# Patient Record
Sex: MALE | Race: WHITE | NOT HISPANIC OR LATINO | Employment: OTHER | URBAN - METROPOLITAN AREA
[De-identification: names, ages, dates, MRNs, and addresses within clinical notes are randomized per-mention and may not be internally consistent; named-entity substitution may affect disease eponyms.]

---

## 2020-01-13 LAB
CREAT ?TM UR-SCNC: 45.9 UMOL/L
HBA1C MFR BLD HPLC: 7.6 %
MICROALBUMIN/CREAT UR: <6.5 MG/G{CREAT}

## 2020-06-11 ENCOUNTER — OFFICE VISIT (OUTPATIENT)
Dept: FAMILY MEDICINE CLINIC | Facility: CLINIC | Age: 70
End: 2020-06-11
Payer: MEDICARE

## 2020-06-11 VITALS
HEART RATE: 76 BPM | SYSTOLIC BLOOD PRESSURE: 112 MMHG | TEMPERATURE: 98.7 F | OXYGEN SATURATION: 95 % | WEIGHT: 207 LBS | BODY MASS INDEX: 25.74 KG/M2 | HEIGHT: 75 IN | RESPIRATION RATE: 16 BRPM | DIASTOLIC BLOOD PRESSURE: 70 MMHG

## 2020-06-11 DIAGNOSIS — I35.0 AORTIC VALVE STENOSIS, ETIOLOGY OF CARDIAC VALVE DISEASE UNSPECIFIED: ICD-10-CM

## 2020-06-11 DIAGNOSIS — Z12.5 SCREENING FOR PROSTATE CANCER: ICD-10-CM

## 2020-06-11 DIAGNOSIS — E78.1 HYPERTRIGLYCERIDEMIA: ICD-10-CM

## 2020-06-11 DIAGNOSIS — Z12.11 COLON CANCER SCREENING: ICD-10-CM

## 2020-06-11 DIAGNOSIS — I25.118 CORONARY ARTERY DISEASE OF NATIVE ARTERY OF NATIVE HEART WITH STABLE ANGINA PECTORIS (HCC): ICD-10-CM

## 2020-06-11 DIAGNOSIS — E11.9 TYPE 2 DIABETES MELLITUS WITHOUT COMPLICATION, WITHOUT LONG-TERM CURRENT USE OF INSULIN (HCC): Primary | ICD-10-CM

## 2020-06-11 DIAGNOSIS — Z95.1 S/P CABG (CORONARY ARTERY BYPASS GRAFT): ICD-10-CM

## 2020-06-11 PROBLEM — I10 HTN (HYPERTENSION): Status: RESOLVED | Noted: 2019-07-26 | Resolved: 2020-06-11

## 2020-06-11 PROBLEM — N43.2 OTHER HYDROCELE: Status: ACTIVE | Noted: 2020-06-11

## 2020-06-11 PROBLEM — I10 HTN (HYPERTENSION): Status: ACTIVE | Noted: 2019-07-26

## 2020-06-11 PROCEDURE — 3074F SYST BP LT 130 MM HG: CPT | Performed by: NURSE PRACTITIONER

## 2020-06-11 PROCEDURE — 3078F DIAST BP <80 MM HG: CPT | Performed by: NURSE PRACTITIONER

## 2020-06-11 PROCEDURE — 99204 OFFICE O/P NEW MOD 45 MIN: CPT | Performed by: NURSE PRACTITIONER

## 2020-06-11 PROCEDURE — 1160F RVW MEDS BY RX/DR IN RCRD: CPT | Performed by: NURSE PRACTITIONER

## 2020-06-11 PROCEDURE — 1036F TOBACCO NON-USER: CPT | Performed by: NURSE PRACTITIONER

## 2020-06-11 RX ORDER — DULAGLUTIDE 1.5 MG/.5ML
INJECTION, SOLUTION SUBCUTANEOUS
COMMUNITY
Start: 2020-06-08 | End: 2021-02-23 | Stop reason: SDUPTHER

## 2020-06-11 RX ORDER — EMPAGLIFLOZIN 25 MG/1
TABLET, FILM COATED ORAL
COMMUNITY
Start: 2020-06-01 | End: 2020-06-12 | Stop reason: SDUPTHER

## 2020-06-11 RX ORDER — ACETAMINOPHEN 500 MG
500 TABLET ORAL
COMMUNITY

## 2020-06-11 RX ORDER — ASPIRIN 81 MG/1
81 TABLET, CHEWABLE ORAL DAILY
COMMUNITY
End: 2022-04-08 | Stop reason: HOSPADM

## 2020-06-11 RX ORDER — MULTIVIT WITH MINERALS/LUTEIN
500 TABLET ORAL 2 TIMES DAILY
COMMUNITY

## 2020-06-11 RX ORDER — OMEGA-3-ACID ETHYL ESTERS 1 G/1
2000 CAPSULE, LIQUID FILLED ORAL 2 TIMES DAILY
COMMUNITY
Start: 2019-10-24 | End: 2020-11-02

## 2020-06-11 RX ORDER — ROSUVASTATIN CALCIUM 10 MG/1
10 TABLET, COATED ORAL
COMMUNITY
Start: 2019-08-06 | End: 2020-11-02 | Stop reason: SDUPTHER

## 2020-06-11 RX ORDER — FINASTERIDE 5 MG/1
5 TABLET, FILM COATED ORAL DAILY
COMMUNITY
End: 2021-05-18 | Stop reason: SDUPTHER

## 2020-06-11 RX ORDER — CHLORTHALIDONE 25 MG/1
25 TABLET ORAL
COMMUNITY
End: 2020-11-02 | Stop reason: SDUPTHER

## 2020-06-11 RX ORDER — METOPROLOL SUCCINATE 50 MG/1
TABLET, EXTENDED RELEASE ORAL
COMMUNITY
Start: 2020-06-08 | End: 2020-11-02 | Stop reason: SDUPTHER

## 2020-06-11 RX ORDER — MELATONIN
1000 DAILY
COMMUNITY
End: 2022-02-24

## 2020-06-11 RX ORDER — NIACIN 500 MG
500 TABLET ORAL 4 TIMES DAILY
COMMUNITY

## 2020-06-12 ENCOUNTER — TELEPHONE (OUTPATIENT)
Dept: FAMILY MEDICINE CLINIC | Facility: CLINIC | Age: 70
End: 2020-06-12

## 2020-06-12 DIAGNOSIS — E11.9 TYPE 2 DIABETES MELLITUS WITHOUT COMPLICATION, WITHOUT LONG-TERM CURRENT USE OF INSULIN (HCC): Primary | ICD-10-CM

## 2020-06-12 DIAGNOSIS — E11.9 TYPE 2 DIABETES MELLITUS WITHOUT COMPLICATION, WITHOUT LONG-TERM CURRENT USE OF INSULIN (HCC): ICD-10-CM

## 2020-06-12 RX ORDER — EMPAGLIFLOZIN 25 MG/1
25 TABLET, FILM COATED ORAL DAILY
Qty: 90 TABLET | Refills: 3 | Status: SHIPPED | OUTPATIENT
Start: 2020-06-12 | End: 2020-06-12 | Stop reason: SDUPTHER

## 2020-06-12 RX ORDER — EMPAGLIFLOZIN 25 MG/1
25 TABLET, FILM COATED ORAL DAILY
Qty: 90 TABLET | Refills: 3 | Status: SHIPPED | OUTPATIENT
Start: 2020-06-12 | End: 2020-09-08 | Stop reason: SDUPTHER

## 2020-07-06 ENCOUNTER — OFFICE VISIT (OUTPATIENT)
Dept: PODIATRY | Facility: CLINIC | Age: 70
End: 2020-07-06
Payer: MEDICARE

## 2020-07-06 VITALS
SYSTOLIC BLOOD PRESSURE: 125 MMHG | TEMPERATURE: 98.3 F | WEIGHT: 207 LBS | HEART RATE: 66 BPM | BODY MASS INDEX: 25.74 KG/M2 | DIASTOLIC BLOOD PRESSURE: 79 MMHG | HEIGHT: 75 IN

## 2020-07-06 DIAGNOSIS — M20.42 HAMMER TOES OF BOTH FEET: ICD-10-CM

## 2020-07-06 DIAGNOSIS — E11.42 DIABETIC POLYNEUROPATHY ASSOCIATED WITH TYPE 2 DIABETES MELLITUS (HCC): Primary | ICD-10-CM

## 2020-07-06 DIAGNOSIS — M20.41 HAMMER TOES OF BOTH FEET: ICD-10-CM

## 2020-07-06 PROCEDURE — 99203 OFFICE O/P NEW LOW 30 MIN: CPT | Performed by: PODIATRIST

## 2020-07-06 PROCEDURE — 1160F RVW MEDS BY RX/DR IN RCRD: CPT | Performed by: PODIATRIST

## 2020-07-06 PROCEDURE — 1036F TOBACCO NON-USER: CPT | Performed by: PODIATRIST

## 2020-07-06 PROCEDURE — 3008F BODY MASS INDEX DOCD: CPT | Performed by: PODIATRIST

## 2020-07-06 PROCEDURE — 3074F SYST BP LT 130 MM HG: CPT | Performed by: PODIATRIST

## 2020-07-06 PROCEDURE — 3078F DIAST BP <80 MM HG: CPT | Performed by: PODIATRIST

## 2020-07-06 RX ORDER — AMOXICILLIN 500 MG/1
TABLET, FILM COATED ORAL AS NEEDED
COMMUNITY
Start: 2020-06-22 | End: 2021-02-23

## 2020-07-06 NOTE — PROGRESS NOTES
Assessment/Plan:     Diagnoses and all orders for this visit:    Diabetic polyneuropathy associated with type 2 diabetes mellitus ([address] )    Hammer toes of both feet  Comments:  no acute concerns, soft shoes, monitor for wounds  Other orders  -     amoxicillin (AMOXIL) 500 MG tablet; as needed for other      -Educated on DM risk to lower extremities, proper shoe gear, and daily monitoring of feet    -Educated on A1C and the risks of poorly controlled Diabetes   -Discussed weight loss and suitable exercise regiment  - DM foot risk is moderate due to deformity, neuropathy  At risk foot care every 10-12 weeks  (Q9)    Subjective:      Patient ID: Maria Isabel Ch is a 65 y o  male  Patient presents for DM foot exam  He recently moved to the area  HE had been going to a podiatrist regularly for his feet  His A1C is 7 4  He has DM 2 for 15 years  He says he has "broderline" neuropathy  HE gets pins and needles in his feet, stronger at night  He takes folic acid which seems to help  His nails are thick and discolored, his old podiatrist usually derbides the nails  PMH significant for CABG, DM2, AVR bovine  The following portions of the patient's history were reviewed and updated as appropriate: allergies, current medications, past family history, past medical history, past social history, past surgical history and problem list     Review of Systems   Constitutional: Negative  HENT: Negative for sinus pressure and sinus pain  Respiratory: Negative for cough and shortness of breath  Cardiovascular: Negative for leg swelling  Gastrointestinal: Negative for diarrhea and nausea  Musculoskeletal: Negative for arthralgias and gait problem  Skin: Positive for color change  Negative for wound  Neurological: Positive for numbness           Objective:      /79   Pulse 66   Temp 98 3 °F (36 8 °C) (Tympanic)   Ht 6' 3" (1 905 m)   Wt 93 9 kg (207 lb)   BMI 25 87 kg/m²          Physical Exam Constitutional: He is oriented to person, place, and time  He appears well-developed  No distress  Cardiovascular: Intact distal pulses  Pulses are no weak pulses  Pulses:       Dorsalis pedis pulses are 2+ on the right side, and 2+ on the left side  Posterior tibial pulses are 2+ on the right side, and 2+ on the left side  Pulmonary/Chest: Effort normal  No respiratory distress  Musculoskeletal: He exhibits edema (1 LE edema B/L, no pitting) and deformity (hammertoes)  He exhibits no tenderness  Right foot: There is deformity  Left foot: There is deformity  Feet:    Feet:   Right Foot:   Protective Sensation: 10 sites tested  7 sites sensed  Skin Integrity: Positive for dry skin  Negative for ulcer, blister, skin breakdown, erythema or callus  Left Foot:   Protective Sensation: 10 sites tested  7 sites sensed  Skin Integrity: Positive for dry skin  Negative for ulcer, blister, skin breakdown, erythema or callus  Neurological: He is alert and oriented to person, place, and time  A sensory deficit is present  Coordination normal    Skin: Skin is warm  Capillary refill takes less than 2 seconds  No rash noted  He is not diaphoretic  Psychiatric: He has a normal mood and affect  Vitals reviewed  Diabetic Foot Exam    Patient's shoes and socks removed  Right Foot/Ankle   Right Foot Inspection  Skin Exam: skin intact, dry skin and abnormal color no blister, no callus, no erythema, no maceration, no ulcer and no callus                          Toe Exam: right toe deformity  Sensory   Vibration: diminished  Proprioception: intact   Monofilament testing: diminished  Vascular  Capillary refills: < 3 seconds  The right DP pulse is 2+  The right PT pulse is 2+     Right Toe  - Comprehensive Exam  Hammertoes: second toe and third toe    Left Foot/Ankle  Left Foot Inspection  Skin Exam: skin intact, dry skin and abnormal color (nails thick yellow, subungual debris, hypertorphy x9)no erythema, no maceration, no ulcer and no callus                         Toe Exam: left toe deformity                   Sensory   Vibration: diminished  Proprioception: intact  Monofilament: diminished  Vascular  Capillary refills: < 3 seconds  The left DP pulse is 2+  The left PT pulse is 2+  Left Toe  - Comprehensive Exam  Hammertoes: second toe and third toe  Assign Risk Category:  Deformity present; Loss of protective sensation;  No weak pulses       Risk: 2

## 2020-07-06 NOTE — PATIENT INSTRUCTIONS
Foot Care for People with Diabetes   WHAT YOU NEED TO KNOW:   · Foot care helps protect your feet and prevent foot ulcers or sores  Long-term high blood sugar levels can damage the blood vessels and nerves in your legs and feet  This damage makes it hard to feel pressure, pain, temperature, and touch  You may not be able to feel a cut or sore, or shoes that are too tight  Foot care is needed to prevent serious problems, such as an infection or amputation  · Diabetes may cause your toes to become crooked or curved under  These changes may affect the way you walk and can lead to increased pressure on your foot  The pressure can decrease blood flow to your feet  Lack of blood flow increases your risk for a foot ulcer  Do not ignore small problems, such as dry skin or small wounds  These can become life-threatening over time without proper care  DISCHARGE INSTRUCTIONS:   Contact your healthcare provider if:   · Your feet become numb, weak, or hard to move  · You have pus draining from a sore on your foot  · You have a wound on your foot that gets bigger, deeper, or does not heal      · You see blisters, cuts, scratches, calluses, or sores on your foot  · You have a fever, and your feet become red, warm, and swollen  · Your toenails become thick, curled, or yellow  · You find it hard to check your feet because your vision is poor  · You have questions or concerns about your condition or care  Foot care:   · Check your feet each day  Look at your whole foot, including the bottom, and between and under your toes  Check for wounds, corns, and calluses  Use a mirror to see the bottom of your feet  The skin on your feet may be shiny, tight, or darker than normal  Your feet may also be cold and pale  Feel your feet by running your hands along the tops, bottoms, sides, and between your toes  Redness, swelling, and warmth are signs of blood flow problems that can lead to a foot ulcer   Do not try to remove corns or calluses yourself  · Wash your feet each day with soap and warm water  Do not use hot water, because this can injure your foot  Dry your feet gently with a towel after you wash them  Dry between and under your toes  · Apply lotion or a moisturizer on your dry feet  Ask your healthcare provider what lotions are best to use  Do not put lotion or moisturizer between your toes  · Cut your toenails correctly  File or cut your toenails straight across  Use a soft brush to clean around your toenails  If your toenails are very thick, you may need to have a healthcare provider or specialist cut them  · Protect your feet  Do not walk barefoot or wear your shoes without socks  Check your shoes for rocks or other objects that can hurt your feet  Wear cotton socks to help keep your feet dry  Wear socks without toe seams, or wear them with the seams inside out  Change your socks each day  Do not wear socks that are dirty or damp  · Wear shoes that fit well  Wear shoes that do not rub against any area of your feet  Your shoes should be ½ to ¾ inch (1 to 2 centimeters) longer than your feet  Your shoes should also have extra space around the widest part of your feet  Walking or athletic shoes with laces or straps that adjust are best  Ask your healthcare provider for help to choose shoes that fit you best  Ask him if you need to wear an insert, orthotic, or bandage on your feet  Follow up with your healthcare provider or foot specialist as directed: You will need to have your feet checked at least once a year  You may need a foot exam more often if you have nerve damage, foot deformities, or ulcers  Write down your questions so you remember to ask them during your visits  © 2017 [address] [employer] Information is for End User's use only and may not be sold, redistributed or otherwise used for commercial purposes   All illustrations and images included in CareNotes® are the copyrighted property of [employer]  or [facility]  The above information is an  only  It is not intended as medical advice for individual conditions or treatments  Talk to your doctor, nurse or pharmacist before following any medical regimen to see if it is safe and effective for you

## 2020-07-20 LAB
CREAT ?TM UR-SCNC: 0.2 UMOL/L
EXT MICROALBUMIN URINE RANDOM: 49
HBA1C MFR BLD HPLC: 7.4 %
MICROALBUMIN/CREAT UR: 4 MG/G{CREAT}

## 2020-08-11 ENCOUNTER — TELEPHONE (OUTPATIENT)
Dept: FAMILY MEDICINE CLINIC | Facility: CLINIC | Age: 70
End: 2020-08-11

## 2020-08-11 NOTE — TELEPHONE ENCOUNTER
Patient called and wanted to know if Patty Couch can put in an order for a urologist        Patient wanted to know if we have more samples of the 1 5 mg trulicity   Please advise

## 2020-08-12 DIAGNOSIS — H25.9 SENILE CATARACT, UNSPECIFIED AGE-RELATED CATARACT TYPE, UNSPECIFIED LATERALITY: ICD-10-CM

## 2020-08-12 DIAGNOSIS — N43.2 OTHER HYDROCELE: Primary | ICD-10-CM

## 2020-08-12 NOTE — TELEPHONE ENCOUNTER
I sent referral for urology  You can give him name and number    Ask rony, we have tons of trulicity  Please give him some

## 2020-09-08 DIAGNOSIS — E11.9 TYPE 2 DIABETES MELLITUS WITHOUT COMPLICATION, WITHOUT LONG-TERM CURRENT USE OF INSULIN (HCC): ICD-10-CM

## 2020-09-08 RX ORDER — EMPAGLIFLOZIN 25 MG/1
25 TABLET, FILM COATED ORAL DAILY
Qty: 30 TABLET | Refills: 1 | Status: SHIPPED | OUTPATIENT
Start: 2020-09-08 | End: 2021-02-23 | Stop reason: SDUPTHER

## 2020-09-18 ENCOUNTER — TELEPHONE (OUTPATIENT)
Dept: FAMILY MEDICINE CLINIC | Facility: CLINIC | Age: 70
End: 2020-09-18

## 2020-09-18 DIAGNOSIS — R00.2 PALPITATIONS: ICD-10-CM

## 2020-09-18 DIAGNOSIS — Z95.1 S/P CABG (CORONARY ARTERY BYPASS GRAFT): Primary | ICD-10-CM

## 2020-09-18 NOTE — TELEPHONE ENCOUNTER
Jus can't see the Cardiologist until 11/02/20 & they would like him to wear a Holter Monitor since he has been having palpitations @ night  They asked if Lily Ram could order it & Atif Rojo said that she could  Please let the patient know once the order is in so they can schedule it  Thank you!

## 2020-09-21 ENCOUNTER — TELEPHONE (OUTPATIENT)
Dept: UROLOGY | Facility: MEDICAL CENTER | Age: 70
End: 2020-09-21

## 2020-09-22 ENCOUNTER — CONSULT (OUTPATIENT)
Dept: ENDOCRINOLOGY | Facility: CLINIC | Age: 70
End: 2020-09-22
Payer: MEDICARE

## 2020-09-22 VITALS
SYSTOLIC BLOOD PRESSURE: 118 MMHG | BODY MASS INDEX: 26.18 KG/M2 | HEIGHT: 74 IN | HEART RATE: 66 BPM | TEMPERATURE: 97 F | DIASTOLIC BLOOD PRESSURE: 60 MMHG | WEIGHT: 204 LBS

## 2020-09-22 DIAGNOSIS — E11.65 TYPE 2 DIABETES MELLITUS WITH HYPERGLYCEMIA, WITHOUT LONG-TERM CURRENT USE OF INSULIN (HCC): Primary | ICD-10-CM

## 2020-09-22 DIAGNOSIS — I10 ESSENTIAL HYPERTENSION: ICD-10-CM

## 2020-09-22 DIAGNOSIS — E78.2 MIXED HYPERLIPIDEMIA: ICD-10-CM

## 2020-09-22 PROCEDURE — 99204 OFFICE O/P NEW MOD 45 MIN: CPT | Performed by: INTERNAL MEDICINE

## 2020-09-22 NOTE — PROGRESS NOTES
Bernadette Pina 65 y o  male MRN: [id number]    Encounter: [id number]      Assessment/Plan     Assessment: This is a 65y o -year-old male with diabetes with hyperglycemia  Plan:    Diagnoses and all orders for this visit:    Type 2 diabetes mellitus with hyperglycemia, without long-term current use of insulin ([facility] )  Lab Results   Component Value Date    HGBA1C 7 4 07/20/2020    A1c 7 4%, goal is 7 0%  Fasting blood sugars are usually elevated in 150-160 range, most likely from eating snack at bedtime, such as Grapes and ice cream   Discussed with patient to avoid high carbohydrate snack at bedtime, in switched to more protein snacks  Also offered appointment with nutrition therapy however patient declined  Will continue current regimen  Discussed to take metformin 1000 mg with breakfast and 1000 mg with dinner, continue Jardiance and Trulicity     -     Ambulatory referral to Endocrinology  -     Basic metabolic panel; Future  -     Hemoglobin A1C; Future    Mixed hyperlipidemia  Continue statins as per Cardiology, patient is also taking fish oil and Niacin     Essential hypertension    Blood pressure well controlled  Continue current management      CC: Diabetes    History of Present Illness     HPI:    Bernadette Pina is 68-year-old gentleman with medical history of type 2 diabetes managed on oral medication, coronary artery disease, status post CABG in 2019, hyperlipidemia, hypertension history of aortic stenosis status post surgery is here for establishment of visit for type 2 diabetes management  Currently he is taking metformin 500 mg 4 times daily,,Jardiance 25 mg daily, trulicity 1 5 mg once a week   For hyperlipidemia he takes Crestor 10 mg daily, Lovaza 2000 mg twice daily  For hypertension his currently taking metoprolol XL 50 mg daily, aspirin 81 mg daily, chlorthalidone 25 mg daily  He also has vitamin-D deficiency and currently taking vitamin-D 3 1000 International Units daily      He checks blood sugars once or twice daily  His blood sugars are usually ranging in 100-200 range  He visits Ophthalmology on yearly basis, denies retinopathy  He denies diabetic neuropathy, follows with Podiatry  His last A1c is  Lab Results   Component Value Date    HGBA1C 7 4 07/20/2020          Review of Systems   Constitutional: Negative for activity change, diaphoresis, fatigue, fever and unexpected weight change  HENT: Negative  Eyes: Negative for visual disturbance  Respiratory: Negative for cough, chest tightness and shortness of breath  Cardiovascular: Negative for chest pain, palpitations and leg swelling  Gastrointestinal: Negative for abdominal pain, blood in stool, constipation, diarrhea, nausea and vomiting  Endocrine: Negative for cold intolerance, heat intolerance, polydipsia, polyphagia and polyuria  Genitourinary: Negative for dysuria, enuresis, frequency and urgency  Musculoskeletal: Negative for arthralgias and myalgias  Skin: Negative for pallor, rash and wound  Allergic/Immunologic: Negative  Neurological: Negative for dizziness, tremors, weakness and numbness  Hematological: Negative  Psychiatric/Behavioral: Negative          Historical Information   Past Medical History:   Diagnosis Date    Diabetes mellitus ([facility] )     TMJ (dislocation of temporomandibular joint)      Past Surgical History:   Procedure Laterality Date    CARDIAC SURGERY      EYE SURGERY  03/2020    Cataract right eye    TONSILLECTOMY       Social History   Social History     Substance and Sexual Activity   Alcohol Use Yes    Frequency: 2-4 times a month     Social History     Substance and Sexual Activity   Drug Use Never     Social History     Tobacco Use   Smoking Status Never Smoker   Smokeless Tobacco Never Used     Family History:   Family History   Problem Relation Age of Onset    Heart disease Mother     Cancer Mother     Heart disease Father     Hypertension Father Meds/Allergies   Current Outpatient Medications   Medication Sig Dispense Refill    acetaminophen (TYLENOL) 500 mg tablet Take 500 mg by mouth daily at bedtime      ascorbic acid (VITAMIN C) 250 mg tablet Take 500 mg by mouth 2 (two) times a day      aspirin 81 mg chewable tablet Chew 81 mg daily      chlorthalidone 25 mg tablet Take 25 mg by mouth      cholecalciferol (VITAMIN D3) 1,000 units tablet Take 1,000 Units by mouth daily      finasteride (PROSCAR) 5 mg tablet Take 5 mg by mouth daily      Jardiance 25 MG TABS Take 1 tablet (25 mg total) by mouth daily 30 tablet 1    metFORMIN (GLUCOPHAGE) 500 mg tablet Take 1,000 mg by mouth 2 (two) times a day      metoprolol succinate (TOPROL-XL) 50 mg 24 hr tablet       niacin 500 mg tablet Take 500 mg by mouth 4 (four) times a day      omega-3-acid ethyl esters (Lovaza) 1 g capsule Take 2,000 mg by mouth 2 (two) times a day      PROBIOTIC PRODUCT PO Take 111 mg by mouth daily      rosuvastatin (CRESTOR) 10 MG tablet Take 10 mg by mouth      TRULICITY 1 5 ZX/5 0ZZ SOPN       amoxicillin (AMOXIL) 500 MG tablet as needed for other      NON FORMULARY        No current facility-administered medications for this visit  Allergies   Allergen Reactions    Statins      Other reaction(s): Muscle Cramps        Objective   Vitals: Blood pressure 118/60, pulse 66, temperature (!) 97 °F (36 1 °C), height 6' 2" (1 88 m), weight 92 5 kg (204 lb)  Physical Exam  Vitals signs reviewed  Constitutional:       General: He is not in acute distress  Appearance: He is well-developed  HENT:      Head: Normocephalic and atraumatic  Eyes:      Pupils: Pupils are equal, round, and reactive to light  Neck:      Musculoskeletal: Normal range of motion and neck supple  Thyroid: No thyromegaly  Cardiovascular:      Rate and Rhythm: Normal rate and regular rhythm  Heart sounds: Normal heart sounds     Pulmonary:      Effort: Pulmonary effort is normal  No respiratory distress  Breath sounds: Normal breath sounds  Musculoskeletal: Normal range of motion  General: No deformity  Skin:     General: Skin is warm and dry  Findings: No erythema  Neurological:      General: No focal deficit present  Mental Status: He is alert and oriented to person, place, and time  Psychiatric:         Mood and Affect: Mood normal          Behavior: Behavior normal          The history was obtained from the review of the chart, patient  Lab Results:   Lab Results   Component Value Date/Time    Hemoglobin A1C 7 4 07/20/2020           Imaging Studies: I have personally reviewed pertinent reports  Portions of the record may have been created with voice recognition software  Occasional wrong word or "sound a like" substitutions may have occurred due to the inherent limitations of voice recognition software  Read the chart carefully and recognize, using context, where substitutions have occurred

## 2020-10-05 ENCOUNTER — OFFICE VISIT (OUTPATIENT)
Dept: PODIATRY | Facility: CLINIC | Age: 70
End: 2020-10-05
Payer: MEDICARE

## 2020-10-05 VITALS
DIASTOLIC BLOOD PRESSURE: 80 MMHG | HEART RATE: 72 BPM | HEIGHT: 75 IN | TEMPERATURE: 96.8 F | SYSTOLIC BLOOD PRESSURE: 123 MMHG | WEIGHT: 205 LBS | BODY MASS INDEX: 25.49 KG/M2

## 2020-10-05 DIAGNOSIS — E11.42 DIABETIC POLYNEUROPATHY ASSOCIATED WITH TYPE 2 DIABETES MELLITUS (HCC): Primary | ICD-10-CM

## 2020-10-05 DIAGNOSIS — B35.1 ONYCHOMYCOSIS: ICD-10-CM

## 2020-10-05 PROCEDURE — 11721 DEBRIDE NAIL 6 OR MORE: CPT | Performed by: PODIATRIST

## 2020-10-07 ENCOUNTER — TRANSCRIBE ORDERS (OUTPATIENT)
Dept: LAB | Facility: AMBULARY SURGERY CENTER | Age: 70
End: 2020-10-07

## 2020-10-07 ENCOUNTER — APPOINTMENT (OUTPATIENT)
Dept: LAB | Facility: AMBULARY SURGERY CENTER | Age: 70
End: 2020-10-07
Payer: MEDICARE

## 2020-10-07 DIAGNOSIS — E11.9 DIABETES MELLITUS WITHOUT COMPLICATION (HCC): ICD-10-CM

## 2020-10-07 DIAGNOSIS — E11.9 DIABETES MELLITUS WITHOUT COMPLICATION (HCC): Primary | ICD-10-CM

## 2020-10-07 LAB
ANION GAP SERPL CALCULATED.3IONS-SCNC: 5 MMOL/L (ref 4–13)
BUN SERPL-MCNC: 24 MG/DL (ref 5–25)
CALCIUM SERPL-MCNC: 10.1 MG/DL (ref 8.3–10.1)
CHLORIDE SERPL-SCNC: 103 MMOL/L (ref 100–108)
CO2 SERPL-SCNC: 31 MMOL/L (ref 21–32)
CREAT SERPL-MCNC: 1.12 MG/DL (ref 0.6–1.3)
GFR SERPL CREATININE-BSD FRML MDRD: 65 ML/MIN/1.73SQ M
GLUCOSE P FAST SERPL-MCNC: 163 MG/DL (ref 65–99)
POTASSIUM SERPL-SCNC: 4 MMOL/L (ref 3.5–5.3)
SODIUM SERPL-SCNC: 139 MMOL/L (ref 136–145)

## 2020-10-07 PROCEDURE — 36415 COLL VENOUS BLD VENIPUNCTURE: CPT

## 2020-10-07 PROCEDURE — 80048 BASIC METABOLIC PNL TOTAL CA: CPT

## 2020-10-12 ENCOUNTER — HOSPITAL ENCOUNTER (OUTPATIENT)
Dept: NON INVASIVE DIAGNOSTICS | Facility: CLINIC | Age: 70
Discharge: HOME | End: 2020-10-12
Payer: MEDICARE

## 2020-10-12 DIAGNOSIS — R00.2 PALPITATIONS: ICD-10-CM

## 2020-10-12 DIAGNOSIS — Z95.1 S/P CABG (CORONARY ARTERY BYPASS GRAFT): ICD-10-CM

## 2020-10-12 PROCEDURE — 93226 XTRNL ECG REC<48 HR SCAN A/R: CPT

## 2020-10-12 PROCEDURE — 93225 XTRNL ECG REC<48 HRS REC: CPT

## 2020-10-13 ENCOUNTER — OFFICE VISIT (OUTPATIENT)
Dept: FAMILY MEDICINE CLINIC | Facility: CLINIC | Age: 70
End: 2020-10-13
Payer: MEDICARE

## 2020-10-13 VITALS
WEIGHT: 206.4 LBS | HEIGHT: 75 IN | SYSTOLIC BLOOD PRESSURE: 129 MMHG | BODY MASS INDEX: 25.66 KG/M2 | OXYGEN SATURATION: 98 % | HEART RATE: 69 BPM | DIASTOLIC BLOOD PRESSURE: 68 MMHG | TEMPERATURE: 96 F

## 2020-10-13 DIAGNOSIS — I25.118 CORONARY ARTERY DISEASE OF NATIVE ARTERY OF NATIVE HEART WITH STABLE ANGINA PECTORIS (HCC): ICD-10-CM

## 2020-10-13 DIAGNOSIS — Z00.00 MEDICARE ANNUAL WELLNESS VISIT, SUBSEQUENT: Primary | ICD-10-CM

## 2020-10-13 DIAGNOSIS — Z95.1 S/P CABG (CORONARY ARTERY BYPASS GRAFT): ICD-10-CM

## 2020-10-13 DIAGNOSIS — E11.65 TYPE 2 DIABETES MELLITUS WITH HYPERGLYCEMIA, WITHOUT LONG-TERM CURRENT USE OF INSULIN (HCC): ICD-10-CM

## 2020-10-13 DIAGNOSIS — I35.0 AORTIC VALVE STENOSIS, ETIOLOGY OF CARDIAC VALVE DISEASE UNSPECIFIED: ICD-10-CM

## 2020-10-13 DIAGNOSIS — E78.1 HYPERTRIGLYCERIDEMIA: ICD-10-CM

## 2020-10-13 DIAGNOSIS — Z01.818 PREOP EXAMINATION: ICD-10-CM

## 2020-10-13 PROCEDURE — 99214 OFFICE O/P EST MOD 30 MIN: CPT | Performed by: NURSE PRACTITIONER

## 2020-10-13 PROCEDURE — 93000 ELECTROCARDIOGRAM COMPLETE: CPT | Performed by: NURSE PRACTITIONER

## 2020-10-13 PROCEDURE — G0438 PPPS, INITIAL VISIT: HCPCS | Performed by: NURSE PRACTITIONER

## 2020-10-13 RX ORDER — SEMAGLUTIDE 1.34 MG/ML
INJECTION, SOLUTION SUBCUTANEOUS
COMMUNITY
End: 2020-11-02

## 2020-10-13 RX ORDER — WARFARIN SODIUM 4 MG/1
TABLET ORAL
COMMUNITY
End: 2020-11-02

## 2020-10-13 RX ORDER — FUROSEMIDE 80 MG
TABLET ORAL
COMMUNITY
End: 2020-11-02

## 2020-10-13 RX ORDER — AMIODARONE HYDROCHLORIDE 200 MG/1
TABLET ORAL
COMMUNITY
End: 2020-11-02

## 2020-10-15 PROCEDURE — 93227 XTRNL ECG REC<48 HR R&I: CPT | Performed by: INTERNAL MEDICINE

## 2020-10-29 ENCOUNTER — TELEPHONE (OUTPATIENT)
Dept: CARDIOLOGY CLINIC | Facility: CLINIC | Age: 70
End: 2020-10-29

## 2020-11-02 ENCOUNTER — OFFICE VISIT (OUTPATIENT)
Dept: CARDIOLOGY CLINIC | Facility: CLINIC | Age: 70
End: 2020-11-02
Payer: MEDICARE

## 2020-11-02 VITALS
OXYGEN SATURATION: 96 % | SYSTOLIC BLOOD PRESSURE: 120 MMHG | HEART RATE: 73 BPM | BODY MASS INDEX: 25.49 KG/M2 | DIASTOLIC BLOOD PRESSURE: 70 MMHG | HEIGHT: 75 IN | WEIGHT: 205 LBS

## 2020-11-02 DIAGNOSIS — I35.0 NONRHEUMATIC AORTIC VALVE STENOSIS: ICD-10-CM

## 2020-11-02 DIAGNOSIS — I65.23 BILATERAL CAROTID ARTERY STENOSIS: ICD-10-CM

## 2020-11-02 DIAGNOSIS — Z95.2 S/P AVR: ICD-10-CM

## 2020-11-02 DIAGNOSIS — I25.118 CORONARY ARTERY DISEASE OF NATIVE ARTERY OF NATIVE HEART WITH STABLE ANGINA PECTORIS (HCC): Primary | ICD-10-CM

## 2020-11-02 DIAGNOSIS — I49.3 PVC (PREMATURE VENTRICULAR CONTRACTION): ICD-10-CM

## 2020-11-02 DIAGNOSIS — Z95.1 S/P CABG (CORONARY ARTERY BYPASS GRAFT): ICD-10-CM

## 2020-11-02 PROCEDURE — 93000 ELECTROCARDIOGRAM COMPLETE: CPT | Performed by: INTERNAL MEDICINE

## 2020-11-02 PROCEDURE — 99205 OFFICE O/P NEW HI 60 MIN: CPT | Performed by: INTERNAL MEDICINE

## 2020-11-02 RX ORDER — CHLORTHALIDONE 25 MG/1
25 TABLET ORAL DAILY
Qty: 90 TABLET | Refills: 3 | Status: SHIPPED | OUTPATIENT
Start: 2020-11-02 | End: 2021-04-30 | Stop reason: SDUPTHER

## 2020-11-02 RX ORDER — ROSUVASTATIN CALCIUM 10 MG/1
10 TABLET, COATED ORAL DAILY
Qty: 90 TABLET | Refills: 3 | Status: SHIPPED | OUTPATIENT
Start: 2020-11-02 | End: 2021-12-27 | Stop reason: SDUPTHER

## 2020-11-02 RX ORDER — METOPROLOL SUCCINATE 50 MG/1
50 TABLET, EXTENDED RELEASE ORAL DAILY
Qty: 90 TABLET | Refills: 3 | Status: SHIPPED | OUTPATIENT
Start: 2020-11-02 | End: 2021-11-08 | Stop reason: SDUPTHER

## 2020-11-17 ENCOUNTER — LAB (OUTPATIENT)
Dept: LAB | Facility: AMBULARY SURGERY CENTER | Age: 70
End: 2020-11-17
Payer: MEDICARE

## 2020-11-17 DIAGNOSIS — E11.9 DIABETES MELLITUS WITHOUT COMPLICATION (HCC): Primary | ICD-10-CM

## 2020-11-17 DIAGNOSIS — E11.65 TYPE 2 DIABETES MELLITUS WITH HYPERGLYCEMIA, WITHOUT LONG-TERM CURRENT USE OF INSULIN (HCC): ICD-10-CM

## 2020-11-17 LAB
ANION GAP SERPL CALCULATED.3IONS-SCNC: 3 MMOL/L (ref 4–13)
BUN SERPL-MCNC: 26 MG/DL (ref 5–25)
CALCIUM SERPL-MCNC: 9.9 MG/DL (ref 8.3–10.1)
CHLORIDE SERPL-SCNC: 106 MMOL/L (ref 100–108)
CO2 SERPL-SCNC: 31 MMOL/L (ref 21–32)
CREAT SERPL-MCNC: 1.13 MG/DL (ref 0.6–1.3)
EST. AVERAGE GLUCOSE BLD GHB EST-MCNC: 151 MG/DL
GFR SERPL CREATININE-BSD FRML MDRD: 65 ML/MIN/1.73SQ M
GLUCOSE P FAST SERPL-MCNC: 158 MG/DL (ref 65–99)
HBA1C MFR BLD: 6.9 %
POTASSIUM SERPL-SCNC: 3.9 MMOL/L (ref 3.5–5.3)
SODIUM SERPL-SCNC: 140 MMOL/L (ref 136–145)

## 2020-11-17 PROCEDURE — 83036 HEMOGLOBIN GLYCOSYLATED A1C: CPT

## 2020-11-17 PROCEDURE — 36415 COLL VENOUS BLD VENIPUNCTURE: CPT

## 2020-11-17 PROCEDURE — 80048 BASIC METABOLIC PNL TOTAL CA: CPT

## 2020-11-23 ENCOUNTER — OFFICE VISIT (OUTPATIENT)
Dept: ENDOCRINOLOGY | Facility: CLINIC | Age: 70
End: 2020-11-23
Payer: MEDICARE

## 2020-11-23 ENCOUNTER — OFFICE VISIT (OUTPATIENT)
Dept: FAMILY MEDICINE CLINIC | Facility: CLINIC | Age: 70
End: 2020-11-23
Payer: MEDICARE

## 2020-11-23 VITALS
TEMPERATURE: 96.6 F | WEIGHT: 205 LBS | HEIGHT: 75 IN | HEART RATE: 72 BPM | BODY MASS INDEX: 25.49 KG/M2 | DIASTOLIC BLOOD PRESSURE: 80 MMHG | SYSTOLIC BLOOD PRESSURE: 116 MMHG

## 2020-11-23 VITALS
OXYGEN SATURATION: 97 % | WEIGHT: 202 LBS | HEART RATE: 70 BPM | HEIGHT: 75 IN | BODY MASS INDEX: 25.12 KG/M2 | TEMPERATURE: 97 F | DIASTOLIC BLOOD PRESSURE: 62 MMHG | RESPIRATION RATE: 18 BRPM | SYSTOLIC BLOOD PRESSURE: 100 MMHG

## 2020-11-23 DIAGNOSIS — I25.118 CORONARY ARTERY DISEASE OF NATIVE ARTERY OF NATIVE HEART WITH STABLE ANGINA PECTORIS (HCC): ICD-10-CM

## 2020-11-23 DIAGNOSIS — I35.0 NONRHEUMATIC AORTIC VALVE STENOSIS: ICD-10-CM

## 2020-11-23 DIAGNOSIS — E11.65 TYPE 2 DIABETES MELLITUS WITH HYPERGLYCEMIA, WITHOUT LONG-TERM CURRENT USE OF INSULIN (HCC): Primary | ICD-10-CM

## 2020-11-23 DIAGNOSIS — I10 ESSENTIAL HYPERTENSION: ICD-10-CM

## 2020-11-23 DIAGNOSIS — E78.2 MIXED HYPERLIPIDEMIA: ICD-10-CM

## 2020-11-23 DIAGNOSIS — Z01.818 PREOP EXAMINATION: Primary | ICD-10-CM

## 2020-11-23 DIAGNOSIS — H25.9 SENILE CATARACT OF LEFT EYE, UNSPECIFIED AGE-RELATED CATARACT TYPE: ICD-10-CM

## 2020-11-23 DIAGNOSIS — E11.65 TYPE 2 DIABETES MELLITUS WITH HYPERGLYCEMIA, WITHOUT LONG-TERM CURRENT USE OF INSULIN (HCC): ICD-10-CM

## 2020-11-23 DIAGNOSIS — I65.23 BILATERAL CAROTID ARTERY STENOSIS: ICD-10-CM

## 2020-11-23 PROCEDURE — 99214 OFFICE O/P EST MOD 30 MIN: CPT | Performed by: PHYSICIAN ASSISTANT

## 2020-11-23 PROCEDURE — 99214 OFFICE O/P EST MOD 30 MIN: CPT | Performed by: NURSE PRACTITIONER

## 2020-11-23 RX ORDER — MULTIVITAMIN
1 TABLET ORAL DAILY
COMMUNITY
End: 2021-02-25

## 2020-12-16 ENCOUNTER — CONSULT (OUTPATIENT)
Dept: UROLOGY | Facility: CLINIC | Age: 70
End: 2020-12-16
Payer: MEDICARE

## 2020-12-16 VITALS
HEART RATE: 70 BPM | HEIGHT: 75 IN | DIASTOLIC BLOOD PRESSURE: 79 MMHG | BODY MASS INDEX: 24.99 KG/M2 | WEIGHT: 201 LBS | SYSTOLIC BLOOD PRESSURE: 120 MMHG

## 2020-12-16 DIAGNOSIS — N43.2 OTHER HYDROCELE: ICD-10-CM

## 2020-12-16 PROCEDURE — 99204 OFFICE O/P NEW MOD 45 MIN: CPT | Performed by: NURSE PRACTITIONER

## 2020-12-18 ENCOUNTER — TELEPHONE (OUTPATIENT)
Dept: FAMILY MEDICINE CLINIC | Facility: CLINIC | Age: 70
End: 2020-12-18

## 2021-01-04 ENCOUNTER — OFFICE VISIT (OUTPATIENT)
Dept: PODIATRY | Facility: CLINIC | Age: 71
End: 2021-01-04
Payer: MEDICARE

## 2021-01-04 VITALS
BODY MASS INDEX: 25.86 KG/M2 | HEART RATE: 75 BPM | HEIGHT: 75 IN | SYSTOLIC BLOOD PRESSURE: 115 MMHG | DIASTOLIC BLOOD PRESSURE: 71 MMHG | WEIGHT: 208 LBS

## 2021-01-04 DIAGNOSIS — B35.1 ONYCHOMYCOSIS: ICD-10-CM

## 2021-01-04 DIAGNOSIS — S90.219A SUBUNGUAL HEMATOMA OF GREAT TOE: ICD-10-CM

## 2021-01-04 DIAGNOSIS — E11.42 DIABETIC POLYNEUROPATHY ASSOCIATED WITH TYPE 2 DIABETES MELLITUS (HCC): Primary | ICD-10-CM

## 2021-01-04 PROCEDURE — 11721 DEBRIDE NAIL 6 OR MORE: CPT | Performed by: PODIATRIST

## 2021-01-04 NOTE — PROGRESS NOTES
Joey Carrasquillo  1950  AT RISK FOOT CARE    1  Diabetic polyneuropathy associated with type 2 [facility]     2  Onychomycosis         Patient presents for at-risk foot care  Patient has no acute concerns today  Patient has significant lower extremity risk due to neuropathy, parasthesia, edema, and trophic skin changes to the lower extremity  On exam patient has thickened, hypertrophic, discolored, brittle toenails with subungual debris and tenderness x10   Patient has callus on none  Patient has lower extremity edema  PAtients skin is atrophic, thickened nails, and decreased pedal hair  Patient has decreased pinprick and vibratory sensation to his feet and parasthesia    Today's treatment includes:  Debridement of toenails  Using nail nipper, an, and curette, nails were sharply debrided, reduced in thickness and length  Devitalized nail tissue and fungal debris excised and removed  Patient tolerated well  Discussed proper shoe gear, daily inspections of feet, and general foot health with patient  Patient has Q9  findings and is recommended for at risk foot care every 9-10 weeks      Patients most recent complete clinical foot exam was on: 7/6/2020

## 2021-01-05 ENCOUNTER — HOSPITAL ENCOUNTER (OUTPATIENT)
Dept: NON INVASIVE DIAGNOSTICS | Facility: CLINIC | Age: 71
Discharge: HOME | End: 2021-01-05
Payer: MEDICARE

## 2021-01-05 DIAGNOSIS — I65.23 BILATERAL CAROTID ARTERY STENOSIS: ICD-10-CM

## 2021-01-05 PROCEDURE — 93880 EXTRACRANIAL BILAT STUDY: CPT | Performed by: SURGERY

## 2021-01-05 PROCEDURE — 93880 EXTRACRANIAL BILAT STUDY: CPT

## 2021-01-06 ENCOUNTER — TELEPHONE (OUTPATIENT)
Dept: CARDIOLOGY CLINIC | Facility: CLINIC | Age: 71
End: 2021-01-06

## 2021-01-06 NOTE — TELEPHONE ENCOUNTER
----- Message from Monty Sena MD sent at 1/6/2021  8:09 AM EST -----  Please let patient know the carotid ultrasound showed < 50% blockages in his carotid arteries  There was some blockage in the artery that supplies his R arm, but this is not significant  He should be aware of this, and blood pressures should be taken on the left arm for most accurate reading  No changes to any medications

## 2021-01-20 ENCOUNTER — HOSPITAL ENCOUNTER (OUTPATIENT)
Dept: ULTRASOUND IMAGING | Facility: HOSPITAL | Age: 71
Discharge: HOME | End: 2021-01-20
Payer: MEDICARE

## 2021-01-20 DIAGNOSIS — N43.2 OTHER HYDROCELE: ICD-10-CM

## 2021-01-20 PROCEDURE — 76870 US EXAM SCROTUM: CPT

## 2021-02-13 DIAGNOSIS — Z23 ENCOUNTER FOR IMMUNIZATION: ICD-10-CM

## 2021-02-23 ENCOUNTER — OFFICE VISIT (OUTPATIENT)
Dept: FAMILY MEDICINE CLINIC | Facility: CLINIC | Age: 71
End: 2021-02-23
Payer: MEDICARE

## 2021-02-23 VITALS
HEIGHT: 75 IN | BODY MASS INDEX: 25.29 KG/M2 | DIASTOLIC BLOOD PRESSURE: 62 MMHG | HEART RATE: 68 BPM | SYSTOLIC BLOOD PRESSURE: 108 MMHG | OXYGEN SATURATION: 96 % | RESPIRATION RATE: 16 BRPM | WEIGHT: 203.4 LBS | TEMPERATURE: 97.7 F

## 2021-02-23 DIAGNOSIS — E11.9 TYPE 2 DIABETES MELLITUS WITHOUT COMPLICATION, WITHOUT LONG-TERM CURRENT USE OF INSULIN (HCC): ICD-10-CM

## 2021-02-23 DIAGNOSIS — I48.0 PAROXYSMAL ATRIAL FIBRILLATION (HCC): ICD-10-CM

## 2021-02-23 DIAGNOSIS — E78.1 HYPERTRIGLYCERIDEMIA: ICD-10-CM

## 2021-02-23 DIAGNOSIS — I50.21 ACUTE SYSTOLIC HEART FAILURE (HCC): ICD-10-CM

## 2021-02-23 DIAGNOSIS — E11.65 TYPE 2 DIABETES MELLITUS WITH HYPERGLYCEMIA, WITHOUT LONG-TERM CURRENT USE OF INSULIN (HCC): Primary | ICD-10-CM

## 2021-02-23 DIAGNOSIS — E78.2 MIXED HYPERLIPIDEMIA: ICD-10-CM

## 2021-02-23 DIAGNOSIS — I25.118 CORONARY ARTERY DISEASE OF NATIVE ARTERY OF NATIVE HEART WITH STABLE ANGINA PECTORIS (HCC): ICD-10-CM

## 2021-02-23 PROBLEM — I10 ESSENTIAL HYPERTENSION: Status: RESOLVED | Noted: 2019-07-26 | Resolved: 2021-02-23

## 2021-02-23 PROBLEM — K62.89 ANUS IRRITATION: Status: ACTIVE | Noted: 2021-01-11

## 2021-02-23 PROCEDURE — 99214 OFFICE O/P EST MOD 30 MIN: CPT | Performed by: NURSE PRACTITIONER

## 2021-02-23 RX ORDER — DULAGLUTIDE 1.5 MG/.5ML
1.5 INJECTION, SOLUTION SUBCUTANEOUS WEEKLY
Qty: 4 PEN | Refills: 5 | Status: SHIPPED | OUTPATIENT
Start: 2021-02-23 | End: 2021-08-19 | Stop reason: ALTCHOICE

## 2021-02-23 RX ORDER — EMPAGLIFLOZIN 25 MG/1
25 TABLET, FILM COATED ORAL DAILY
Qty: 90 TABLET | Refills: 3 | Status: SHIPPED | OUTPATIENT
Start: 2021-02-23 | End: 2021-02-23 | Stop reason: SDUPTHER

## 2021-02-23 RX ORDER — EMPAGLIFLOZIN 25 MG/1
25 TABLET, FILM COATED ORAL DAILY
Qty: 90 TABLET | Refills: 3 | Status: SHIPPED | OUTPATIENT
Start: 2021-02-23

## 2021-02-23 NOTE — PROGRESS NOTES
FAMILY PRACTICE OFFICE VISIT       NAME: Jus Chavez  AGE: 65 y o  SEX: male       : 1950        MRN: [id number]    DATE: 2021  TIME: 7:58 AM    Assessment and Plan   1  Type 2 diabetes mellitus with hyperglycemia, without long-term current use of insulin (HCC)  Assessment & Plan:    Lab Results   Component Value Date    HGBA1C 6 9 (H) 2020   stable  Cont current meds      Orders:  -     Trulicity 1 5 WF/0 3PT SOPN; Inject 0 5 mL (1 5 mg total) under the skin once a week  -     HEMOGLOBIN A1C W/ EAG ESTIMATION; Future  -     Microalbumin / creatinine urine ratio  -     CBC and differential; Future  -     Comprehensive metabolic panel; Future  -     Lipid Panel with Direct LDL reflex; Future  -     TSH, 3rd generation with Free T4 reflex; Future    2  Coronary artery disease of native artery of native heart with stable angina pectoris ([facility] [place] [address] )  Assessment & Plan:  Stable  Cont current meds  Cont f/u with cardiology      Orders:  -     HEMOGLOBIN A1C W/ EAG ESTIMATION; Future  -     Microalbumin / creatinine urine ratio  -     CBC and differential; Future  -     Comprehensive metabolic panel; Future  -     Lipid Panel with Direct LDL reflex; Future  -     TSH, 3rd generation with Free T4 reflex; Future    3  Hypertriglyceridemia  Assessment & Plan:  Stable labs      Orders:  -     HEMOGLOBIN A1C W/ EAG ESTIMATION; Future  -     Microalbumin / creatinine urine ratio  -     CBC and differential; Future  -     Comprehensive metabolic panel; Future  -     Lipid Panel with Direct LDL reflex; Future  -     TSH, 3rd generation with Free T4 reflex; Future    4  Mixed hyperlipidemia  -     HEMOGLOBIN A1C W/ EAG ESTIMATION; Future  -     Microalbumin / creatinine urine ratio  -     CBC and differential; Future  -     Comprehensive metabolic panel; Future  -     Lipid Panel with Direct LDL reflex; Future  -     TSH, 3rd generation with Free T4 reflex; Future    5   Acute systolic heart failure [facility])  Assessment & Plan:  Wt Readings from Last 3 Encounters:   02/24/21 90 7 kg (200 lb)   02/23/21 92 3 kg (203 lb 6 4 oz)   01/04/21 94 3 kg (208 lb)     Weigh daily        Orders:  -     HEMOGLOBIN A1C W/ EAG ESTIMATION; Future  -     Microalbumin / creatinine urine ratio  -     CBC and differential; Future  -     Comprehensive metabolic panel; Future  -     Lipid Panel with Direct LDL reflex; Future  -     TSH, 3rd generation with Free T4 reflex; Future    6  Paroxysmal atrial fibrillation (HCC)  Assessment & Plan:  Rate controlled      Orders:  -     HEMOGLOBIN A1C W/ EAG ESTIMATION; Future  -     Microalbumin / creatinine urine ratio  -     CBC and differential; Future  -     Comprehensive metabolic panel; Future  -     Lipid Panel with Direct LDL reflex; Future  -     TSH, 3rd generation with Free T4 reflex; Future    7  Type 2 diabetes mellitus without complication, without long-term current use of insulin (HCC)  Assessment & Plan:    Lab Results   Component Value Date    HGBA1C 6 9 (H) 11/17/2020   stable  Cont current meds      Orders:  -     Jardiance 25 MG TABS; Take 1 tablet (25 mg total) by mouth daily  -     HEMOGLOBIN A1C W/ EAG ESTIMATION; Future  -     Microalbumin / creatinine urine ratio  -     CBC and differential; Future  -     Comprehensive metabolic panel; Future  -     Lipid Panel with Direct LDL reflex; Future  -     TSH, 3rd generation with Free T4 reflex;  Future               Chief Complaint     Chief Complaint   Patient presents with    Follow-up       History of Present Illness   Isabel Jacob is a 65y o -year-old male who is here for f/u to chronic medical conditions  Saw dermatology and had precancerous growth from right knee removed  To see colorectal next week dr Holly Anderson to have rectal itch and discomfort  Trying monistat and desitin  Using vaseline as well  No hx of hemorrhoids  Not having bm daily  Straining more to go  To see dr Alisia Adam for hydrocele, had us      Review of Systems   Review of Systems   Constitutional: Negative for fatigue and fever  HENT: Negative for congestion, postnasal drip and rhinorrhea  Eyes: Negative for photophobia and visual disturbance  Respiratory: Negative for cough, shortness of breath and wheezing  Cardiovascular: Negative for chest pain and palpitations  Gastrointestinal: Negative for constipation, diarrhea and nausea  Genitourinary: Negative for dysuria and frequency  Musculoskeletal: Negative for arthralgias and myalgias  Skin: Negative for rash  Neurological: Negative for dizziness, light-headedness and headaches  Hematological: Negative for adenopathy  Psychiatric/Behavioral: Negative for dysphoric mood and sleep disturbance  The patient is not nervous/anxious          Active Problem List     Patient Active Problem List   Diagnosis    Aortic stenosis    [facility] native artery of native heart [facility]    DM (diabetes mellitus), type 2 (HCC)    Hypertriglyceridemia    S/P CABG (coronary artery bypass graft)    Other hydrocele    S/P AVR    PVC (premature ventricular contraction)    Bilateral carotid artery stenosis    Mixed hyperlipidemia    Anus irritation    Acute systolic heart failure (HCC)    Paroxysmal atrial fibrillation (HCC)    Benign prostatic hyperplasia with urinary frequency         Past Medical History:  Past Medical History:   Diagnosis Date    Diabetes mellitus ([facility] [address] )     TMJ (dislocation of temporomandibular joint)        Past Surgical History:  Past Surgical History:   Procedure Laterality Date    CARDIAC SURGERY      EYE SURGERY  03/2020    Cataract right eye    EYE SURGERY Left 12/03/2020    left eye cataract     TONSILLECTOMY         Family History:  Family History   Problem Relation Age of Onset    Heart disease Mother     Cancer Mother     Heart disease Father     Hypertension Father        Social History:  Social History Socioeconomic History    Marital status: /Civil Union     Spouse name: Not on file    Number of children: Not on file    Years of education: Not on file    Highest education level: Not on file   Occupational History    Not on file   Social Needs    Financial resource strain: Not on file    Food insecurity     Worry: Not on file     Inability: Not on file    Transportation needs     Medical: Not on file     Non-medical: Not on file   Tobacco Use    Smoking status: Never Smoker    Smokeless tobacco: Never Used   Substance and Sexual Activity    Alcohol use: Yes     Frequency: 2-4 times a month    Drug use: Never    Sexual activity: Not on file   Lifestyle    Physical activity     Days per week: Not on file     Minutes per session: Not on file    Stress: Not on file   Relationships    Social connections     Talks on phone: Not on file     Gets together: Not on file     Attends Mosque service: Not on file     Active member of club or organization: Not on file     Attends meetings of clubs or organizations: Not on file     Relationship status: Not on file    Intimate partner violence     Fear of current or ex partner: Not on file     Emotionally abused: Not on file     Physically abused: Not on file     Forced sexual activity: Not on file   Other Topics Concern    Not on file   Social History Narrative    Not on file       Objective     Vitals:    02/23/21 1400   BP: 108/62   Pulse: 68   Resp: 16   Temp: 97 7 °F (36 5 °C)   SpO2: 96%     Wt Readings from Last 3 Encounters:   02/24/21 90 7 kg (200 lb)   02/23/21 92 3 kg (203 lb 6 4 oz)   01/04/21 94 3 kg (208 lb)       Physical Exam  Vitals signs and nursing note reviewed  Constitutional:       Appearance: Normal appearance  HENT:      Head: Normocephalic and atraumatic        Right Ear: Tympanic membrane, ear canal and external ear normal       Left Ear: Tympanic membrane, ear canal and external ear normal       Nose: Nose normal  Mouth/Throat:      Mouth: Mucous membranes are moist    Eyes:      Extraocular Movements: Extraocular movements intact  Conjunctiva/sclera: Conjunctivae normal       Pupils: Pupils are equal, round, and reactive to light  Neck:      Musculoskeletal: Normal range of motion and neck supple  Cardiovascular:      Rate and Rhythm: Normal rate and regular rhythm  Pulses: Normal pulses  Heart sounds: Normal heart sounds  Pulmonary:      Effort: Pulmonary effort is normal       Breath sounds: Normal breath sounds  Abdominal:      General: Bowel sounds are normal       Palpations: Abdomen is soft  Musculoskeletal: Normal range of motion  Skin:     General: Skin is warm and dry  Capillary Refill: Capillary refill takes less than 2 seconds  Neurological:      General: No focal deficit present  Mental Status: He is alert and oriented to person, place, and time  Psychiatric:         Mood and Affect: Mood normal          Behavior: Behavior normal          Thought Content:  Thought content normal          Judgment: Judgment normal          Pertinent Laboratory/Diagnostic Studies:  Lab Results   Component Value Date    BUN 26 (H) 11/17/2020    CREATININE 1 13 11/17/2020    CALCIUM 9 9 11/17/2020    K 3 9 11/17/2020    CO2 31 11/17/2020     11/17/2020     No results found for: ALT, AST, GGT, ALKPHOS, BILITOT    No results found for: WBC, HGB, HCT, MCV, PLT    No results found for: TSH    No results found for: CHOL  No results found for: TRIG  No results found for: HDL  No results found for: [facility]  Lab Results   Component Value Date    HGBA1C 6 9 (H) 11/17/2020       Results for orders placed or performed in visit on 35/85/97   Basic metabolic panel   Result Value Ref Range    Sodium 140 136 - 145 mmol/L    Potassium 3 9 3 5 - 5 3 mmol/L    Chloride 106 100 - 108 mmol/L    CO2 31 21 - 32 mmol/L    ANION GAP 3 (L) 4 - 13 mmol/L    BUN 26 (H) 5 - 25 mg/dL    Creatinine 1 13 0 60 - 1 30 mg/dL    Glucose, Fasting 158 (H) 65 - 99 mg/dL    Calcium 9 9 8 3 - 10 1 mg/dL    eGFR 65 ml/min/1 73sq m   Hemoglobin A1C   Result Value Ref Range    Hemoglobin A1C 6 9 (H) Normal 3 8-5 6%; PreDiabetic 5 7-6 4%; Diabetic >=6 5%; Glycemic control for adults with diabetes <7 0% %     mg/dl       Orders Placed This Encounter   Procedures    HEMOGLOBIN A1C W/ EAG ESTIMATION    Microalbumin / creatinine urine ratio    CBC and differential    Comprehensive metabolic panel    Lipid Panel with Direct LDL reflex    TSH, 3rd generation with Free T4 reflex       ALLERGIES:  No Known Allergies    Current Medications     Current Outpatient Medications   Medication Sig Dispense Refill    acetaminophen (TYLENOL) 500 mg tablet Take 500 mg by mouth daily at bedtime      ascorbic acid (VITAMIN C) 250 mg tablet Take 500 mg by mouth 2 (two) times a day      aspirin 81 mg chewable tablet Chew 81 mg daily      chlorthalidone 25 mg tablet Take 1 tablet (25 mg total) by mouth daily 90 tablet 3    cholecalciferol (VITAMIN D3) 1,000 units tablet Take 1,000 Units by mouth daily      finasteride (PROSCAR) 5 mg tablet Take 5 mg by mouth daily      Jardiance 25 MG TABS Take 1 tablet (25 mg total) by mouth daily 90 tablet 3    metFORMIN (GLUCOPHAGE) 500 mg tablet Take 1,000 mg by mouth 2 (two) times a day      metoprolol succinate (TOPROL-XL) 50 mg 24 hr tablet Take 1 tablet (50 mg total) by mouth daily 90 tablet 3    niacin 500 mg tablet Take 500 mg by mouth 4 (four) times a day      NON FORMULARY       PROBIOTIC PRODUCT PO Take 111 mg by mouth daily      rosuvastatin (CRESTOR) 10 MG tablet Take 1 tablet (10 mg total) by mouth daily 90 tablet 3    Trulicity 1 5 EL/4 0RN SOPN Inject 0 5 mL (1 5 mg total) under the skin once a week 4 pen 5    tamsulosin (FLOMAX) 0 4 mg Take 1 capsule (0 4 mg total) by mouth daily with dinner 30 capsule 6     No current facility-administered medications for this visit            Health Maintenance     Health Maintenance   Topic Date Due    DM Eye Exam  04/28/1958    COVID-19 Vaccine (1 of 2) 04/28/1964    Colorectal Cancer Screening  04/28/1998    Influenza Vaccine (1) 09/01/2020    HEMOGLOBIN A1C  05/17/2021    Depression Screening PHQ  06/11/2021    BMI: Followup Plan  06/21/2021    DTaP,Tdap,and Td Vaccines (1 - Tdap) 06/21/2021 (Originally [birth year]28/1971)    Pneumococcal Vaccine: 65+ Years (1 of 1 - PPSV23) 10/13/2021 (Originally 4/28/2013)    Hepatitis C Screening  07/04/2022 (Originally 1950)    Diabetic Foot Exam  07/06/2021    URINE MICROALBUMIN  07/20/2021    Fall Risk  10/13/2021    Medicare Annual Wellness Visit (AWV)  10/13/2021    BMI: Adult  02/24/2022    HIB Vaccine  Aged Out    Hepatitis B Vaccine  Aged Out    IPV Vaccine  Aged Out    Hepatitis A Vaccine  Aged Out    Meningococcal ACWY Vaccine  Aged Out    HPV Vaccine  Aged Out       There is no immunization history on file for this patient  BMI Counseling: Body mass index is 25 42 kg/m²  The BMI is above normal  Nutrition recommendations include decreasing portion sizes, encouraging healthy choices of fruits and vegetables, decreasing fast food intake, consuming healthier snacks, limiting drinks that contain sugar, moderation in carbohydrate intake, increasing intake of lean protein, reducing intake of saturated and trans fat and reducing intake of cholesterol  Exercise recommendations include moderate physical activity 150 minutes/week, exercising 3-5 times per week and strength training exercises  No pharmacotherapy was ordered             HESHAM Gerber

## 2021-02-24 ENCOUNTER — OFFICE VISIT (OUTPATIENT)
Dept: UROLOGY | Facility: CLINIC | Age: 71
End: 2021-02-24
Payer: MEDICARE

## 2021-02-24 VITALS
DIASTOLIC BLOOD PRESSURE: 74 MMHG | BODY MASS INDEX: 24.87 KG/M2 | HEIGHT: 75 IN | SYSTOLIC BLOOD PRESSURE: 114 MMHG | HEART RATE: 75 BPM | WEIGHT: 200 LBS

## 2021-02-24 DIAGNOSIS — R35.0 BENIGN PROSTATIC HYPERPLASIA WITH URINARY FREQUENCY: ICD-10-CM

## 2021-02-24 DIAGNOSIS — N40.1 BENIGN PROSTATIC HYPERPLASIA WITH URINARY FREQUENCY: ICD-10-CM

## 2021-02-24 DIAGNOSIS — N43.2 OTHER HYDROCELE: Primary | ICD-10-CM

## 2021-02-24 PROCEDURE — 99214 OFFICE O/P EST MOD 30 MIN: CPT | Performed by: UROLOGY

## 2021-02-24 RX ORDER — TAMSULOSIN HYDROCHLORIDE 0.4 MG/1
0.4 CAPSULE ORAL
Qty: 30 CAPSULE | Refills: 6 | Status: SHIPPED | OUTPATIENT
Start: 2021-02-24 | End: 2021-09-20

## 2021-02-25 PROBLEM — N40.1 BENIGN PROSTATIC HYPERPLASIA WITH URINARY FREQUENCY: Status: ACTIVE | Noted: 2021-02-25

## 2021-02-25 PROBLEM — R35.0 BENIGN PROSTATIC HYPERPLASIA WITH URINARY FREQUENCY: Status: ACTIVE | Noted: 2021-02-25

## 2021-02-25 NOTE — PROGRESS NOTES
Referring Physician: HESHAM Quinones  A copy of this note was sent to the referring physician  Diagnoses and all orders for this visit:    Other hydrocele    Benign prostatic hyperplasia with urinary frequency  -     tamsulosin (FLOMAX) 0 4 mg; Take 1 capsule (0 4 mg total) by mouth daily with dinner            Assessment and plan: 1  Right hydrocele  - asymptomatic  - patient was counseled on management options and has elected for observation which is very reasonable    2  BPH with obstructive lower urinary tract symptoms  - tamsulosin prescribed dosing adverse effects reviewed      Today, we discussed a stepwise approach in treating lower urinary tract symptoms associated with BPH  Lower urinary tract symptoms (LUTS) can be sub-divided into those that result from failure of the bladder to store urine normally ("storage symptoms"), those that result from difficulties in emptying ("voiding symptoms"), or those that follow micturition ("post-micturition symptoms")  Obstructive symptoms such as hesitancy, weak stream, and pushing to urinate are classified as voiding symptoms  OAB is due to the inability of the bladder to store urine normally  The symptoms of frequency, urgency, nocturia, and urge incontinence are classified as storage symptoms  I discussed the mainstays of therapy for voiding symptoms including alpha blockers, 5-alpha reductase inhibitors, and surgical management including TURP (laser, monopolar, bipolar, button electrode), TUIP, and prostatectomy  I had a candid discussion about the risks of each of these medications and the mechanism of action  I also discussed the use of PD5 inhibitors for LUTS          tamsulosin was prescribed  Dosing adverse effects reviewed  He will follow up in 3 months time with 1 of our advanced practitioners for a AUA symptom score, uroflow, PVR    If his symptoms remain poorly controlled he   Can be offered  for cystoscopy and transrectal ultrasound  Jhoan Cheng MD      Chief Complaint      hydrocele follow-up, urinary complaints      History of Present Illness     Merced Stephenson is a 65 y o  Male returns in follow-up  He was seen recently for evaluation of a right-sided hydrocele  He was counseled on options and a scrotal ultrasound was obtained  Patient is completely asymptomatic from this  Specifically he has no pain or discomfort even when sitting in taking long car rides  He also wishes to discuss urinary symptoms  He has urinary urgency and frequency  He also notes a weak stream and some trouble directing his stream   He has never been offered any medical management for these urinary issues  He has had no hematuria, dysuria, or urinary tract infections in the past    Detailed Urologic History     - please refer to HPI    Review of Systems     Review of Systems   Constitutional: Negative for activity change and fatigue  HENT: Negative for congestion  Eyes: Negative for visual disturbance  Respiratory: Negative for shortness of breath and wheezing  Cardiovascular: Negative for chest pain and leg swelling  Gastrointestinal: Negative for abdominal pain  Genitourinary: Negative for dysuria, flank pain, hematuria and urgency  As per HPI   Musculoskeletal: Negative for back pain  Allergic/Immunologic: Negative for immunocompromised state  Neurological: Negative for dizziness and numbness  Psychiatric/Behavioral: Negative for dysphoric mood  All other systems reviewed and are negative  Allergies     No Known Allergies    Physical Exam     Physical Exam  Constitutional:       General: He is not in acute distress  Appearance: He is well-developed  HENT:      Head: Normocephalic and atraumatic  Neck:      Musculoskeletal: Normal range of motion     Cardiovascular:      Comments: Negative lower extremity edema  Pulmonary:      Effort: Pulmonary effort is normal       Breath sounds: Normal breath sounds  Abdominal:      Palpations: Abdomen is soft  Genitourinary:     Comments: Testes descended bilaterally  There is a large uncomplicated simple palpation of a right hydrocele  Contralateral left testes is normal   Musculoskeletal: Normal range of motion  Skin:     General: Skin is warm  Neurological:      Mental Status: He is alert and oriented to person, place, and time     Psychiatric:         Behavior: Behavior normal              Vital Signs  Vitals:    02/24/21 1320   BP: 114/74   Pulse: 75   Weight: 90 7 kg (200 lb)   Height: 6' 3" (1 905 m)         Current Medications       Current Outpatient Medications:     acetaminophen (TYLENOL) 500 mg tablet, Take 500 mg by mouth daily at bedtime, Disp: , Rfl:     ascorbic acid (VITAMIN C) 250 mg tablet, Take 500 mg by mouth 2 (two) times a day, Disp: , Rfl:     aspirin 81 mg chewable tablet, Chew 81 mg daily, Disp: , Rfl:     chlorthalidone 25 mg tablet, Take 1 tablet (25 mg total) by mouth daily, Disp: 90 tablet, Rfl: 3    cholecalciferol (VITAMIN D3) 1,000 units tablet, Take 1,000 Units by mouth daily, Disp: , Rfl:     finasteride (PROSCAR) 5 mg tablet, Take 5 mg by mouth daily, Disp: , Rfl:     Jardiance 25 MG TABS, Take 1 tablet (25 mg total) by mouth daily, Disp: 90 tablet, Rfl: 3    metFORMIN (GLUCOPHAGE) 500 mg tablet, Take 1,000 mg by mouth 2 (two) times a day, Disp: , Rfl:     metoprolol succinate (TOPROL-XL) 50 mg 24 hr tablet, Take 1 tablet (50 mg total) by mouth daily, Disp: 90 tablet, Rfl: 3    niacin 500 mg tablet, Take 500 mg by mouth 4 (four) times a day, Disp: , Rfl:     NON FORMULARY, , Disp: , Rfl:     PROBIOTIC PRODUCT PO, Take 111 mg by mouth daily, Disp: , Rfl:     rosuvastatin (CRESTOR) 10 MG tablet, Take 1 tablet (10 mg total) by mouth daily, Disp: 90 tablet, Rfl: 3    Trulicity 1 5 TE/2 3SL SOPN, Inject 0 5 mL (1 5 mg total) under the skin once a week, Disp: 4 pen, Rfl: 5    Multiple Vitamin (multivitamin) tablet, Take 1 tablet by mouth daily, Disp: , Rfl:     tamsulosin (FLOMAX) 0 4 mg, Take 1 capsule (0 4 mg total) by mouth daily with dinner, Disp: 30 capsule, Rfl: 6      Active Problems     Patient Active Problem List   Diagnosis    Aortic stenosis    Coronary artery disease of native artery of native heart with stable angina pectoris ([facility] )    DM (diabetes mellitus), type 2 ([place] [address] )    Hypertriglyceridemia    S/P CABG (coronary artery bypass graft)    Other hydrocele    S/P AVR    PVC (premature ventricular contraction)    Bilateral carotid artery stenosis    Mixed hyperlipidemia    Anus irritation    Acute systolic heart failure (HCC)    Paroxysmal atrial fibrillation ([place] [address] )    Benign prostatic hyperplasia with urinary frequency         Past Medical History     Past Medical History:   Diagnosis Date    Diabetes mellitus ([place] [address] )     TMJ (dislocation of temporomandibular joint)          Surgical History     Past Surgical History:   Procedure Laterality Date    CARDIAC SURGERY      EYE SURGERY  03/2020    Cataract right eye    EYE SURGERY Left 12/03/2020    left eye cataract     TONSILLECTOMY           Family History     Family History   Problem Relation Age of Onset    Heart disease Mother     Cancer Mother     Heart disease Father     Hypertension Father          Social History     Social History     Social History     Tobacco Use   Smoking Status Never Smoker   Smokeless Tobacco Never Used         Pertinent Lab Values     Lab Results   Component Value Date    CREATININE 1 13 11/17/2020       No results found for: PSA    @RESULTRCNT(1H])@      Pertinent Imaging      - n/a    Portions of the record may have been created with voice recognition software   Occasional wrong word or "sound a like" substitutions may have occurred due to the inherent limitations of voice recognition software   Read the chart carefully and recognize, using context, where substitutions have occurred

## 2021-02-25 NOTE — ASSESSMENT & PLAN NOTE
Wt Readings from Last 3 Encounters:   02/24/21 90 7 kg (200 lb)   02/23/21 92 3 kg (203 lb 6 4 oz)   01/04/21 94 3 kg (208 lb)     Weigh daily

## 2021-03-07 ENCOUNTER — IMMUNIZATIONS (OUTPATIENT)
Dept: FAMILY MEDICINE CLINIC | Facility: HOSPITAL | Age: 71
End: 2021-03-07

## 2021-03-07 DIAGNOSIS — Z23 ENCOUNTER FOR IMMUNIZATION: Primary | ICD-10-CM

## 2021-03-07 PROCEDURE — 91300 SARS-COV-2 / COVID-19 MRNA VACCINE (PFIZER-BIONTECH) 30 MCG: CPT

## 2021-03-07 PROCEDURE — 0001A SARS-COV-2 / COVID-19 MRNA VACCINE (PFIZER-BIONTECH) 30 MCG: CPT

## 2021-03-08 ENCOUNTER — TELEPHONE (OUTPATIENT)
Dept: FAMILY MEDICINE CLINIC | Facility: CLINIC | Age: 71
End: 2021-03-08

## 2021-03-08 NOTE — TELEPHONE ENCOUNTER
Patient has had sore ears for 3-4 days, they feel waxy and painful  Can he get an ambulatory referral to ENT?

## 2021-03-09 ENCOUNTER — OFFICE VISIT (OUTPATIENT)
Dept: FAMILY MEDICINE CLINIC | Facility: CLINIC | Age: 71
End: 2021-03-09
Payer: MEDICARE

## 2021-03-09 VITALS
OXYGEN SATURATION: 97 % | RESPIRATION RATE: 18 BRPM | HEART RATE: 72 BPM | DIASTOLIC BLOOD PRESSURE: 72 MMHG | WEIGHT: 204.4 LBS | BODY MASS INDEX: 25.41 KG/M2 | TEMPERATURE: 97.4 F | SYSTOLIC BLOOD PRESSURE: 118 MMHG | HEIGHT: 75 IN

## 2021-03-09 DIAGNOSIS — H61.23 BILATERAL IMPACTED CERUMEN: Primary | ICD-10-CM

## 2021-03-09 PROCEDURE — 69210 REMOVE IMPACTED EAR WAX UNI: CPT | Performed by: NURSE PRACTITIONER

## 2021-03-09 PROCEDURE — 99213 OFFICE O/P EST LOW 20 MIN: CPT | Performed by: NURSE PRACTITIONER

## 2021-03-09 NOTE — PROGRESS NOTES
FAMILY PRACTICE OFFICE VISIT       NAME: Jus Chavez  AGE: 65 y o  SEX: male       : 1950        MRN: [id number]    DATE: 3/9/2021  TIME: 3:44 PM    Assessment and Plan   1  Bilateral impacted cerumen  -     Ear cerumen removal                 Chief Complaint     Chief Complaint   Patient presents with    Earache       History of Present Illness   Morgan Ospina is a 65y o -year-old male who is here for c/o wax in both ears  Having trouble hearing from both ears      Review of Systems   Review of Systems   Constitutional: Negative for fatigue and fever  HENT: Negative for ear discharge and ear pain  Respiratory: Negative for cough and shortness of breath  Cardiovascular: Negative for chest pain and palpitations  Gastrointestinal: Negative for constipation and diarrhea  Skin: Negative for rash  Neurological: Negative for dizziness and headaches  Hematological: Negative for adenopathy  Psychiatric/Behavioral: Negative for dysphoric mood  The patient is not nervous/anxious          Active Problem List     Patient Active Problem List   Diagnosis    Aortic stenosis    Coronary artery disease of native artery of native heart with stable angina pectoris ([address] )    DM (diabetes mellitus), type 2 ([address] )    Hypertriglyceridemia    S/P CABG (coronary artery bypass graft)    Other hydrocele    S/P AVR    PVC (premature ventricular contraction)    Bilateral carotid artery stenosis    Mixed hyperlipidemia    Anus irritation    Acute systolic heart failure (HCC)    Paroxysmal atrial fibrillation (HCC)    Benign prostatic hyperplasia with urinary frequency    Bilateral impacted cerumen         Past Medical History:  Past Medical History:   Diagnosis Date    Diabetes mellitus ([address] )     TMJ (dislocation of temporomandibular joint)        Past Surgical History:  Past Surgical History:   Procedure Laterality Date    CARDIAC SURGERY      EYE SURGERY  2020    Cataract right eye    EYE SURGERY Left 12/03/2020    left eye cataract     TONSILLECTOMY         Family History:  Family History   Problem Relation Age of Onset    Heart disease Mother     Cancer Mother     Heart disease Father     Hypertension Father     Colon cancer Neg Hx        Social History:  Social History     Socioeconomic History    Marital status: /Civil Union     Spouse name: Not on file    Number of children: Not on file    Years of education: Not on file    Highest education level: Not on file   Occupational History    Not on file   Social Needs    Financial resource strain: Not on file    Food insecurity     Worry: Not on file     Inability: Not on file   [employer] needs     Medical: Not on file     Non-medical: Not on file   Tobacco Use    Smoking status: Never Smoker    Smokeless tobacco: Never Used   Substance and Sexual Activity    Alcohol use: Yes     Frequency: 2-4 times a month    Drug use: Never    Sexual activity: Not on file   Lifestyle    Physical activity     Days per week: Not on file     Minutes per session: Not on file    Stress: Not on file   Relationships    Social connections     Talks on phone: Not on file     Gets together: Not on file     Attends [employer] service: Not on file     Active member of club or organization: Not on file     Attends meetings of clubs or organizations: Not on file     Relationship status: Not on file    Intimate partner violence     Fear of current or ex partner: Not on file     Emotionally abused: Not on file     Physically abused: Not on file     Forced sexual activity: Not on file   Other Topics Concern    Not on file   Social History Narrative    Not on file       Objective     Vitals:    03/09/21 1500   BP: 118/72   Pulse: 72   Resp: 18   Temp: (!) 97 4 °F (36 3 °C)   SpO2: 97%     Wt Readings from Last 3 Encounters:   03/09/21 92 7 kg (204 lb 6 4 oz)   03/03/21 92 5 kg (204 lb)   02/24/21 90 7 kg (200 lb)       Physical Exam  Vitals signs and nursing note reviewed  Constitutional:       Appearance: Normal appearance  HENT:      Head: Normocephalic and atraumatic  Right Ear: There is impacted cerumen  Left Ear: There is impacted cerumen  Nose: Nose normal    Eyes:      Conjunctiva/sclera: Conjunctivae normal    Cardiovascular:      Rate and Rhythm: Normal rate and regular rhythm  Pulses: Normal pulses  Heart sounds: Normal heart sounds  Pulmonary:      Effort: Pulmonary effort is normal       Breath sounds: Normal breath sounds  Abdominal:      General: Bowel sounds are normal    Musculoskeletal: Normal range of motion  Skin:     General: Skin is warm and dry  Capillary Refill: Capillary refill takes less than 2 seconds  Neurological:      General: No focal deficit present  Mental Status: He is alert and oriented to person, place, and time     Psychiatric:         Mood and Affect: Mood normal          Behavior: Behavior normal          Pertinent Laboratory/Diagnostic Studies:  Lab Results   Component Value Date    BUN 26 (H) 11/17/2020    CREATININE 1 13 11/17/2020    CALCIUM 9 9 11/17/2020    K 3 9 11/17/2020    CO2 31 11/17/2020     11/17/2020     No results found for: ALT, AST, GGT, ALKPHOS, BILITOT    No results found for: WBC, HGB, HCT, MCV, PLT    No results found for: TSH    No results found for: CHOL  No results found for: TRIG  No results found for: HDL  No results found for: [facility]  Lab Results   Component Value Date    HGBA1C 6 9 (H) 11/17/2020       Results for orders placed or performed in visit on 26/60/96   Basic metabolic panel   Result Value Ref Range    Sodium 140 136 - 145 mmol/L    Potassium 3 9 3 5 - 5 3 mmol/L    Chloride 106 100 - 108 mmol/L    CO2 31 21 - 32 mmol/L    ANION GAP 3 (L) 4 - 13 mmol/L    BUN 26 (H) 5 - 25 mg/dL    Creatinine 1 13 0 60 - 1 30 mg/dL    Glucose, Fasting 158 (H) 65 - 99 mg/dL    Calcium 9 9 8 3 - 10 1 mg/dL    eGFR 65 ml/min/1 73sq m Hemoglobin A1C   Result Value Ref Range    Hemoglobin A1C 6 9 (H) Normal 3 8-5 6%; PreDiabetic 5 7-6 4%; Diabetic >=6 5%; Glycemic control for adults with diabetes <7 0% %     mg/dl       Orders Placed This Encounter   Procedures    Ear cerumen removal       ALLERGIES:  No Known Allergies    Current Medications     Current Outpatient Medications   Medication Sig Dispense Refill    acetaminophen (TYLENOL) 500 mg tablet Take 500 mg by mouth daily at bedtime      ascorbic acid (VITAMIN C) 250 mg tablet Take 500 mg by mouth 2 (two) times a day      aspirin 81 mg chewable tablet Chew 81 mg daily      chlorthalidone 25 mg tablet Take 1 tablet (25 mg total) by mouth daily 90 tablet 3    cholecalciferol (VITAMIN D3) 1,000 units tablet Take 1,000 Units by mouth daily      finasteride (PROSCAR) 5 mg tablet Take 5 mg by mouth daily      Jardiance 25 MG TABS Take 1 tablet (25 mg total) by mouth daily 90 tablet 3    metFORMIN (GLUCOPHAGE) 500 mg tablet Take 1,000 mg by mouth 2 (two) times a day      metoprolol succinate (TOPROL-XL) 50 mg 24 hr tablet Take 1 tablet (50 mg total) by mouth daily 90 tablet 3    niacin 500 mg tablet Take 500 mg by mouth 4 (four) times a day      NON FORMULARY       PROBIOTIC PRODUCT PO Take 111 mg by mouth daily      rosuvastatin (CRESTOR) 10 MG tablet Take 1 tablet (10 mg total) by mouth daily 90 tablet 3    tamsulosin (FLOMAX) 0 4 mg Take 1 capsule (0 4 mg total) by mouth daily with dinner 30 capsule 6    Trulicity 1 5 ZN/0 2PP SOPN Inject 0 5 mL (1 5 mg total) under the skin once a week 4 pen 5     No current facility-administered medications for this visit            Health Maintenance     Health Maintenance   Topic Date Due    DM Eye Exam  04/28/1958    Colorectal Cancer Screening  04/28/1998    HEMOGLOBIN A1C  05/17/2021    DTaP,Tdap,and Td Vaccines (1 - Tdap) 06/21/2021 (Originally 4/28/1969)    Influenza Vaccine (1) 06/30/2021 (Originally 9/1/2020)   [facility] Pneumococcal Vaccine: 65+ Years (1 of 1 - PPSV23) 10/13/2021 (Originally 4/28/2013)    Hepatitis C Screening  07/04/2022 (Originally 1950)    COVID-19 Vaccine (2 of 2 - Pfizer series) 03/28/2021    Diabetic Foot Exam  07/06/2021    URINE MICROALBUMIN  07/20/2021    Medicare Annual Wellness Visit (AWV)  10/13/2021    Depression Screening PHQ  02/23/2022    Fall Risk  02/25/2022    BMI: Followup Plan  02/25/2022    BMI: Adult  03/09/2022    HIB Vaccine  Aged Out    Hepatitis B Vaccine  Aged Out    IPV Vaccine  Aged Out    Hepatitis A Vaccine  Aged Out    Meningococcal ACWY Vaccine  Aged Out    HPV Vaccine  Aged [employer] History   Administered Date(s) Administered    SARS-CoV-2 / COVID-19 mRNA IM ([facility]`) 03/07/2021       Ear cerumen removal    Date/Time: 3/9/2021 3:42 PM  Performed by: HESHAM Wayne  Authorized by: HESHAM Wayne   Universal Protocol:  Consent: Verbal consent obtained  Written consent not obtained  Risks and benefits: risks, benefits and alternatives were discussed  Consent given by: patient  Patient understanding: patient states understanding of the procedure being performed  Patient consent: the patient's understanding of the procedure matches consent given  Patient identity confirmed: verbally with patient      Patient location:  Clinic  Procedure details:     Local anesthetic:  None    Location:  L ear and R ear    Procedure type: irrigation with instrumentation      Instrumentation: forceps      Approach:  Natural orifice    Equipment used:  Syringe, peroxide, water, water, forceps  Post-procedure details:     Complication:  None    Hearing quality:  Improved    Patient tolerance of procedure:   Tolerated well, no immediate complications        HESHAM Wayne

## 2021-03-15 ENCOUNTER — OFFICE VISIT (OUTPATIENT)
Dept: PODIATRY | Facility: CLINIC | Age: 71
End: 2021-03-15
Payer: MEDICARE

## 2021-03-15 VITALS
HEIGHT: 75 IN | SYSTOLIC BLOOD PRESSURE: 115 MMHG | WEIGHT: 207.4 LBS | BODY MASS INDEX: 25.79 KG/M2 | HEART RATE: 66 BPM | DIASTOLIC BLOOD PRESSURE: 70 MMHG

## 2021-03-15 DIAGNOSIS — E11.42 DIABETIC POLYNEUROPATHY ASSOCIATED WITH TYPE 2 DIABETES MELLITUS (HCC): Primary | ICD-10-CM

## 2021-03-15 DIAGNOSIS — M20.41 HAMMER TOES OF BOTH FEET: ICD-10-CM

## 2021-03-15 DIAGNOSIS — B35.1 ONYCHOMYCOSIS: ICD-10-CM

## 2021-03-15 DIAGNOSIS — M20.42 HAMMER TOES OF BOTH FEET: ICD-10-CM

## 2021-03-15 PROCEDURE — 11721 DEBRIDE NAIL 6 OR MORE: CPT | Performed by: PODIATRIST

## 2021-03-15 NOTE — PROGRESS NOTES
Jus Chavez  1950  AT RISK FOOT CARE    1  Diabetic polyneuropathy associated with type 2 diabetes mellitus ([facility] [address] )  Diabetic Shoe    Diabetic Shoe Inserts   2  Onychomycosis  Diabetic Shoe    Diabetic Shoe Inserts   3  Hammer toes of both feet  Diabetic Shoe    Diabetic Shoe Inserts       Patient presents for at-risk foot care  Patient has no acute concerns today  Patient has significant lower extremity risk due to neuropathy, parasthesia, edema, and trophic skin changes to the lower extremity  On exam patient has thickened, hypertrophic, discolored, brittle toenails with subungual debris and tenderness x10   Callus: none  Patient has lower extremity edema  PAtients skin is atrophic, thickened nails, and decreased pedal hair  Patient has decreased pinprick and vibratory sensation to his feet and parasthesia    Today's treatment includes:  Debridement of toenails  Using nail nipper, an, and curette, nails were sharply debrided, reduced in thickness and length  Devitalized nail tissue and fungal debris excised and removed  Patient tolerated well  Discussed proper shoe gear, daily inspections of feet, and general foot health with patient  Patient has Q9  findings and is recommended for at risk foot care every 9-10 weeks      Patients most recent complete clinical foot exam was on: 7/6/2020
patient

## 2021-03-22 ENCOUNTER — LAB (OUTPATIENT)
Dept: LAB | Facility: AMBULARY SURGERY CENTER | Age: 71
End: 2021-03-22
Payer: MEDICARE

## 2021-03-22 DIAGNOSIS — I48.0 PAROXYSMAL ATRIAL FIBRILLATION (HCC): ICD-10-CM

## 2021-03-22 DIAGNOSIS — E11.65 TYPE 2 DIABETES MELLITUS WITH HYPERGLYCEMIA, WITHOUT LONG-TERM CURRENT USE OF INSULIN (HCC): ICD-10-CM

## 2021-03-22 DIAGNOSIS — E78.1 HYPERTRIGLYCERIDEMIA: ICD-10-CM

## 2021-03-22 DIAGNOSIS — I50.21 ACUTE SYSTOLIC HEART FAILURE (HCC): ICD-10-CM

## 2021-03-22 DIAGNOSIS — E78.2 MIXED HYPERLIPIDEMIA: ICD-10-CM

## 2021-03-22 DIAGNOSIS — I10 ESSENTIAL HYPERTENSION: ICD-10-CM

## 2021-03-22 DIAGNOSIS — I25.118 CORONARY ARTERY DISEASE OF NATIVE ARTERY OF NATIVE HEART WITH STABLE ANGINA PECTORIS (HCC): ICD-10-CM

## 2021-03-22 DIAGNOSIS — E11.9 TYPE 2 DIABETES MELLITUS WITHOUT COMPLICATION, WITHOUT LONG-TERM CURRENT USE OF INSULIN (HCC): ICD-10-CM

## 2021-03-22 LAB
ALBUMIN SERPL BCP-MCNC: 4 G/DL (ref 3.5–5)
ALP SERPL-CCNC: 62 U/L (ref 46–116)
ALT SERPL W P-5'-P-CCNC: 26 U/L (ref 12–78)
ANION GAP SERPL CALCULATED.3IONS-SCNC: 7 MMOL/L (ref 4–13)
AST SERPL W P-5'-P-CCNC: 14 U/L (ref 5–45)
BASOPHILS # BLD AUTO: 0.09 THOUSANDS/ΜL (ref 0–0.1)
BASOPHILS NFR BLD AUTO: 1 % (ref 0–1)
BILIRUB SERPL-MCNC: 0.55 MG/DL (ref 0.2–1)
BUN SERPL-MCNC: 28 MG/DL (ref 5–25)
CALCIUM SERPL-MCNC: 9.7 MG/DL (ref 8.3–10.1)
CHLORIDE SERPL-SCNC: 105 MMOL/L (ref 100–108)
CHOLEST SERPL-MCNC: 121 MG/DL (ref 50–200)
CO2 SERPL-SCNC: 27 MMOL/L (ref 21–32)
CREAT SERPL-MCNC: 1.05 MG/DL (ref 0.6–1.3)
CREAT UR-MCNC: 33.6 MG/DL
EOSINOPHIL # BLD AUTO: 0.26 THOUSAND/ΜL (ref 0–0.61)
EOSINOPHIL NFR BLD AUTO: 3 % (ref 0–6)
ERYTHROCYTE [DISTWIDTH] IN BLOOD BY AUTOMATED COUNT: 11.6 % (ref 11.6–15.1)
EST. AVERAGE GLUCOSE BLD GHB EST-MCNC: 166 MG/DL
GFR SERPL CREATININE-BSD FRML MDRD: 71 ML/MIN/1.73SQ M
GLUCOSE P FAST SERPL-MCNC: 149 MG/DL (ref 65–99)
HBA1C MFR BLD: 7.4 %
HCT VFR BLD AUTO: 42.1 % (ref 36.5–49.3)
HDLC SERPL-MCNC: 46 MG/DL
HGB BLD-MCNC: 14.5 G/DL (ref 12–17)
IMM GRANULOCYTES # BLD AUTO: 0.01 THOUSAND/UL (ref 0–0.2)
IMM GRANULOCYTES NFR BLD AUTO: 0 % (ref 0–2)
LDLC SERPL CALC-MCNC: 39 MG/DL (ref 0–100)
LYMPHOCYTES # BLD AUTO: 3.06 THOUSANDS/ΜL (ref 0.6–4.47)
LYMPHOCYTES NFR BLD AUTO: 37 % (ref 14–44)
MCH RBC QN AUTO: 30.6 PG (ref 26.8–34.3)
MCHC RBC AUTO-ENTMCNC: 34.4 G/DL (ref 31.4–37.4)
MCV RBC AUTO: 89 FL (ref 82–98)
MICROALBUMIN UR-MCNC: <5 MG/L (ref 0–20)
MICROALBUMIN/CREAT 24H UR: <15 MG/G CREATININE (ref 0–30)
MONOCYTES # BLD AUTO: 0.76 THOUSAND/ΜL (ref 0.17–1.22)
MONOCYTES NFR BLD AUTO: 9 % (ref 4–12)
NEUTROPHILS # BLD AUTO: 4.06 THOUSANDS/ΜL (ref 1.85–7.62)
NEUTS SEG NFR BLD AUTO: 50 % (ref 43–75)
NRBC BLD AUTO-RTO: 0 /100 WBCS
PLATELET # BLD AUTO: 169 THOUSANDS/UL (ref 149–390)
PMV BLD AUTO: 10.5 FL (ref 8.9–12.7)
POTASSIUM SERPL-SCNC: 3.6 MMOL/L (ref 3.5–5.3)
PROT SERPL-MCNC: 7.5 G/DL (ref 6.4–8.2)
RBC # BLD AUTO: 4.74 MILLION/UL (ref 3.88–5.62)
SODIUM SERPL-SCNC: 139 MMOL/L (ref 136–145)
TRIGL SERPL-MCNC: 179 MG/DL
TSH SERPL DL<=0.05 MIU/L-ACNC: 3.52 UIU/ML (ref 0.36–3.74)
WBC # BLD AUTO: 8.24 THOUSAND/UL (ref 4.31–10.16)

## 2021-03-22 PROCEDURE — 82570 ASSAY OF URINE CREATININE: CPT | Performed by: NURSE PRACTITIONER

## 2021-03-22 PROCEDURE — 82043 UR ALBUMIN QUANTITATIVE: CPT | Performed by: NURSE PRACTITIONER

## 2021-03-22 PROCEDURE — 80053 COMPREHEN METABOLIC PANEL: CPT

## 2021-03-22 PROCEDURE — 80061 LIPID PANEL: CPT

## 2021-03-22 PROCEDURE — 85025 COMPLETE CBC W/AUTO DIFF WBC: CPT

## 2021-03-22 PROCEDURE — 83036 HEMOGLOBIN GLYCOSYLATED A1C: CPT

## 2021-03-22 PROCEDURE — 84443 ASSAY THYROID STIM HORMONE: CPT

## 2021-03-22 PROCEDURE — 36415 COLL VENOUS BLD VENIPUNCTURE: CPT

## 2021-03-26 ENCOUNTER — IMMUNIZATIONS (OUTPATIENT)
Dept: FAMILY MEDICINE CLINIC | Facility: HOSPITAL | Age: 71
End: 2021-03-26

## 2021-03-26 DIAGNOSIS — Z23 ENCOUNTER FOR IMMUNIZATION: Primary | ICD-10-CM

## 2021-03-26 PROCEDURE — 91300 SARS-COV-2 / COVID-19 MRNA VACCINE (PFIZER-BIONTECH) 30 MCG: CPT

## 2021-03-26 PROCEDURE — 0002A SARS-COV-2 / COVID-19 MRNA VACCINE (PFIZER-BIONTECH) 30 MCG: CPT

## 2021-03-29 ENCOUNTER — TELEPHONE (OUTPATIENT)
Dept: CARDIOLOGY CLINIC | Facility: CLINIC | Age: 71
End: 2021-03-29

## 2021-03-29 DIAGNOSIS — I25.118 CORONARY ARTERY DISEASE OF NATIVE ARTERY OF NATIVE HEART WITH STABLE ANGINA PECTORIS (HCC): ICD-10-CM

## 2021-03-29 NOTE — TELEPHONE ENCOUNTER
Patient has refill at [facility] , he will call them to [facility]

## 2021-03-29 NOTE — TELEPHONE ENCOUNTER
Isaac Franco called and is requesting a med refill for rosuvastatin, if approved please send to [facility] in [facility] is the last ordering provider

## 2021-04-15 ENCOUNTER — OFFICE VISIT (OUTPATIENT)
Dept: ENDOCRINOLOGY | Facility: CLINIC | Age: 71
End: 2021-04-15
Payer: MEDICARE

## 2021-04-15 VITALS
TEMPERATURE: 98.7 F | BODY MASS INDEX: 25.5 KG/M2 | HEART RATE: 70 BPM | WEIGHT: 204 LBS | DIASTOLIC BLOOD PRESSURE: 60 MMHG | SYSTOLIC BLOOD PRESSURE: 108 MMHG

## 2021-04-15 DIAGNOSIS — E78.2 MIXED HYPERLIPIDEMIA: ICD-10-CM

## 2021-04-15 DIAGNOSIS — I10 ESSENTIAL HYPERTENSION: ICD-10-CM

## 2021-04-15 DIAGNOSIS — E11.9 TYPE 2 DIABETES MELLITUS WITHOUT COMPLICATION, WITHOUT LONG-TERM CURRENT USE OF INSULIN (HCC): ICD-10-CM

## 2021-04-15 DIAGNOSIS — Z95.1 S/P CABG (CORONARY ARTERY BYPASS GRAFT): ICD-10-CM

## 2021-04-15 DIAGNOSIS — E11.65 TYPE 2 DIABETES MELLITUS WITH HYPERGLYCEMIA, WITHOUT LONG-TERM CURRENT USE OF INSULIN (HCC): ICD-10-CM

## 2021-04-15 DIAGNOSIS — E11.40 CONTROLLED TYPE 2 DIABETES WITH NEUROPATHY (HCC): Primary | ICD-10-CM

## 2021-04-15 DIAGNOSIS — I25.118 CORONARY ARTERY DISEASE OF NATIVE ARTERY OF NATIVE HEART WITH STABLE ANGINA PECTORIS (HCC): ICD-10-CM

## 2021-04-15 PROCEDURE — 99214 OFFICE O/P EST MOD 30 MIN: CPT | Performed by: INTERNAL MEDICINE

## 2021-04-15 RX ORDER — L-METHYLFOLATE-ALGAE-VIT B12-B6 CAP 3-90.314-2-35 MG 3-90.314-2-35 MG
CAP ORAL
Qty: 30 CAPSULE | Refills: 4 | Status: SHIPPED | OUTPATIENT
Start: 2021-04-15 | End: 2021-06-23

## 2021-04-15 NOTE — PROGRESS NOTES
Bandar Adkins 65 y o  male MRN: [id number]    Encounter: [id number]      Assessment/Plan     Assessment: This is a 65y o -year-old male with diabetes with hyperglycemia  Plan:    Diagnoses and all orders for this visit:    Controlled type 2 diabetes with neuropathy ([facility] )  Will start methyl folate  Vitamin B12 combination for neuropathy  -     L-Methylfolate-Algae-B12-B6 (Metanx) 3-90 314-2-35 MG CAPS; Take one capsule daily    Type 2 diabetes mellitus with hyperglycemia, without long-term current use of insulin ([facility])  Lab Results   Component Value Date    HGBA1C 7 4 (H) 03/22/2021   A1c 7 4%, improved  Continue current regimen  Discussed importance of follow-up with Ophthalmology and podiatry      -     L-Methylfolate-Algae-B12-B6 (Metanx) 8-11 002-4-59 MG CAPS; Take one capsule daily  -     metFORMIN (GLUCOPHAGE) 500 mg tablet; Take 2 tablets (1,000 mg total) by mouth 2 (two) times a day  -     Anti-islet cell antibody; Future  -     Hemoglobin A1C; Future    Mixed hyperlipidemia  Lab Results   Component Value Date    LDLCALC 39 03/22/2021     Essential hypertension  Blood pressure well controlled  Continue current management  Discussed with patient if he develops any dizziness he should call our office    S/P CABG (coronary artery bypass graft)  Continue to follow with Cardiology  Coronary artery disease of native artery of native heart with stable angina pectoris ([place] [address] )  Follow with Cardiology  Type 2 diabetes mellitus without complication, without long-term current use of insulin ([place] [address] )        CC: Diabetes    History of Present Illness     HPI:    Bandar Adkins  Is 64-year-old man with medical history of type 2 diabetes, managed on metformin 1000 mg twice a day and Jardiance 25 mg daily is here for follow-up    He is also taking Trulicity 1 5 mg weekly, denies any side effects      Diabetes course has been stable  Lab Results   Component Value Date    HGBA1C 7 4 (H) 03/22/2021   A1c improved to 7 4%    Patient checks blood sugars once or twice daily, blood sugars are usually in  mg/dL range    For hypertension he takes Toprol-XL 50 mg daily, chlorthalidone 25 mg daily  He also has history of congestive heart failure and coronary artery disease, followed by Cardiology  For hypertension he takes Crestor 10 mg daily    Results for Pipo Bartlett (MRN [id number]) as of 4/15/2021 14:49   Ref  Range 3/22/2021 07:36   Sodium Latest Ref Range: 136 - 145 mmol/L 139   Potassium Latest Ref Range: 3 5 - 5 3 mmol/L 3 6   Chloride Latest Ref Range: 100 - 108 mmol/L 105   CO2 Latest Ref Range: 21 - 32 mmol/L 27   Anion Gap Latest Ref Range: 4 - 13 mmol/L 7   BUN Latest Ref Range: 5 - 25 mg/dL 28 (H)   Creatinine Latest Ref Range: 0 60 - 1 30 mg/dL 1 05   GLUCOSE FASTING Latest Ref Range: 65 - 99 mg/dL 149 (H)   Calcium Latest Ref Range: 8 3 - 10 1 mg/dL 9 7   AST Latest Ref Range: 5 - 45 U/L 14   ALT Latest Ref Range: 12 - 78 U/L 26   Alkaline Phosphatase Latest Ref Range: 46 - 116 U/L 62   Total Protein Latest Ref Range: 6 4 - 8 2 g/dL 7 5   Albumin Latest Ref Range: 3 5 - 5 0 g/dL 4 0   TOTAL BILIRUBIN Latest Ref Range: 0 20 - 1 00 mg/dL 0 55   eGFR Latest Units: ml/min/1 73sq m 71   Cholesterol Latest Ref Range: 50 - 200 mg/dL 121   Triglycerides Latest Ref Range: <=150 mg/dL 179 (H)   HDL Latest Ref Range: >=40 mg/dL 46   LDL Calculated Latest Ref Range: 0 - 100 mg/dL 39       Lab Results   Component Value Date    HGBA1C 7 4 (H) 03/22/2021          Review of Systems   Constitutional: Negative for activity change, diaphoresis, fatigue, fever and unexpected weight change  HENT: Negative  Eyes: Negative for visual disturbance  Respiratory: Negative for cough, chest tightness and shortness of breath  Cardiovascular: Negative for chest pain, palpitations and leg swelling  Gastrointestinal: Negative for abdominal pain, blood in stool, constipation, diarrhea, nausea and vomiting     Endocrine: Negative for cold intolerance, heat intolerance, polydipsia, polyphagia and polyuria  Genitourinary: Negative for dysuria, enuresis, frequency and urgency  Musculoskeletal: Negative for arthralgias and myalgias  Skin: Negative for pallor, rash and wound  Allergic/Immunologic: Negative  Neurological: Positive for numbness  Negative for dizziness, tremors and weakness  Hematological: Negative  Psychiatric/Behavioral: Negative          Historical Information   Past Medical History:   Diagnosis Date    Diabetes mellitus ([address] )     TMJ (dislocation of temporomandibular joint)      Past Surgical History:   Procedure Laterality Date    CARDIAC SURGERY      EYE SURGERY  03/2020    Cataract right eye    EYE SURGERY Left 12/03/2020    left eye cataract     TONSILLECTOMY       Social History   Social History     Substance and Sexual Activity   Alcohol Use Yes    Frequency: 2-4 times a month     Social History     Substance and Sexual Activity   Drug Use Never     Social History     Tobacco Use   Smoking Status Never Smoker   Smokeless Tobacco Never Used     Family History:   Family History   Problem Relation Age of Onset    Heart disease Mother     Cancer Mother     Heart disease Father     Hypertension Father     Colon cancer Neg Hx        Meds/Allergies   Current Outpatient Medications   Medication Sig Dispense Refill    acetaminophen (TYLENOL) 500 mg tablet Take 500 mg by mouth daily at bedtime      ascorbic acid (VITAMIN C) 250 mg tablet Take 500 mg by mouth 2 (two) times a day      aspirin 81 mg chewable tablet Chew 81 mg daily      chlorthalidone 25 mg tablet Take 1 tablet (25 mg total) by mouth daily 90 tablet 3    cholecalciferol (VITAMIN D3) 1,000 units tablet Take 1,000 Units by mouth daily      finasteride (PROSCAR) 5 mg tablet Take 5 mg by mouth daily      Jardiance 25 MG TABS Take 1 tablet (25 mg total) by mouth daily 90 tablet 3    metFORMIN (GLUCOPHAGE) 500 mg tablet Take 2 tablets (1,000 mg total) by mouth 2 (two) times a day 360 tablet 1    metoprolol succinate (TOPROL-XL) 50 mg 24 hr tablet Take 1 tablet (50 mg total) by mouth daily 90 tablet 3    niacin 500 mg tablet Take 500 mg by mouth 4 (four) times a day      NON FORMULARY       PROBIOTIC PRODUCT PO Take 111 mg by mouth daily      rosuvastatin (CRESTOR) 10 MG tablet Take 1 tablet (10 mg total) by mouth daily 90 tablet 3    tamsulosin (FLOMAX) 0 4 mg Take 1 capsule (0 4 mg total) by mouth daily with dinner 30 capsule 6    Trulicity 1 5 WH/3 1KI SOPN Inject 0 5 mL (1 5 mg total) under the skin once a week 4 pen 5    L-Methylfolate-Algae-B12-B6 (Metanx) 3-90 314-2-35 MG CAPS Take one capsule daily 30 capsule 4     No current facility-administered medications for this visit  No Known Allergies    Objective   Vitals: Blood pressure 108/60, pulse 70, temperature 98 7 °F (37 1 °C), weight 92 5 kg (204 lb)  Physical Exam  Vitals signs reviewed  Constitutional:       General: He is not in acute distress  Appearance: He is well-developed  HENT:      Head: Normocephalic and atraumatic  Eyes:      Pupils: Pupils are equal, round, and reactive to light  Neck:      Musculoskeletal: Normal range of motion and neck supple  Thyroid: No thyromegaly  Cardiovascular:      Rate and Rhythm: Normal rate and regular rhythm  Heart sounds: Normal heart sounds  Pulmonary:      Effort: Pulmonary effort is normal  No respiratory distress  Breath sounds: Normal breath sounds  Musculoskeletal: Normal range of motion  General: No deformity  Skin:     General: Skin is warm and dry  Findings: No erythema  Neurological:      Mental Status: He is alert and oriented to person, place, and time  The history was obtained from the review of the chart, patient      Lab Results:   Lab Results   Component Value Date/Time    Hemoglobin A1C 7 4 (H) 03/22/2021 07:36 AM    Hemoglobin A1C 6 9 (H) 11/17/2020 08:23 AM    Hemoglobin A1C 7 4 07/20/2020    WBC 8 24 03/22/2021 07:36 AM    Hemoglobin 14 5 03/22/2021 07:36 AM    Hematocrit 42 1 03/22/2021 07:36 AM    MCV 89 03/22/2021 07:36 AM    Platelets 293 98/57/5378 07:36 AM    BUN 28 (H) 03/22/2021 07:36 AM    BUN 26 (H) 11/17/2020 08:23 AM    BUN 24 10/07/2020 08:05 AM    Potassium 3 6 03/22/2021 07:36 AM    Potassium 3 9 11/17/2020 08:23 AM    Potassium 4 0 10/07/2020 08:05 AM    Chloride 105 03/22/2021 07:36 AM    Chloride 106 11/17/2020 08:23 AM    Chloride 103 10/07/2020 08:05 AM    CO2 27 03/22/2021 07:36 AM    CO2 31 11/17/2020 08:23 AM    CO2 31 10/07/2020 08:05 AM    Creatinine 1 05 03/22/2021 07:36 AM    Creatinine 1 13 11/17/2020 08:23 AM    Creatinine 1 12 10/07/2020 08:05 AM    AST 14 03/22/2021 07:36 AM    ALT 26 03/22/2021 07:36 AM    Albumin 4 0 03/22/2021 07:36 AM    HDL, Direct 46 03/22/2021 07:36 AM    Triglycerides 179 (H) 03/22/2021 07:36 AM           Imaging Studies: I have personally reviewed pertinent reports  Portions of the record may have been created with voice recognition software  Occasional wrong word or "sound a like" substitutions may have occurred due to the inherent limitations of voice recognition software  Read the chart carefully and recognize, using context, where substitutions have occurred

## 2021-04-15 NOTE — PATIENT INSTRUCTIONS
Hypoglycemia instructions   Evy Gaxiola  4/15/2021  [id number]    Low Blood Sugar    Steps to treat low blood sugar  1  Test blood sugar if you have symptoms of low blood sugar:   Low Blood Sugar Symptoms:  o Sweaty  o Dizzy  o Rapid heartbeat  o Shaky    o Bad mood  o Hungry      2  Treat blood sugar less than 70 with 15 grams of fast-acting carbohydrate:   Examples of 15 grams Fast-Acting Carbohydrate:  o 4 oz juice  o 4 oz regular soda  o 3-4 glucose tablets (chew)  o 3-4 hard candies (chew)              3    Wait 15 minutes and test your blood sugar again           4   If blood sugar is less than 100, repeat steps 2-3       5  When your blood sugar is 100 or more, eat a snack if it will be longer than one hour until your next meal  The snack should be 15 grams of carbohydrate and a protein:   Examples of snacks:  o ½ sandwich  o 6 crackers with cheese  o Piece of fruit with cheese or peanut butter  o 6 crackers with peanut butter

## 2021-04-30 ENCOUNTER — OFFICE VISIT (OUTPATIENT)
Dept: CARDIOLOGY CLINIC | Facility: CLINIC | Age: 71
End: 2021-04-30
Payer: MEDICARE

## 2021-04-30 VITALS
HEART RATE: 68 BPM | SYSTOLIC BLOOD PRESSURE: 118 MMHG | BODY MASS INDEX: 25.74 KG/M2 | OXYGEN SATURATION: 98 % | DIASTOLIC BLOOD PRESSURE: 58 MMHG | HEIGHT: 75 IN | WEIGHT: 207 LBS

## 2021-04-30 DIAGNOSIS — Z95.2 S/P AVR: ICD-10-CM

## 2021-04-30 DIAGNOSIS — I77.1 STENOSIS OF RIGHT SUBCLAVIAN ARTERY (HCC): ICD-10-CM

## 2021-04-30 DIAGNOSIS — I25.118 CORONARY ARTERY DISEASE OF NATIVE ARTERY OF NATIVE HEART WITH STABLE ANGINA PECTORIS (HCC): Primary | ICD-10-CM

## 2021-04-30 DIAGNOSIS — E78.2 MIXED HYPERLIPIDEMIA: ICD-10-CM

## 2021-04-30 DIAGNOSIS — I35.0 NONRHEUMATIC AORTIC VALVE STENOSIS: ICD-10-CM

## 2021-04-30 DIAGNOSIS — Z95.1 S/P CABG (CORONARY ARTERY BYPASS GRAFT): ICD-10-CM

## 2021-04-30 PROCEDURE — 99214 OFFICE O/P EST MOD 30 MIN: CPT | Performed by: INTERNAL MEDICINE

## 2021-04-30 PROCEDURE — 93000 ELECTROCARDIOGRAM COMPLETE: CPT | Performed by: INTERNAL MEDICINE

## 2021-04-30 RX ORDER — CHLORTHALIDONE 25 MG/1
12.5 TABLET ORAL DAILY
Qty: 90 TABLET | Refills: 3
Start: 2021-04-30 | End: 2021-05-10 | Stop reason: SDUPTHER

## 2021-04-30 NOTE — PROGRESS NOTES
Cardiology Follow Up    Seretha Simmonds  [id number]  1950  [facility] [address]  [facility] [address]      1  Coronary artery disease of native artery of native heart with stable angina pectoris (HCC)  POCT ECG    chlorthalidone 25 mg tablet    Echo complete with contrast if indicated   2  Nonrheumatic aortic valve stenosis     3  S/P AVR  Echo complete with contrast if indicated   4  S/P CABG (coronary artery bypass graft)     5  Mixed hyperlipidemia     6  Stenosis of right subclavian artery (HCC)         Discussion/Summary:    - CAD: prior CABG single vessel with LIMA to LAD  Myalgias with other statins, but tolerating Crestor 10mg, LDL is doing well  Discussed the niacin previously, he wishes to continue     - AS: s/p AVR  Check echo to evaluate valve function  - Carotid stenosis: bilateral  < 50%  Medical management  R subclavian moderate disease also noted, advised to check BP measurements on the L     - HTN: BP on the lower side  With fatigue and dizziness, cut back the chlorthalidone to 12 5mg daily  - PAF: brief after surgery, no evidence of recurrence, not on anticoagulation     - Edema - trace, no major overload  Avoid loop diuretic for now, as I am actually decreasing the chlorthalidone  Previous History:  68-year-old man  He has a history of coronary artery disease diagnosed in July of 2019 when he was having chest discomfort  He had single-vessel bypass with LIMA to the LAD and also at that time had aortic stenosis and underwent a 25 mm Almeida bioprosthetic aortic valve replacement (Ray)    Preoperative testing showed that he had carotid artery stenosis bilaterally which has been monitored serially with ultrasound most recently with < 50% stenosis bilaterally but some R subclavian disease as well  Had atrial fibrillation which lasted < 24 hours after surgery   He was initially on amiodarone and warfarin for brief period of time after the surgery, but this was stopped  He has had some episodes of palpitations  He had a 1 week monitor with his prior cardiologist, no atrial fibrillation was found  Recently, he has been feeling some palpitations at night  He had a 48 hour Holter monitor which showed only PVCs  No other significant arrhythmias  Otherwise, he gets occasional lower extremity edema  He was on Lasix immediately after the surgery, but this was switched to chlorthalidone  He has had myalgias with several statins in the past   He has most recently been on 10 mg of Crestor  His LDL is in the 40s  He is also taking niacin  He has been on this for 15 years, has not had any side effects with it  Over-the-counter fish oil  Lovaza was too expensive  Interval History:    Returns again with similar concerns regarding lower extremity edema which is intermittent, and some occasional dizziness  He was walking recently at the [address] [place] [address], and felt like he was staggering  Drank some water and then was better  Review of his blood pressures from last few visits shows this is on the lower side  He is on 25 mg of chlorthalidone still  Blood work done in March showed good renal function  Lipid panel was okay for LDL  He has not had any chest pain  No major shortness of breath          Patient Active Problem List   Diagnosis    Aortic stenosis    Coronary artery disease of native artery of native heart with stable angina pectoris ([facility] )    DM (diabetes mellitus), type 2 ([facility] )    Hypertriglyceridemia    S/P CABG (coronary artery bypass graft)    Other hydrocele    S/P AVR    PVC (premature ventricular contraction)    Bilateral carotid artery stenosis    Mixed hyperlipidemia    Anus irritation    Acute systolic heart failure (HCC)    Paroxysmal atrial fibrillation (HCC)    Benign prostatic hyperplasia with urinary frequency    Bilateral impacted cerumen    Stenosis of right subclavian artery (HCC)     Past Medical History:   Diagnosis Date    Diabetes mellitus ([address] )     TMJ (dislocation of temporomandibular joint)      Social History     Tobacco Use    Smoking status: Never Smoker    Smokeless tobacco: Never Used   Substance Use Topics    Alcohol use: Yes     Frequency: 2-3 times a week     Comment: 1 glass of wine or 1 beer a week    Drug use: Never      Family History   Problem Relation Age of Onset    Heart disease Mother     Cancer Mother     Heart disease Father     Hypertension Father     Colon cancer Neg Hx      Past Surgical History:   Procedure Laterality Date    CARDIAC SURGERY      EYE SURGERY  03/2020    Cataract right eye    EYE SURGERY Left 12/03/2020    left eye cataract     TONSILLECTOMY         Current Outpatient Medications:     acetaminophen (TYLENOL) 500 mg tablet, Take 500 mg by mouth daily at bedtime, Disp: , Rfl:     ascorbic acid (VITAMIN C) 250 mg tablet, Take 500 mg by mouth 2 (two) times a day, Disp: , Rfl:     aspirin 81 mg chewable tablet, Chew 81 mg daily, Disp: , Rfl:     chlorthalidone 25 mg tablet, Take 0 5 tablets (12 5 mg total) by mouth daily, Disp: 90 tablet, Rfl: 3    cholecalciferol (VITAMIN D3) 1,000 units tablet, Take 1,000 Units by mouth daily, Disp: , Rfl:     finasteride (PROSCAR) 5 mg tablet, Take 5 mg by mouth daily, Disp: , Rfl:     Jardiance 25 MG TABS, Take 1 tablet (25 mg total) by mouth daily, Disp: 90 tablet, Rfl: 3    metFORMIN (GLUCOPHAGE) 500 mg tablet, Take 2 tablets (1,000 mg total) by mouth 2 (two) times a day, Disp: 360 tablet, Rfl: 1    metoprolol succinate (TOPROL-XL) 50 mg 24 hr tablet, Take 1 tablet (50 mg total) by mouth daily, Disp: 90 tablet, Rfl: 3    niacin 500 mg tablet, Take 500 mg by mouth 4 (four) times a day, Disp: , Rfl:     NON FORMULARY, , Disp: , Rfl:     PROBIOTIC PRODUCT PO, Take 111 mg by mouth daily, Disp: , Rfl:     rosuvastatin (CRESTOR) 10 MG tablet, Take 1 tablet (10 mg total) by mouth daily, Disp: 90 tablet, Rfl: 3    tamsulosin (FLOMAX) 0 4 mg, Take 1 capsule (0 4 mg total) by mouth daily with dinner, Disp: 30 capsule, Rfl: 6    Trulicity 1 5 IB/5 3UO SOPN, Inject 0 5 mL (1 5 mg total) under the skin once a week, Disp: 4 pen, Rfl: 5    L-Methylfolate-Algae-B12-B6 (Metanx) 3-90 314-2-35 MG CAPS, Take one capsule daily (Patient not taking: Reported on 4/30/2021), Disp: 30 capsule, Rfl: 4  No Known Allergies    Vitals:    04/30/21 1327   BP: 118/58   Pulse: 68   SpO2: 98%   Weight: 93 9 kg (207 lb)   Height: 6' 3" (1 905 m)     Vitals:    04/30/21 1327   Weight: 93 9 kg (207 lb)      Height: 6' 3" (190 5 cm)   Body mass index is 25 87 kg/m²  Physical Exam:  GEN: Fatmata Lindsay appears well, alert and oriented x 3, pleasant and cooperative   HEENT: pupils equal, round, and reactive to light; extraocular muscles intact  NECK: supple, no carotid bruits   HEART: bioprosthetic AVR  LUNGS: clear to auscultation bilaterally; no wheezes, rales, or rhonchi   ABDOMEN: normal bowel sounds, soft, no tenderness, no distention  EXTREMITIES: trace LE edema  NEURO: no focal findings   SKIN: normal without suspicious lesions on exposed skin      ROS:  Positive for fatigue, sleepiness  No snoring, gets refreshed sleep  No chest pain, shortness of breath  Except as noted in HPI, is otherwise reviewed in detail and a 12 point review of systems is negative      Labs:  Lab Results   Component Value Date    K 3 6 03/22/2021     03/22/2021    CREATININE 1 05 03/22/2021    BUN 28 (H) 03/22/2021    CO2 27 03/22/2021    ALT 26 03/22/2021    AST 14 03/22/2021    GLUF 149 (H) 03/22/2021    HGBA1C 7 4 (H) 03/22/2021    WBC 8 24 03/22/2021    HGB 14 5 03/22/2021    HCT 42 1 03/22/2021     03/22/2021       No results found for: CHOL  Lab Results   Component Value Date    HDL 46 03/22/2021     Lab Results   Component Value Date    LDLCALC 39 03/22/2021     Lab Results   Component Value Date    TRIG 179 (H) 03/22/2021       Testing:  Holter: The patient was in sinus rhythm throughout the recording      Minimum HR: 59  Average HR: 74  Maximum HR: 102     Premature ventricular contractions: 2954 (2%)  Ventricular runs: 0     Supraventricular contractions: 34  Supraventricular runs: 1 lasting 4 beats     Longest RR: 1 5 sec     Arrhythmias: none     Diary submitted: No        Impression     1  Abnormal Holter monitor  2  Frequent PVCs without ventricular runs  3   Rare PACs    EKG:  Sinus rhythm, 68 BPM  LBBB

## 2021-05-10 ENCOUNTER — TELEPHONE (OUTPATIENT)
Dept: CARDIOLOGY CLINIC | Facility: CLINIC | Age: 71
End: 2021-05-10

## 2021-05-10 DIAGNOSIS — I25.118 CORONARY ARTERY DISEASE OF NATIVE ARTERY OF NATIVE HEART WITH STABLE ANGINA PECTORIS (HCC): ICD-10-CM

## 2021-05-10 RX ORDER — CHLORTHALIDONE 25 MG/1
12.5 TABLET ORAL DAILY
Qty: 90 TABLET | Refills: 3
Start: 2021-05-10 | End: 2021-10-04

## 2021-05-10 NOTE — TELEPHONE ENCOUNTER
Dr Kely Fajardo lowered dose of Chlorthalidone to 12 5mg and Patient is requesting a new script for the 12 5mg

## 2021-05-13 NOTE — TELEPHONE ENCOUNTER
Please send 12 5 dose to the patient pharmacy  The pharmacy never received It      [facility] [address] - [address] [facility]    chlorthalidone 25 mg tablet

## 2021-05-18 DIAGNOSIS — N40.1 BENIGN PROSTATIC HYPERPLASIA WITH URINARY FREQUENCY: Primary | ICD-10-CM

## 2021-05-18 DIAGNOSIS — R35.0 BENIGN PROSTATIC HYPERPLASIA WITH URINARY FREQUENCY: Primary | ICD-10-CM

## 2021-05-18 RX ORDER — FINASTERIDE 5 MG/1
5 TABLET, FILM COATED ORAL DAILY
Qty: 90 TABLET | Refills: 3 | Status: SHIPPED | OUTPATIENT
Start: 2021-05-18 | End: 2022-05-02 | Stop reason: SDUPTHER

## 2021-05-18 NOTE — TELEPHONE ENCOUNTER
Patient left a message on the Medication Refill voice mail line requesting a new prescription for Finasteride 5mg, 90 day supply to [id number]

## 2021-05-18 NOTE — TELEPHONE ENCOUNTER
The patient has an upcoming office visit scheduled for 5/26/21 with Ifrah Medina PA-C in the [facility] location but will run out of medication until then    Request for same, 90 day supply with 2 refills was queued and forwarded to the Advanced Practitioner covering the [facility] location for approval

## 2021-05-24 ENCOUNTER — OFFICE VISIT (OUTPATIENT)
Dept: PODIATRY | Facility: CLINIC | Age: 71
End: 2021-05-24
Payer: MEDICARE

## 2021-05-24 VITALS
HEIGHT: 75 IN | DIASTOLIC BLOOD PRESSURE: 65 MMHG | BODY MASS INDEX: 25.54 KG/M2 | SYSTOLIC BLOOD PRESSURE: 108 MMHG | WEIGHT: 205.4 LBS | HEART RATE: 64 BPM

## 2021-05-24 DIAGNOSIS — B35.1 ONYCHOMYCOSIS: ICD-10-CM

## 2021-05-24 DIAGNOSIS — E11.42 DIABETIC POLYNEUROPATHY ASSOCIATED WITH TYPE 2 DIABETES MELLITUS (HCC): Primary | ICD-10-CM

## 2021-05-24 PROCEDURE — 11721 DEBRIDE NAIL 6 OR MORE: CPT | Performed by: PODIATRIST

## 2021-05-24 RX ORDER — PROPRANOLOL/HYDROCHLOROTHIAZID 40 MG-25MG
TABLET ORAL
COMMUNITY
Start: 2021-05-03

## 2021-05-24 RX ORDER — ASCORBIC ACID 1000 MG
TABLET ORAL
COMMUNITY

## 2021-05-24 NOTE — PROGRESS NOTES
Jus Chavez  1950  AT RISK FOOT CARE    1  Diabetic polyneuropathy associated with type 2 diabetes mellitus ([facility] )     2  Onychomycosis         Patient presents for at-risk foot care  Patient has no acute concerns today  Patient has significant lower extremity risk due to neuropathy, parasthesia, edema, and trophic skin changes to the lower extremity  On exam patient has thickened, hypertrophic, discolored, brittle toenails with subungual debris and tenderness x10   Callus: none  Patient has lower extremity edema  PAtients skin is atrophic, thickened nails, and decreased pedal hair  Patient has decreased pinprick and vibratory sensation to his feet and parasthesia    Today's treatment includes:  Debridement of toenails  Using nail nipper, an, and curette, nails were sharply debrided, reduced in thickness and length  Devitalized nail tissue and fungal debris excised and removed  Patient tolerated well  Discussed proper shoe gear, daily inspections of feet, and general foot health with patient  Patient has Q9  findings and is recommended for at risk foot care every 9-10 weeks      Patients most recent complete clinical foot exam was on: 7/6/2020

## 2021-05-26 ENCOUNTER — OFFICE VISIT (OUTPATIENT)
Dept: UROLOGY | Facility: CLINIC | Age: 71
End: 2021-05-26
Payer: MEDICARE

## 2021-05-26 VITALS
HEIGHT: 75 IN | SYSTOLIC BLOOD PRESSURE: 102 MMHG | WEIGHT: 205 LBS | BODY MASS INDEX: 25.49 KG/M2 | DIASTOLIC BLOOD PRESSURE: 60 MMHG | HEART RATE: 65 BPM

## 2021-05-26 DIAGNOSIS — R35.0 BENIGN PROSTATIC HYPERPLASIA WITH URINARY FREQUENCY: Primary | ICD-10-CM

## 2021-05-26 DIAGNOSIS — N40.1 BENIGN PROSTATIC HYPERPLASIA WITH URINARY FREQUENCY: Primary | ICD-10-CM

## 2021-05-26 LAB — POST-VOID RESIDUAL VOLUME, ML POC: 43 ML

## 2021-05-26 PROCEDURE — 51798 US URINE CAPACITY MEASURE: CPT | Performed by: PHYSICIAN ASSISTANT

## 2021-05-26 PROCEDURE — 99213 OFFICE O/P EST LOW 20 MIN: CPT | Performed by: PHYSICIAN ASSISTANT

## 2021-05-26 NOTE — PROGRESS NOTES
UROLOGY PROGRESS NOTE   Patient Identifiers: Renée Mejias (MRN [id number])  Date of Service: 5/26/2021    Subjective:     27-year-old male with history of BPH and right-sided hydrocele seen in February and is here for follow-up  He had been on finasteride for many years and had previously seen Urology in [address] [place] [address]  Tamsulosin was added as last visit which he reports made a good improvement in his lower urinary tract symptoms  He further has a right-sided hydrocele  This is confirmed by scrotal ultrasound  There is no intra testicular mass or hernia  Reason for visit:  BPH and hydrocele follow-up    Objective:     VITALS:    Vitals:    05/26/21 1133   BP: 102/60   Pulse: 65     AUA SYMPTOM SCORE      Most Recent Value   AUA SYMPTOM SCORE   How often have you had a sensation of not emptying your bladder completely after you finished urinating? 0   How often have you had to urinate again less than two hours after you finished urinating? 1   How often have you found you stopped and started again several times when you urinate?  0   How often have you found it difficult to postpone urination? 1   How often have you had a weak urinary stream?  1   How often have you had to push or strain to begin urination? 0   How many times did you most typically get up to urinate from the time you went to bed at night until the time you got up in the morning?   1   Quality of Life: If you were to spend the rest of your life with your urinary condition just the way it is now, how would you feel about that?  2   AUA SYMPTOM SCORE  4            LABS:  Lab Results   Component Value Date    HGB 14 5 03/22/2021    HCT 42 1 03/22/2021    WBC 8 24 03/22/2021     03/22/2021   ]    Lab Results   Component Value Date    K 3 6 03/22/2021     03/22/2021    CO2 27 03/22/2021    BUN 28 (H) 03/22/2021    CREATININE 1 05 03/22/2021    CALCIUM 9 7 03/22/2021   ]        INPATIENT MEDS:    Current Outpatient Medications:    acetaminophen (TYLENOL) 500 mg tablet, Take 500 mg by mouth daily at bedtime, Disp: , Rfl:     ascorbic acid (VITAMIN C) 250 mg tablet, Take 500 mg by mouth 2 (two) times a day, Disp: , Rfl:     aspirin 81 mg chewable tablet, Chew 81 mg daily, Disp: , Rfl:     chlorthalidone 25 mg tablet, Take 0 5 tablets (12 5 mg total) by mouth daily, Disp: 90 tablet, Rfl: 3    cholecalciferol (VITAMIN D3) 1,000 units tablet, Take 1,000 Units by mouth daily, Disp: , Rfl:     finasteride (PROSCAR) 5 mg tablet, Take 1 tablet (5 mg total) by mouth daily, Disp: 90 tablet, Rfl: 3    Norma, Zingiber officinalis, (Norma Root) 500 MG CAPS, , Disp: , Rfl:     Jardiance 25 MG TABS, Take 1 tablet (25 mg total) by mouth daily, Disp: 90 tablet, Rfl: 3    L-Methylfolate-Algae-B12-B6 (Metanx) 3-90 314-2-35 MG CAPS, Take one capsule daily, Disp: 30 capsule, Rfl: 4    metFORMIN (GLUCOPHAGE) 500 mg tablet, Take 2 tablets (1,000 mg total) by mouth 2 (two) times a day, Disp: 360 tablet, Rfl: 1    metoprolol succinate (TOPROL-XL) 50 mg 24 hr tablet, Take 1 tablet (50 mg total) by mouth daily, Disp: 90 tablet, Rfl: 3    niacin 500 mg tablet, Take 500 mg by mouth 4 (four) times a day, Disp: , Rfl:     NON FORMULARY, , Disp: , Rfl:     PROBIOTIC PRODUCT PO, Take 111 mg by mouth daily, Disp: , Rfl:     rosuvastatin (CRESTOR) 10 MG tablet, Take 1 tablet (10 mg total) by mouth daily, Disp: 90 tablet, Rfl: 3    tamsulosin (FLOMAX) 0 4 mg, Take 1 capsule (0 4 mg total) by mouth daily with dinner, Disp: 30 capsule, Rfl: 6    Trulicity 1 5 UJ/9 1SE SOPN, Inject 0 5 mL (1 5 mg total) under the skin once a week, Disp: 4 pen, Rfl: 5    Turmeric 500 MG CAPS, , Disp: , Rfl:       Physical Exam:   /60   Pulse 65   Ht 6' 3" (1 905 m)   Wt 93 kg (205 lb)   BMI 25 62 kg/m²   GEN: no acute distress    RESP: breathing comfortably with no accessory muscle use    ABD: soft, non-tender, non-distended   INCISION:    EXT: no significant peripheral edema   (Male): Penis circumcised, phallus normal, meatus patent  Testicles descended into scrotum bilaterally without masses nor tenderness  No inguinal hernias bilaterally  Moderate- right-sided hydrocele which somewhat tracts up the inguinal canal      RADIOLOGY:    SCROTAL ULTRASOUND   IMPRESSION:     1  No intratesticular mass or evidence of acute testicular torsion  2   Large right-sided hydrocele and small left-sided hydrocele      Assessment:    1  BPH with obstruction   2   Right-sided hydrocele     Plan:   - he will continue on with tamsulosin and finasteride  - we discussed hydrocele aspiration as well as hydrocelectomy  - he will follow-up with me in several weeks for hydrocele aspiration if it lasts only a short period of time he may consider a surgical option if he gets 3 or 4 months out of it he may follow up for repeat aspiration later in the year  -

## 2021-06-08 ENCOUNTER — TELEPHONE (OUTPATIENT)
Dept: FAMILY MEDICINE CLINIC | Facility: CLINIC | Age: 71
End: 2021-06-08

## 2021-06-08 NOTE — TELEPHONE ENCOUNTER
Patients wife called in said patient is having issues with his stomach, when he is laying down it gurgles  She said it may be acid tried otc tums but this is not helping  started Sunday  Not having fever, not having nausea, not having vomiting  Should he try something else?

## 2021-06-10 ENCOUNTER — PROCEDURE VISIT (OUTPATIENT)
Dept: UROLOGY | Facility: CLINIC | Age: 71
End: 2021-06-10
Payer: MEDICARE

## 2021-06-10 VITALS
WEIGHT: 205.4 LBS | BODY MASS INDEX: 25.54 KG/M2 | DIASTOLIC BLOOD PRESSURE: 60 MMHG | SYSTOLIC BLOOD PRESSURE: 132 MMHG | HEIGHT: 75 IN | HEART RATE: 71 BPM

## 2021-06-10 DIAGNOSIS — N40.0 BENIGN PROSTATIC HYPERPLASIA WITHOUT LOWER URINARY TRACT SYMPTOMS: ICD-10-CM

## 2021-06-10 DIAGNOSIS — N43.3 HYDROCELE, UNSPECIFIED HYDROCELE TYPE: Primary | ICD-10-CM

## 2021-06-10 PROCEDURE — 55000 DRAINAGE OF HYDROCELE: CPT | Performed by: PHYSICIAN ASSISTANT

## 2021-06-10 PROCEDURE — 99213 OFFICE O/P EST LOW 20 MIN: CPT | Performed by: PHYSICIAN ASSISTANT

## 2021-06-10 NOTE — PROGRESS NOTES
Puncture aspiration hydrocele     Date/Time 6/10/2021 2:01 PM     Performed by  William Castlilo PA-C     Authorized by William Castillo PA-C      Universal Protocol   Consent given by: patient  Time out: Immediately prior to procedure a "time out" was called to verify the correct patient, procedure, equipment, support staff and site/side marked as required  Timeout called at: 6/10/2021 1:15 PM   Patient identity confirmed: verbally with patient        Local anesthesia used: yes     Anesthesia   Local anesthesia used: yes  Local Anesthetic: lidocaine 1% without epinephrine     Procedure Details   Procedure Notes:  68-year-old man with history of moderate sized right hydrocele  He has not had any procedures previously  He is here for aspiration and drainage of hydrocele  Scrotum is prepped and draped in usual fashion with Betadine  1% lidocaine used for local anesthetic  An 25 gauge Angiocath is placed into the hydrocele collection and drainage is initiated  250 mL of slightly cloudy yellow fluid is removed without difficulty  There was no bleeding patient tolerated procedure well  The needle stick was covered with antibiotic ointment and a dry sterile dressing  Patient wishes follow-up in 1 year with a PSA prior to visit and will call in the interim if he needs a repeat hydrocele aspiration    Patient tolerance: patient tolerated the procedure well with no immediate complications

## 2021-06-23 ENCOUNTER — TELEPHONE (OUTPATIENT)
Dept: ENDOCRINOLOGY | Facility: CLINIC | Age: 71
End: 2021-06-23

## 2021-06-23 NOTE — TELEPHONE ENCOUNTER
Please inform pt it  hard to see any rashes in the picture, would recommend put trulicity on hold until we see you and then see if rash improves, thanks     Sharath Zabala MD

## 2021-06-24 ENCOUNTER — OFFICE VISIT (OUTPATIENT)
Dept: FAMILY MEDICINE CLINIC | Facility: CLINIC | Age: 71
End: 2021-06-24
Payer: MEDICARE

## 2021-06-24 VITALS
HEART RATE: 91 BPM | WEIGHT: 205 LBS | OXYGEN SATURATION: 96 % | DIASTOLIC BLOOD PRESSURE: 60 MMHG | BODY MASS INDEX: 25.49 KG/M2 | RESPIRATION RATE: 16 BRPM | HEIGHT: 75 IN | SYSTOLIC BLOOD PRESSURE: 102 MMHG | TEMPERATURE: 95.6 F

## 2021-06-24 DIAGNOSIS — Z12.11 COLON CANCER SCREENING: ICD-10-CM

## 2021-06-24 DIAGNOSIS — K21.9 GASTROESOPHAGEAL REFLUX DISEASE WITHOUT ESOPHAGITIS: Primary | ICD-10-CM

## 2021-06-24 DIAGNOSIS — I48.0 PAROXYSMAL ATRIAL FIBRILLATION (HCC): ICD-10-CM

## 2021-06-24 DIAGNOSIS — E78.2 MIXED HYPERLIPIDEMIA: ICD-10-CM

## 2021-06-24 DIAGNOSIS — E11.65 TYPE 2 DIABETES MELLITUS WITH HYPERGLYCEMIA, WITHOUT LONG-TERM CURRENT USE OF INSULIN (HCC): ICD-10-CM

## 2021-06-24 DIAGNOSIS — I65.23 BILATERAL CAROTID ARTERY STENOSIS: ICD-10-CM

## 2021-06-24 PROBLEM — H61.23 BILATERAL IMPACTED CERUMEN: Status: RESOLVED | Noted: 2021-03-09 | Resolved: 2021-06-24

## 2021-06-24 PROBLEM — N43.2 OTHER HYDROCELE: Status: RESOLVED | Noted: 2020-06-11 | Resolved: 2021-06-24

## 2021-06-24 PROBLEM — I50.21 ACUTE SYSTOLIC HEART FAILURE (HCC): Status: RESOLVED | Noted: 2021-02-23 | Resolved: 2021-06-24

## 2021-06-24 PROCEDURE — 99214 OFFICE O/P EST MOD 30 MIN: CPT | Performed by: NURSE PRACTITIONER

## 2021-06-24 RX ORDER — CHLORAL HYDRATE 500 MG
CAPSULE ORAL
COMMUNITY

## 2021-06-24 NOTE — PROGRESS NOTES
FAMILY PRACTICE OFFICE VISIT       NAME: Jus Chavez  AGE: 66 y o  SEX: male       : 1950        MRN: [id number]    DATE: 2021  TIME: 11:19 AM    Assessment and Plan   1  Gastroesophageal reflux disease without esophagitis  -     Ambulatory referral to Gastroenterology; Future    2  Colon cancer screening  -     Ambulatory referral to Gastroenterology; Future    3  Type 2 diabetes mellitus with hyperglycemia, without long-term current use of insulin (HCC)  Assessment & Plan:  Off trulicity now due to side affect  F/u with endo    Lab Results   Component Value Date    HGBA1C 7 4 (H) 2021         4  Paroxysmal atrial fibrillation (HCC)  Assessment & Plan:  Rate controlled  Cont current meds        5  Mixed hyperlipidemia  Assessment & Plan:  Elevated triglycerides  To eat less carbohydrates          6  Bilateral carotid artery stenosis  Assessment & Plan:  Cont statin  Cont currnet meds  Monitor doppler                   Chief Complaint     Chief Complaint   Patient presents with    Follow-up     Patient is here for his bilateral impacted cerumen       History of Present Illness   Samantha Workman is a 66y o -year-old male who is here for f/u to chronic medical conditions  Getting rash from trulicity  Hive from trulicity shot  Saw urology, hydrocele was reduced/aspirated in office  Finished prilosec a few days ago  Hx of this in past  Hx of ibs in past  Saw dr Shun Christian using vaseline on the rectum and doing well  Patient to have f/u echo with cardiology in august        Review of Systems   Review of Systems   Constitutional: Negative for fatigue and fever  HENT: Negative for congestion, postnasal drip and rhinorrhea  Eyes: Negative for photophobia and visual disturbance  Respiratory: Negative for cough, shortness of breath and wheezing  Cardiovascular: Negative for chest pain and palpitations  Gastrointestinal: Positive for abdominal pain   Negative for constipation, diarrhea, nausea and vomiting  Genitourinary: Negative for dysuria and frequency  Musculoskeletal: Negative for arthralgias and myalgias  Skin: Negative for rash  Neurological: Negative for dizziness, light-headedness and headaches  Hematological: Negative for adenopathy  Psychiatric/Behavioral: Negative for dysphoric mood and sleep disturbance  The patient is not nervous/anxious          Active Problem List     Patient Active Problem List   Diagnosis    Aortic stenosis    Coronary artery disease of native artery of native heart with stable angina pectoris ([facility] )    DM (diabetes mellitus), type 2 (HCC)    Hypertriglyceridemia    S/P CABG (coronary artery bypass graft)    S/P AVR    PVC (premature ventricular contraction)    Bilateral carotid artery stenosis    Mixed hyperlipidemia    Anus irritation    Paroxysmal atrial fibrillation (HCC)    Benign prostatic hyperplasia with urinary frequency    Stenosis of right subclavian artery (HCC)    Hydrocele in adult         Past Medical History:  Past Medical History:   Diagnosis Date    Acute systolic heart failure ([facility] ) 2/23/2021    Bilateral impacted cerumen 3/9/2021    Diabetes mellitus ([place] [address] )     Other hydrocele 6/11/2020    TMJ (dislocation of temporomandibular joint)        Past Surgical History:  Past Surgical History:   Procedure Laterality Date    CARDIAC SURGERY      EYE SURGERY  03/2020    Cataract right eye    EYE SURGERY Left 12/03/2020    left eye cataract     TONSILLECTOMY         Family History:  Family History   Problem Relation Age of Onset    Heart disease Mother     Cancer Mother     Heart disease Father     Hypertension Father     Colon cancer Neg Hx        Social History:  Social History     Socioeconomic History    Marital status: /Civil Union     Spouse name: Not on file    Number of children: Not on file    Years of education: Not on file    Highest education level: Not on file   Occupational History    Not on file   Tobacco Use    Smoking status: Never Smoker    Smokeless tobacco: Never Used   Vaping Use    Vaping Use: Never used   Substance and Sexual Activity    Alcohol use: Yes     Comment: 1 glass of wine or 1 beer a week    Drug use: Never    Sexual activity: Not on file   Other Topics Concern    Not on file   Social History Narrative    Not on file     Social Determinants of Health     Financial Resource Strain:     Difficulty of Paying Living Expenses:    Food Insecurity:     Worried About Running Out of Food in the Last Year:     Ran Out of Food in the Last Year:    Transportation Needs:     Lack of Transportation (Medical):  Lack of Transportation (Non-Medical):    Physical Activity:     Days of Exercise per Week:     Minutes of Exercise per Session:    Stress:     Feeling of Stress :    Social Connections:     Frequency of Communication with Friends and Family:     Frequency of Social Gatherings with Friends and Family:     Attends Taoist Services:     Active Member of Clubs or Organizations:     Attends Club or Organization Meetings:     Marital Status:    Intimate Partner Violence:     Fear of Current or Ex-Partner:     Emotionally Abused:     Physically Abused:     Sexually Abused:        Objective     Vitals:    06/24/21 1027   BP: 102/60   Pulse: 91   Resp: 16   Temp: (!) 95 6 °F (35 3 °C)   SpO2: 96%     Wt Readings from Last 3 Encounters:   06/24/21 93 kg (205 lb)   06/10/21 93 2 kg (205 lb 6 4 oz)   05/26/21 93 kg (205 lb)       Physical Exam  Vitals and nursing note reviewed  Constitutional:       Appearance: Normal appearance  HENT:      Head: Normocephalic and atraumatic  Right Ear: Tympanic membrane, ear canal and external ear normal       Left Ear: Tympanic membrane and ear canal normal       Nose: Nose normal       Mouth/Throat:      Mouth: Mucous membranes are moist       Pharynx: Oropharynx is clear     Eyes:      Extraocular Movements: Extraocular movements intact  Conjunctiva/sclera: Conjunctivae normal       Pupils: Pupils are equal, round, and reactive to light  Cardiovascular:      Rate and Rhythm: Normal rate  Rhythm irregular  Pulses: Normal pulses  no weak pulses          Dorsalis pedis pulses are 2+ on the right side and 2+ on the left side  Posterior tibial pulses are 2+ on the right side and 2+ on the left side  Heart sounds: Normal heart sounds  Pulmonary:      Effort: Pulmonary effort is normal       Breath sounds: Normal breath sounds  Abdominal:      General: Bowel sounds are normal       Palpations: Abdomen is soft  Musculoskeletal:         General: Normal range of motion  Cervical back: Normal range of motion and neck supple  Feet:      Right foot:      Skin integrity: No ulcer, skin breakdown, erythema, warmth, callus or dry skin  Left foot:      Skin integrity: No ulcer, skin breakdown, erythema, warmth, callus or dry skin  Skin:     General: Skin is warm and dry  Capillary Refill: Capillary refill takes less than 2 seconds  Neurological:      General: No focal deficit present  Mental Status: He is alert and oriented to person, place, and time  Psychiatric:         Mood and Affect: Mood normal          Behavior: Behavior normal          Thought Content:  Thought content normal          Judgment: Judgment normal          Pertinent Laboratory/Diagnostic Studies:  Lab Results   Component Value Date    BUN 28 (H) 03/22/2021    CREATININE 1 05 03/22/2021    CALCIUM 9 7 03/22/2021    K 3 6 03/22/2021    CO2 27 03/22/2021     03/22/2021     Lab Results   Component Value Date    ALT 26 03/22/2021    AST 14 03/22/2021    ALKPHOS 62 03/22/2021       Lab Results   Component Value Date    WBC 8 24 03/22/2021    HGB 14 5 03/22/2021    HCT 42 1 03/22/2021    MCV 89 03/22/2021     03/22/2021       No results found for: TSH    No results found for: CHOL  Lab Results   Component Value Date TRIG 179 (H) 03/22/2021     Lab Results   Component Value Date    HDL 46 03/22/2021     Lab Results   Component Value Date    LDLCALC 39 03/22/2021     Lab Results   Component Value Date    HGBA1C 7 4 (H) 03/22/2021       Results for orders placed or performed in visit on 05/26/21   POCT Measure PVR   Result Value Ref Range    POST-VOID RESIDUAL VOLUME, ML POC 43 mL       Orders Placed This Encounter   Procedures    Ambulatory referral to Gastroenterology       ALLERGIES:  No Known Allergies    Current Medications     Current Outpatient Medications   Medication Sig Dispense Refill    acetaminophen (TYLENOL) 500 mg tablet Take 500 mg by mouth daily at bedtime      ascorbic acid (VITAMIN C) 250 mg tablet Take 500 mg by mouth 2 (two) times a day      aspirin 81 mg chewable tablet Chew 81 mg daily      chlorthalidone 25 mg tablet Take 0 5 tablets (12 5 mg total) by mouth daily 90 tablet 3    cholecalciferol (VITAMIN D3) 1,000 units tablet Take 1,000 Units by mouth daily      finasteride (PROSCAR) 5 mg tablet Take 1 tablet (5 mg total) by mouth daily 90 tablet 3    Norma, Zingiber officinalis, (Norma Root) 500 MG CAPS       Jardiance 25 MG TABS Take 1 tablet (25 mg total) by mouth daily 90 tablet 3    metFORMIN (GLUCOPHAGE) 500 mg tablet Take 2 tablets (1,000 mg total) by mouth 2 (two) times a day 360 tablet 1    metoprolol succinate (TOPROL-XL) 50 mg 24 hr tablet Take 1 tablet (50 mg total) by mouth daily 90 tablet 3    niacin 500 mg tablet Take 500 mg by mouth 4 (four) times a day      NON FORMULARY       Omega-3 Fatty Acids (fish oil) 1,000 mg       PROBIOTIC PRODUCT PO Take 111 mg by mouth daily      rosuvastatin (CRESTOR) 10 MG tablet Take 1 tablet (10 mg total) by mouth daily 90 tablet 3    tamsulosin (FLOMAX) 0 4 mg Take 1 capsule (0 4 mg total) by mouth daily with dinner 30 capsule 6    Trulicity 1 5 IY/9 5LQ SOPN Inject 0 5 mL (1 5 mg total) under the skin once a week 4 pen 5    Turmeric 500 MG CAPS        No current facility-administered medications for this visit  Health Maintenance     Health Maintenance   Topic Date Due    DM Eye Exam  Never done    Colorectal Cancer Screening  Never done    Diabetic Foot Exam  07/06/2021    Influenza Vaccine (Season Ended) 09/01/2021    HEMOGLOBIN A1C  09/22/2021    Pneumococcal Vaccine: 65+ Years (1 of 2 - PPSV23) 10/13/2021 (Originally 4/28/1956)    DTaP,Tdap,and Td Vaccines (1 - Tdap) 06/24/2022 (Originally 4/28/1971)    Hepatitis C Screening  07/04/2022 (Originally 1950)    Medicare Annual Wellness Visit (AWV)  10/13/2021    Fall Risk  02/25/2022    BMI: Followup Plan  02/25/2022    Depression Screening PHQ  03/09/2022    URINE MICROALBUMIN  03/22/2022    BMI: Adult  06/24/2022    COVID-19 Vaccine  Completed    HIB Vaccine  Aged Out    Hepatitis B Vaccine  Aged Out    IPV Vaccine  Aged Out    Hepatitis A Vaccine  Aged Out    Meningococcal ACWY Vaccine  Aged Out    HPV Vaccine  Aged Out     Immunization History   Administered Date(s) Administered    SARS-CoV-2 / COVID-19 mRNA IM (Pfizer-BioNTech) 03/07/2021, 03/26/2021       Patient's shoes and socks removed  Right Foot/Ankle   Right Foot Inspection  Skin Exam: skin normal and skin intact no dry skin, no warmth, no callus, no erythema, no maceration, no abnormal color, no pre-ulcer, no ulcer and no callus                          Toe Exam: ROM and strength within normal limits  Sensory   Vibration: intact  Proprioception: intact   Monofilament testing: intact  Vascular  Capillary refills: < 3 seconds  The right DP pulse is 2+  The right PT pulse is 2+       Left Foot/Ankle  Left Foot Inspection  Skin Exam: skin normal and skin intactno dry skin, no warmth, no erythema, no maceration, normal color, no pre-ulcer, no ulcer and no callus                         Toe Exam: ROM and strength within normal limits                   Sensory   Vibration: intact  Proprioception: intact  Monofilament: intact  Vascular  Capillary refills: < 3 seconds  The left DP pulse is 2+  The left PT pulse is 2+  Assign Risk Category:  No deformity present; No loss of protective sensation;  No weak pulses       Risk: HESHAM Palmer

## 2021-06-24 NOTE — ASSESSMENT & PLAN NOTE
Off trulicity now due to side affect  F/u with endo    Lab Results   Component Value Date    HGBA1C 7 4 (H) 03/22/2021

## 2021-06-25 ENCOUNTER — TELEPHONE (OUTPATIENT)
Dept: FAMILY MEDICINE CLINIC | Facility: CLINIC | Age: 71
End: 2021-06-25

## 2021-06-25 NOTE — TELEPHONE ENCOUNTER
Called and asked if the patient had his diabetic eye exam complete and they stated he did   They will be faxing to us

## 2021-08-02 ENCOUNTER — OFFICE VISIT (OUTPATIENT)
Dept: PODIATRY | Facility: CLINIC | Age: 71
End: 2021-08-02
Payer: MEDICARE

## 2021-08-02 VITALS
WEIGHT: 205 LBS | SYSTOLIC BLOOD PRESSURE: 129 MMHG | HEIGHT: 75 IN | DIASTOLIC BLOOD PRESSURE: 65 MMHG | BODY MASS INDEX: 25.49 KG/M2 | HEART RATE: 66 BPM

## 2021-08-02 DIAGNOSIS — M20.41 HAMMER TOES OF BOTH FEET: ICD-10-CM

## 2021-08-02 DIAGNOSIS — B35.1 ONYCHOMYCOSIS: ICD-10-CM

## 2021-08-02 DIAGNOSIS — E11.42 DIABETIC POLYNEUROPATHY ASSOCIATED WITH TYPE 2 DIABETES MELLITUS (HCC): Primary | ICD-10-CM

## 2021-08-02 DIAGNOSIS — M20.42 HAMMER TOES OF BOTH FEET: ICD-10-CM

## 2021-08-02 PROCEDURE — 99214 OFFICE O/P EST MOD 30 MIN: CPT | Performed by: PODIATRIST

## 2021-08-02 NOTE — PROGRESS NOTES
Assessment/Plan:         Diagnoses and all orders for this visit:    Diabetic polyneuropathy associated with type 2 diabetes mellitus ([facility] )    Onychomycosis    Hammer toes of both feet  Comments:  toe crests helping a lot  COntine use      -Educated on DM risk to lower extremities, proper shoe gear, and daily monitoring of feet    -Educated on A1C and the risks of poorly controlled Diabetes   -Discussed weight loss and suitable exercise regiment  - Dm foot risk today, digital deformity, neuropathy, trophic changes to skin  Recommend at risk foot care 9-12 weeks  Reviewed A1C from March Reviewed PCP visit 6/24/21    Moderate risk for foot complication, continue at risk foot care every 9-12 weeks)  Subjective:      Patient ID: Monica Pitts is a 66 y o  male  Patient arrives for at-risk foot care  Last hemoglobin A1c was in the sevens  He does have neuropathy in the toes  He does have severe hammertoe deformity and uses a toe crest   He notes some swelling his feet by the end the day  He reports no other changes in medical history  The following portions of the patient's history were reviewed and updated as appropriate:   He  has a past medical history of Acute systolic heart failure ([facility] ) (2/23/2021), Bilateral impacted cerumen (3/9/2021), Diabetes mellitus ([place] [address] ), Other hydrocele (6/11/2020), and TMJ (dislocation of temporomandibular joint)    He   Patient Active Problem List    Diagnosis Date Noted    Stenosis of right subclavian artery ([facility] ) 04/30/2021    Benign prostatic hyperplasia with urinary frequency 02/25/2021    Paroxysmal atrial fibrillation ([place] [address] ) 02/23/2021    Anus irritation 01/11/2021    Mixed hyperlipidemia 11/23/2020    S/P AVR 11/02/2020    PVC (premature ventricular contraction) 11/02/2020    Bilateral carotid artery stenosis 11/02/2020    Hypertriglyceridemia 10/18/2019    S/P CABG (coronary artery bypass graft) 08/01/2019    Coronary artery disease of native artery of native heart with stable angina pectoris ([facility] ) 07/27/2019    Aortic stenosis 07/26/2019    DM (diabetes mellitus), type 2 ([facility] ) 07/26/2019    Hydrocele in adult 05/03/2015     He  has a past surgical history that includes Tonsillectomy; Cardiac surgery; Eye surgery (03/2020); and Eye surgery (Left, 12/03/2020)  His family history includes Cancer in his mother; Heart disease in his father and mother; Hypertension in his father  He  reports that he has never smoked  He has never used smokeless tobacco  He reports current alcohol use  He reports that he does not use drugs    Current Outpatient Medications   Medication Sig Dispense Refill    acetaminophen (TYLENOL) 500 mg tablet Take 500 mg by mouth daily at bedtime      ascorbic acid (VITAMIN C) 250 mg tablet Take 500 mg by mouth 2 (two) times a day      aspirin 81 mg chewable tablet Chew 81 mg daily      chlorthalidone 25 mg tablet Take 0 5 tablets (12 5 mg total) by mouth daily 90 tablet 3    cholecalciferol (VITAMIN D3) 1,000 units tablet Take 1,000 Units by mouth daily      finasteride (PROSCAR) 5 mg tablet Take 1 tablet (5 mg total) by mouth daily 90 tablet 3    Norma, Zingiber officinalis, (Norma Root) 500 MG CAPS       Jardiance 25 MG TABS Take 1 tablet (25 mg total) by mouth daily 90 tablet 3    metFORMIN (GLUCOPHAGE) 500 mg tablet Take 2 tablets (1,000 mg total) by mouth 2 (two) times a day 360 tablet 1    metoprolol succinate (TOPROL-XL) 50 mg 24 hr tablet Take 1 tablet (50 mg total) by mouth daily 90 tablet 3    niacin 500 mg tablet Take 500 mg by mouth 4 (four) times a day      NON FORMULARY       Omega-3 Fatty Acids (fish oil) 1,000 mg       PROBIOTIC PRODUCT PO Take 111 mg by mouth daily      rosuvastatin (CRESTOR) 10 MG tablet Take 1 tablet (10 mg total) by mouth daily 90 tablet 3    tamsulosin (FLOMAX) 0 4 mg Take 1 capsule (0 4 mg total) by mouth daily with dinner 30 capsule 6    Turmeric 500 MG CAPS       Trulicity 1 5 MG/0 5ML SOPN Inject 0 5 mL (1 5 mg total) under the skin once a week (Patient not taking: Reported on 8/2/2021) 4 pen 5     No current facility-administered medications for this visit  Current Outpatient Medications on File Prior to Visit   Medication Sig    acetaminophen (TYLENOL) 500 mg tablet Take 500 mg by mouth daily at bedtime    ascorbic acid (VITAMIN C) 250 mg tablet Take 500 mg by mouth 2 (two) times a day    aspirin 81 mg chewable tablet Chew 81 mg daily    chlorthalidone 25 mg tablet Take 0 5 tablets (12 5 mg total) by mouth daily    cholecalciferol (VITAMIN D3) 1,000 units tablet Take 1,000 Units by mouth daily    finasteride (PROSCAR) 5 mg tablet Take 1 tablet (5 mg total) by mouth daily    Norma, Zingiber officinalis, (Norma Root) 500 MG CAPS     Jardiance 25 MG TABS Take 1 tablet (25 mg total) by mouth daily    metFORMIN (GLUCOPHAGE) 500 mg tablet Take 2 tablets (1,000 mg total) by mouth 2 (two) times a day    metoprolol succinate (TOPROL-XL) 50 mg 24 hr tablet Take 1 tablet (50 mg total) by mouth daily    niacin 500 mg tablet Take 500 mg by mouth 4 (four) times a day    NON FORMULARY     Omega-3 Fatty Acids (fish oil) 1,000 mg     PROBIOTIC PRODUCT PO Take 111 mg by mouth daily    rosuvastatin (CRESTOR) 10 MG tablet Take 1 tablet (10 mg total) by mouth daily    tamsulosin (FLOMAX) 0 4 mg Take 1 capsule (0 4 mg total) by mouth daily with dinner    Turmeric 500 MG CAPS     Trulicity 1 5 WK/0 2CX SOPN Inject 0 5 mL (1 5 mg total) under the skin once a week (Patient not taking: Reported on 8/2/2021)     No current facility-administered medications on file prior to visit  He has No Known Allergies       Review of Systems   Constitutional: Negative  HENT: Negative for sinus pressure and sinus pain  Respiratory: Negative for cough and shortness of breath  Cardiovascular: Negative for chest pain and leg swelling     Gastrointestinal: Negative for diarrhea, nausea and vomiting  Musculoskeletal: Positive for joint swelling  Negative for arthralgias and gait problem  Skin: Positive for color change  Negative for rash and wound  Neurological: Positive for numbness  Negative for weakness  Objective:      /65   Pulse 66   Ht 6' 3" (1 905 m) Comment: verbal  Wt 93 kg (205 lb)   BMI 25 62 kg/m²     Hemoglobin A1C  Order: [id number]  Status:  Final result   Visible to patient:  Yes ([facility]   Next appt:  08/09/2021 at 02:00 PM in Cardiology Imaging ([facility])   Dx:  Type 2 diabetes mellitus with hypergl      1 Result Note   Ref Range & Units 3/22/21  7:36 AM   Hemoglobin A1C Normal 3 8-5 6%; PreDiabetic 5 7-6 4%; Diabetic >=6 5%; Glycemic control for adults with diabetes <7 0% % 7 4High     EAG mg/dl 166          Specimen Collected: 03/22/21  7:36 AM   Last Resulted: 03/22/21 11:20 AM                Physical Exam  Vitals reviewed  Constitutional:       General: He is not in acute distress  Appearance: He is normal weight  He is not toxic-appearing  HENT:      Nose: No congestion or rhinorrhea  Cardiovascular:      Rate and Rhythm: Normal rate  Pulses: Normal pulses  no weak pulses          Dorsalis pedis pulses are 2+ on the right side and 2+ on the left side  Posterior tibial pulses are 2+ on the right side and 2+ on the left side  Pulmonary:      Effort: Pulmonary effort is normal  No respiratory distress  Musculoskeletal:         General: Deformity present  Right lower leg: Edema present  Left lower leg: Edema present  Feet:    Feet:      Right foot:      Skin integrity: Dry skin present  No ulcer, skin breakdown, erythema or callus  Left foot:      Skin integrity: Dry skin present  No ulcer, skin breakdown, erythema or callus  Skin:     Capillary Refill: Capillary refill takes less than 2 seconds  Findings: No lesion or rash     Neurological:      Mental Status: He is alert and oriented to person, place, and time  Sensory: Sensory deficit present  Motor: No weakness  Gait: Gait normal    Psychiatric:         Mood and Affect: Mood normal                Diabetic Foot Exam    Patient's shoes and socks removed  Right Foot/Ankle   Right Foot Inspection  Skin Exam: skin intact, dry skin and abnormal color no callus, no erythema, no maceration, no ulcer and no callus                          Toe Exam: swelling and right toe deformityno tenderness and erythema  Sensory   Vibration: diminished  Proprioception: diminished   Monofilament testing: diminished  Vascular  Capillary refills: < 3 seconds  The right DP pulse is 2+  The right PT pulse is 2+  Right Toe  - Comprehensive Exam  Hammertoes: second toe and third toe  Swelling: great toe metatarsophalangeal joint, lesser metatarsophalangeal joints and metatarsals         Left Foot/Ankle  Left Foot Inspection  Skin Exam: skin intact, dry skin and abnormal colorno erythema, no maceration, no ulcer and no callus                         Toe Exam: swelling and left toe deformityno tenderness and no erythema                   Sensory   Vibration: diminished  Proprioception: diminished  Monofilament: diminished  Vascular  Capillary refills: < 3 seconds  The left DP pulse is 2+  The left PT pulse is 2+  Left Toe  - Comprehensive Exam  Hammertoes: second toe and third toe  Swelling: great toe metatarsophalangeal joint, lesser metatarsophalangeal joints and metatarsals       Assign Risk Category:  Deformity present; Loss of protective sensation;  No weak pulses       Risk: 2

## 2021-08-02 NOTE — PATIENT INSTRUCTIONS
Foot Care for People with Diabetes   WHAT YOU NEED TO KNOW:   · Foot care helps protect your feet and prevent foot ulcers or sores  Long-term high blood sugar levels can damage the blood vessels and nerves in your legs and feet  This damage makes it hard to feel pressure, pain, temperature, and touch  You may not be able to feel a cut or sore, or shoes that are too tight  Foot care is needed to prevent serious problems, such as an infection or amputation  · Diabetes may cause your toes to become crooked or curved under  These changes may affect the way you walk and can lead to increased pressure on your foot  The pressure can decrease blood flow to your feet  Lack of blood flow increases your risk for a foot ulcer  Do not ignore small problems, such as dry skin or small wounds  These can become life-threatening over time without proper care  DISCHARGE INSTRUCTIONS:   Call your care team provider if:   · Your feet become numb, weak, or hard to move  · You have pus draining from a sore on your foot  · You have a wound on your foot that gets bigger, deeper, or does not heal      · You see blisters, cuts, scratches, calluses, or sores on your foot  · You have a fever, and your feet become red, warm, and swollen  · Your toenails become thick, curled, or yellow  · You find it hard to check your feet because your vision is poor  · You have questions or concerns about your condition or care  Foot care:   · Check your feet each day  Look at your whole foot, including the bottom, and between and under your toes  Check for wounds, corns, and calluses  Use a mirror to see the bottom of your feet  The skin on your feet may be shiny, tight, or darker than normal  Your feet may also be cold and pale  Feel your feet by running your hands along the tops, bottoms, sides, and between your toes  Redness, swelling, and warmth are signs of blood flow problems that can lead to a foot ulcer   Do not try to remove corns or calluses yourself  · Wash your feet each day with soap and warm water  Do not use hot water, because this can injure your foot  Dry your feet gently with a towel after you wash them  Dry between and under your toes  · Apply lotion or a moisturizer on your dry feet  Ask your care team provider what lotions are best to use  Do not put lotion or moisturizer between your toes  Moisture between your toes could lead to skin breakdown  · Cut your toenails correctly  File or cut your toenails straight across  Use a soft brush to clean around your toenails  If your toenails are very thick, you may need to have a care team provider or specialist cut them  · Protect your feet  Do not walk barefoot or wear your shoes without socks  Check your shoes for rocks or other objects that can hurt your feet  Wear cotton socks to help keep your feet dry  Wear socks without toe seams, or wear them with the seams inside out  Change your socks each day  Do not wear socks that are dirty or damp  · Wear shoes that fit well  Wear shoes that do not rub against any area of your feet  Your shoes should be ½ to ¾ inch (1 to 2 centimeters) longer than your feet  Your shoes should also have extra space around the widest part of your feet  Walking or athletic shoes with laces or straps that adjust are best  Ask your care team provider for help to choose shoes that fit you best  Ask him or her if you need to wear an insert, orthotic, or bandage on your feet  · Do not smoke  Smoking can damage your blood vessels and put you at increased risk for foot ulcers  Ask your care team provider for information if you currently smoke and need help to quit  E-cigarettes or smokeless tobacco still contain nicotine  Talk to your care team provider before you use these products  Follow up with your diabetes care team provider or foot specialist as directed:   You will need to have your feet checked at least once a sandy Mauricio Aden may need a foot exam more often if you have nerve damage, foot deformities, or ulcers  Write down your questions so you remember to ask them during your visits  © Copyright [employer] 2021 Information is for End User's use only and may not be sold, redistributed or otherwise used for commercial purposes  All illustrations and images included in CareNotes® are the copyrighted property of A D A M , Inc  or [facility] [place] Savita Rice   The above information is an  only  It is not intended as medical advice for individual conditions or treatments  Talk to your doctor, nurse or pharmacist before following any medical regimen to see if it is safe and effective for you

## 2021-08-09 ENCOUNTER — HOSPITAL ENCOUNTER (OUTPATIENT)
Dept: NON INVASIVE DIAGNOSTICS | Facility: CLINIC | Age: 71
Discharge: HOME | End: 2021-08-09
Payer: MEDICARE

## 2021-08-09 DIAGNOSIS — Z95.2 S/P AVR: ICD-10-CM

## 2021-08-09 DIAGNOSIS — I25.118 CORONARY ARTERY DISEASE OF NATIVE ARTERY OF NATIVE HEART WITH STABLE ANGINA PECTORIS (HCC): ICD-10-CM

## 2021-08-09 PROCEDURE — 93306 TTE W/DOPPLER COMPLETE: CPT

## 2021-08-09 PROCEDURE — 93306 TTE W/DOPPLER COMPLETE: CPT | Performed by: INTERNAL MEDICINE

## 2021-08-10 ENCOUNTER — TELEPHONE (OUTPATIENT)
Dept: CARDIOLOGY CLINIC | Facility: CLINIC | Age: 71
End: 2021-08-10

## 2021-08-10 ENCOUNTER — TELEPHONE (OUTPATIENT)
Dept: ENDOCRINOLOGY | Facility: CLINIC | Age: 71
End: 2021-08-10

## 2021-08-10 NOTE — TELEPHONE ENCOUNTER
Patient called after he received his echo results via [employer] and is asking for interpretation  Please advise

## 2021-08-12 ENCOUNTER — LAB (OUTPATIENT)
Dept: LAB | Facility: AMBULARY SURGERY CENTER | Age: 71
End: 2021-08-12
Payer: MEDICARE

## 2021-08-12 DIAGNOSIS — E11.65 TYPE 2 DIABETES MELLITUS WITH HYPERGLYCEMIA, WITHOUT LONG-TERM CURRENT USE OF INSULIN (HCC): ICD-10-CM

## 2021-08-12 DIAGNOSIS — N40.0 BENIGN PROSTATIC HYPERPLASIA WITHOUT LOWER URINARY TRACT SYMPTOMS: ICD-10-CM

## 2021-08-12 LAB
EST. AVERAGE GLUCOSE BLD GHB EST-MCNC: 166 MG/DL
HBA1C MFR BLD: 7.4 %
PSA SERPL-MCNC: 0.4 NG/ML (ref 0–4)

## 2021-08-12 PROCEDURE — 83036 HEMOGLOBIN GLYCOSYLATED A1C: CPT

## 2021-08-12 PROCEDURE — 36415 COLL VENOUS BLD VENIPUNCTURE: CPT

## 2021-08-12 PROCEDURE — 84153 ASSAY OF PSA TOTAL: CPT

## 2021-08-12 PROCEDURE — 86341 ISLET CELL ANTIBODY: CPT

## 2021-08-16 LAB — PANC ISLET CELL AB TITR SER: NEGATIVE {TITER}

## 2021-08-19 ENCOUNTER — OFFICE VISIT (OUTPATIENT)
Dept: ENDOCRINOLOGY | Facility: CLINIC | Age: 71
End: 2021-08-19
Payer: MEDICARE

## 2021-08-19 VITALS
HEIGHT: 75 IN | SYSTOLIC BLOOD PRESSURE: 106 MMHG | DIASTOLIC BLOOD PRESSURE: 80 MMHG | WEIGHT: 203 LBS | HEART RATE: 58 BPM | TEMPERATURE: 97.1 F | BODY MASS INDEX: 25.24 KG/M2

## 2021-08-19 DIAGNOSIS — E11.65 TYPE 2 DIABETES MELLITUS WITH HYPERGLYCEMIA, WITHOUT LONG-TERM CURRENT USE OF INSULIN (HCC): Primary | ICD-10-CM

## 2021-08-19 DIAGNOSIS — I10 ESSENTIAL HYPERTENSION: ICD-10-CM

## 2021-08-19 DIAGNOSIS — E11.65 TYPE 2 DIABETES MELLITUS WITH HYPERGLYCEMIA, WITHOUT LONG-TERM CURRENT USE OF INSULIN (HCC): ICD-10-CM

## 2021-08-19 DIAGNOSIS — E78.2 MIXED HYPERLIPIDEMIA: ICD-10-CM

## 2021-08-19 PROCEDURE — 99214 OFFICE O/P EST MOD 30 MIN: CPT | Performed by: PHYSICIAN ASSISTANT

## 2021-08-19 NOTE — PATIENT INSTRUCTIONS
Hypoglycemia instructions   Santa Doss  8/19/2021  [id number]    Low Blood Sugar    Steps to treat low blood sugar  1  Test blood sugar if you have symptoms of low blood sugar:   Low Blood Sugar Symptoms:  o Sweaty  o Dizzy  o Rapid heartbeat  o Shaky  o Bad mood  o Hungry      2  Treat blood sugar less than 70 with 15 grams of fast-acting carbohydrate:   Examples of 15 grams Fast-Acting Carbohydrate:  o 4 oz juice  o 4 oz regular soda  o 3-4 glucose tablets (chew)  o 3-4 hard candies (chew)          3  Wait 15 minutes and test your blood sugar again     4   If blood sugar is less than 100, repeat steps 2-3     5  When your blood sugar is 100 or more, eat a snack if it will be longer than one hour until your next meal  The snack should be 15 grams of carbohydrate and a protein:   Examples of snacks:  o ½ sandwich  o 6 crackers with cheese  o Piece of fruit with cheese or peanut butter  o 6 crackers with peanut butter

## 2021-08-19 NOTE — PROGRESS NOTES
Patient Progress Note      CC: Dm      Referring Provider  No referring provider defined for this encounter  History of Present Illness:   Munir Andrew is a 66 y o  male with a history of type 2 diabetes without long term use of insulin  Diabetes course has been stable  Complications of DM: CAD  Denies recent illness or hospitalizations  Denies recent severe hypoglycemic or severe hyperglycemic episodes  Denies any issues with his current regimen  Home glucose monitoring: are performed regularly     Home blood glucose readings: 122-213 mg/dl, most readings less than 176 mg/dl     Current regimen: metformin 3956 mg BID, Trulicity 1 5 mg weekly (stopped due to injection site reaction), Jardiance 25 mg daily  compliant most of the time, denies any side effects from medications  Hypoglycemic episodes: No, never  H/o of hypoglycemia causing hospitalization or Intervention such as glucagon injection  or ambulance call :  No  Hypoglycemia symptoms: never   Treatment of hypoglycemia: discussed treatment     Medic alert tag: recommended: Yes     Diabetes education: Yes  Diet: 3 meals per day, 1 snack per day  Timing of meals is predictable  Diabetic diet compliance:  compliant most of the time  Activity: Daily activity is predictable: Yes  He tries to exercise once a day     Ophthamology: states he has been to an eye doctor in the past year  Podiatry: foot exam UTD, August 2021     Not on ACE inhibitor/ARB  Hyperlipidemia: on statin - tolerating well, no myalgias  compliant most of the time, denies any side effects from medications    Thyroid disorders: No  History of pancreatitis: No    Patient Active Problem List   Diagnosis    Aortic stenosis    Coronary artery disease of native artery of native heart with stable angina pectoris ([facility] [address] [facility]    DM (diabetes mellitus), type 2 ([facility] [address] )    Essential hypertension    Hypertriglyceridemia    S/P CABG (coronary artery bypass graft)    S/P AVR    PVC (premature ventricular contraction)    Bilateral carotid artery stenosis    Mixed hyperlipidemia    Anus irritation    Paroxysmal atrial fibrillation (HCC)    Benign prostatic hyperplasia with urinary frequency    Stenosis of right subclavian artery (HCC)    Hydrocele in adult      Past Medical History:   Diagnosis Date    Acute systolic heart failure (HCC) 2/23/2021    Bilateral impacted cerumen 3/9/2021    Diabetes mellitus ([address] )     Other hydrocele 6/11/2020    TMJ (dislocation of temporomandibular joint)       Past Surgical History:   Procedure Laterality Date    CARDIAC SURGERY      EYE SURGERY  03/2020    Cataract right eye    EYE SURGERY Left 12/03/2020    left eye cataract     TONSILLECTOMY        Family History   Problem Relation Age of Onset    Heart disease Mother     Cancer Mother     Heart disease Father     Hypertension Father     Colon cancer Neg Hx      Social History     Tobacco Use    Smoking status: Never Smoker    Smokeless tobacco: Never Used   Substance Use Topics    Alcohol use: Yes     Comment: 1 glass of wine or 1 beer a week     No Known Allergies      Current Outpatient Medications:     acetaminophen (TYLENOL) 500 mg tablet, Take 500 mg by mouth daily at bedtime, Disp: , Rfl:     ascorbic acid (VITAMIN C) 250 mg tablet, Take 500 mg by mouth 2 (two) times a day, Disp: , Rfl:     aspirin 81 mg chewable tablet, Chew 81 mg daily, Disp: , Rfl:     chlorthalidone 25 mg tablet, Take 0 5 tablets (12 5 mg total) by mouth daily, Disp: 90 tablet, Rfl: 3    cholecalciferol (VITAMIN D3) 1,000 units tablet, Take 1,000 Units by mouth daily, Disp: , Rfl:     finasteride (PROSCAR) 5 mg tablet, Take 1 tablet (5 mg total) by mouth daily, Disp: 90 tablet, Rfl: 3    Norma, Zingiber officinalis, (Norma Root) 500 MG CAPS, , Disp: , Rfl:     Jardiance 25 MG TABS, Take 1 tablet (25 mg total) by mouth daily, Disp: 90 tablet, Rfl: 3    metFORMIN (GLUCOPHAGE) 500 mg tablet, Take 2 tablets (1,000 mg total) by mouth 2 (two) times a day, Disp: 360 tablet, Rfl: 1    metoprolol succinate (TOPROL-XL) 50 mg 24 hr tablet, Take 1 tablet (50 mg total) by mouth daily, Disp: 90 tablet, Rfl: 3    niacin 500 mg tablet, Take 500 mg by mouth 4 (four) times a day, Disp: , Rfl:     NON FORMULARY, , Disp: , Rfl:     Omega-3 Fatty Acids (fish oil) 1,000 mg, , Disp: , Rfl:     PROBIOTIC PRODUCT PO, Take 111 mg by mouth daily, Disp: , Rfl:     rosuvastatin (CRESTOR) 10 MG tablet, Take 1 tablet (10 mg total) by mouth daily, Disp: 90 tablet, Rfl: 3    tamsulosin (FLOMAX) 0 4 mg, Take 1 capsule (0 4 mg total) by mouth daily with dinner, Disp: 30 capsule, Rfl: 6    Turmeric 500 MG CAPS, , Disp: , Rfl:     sitaGLIPtin (JANUVIA) 100 mg tablet, Take 1 tablet (100 mg total) by mouth daily, Disp: 30 tablet, Rfl: 5  Review of Systems   Constitutional: Negative for activity change, appetite change, fatigue and unexpected weight change  HENT: Negative for trouble swallowing  Eyes: Negative for visual disturbance  Respiratory: Negative for shortness of breath  Cardiovascular: Negative for chest pain and palpitations  Gastrointestinal: Negative for constipation and diarrhea  Endocrine: Negative for polydipsia and polyuria  Musculoskeletal: Positive for arthralgias  Skin: Negative for wound  Neurological: Positive for numbness (mild)  Psychiatric/Behavioral: Negative  Physical Exam:  Body mass index is 25 37 kg/m²  /80   Pulse 58   Temp (!) 97 1 °F (36 2 °C) (Tympanic)   Ht 6' 3" (1 905 m)   Wt 92 1 kg (203 lb)   BMI 25 37 kg/m²    Wt Readings from Last 3 Encounters:   08/19/21 92 1 kg (203 lb)   08/02/21 93 kg (205 lb)   06/24/21 93 kg (205 lb)       Physical Exam  Vitals and nursing note reviewed  Constitutional:       Appearance: He is well-developed  HENT:      Head: Normocephalic  Eyes:      General: No scleral icterus       Pupils: Pupils are equal, round, and reactive to light    Neck:      Thyroid: No thyromegaly  Cardiovascular:      Rate and Rhythm: Normal rate and regular rhythm  Pulses:           Radial pulses are 2+ on the right side and 2+ on the left side  Heart sounds: No murmur heard  Pulmonary:      Effort: Pulmonary effort is normal  No respiratory distress  Breath sounds: Normal breath sounds  No wheezing  Musculoskeletal:      Cervical back: Neck supple  Skin:     General: Skin is warm and dry  Neurological:      Mental Status: He is alert  Patient's shoes and socks were not removed  Labs:   Component      Latest Ref Rng & Units 11/17/2020 3/22/2021 8/12/2021   Sodium      136 - 145 mmol/L 140 139    Potassium      3 5 - 5 3 mmol/L 3 9 3 6    Chloride      100 - 108 mmol/L 106 105    CO2      21 - 32 mmol/L 31 27    Anion Gap      4 - 13 mmol/L 3 (L) 7    BUN      5 - 25 mg/dL 26 (H) 28 (H)    Creatinine      0 60 - 1 30 mg/dL 1 13 1 05    GLUCOSE FASTING      65 - 99 mg/dL 158 (H) 149 (H)    Calcium      8 3 - 10 1 mg/dL 9 9 9 7    AST      5 - 45 U/L  14    ALT      12 - 78 U/L  26    Alkaline Phosphatase      46 - 116 U/L  62    Total Protein      6 4 - 8 2 g/dL  7 5    Albumin      3 5 - 5 0 g/dL  4 0    TOTAL BILIRUBIN      0 20 - 1 00 mg/dL  0 55    eGFR      ml/min/1 73sq m 65 71    Cholesterol      50 - 200 mg/dL  121    Triglycerides      <=150 mg/dL  179 (H)    HDL      >=40 mg/dL  46    LDL Calculated      0 - 100 mg/dL  39    EXT Creatinine Urine      mg/dL  33 6    MICROALBUM ,U,RANDOM      0 0 - 20 0 mg/L  <5 0    MICROALBUMIN/CREATININE RATIO      0 - 30 mg/g creatinine  <15    Hemoglobin A1C      Normal 3 8-5 6%; PreDiabetic 5 7-6 4%;  Diabetic >=6 5%; Glycemic control for adults with diabetes <7 0% % 6 9 (H) 7 4 (H) 7 4 (H)   eAG, EST AVG Glucose      mg/dl 151 166 166   TSH 3RD GENERATON      0 358 - 3 740 uIU/mL  3 520    ISLET CELL ANTIBODY      Neg:<1:1   Negative       Plan:    Diagnoses and all orders for this visit:    Type 2 diabetes mellitus with hyperglycemia, without long-term current use of insulin (HCC)  HGA1C 7 4%  Remained the Same  Treatment regimen:  Start Januvia 100 mg daily  Continue Jardiance and metformin  Discussed risks/complications associated with uncontrolled diabetes  Advised to adhere to diabetic diet, and recommended staying active/exercising routinely as tolerated  Keep carbohydrates consistent to limit blood glucose fluctuations  Advised to call if blood sugars less than 70 mg/dl or over 300 mg/dl  Check blood glucose 1-2 times a day  Discussed symptoms and treatment of hypoglycemia  Recommended routine follow-up with podiatry and ophthalmology  Send log in 1-2 weeks  Ordered blood work to complete prior to next visit  -     Hemoglobin A1C; Future  -     Basic metabolic panel; Future  -     Microalbumin / creatinine urine ratio; Future  -     sitaGLIPtin (JANUVIA) 100 mg tablet; Take 1 tablet (100 mg total) by mouth daily    Mixed hyperlipidemia  LDL previously 39  Continue statin therapy  Managed by cardiology     Essential hypertension  Blood pressure adequately controlled, continue current treatment  -     Basic metabolic panel; Future       Discussed with the patient diagnosis and treatment and all questions fully answered  He will call me if any problems arise  Counseled patient on diagnostic results, prognosis, risk and benefit of treatment options, instruction for management, importance of treatment compliance, risk factor reduction and impressions        Quin Gonzáles PA-C

## 2021-08-23 ENCOUNTER — TELEPHONE (OUTPATIENT)
Dept: ENDOCRINOLOGY | Facility: CLINIC | Age: 71
End: 2021-08-23

## 2021-09-19 DIAGNOSIS — N40.1 BENIGN PROSTATIC HYPERPLASIA WITH URINARY FREQUENCY: ICD-10-CM

## 2021-09-19 DIAGNOSIS — R35.0 BENIGN PROSTATIC HYPERPLASIA WITH URINARY FREQUENCY: ICD-10-CM

## 2021-09-20 RX ORDER — TAMSULOSIN HYDROCHLORIDE 0.4 MG/1
CAPSULE ORAL
Qty: 30 CAPSULE | Refills: 6 | Status: SHIPPED | OUTPATIENT
Start: 2021-09-20 | End: 2022-04-18

## 2021-09-28 ENCOUNTER — OFFICE VISIT (OUTPATIENT)
Dept: GASTROENTEROLOGY | Facility: AMBULARY SURGERY CENTER | Age: 71
End: 2021-09-28
Payer: MEDICARE

## 2021-09-28 VITALS
WEIGHT: 205 LBS | DIASTOLIC BLOOD PRESSURE: 60 MMHG | BODY MASS INDEX: 25.49 KG/M2 | HEIGHT: 75 IN | SYSTOLIC BLOOD PRESSURE: 120 MMHG

## 2021-09-28 DIAGNOSIS — K21.9 GASTROESOPHAGEAL REFLUX DISEASE WITHOUT ESOPHAGITIS: ICD-10-CM

## 2021-09-28 DIAGNOSIS — Z12.11 COLON CANCER SCREENING: ICD-10-CM

## 2021-09-28 PROCEDURE — 99204 OFFICE O/P NEW MOD 45 MIN: CPT | Performed by: INTERNAL MEDICINE

## 2021-09-28 NOTE — PROGRESS NOTES
Consultation -  [facility]  Jake Ramirez 66 y o  male MRN: [id number]          Assessment & Plan:   70-year-old gentleman, history of diabetes, coronary disease, status post CABG with bioprosthetic bovine aortic valve, perianal itching, intermittent epigastric discomfort  1  Perianal itching:  Has been seen by Colorectal surgery, felt to be localized skin irritation   - recommended that he use a water-based cleaning approach after bowel movements including either wet wipes or water through a spray bottle, tried to use less of brace of wiping  -continues topical skin protectant 6 as needed  - recommended daily fiber supplement    2  Colon cancer screening:  Patient last colonoscopy was greater than 10 years ago, approximately 25 years ago   - we will schedule patient for colonoscopy   - discussed with him risks of procedure including but not limited to bleeding, surgery, perforation, missed polyp detection rate    3  Epigastric discomfort: Very mild intermittent symptoms, usually associated with drinking water in the middle the night   -likely due to mild reflux from nocturnal drinking  -continue to monitor, symptoms are not worsening appear to be improving in fact   - offered the patient empiric H2 blocker trial, he was not interested at this time, we will continue to monitor symptoms        Gela Gilliland was seen today for advice only  Diagnoses and all orders for this visit:    Colon cancer screening  -     Ambulatory referral to Gastroenterology  -     Colonoscopy; Future    Gastroesophageal reflux disease without esophagitis  -     Ambulatory referral to Gastroenterology            _____________________________________________________________        CC:  Intermittent epigastric pain and perianal itching    HPI:  Jake Ramirez is a 66 y o male who was referred for evaluation of  Intermittent epigastric pain and perianal itching    This is a pleasant 70-year-old gentleman, status post CABG, aortic valve repair, patient is here for evaluation of intermittent epigastric discomfort and perianal itching  Has had several months of perianal itching, tried over-the-counter regimens including Desitin, Vaseline, topical steroid creams, preparation H, was seen by Colorectal surgery who was found to have some perianal irritation  Patient was then recommended to take Ventolin, he was also instructed to have a colonoscopy  He notes that since that appointment he was having mild epigastric discomfort  He reports an epigastric discomfort which is mild, typically when he wakes up at night to drink water he has this which can last a few minutes  He was initially somewhat more severe but has since subsided occurs very rarely and appears to be very mild to the patient and he is not concerned he she has not had any nausea, vomiting, dysphagia  Appetite is good, weight has been stable  Denies any diarrhea, constipation, melena, rectal bleeding  Patient was apparently diagnosed with irritable bowel syndrome about 30 years ago  Last colonoscopy was 25 to 30 years ago  Denies any recent change in medications  Denies any significant NSAID use  Does report having dry mouth, which she associates with his prostate issues  Surgical history is notable for CABG, he had recent skin lesion resected from his leg  Denies any tobacco, drinks about 1 to 2 drinks per week  He is retired  Family history is notable for melanoma in his mother  ROS:  The remainder of the ROS was negative except for the pertinent positives mentioned in HPI  Allergies: Patient has no known allergies      Medications:   Current Outpatient Medications:     acetaminophen (TYLENOL) 500 mg tablet, Take 500 mg by mouth daily at bedtime, Disp: , Rfl:     ascorbic acid (VITAMIN C) 250 mg tablet, Take 500 mg by mouth 2 (two) times a day, Disp: , Rfl:     aspirin 81 mg chewable tablet, Chew 81 mg daily, Disp: , Rfl:     chlorthalidone 25 mg tablet, Take 0 5 tablets (12 5 mg total) by mouth daily, Disp: 90 tablet, Rfl: 3    cholecalciferol (VITAMIN D3) 1,000 units tablet, Take 1,000 Units by mouth daily, Disp: , Rfl:     finasteride (PROSCAR) 5 mg tablet, Take 1 tablet (5 mg total) by mouth daily, Disp: 90 tablet, Rfl: 3    Norma, Zingiber officinalis, (Norma Root) 500 MG CAPS, , Disp: , Rfl:     Jardiance 25 MG TABS, Take 1 tablet (25 mg total) by mouth daily, Disp: 90 tablet, Rfl: 3    metFORMIN (GLUCOPHAGE) 500 mg tablet, Take 2 tablets (1,000 mg total) by mouth 2 (two) times a day, Disp: 360 tablet, Rfl: 1    metoprolol succinate (TOPROL-XL) 50 mg 24 hr tablet, Take 1 tablet (50 mg total) by mouth daily, Disp: 90 tablet, Rfl: 3    niacin 500 mg tablet, Take 500 mg by mouth 4 (four) times a day, Disp: , Rfl:     NON FORMULARY, , Disp: , Rfl:     Omega-3 Fatty Acids (fish oil) 1,000 mg, , Disp: , Rfl:     PROBIOTIC PRODUCT PO, Take 111 mg by mouth daily, Disp: , Rfl:     rosuvastatin (CRESTOR) 10 MG tablet, Take 1 tablet (10 mg total) by mouth daily, Disp: 90 tablet, Rfl: 3    sitaGLIPtin (JANUVIA) 100 mg tablet, Take 1 tablet (100 mg total) by mouth daily, Disp: 30 tablet, Rfl: 5    tamsulosin (FLOMAX) 0 4 mg, TAKE ONE CAPSULE BY MOUTH EVERY DAY WITH DINNER, Disp: 30 capsule, Rfl: 6    Turmeric 500 MG CAPS, , Disp: , Rfl: '    Past Medical History:   Diagnosis Date    Acute systolic heart failure (HCC) 2/23/2021    Bilateral impacted cerumen 3/9/2021    Diabetes mellitus ([address] )     Other hydrocele 6/11/2020    TMJ (dislocation of temporomandibular joint)        Past Surgical History:   Procedure Laterality Date    CARDIAC SURGERY      EYE SURGERY  03/2020    Cataract right eye    EYE SURGERY Left 12/03/2020    left eye cataract     TONSILLECTOMY         Family History   Problem Relation Age of Onset    Heart disease Mother     Cancer Mother     Heart disease Father     Hypertension Father  Colon cancer Neg Hx         reports that he has never smoked  He has never used smokeless tobacco  He reports current alcohol use  He reports that he does not use drugs            Physical Exam:     /60   Ht 6' 3" (1 905 m)   Wt 93 kg (205 lb)   BMI 25 62 kg/m²     Gen: wn/wd, NAD , pleasant gentleman  HEENT: anicteric, MMM, no cervical LAD  CVS: RRR, no m/r/g  CHEST: CTA b/l, sternotomy scar with associated keloid  ABD: +BS, soft, NT,ND, no hepatosplenomegaly  EXT: no c/c/e  NEURO: aaox3  SKIN: NO rashes

## 2021-09-28 NOTE — LETTER
September 28, 2021     Grisel Minors, [place] [address] [facility]  [address] [address]    Patient: Shy Poole   YOB: 1950   Date of Visit: 9/28/2021       Dear Dr Augie Gitelman:    Thank you for referring Sonido Hollingsworth to me for evaluation  Below are my notes for this consultation  If you have questions, please do not hesitate to call me  I look forward to following your patient along with you  Sincerely,        Selin Glover MD        CC: No Recipients  Selin Glover MD  9/28/2021 11:25 AM  Sign when Signing Visit  Consultation - [address] [facility]  Shy Poole 66 y o  male MRN: [id number]          Assessment & Plan:   68-year-old gentleman, history of diabetes, coronary disease, status post CABG with bioprosthetic bovine aortic valve, perianal itching, intermittent epigastric discomfort  1  Perianal itching:  Has been seen by Colorectal surgery, felt to be localized skin irritation   - recommended that he use a water-based cleaning approach after bowel movements including either wet wipes or water through a spray bottle, tried to use less of brace of wiping  -continues topical skin protectant 6 as needed  - recommended daily fiber supplement    2  Colon cancer screening:  Patient last colonoscopy was greater than 10 years ago, approximately 25 years ago   - we will schedule patient for colonoscopy   - discussed with him risks of procedure including but not limited to bleeding, surgery, perforation, missed polyp detection rate    3   Epigastric discomfort: Very mild intermittent symptoms, usually associated with drinking water in the middle the night   -likely due to mild reflux from nocturnal drinking  -continue to monitor, symptoms are not worsening appear to be improving in fact   - offered the patient empiric H2 blocker trial, he was not interested at this time, we will continue to monitor symptoms        Jose Padilla was seen today for advice only     Diagnoses and all orders for this visit:    Colon cancer screening  -     Ambulatory referral to Gastroenterology  -     Colonoscopy; Future    Gastroesophageal reflux disease without esophagitis  -     Ambulatory referral to Gastroenterology            _____________________________________________________________        CC:  Intermittent epigastric pain and perianal itching    HPI:  Evert Severs is a 66 y o male who was referred for evaluation of  Intermittent epigastric pain and perianal itching  This is a pleasant 58-year-old gentleman, status post CABG, aortic valve repair, patient is here for evaluation of intermittent epigastric discomfort and perianal itching  Has had several months of perianal itching, tried over-the-counter regimens including Desitin, Vaseline, topical steroid creams, preparation H, was seen by Colorectal surgery who was found to have some perianal irritation  Patient was then recommended to take Ventolin, he was also instructed to have a colonoscopy  He notes that since that appointment he was having mild epigastric discomfort  He reports an epigastric discomfort which is mild, typically when he wakes up at night to drink water he has this which can last a few minutes  He was initially somewhat more severe but has since subsided occurs very rarely and appears to be very mild to the patient and he is not concerned he she has not had any nausea, vomiting, dysphagia  Appetite is good, weight has been stable  Denies any diarrhea, constipation, melena, rectal bleeding  Patient was apparently diagnosed with irritable bowel syndrome about 30 years ago  Last colonoscopy was 25 to 30 years ago  Denies any recent change in medications  Denies any significant NSAID use  Does report having dry mouth, which she associates with his prostate issues  Surgical history is notable for CABG, he had recent skin lesion resected from his leg        Denies any tobacco, drinks about 1 to 2 drinks per week  He is retired  Family history is notable for melanoma in his mother  ROS:  The remainder of the ROS was negative except for the pertinent positives mentioned in HPI  Allergies: Patient has no known allergies      Medications:   Current Outpatient Medications:     acetaminophen (TYLENOL) 500 mg tablet, Take 500 mg by mouth daily at bedtime, Disp: , Rfl:     ascorbic acid (VITAMIN C) 250 mg tablet, Take 500 mg by mouth 2 (two) times a day, Disp: , Rfl:     aspirin 81 mg chewable tablet, Chew 81 mg daily, Disp: , Rfl:     chlorthalidone 25 mg tablet, Take 0 5 tablets (12 5 mg total) by mouth daily, Disp: 90 tablet, Rfl: 3    cholecalciferol (VITAMIN D3) 1,000 units tablet, Take 1,000 Units by mouth daily, Disp: , Rfl:     finasteride (PROSCAR) 5 mg tablet, Take 1 tablet (5 mg total) by mouth daily, Disp: 90 tablet, Rfl: 3    Norma, Zingiber officinalis, (Norma Root) 500 MG CAPS, , Disp: , Rfl:     Jardiance 25 MG TABS, Take 1 tablet (25 mg total) by mouth daily, Disp: 90 tablet, Rfl: 3    metFORMIN (GLUCOPHAGE) 500 mg tablet, Take 2 tablets (1,000 mg total) by mouth 2 (two) times a day, Disp: 360 tablet, Rfl: 1    metoprolol succinate (TOPROL-XL) 50 mg 24 hr tablet, Take 1 tablet (50 mg total) by mouth daily, Disp: 90 tablet, Rfl: 3    niacin 500 mg tablet, Take 500 mg by mouth 4 (four) times a day, Disp: , Rfl:     NON FORMULARY, , Disp: , Rfl:     Omega-3 Fatty Acids (fish oil) 1,000 mg, , Disp: , Rfl:     PROBIOTIC PRODUCT PO, Take 111 mg by mouth daily, Disp: , Rfl:     rosuvastatin (CRESTOR) 10 MG tablet, Take 1 tablet (10 mg total) by mouth daily, Disp: 90 tablet, Rfl: 3    sitaGLIPtin (JANUVIA) 100 mg tablet, Take 1 tablet (100 mg total) by mouth daily, Disp: 30 tablet, Rfl: 5    tamsulosin (FLOMAX) 0 4 mg, TAKE ONE CAPSULE BY MOUTH EVERY DAY WITH DINNER, Disp: 30 capsule, Rfl: 6    Turmeric 500 MG CAPS, , Disp: , Rfl: '    Past Medical History:   Diagnosis Date    [facility] systolic heart failure ([facility] [address] ) 2/23/2021    Bilateral impacted cerumen 3/9/2021    Diabetes mellitus ([facility] [address] )     Other hydrocele 6/11/2020    TMJ (dislocation of temporomandibular joint)        Past Surgical History:   Procedure Laterality Date    CARDIAC SURGERY      EYE SURGERY  03/2020    Cataract right eye    EYE SURGERY Left 12/03/2020    left eye cataract     TONSILLECTOMY         Family History   Problem Relation Age of Onset    Heart disease Mother     Cancer Mother     Heart disease Father     Hypertension Father     Colon cancer Neg Hx         reports that he has never smoked  He has never used smokeless tobacco  He reports current alcohol use  He reports that he does not use drugs            Physical Exam:     /60   Ht 6' 3" (1 905 m)   Wt 93 kg (205 lb)   BMI 25 62 kg/m²     Gen: wn/wd, NAD , pleasant gentleman  HEENT: anicteric, MMM, no cervical LAD  CVS: RRR, no m/r/g  CHEST: CTA b/l, sternotomy scar with associated keloid  ABD: +BS, soft, NT,ND, no hepatosplenomegaly  EXT: no c/c/e  NEURO: aaox3  SKIN: NO rashes

## 2021-09-28 NOTE — PATIENT INSTRUCTIONS
Try to use water or hypoallergenic unscented wet wipes after BM  Take 1 tablespoon of metamucil/citrucel/benefiber  Hypoallergenic soap (dove)

## 2021-10-04 ENCOUNTER — OFFICE VISIT (OUTPATIENT)
Dept: CARDIOLOGY CLINIC | Facility: CLINIC | Age: 71
End: 2021-10-04
Payer: MEDICARE

## 2021-10-04 VITALS
BODY MASS INDEX: 25.74 KG/M2 | SYSTOLIC BLOOD PRESSURE: 118 MMHG | DIASTOLIC BLOOD PRESSURE: 62 MMHG | OXYGEN SATURATION: 97 % | HEART RATE: 54 BPM | WEIGHT: 207 LBS | HEIGHT: 75 IN

## 2021-10-04 DIAGNOSIS — I65.23 BILATERAL CAROTID ARTERY STENOSIS: ICD-10-CM

## 2021-10-04 DIAGNOSIS — Z95.2 S/P AVR: ICD-10-CM

## 2021-10-04 DIAGNOSIS — I10 ESSENTIAL HYPERTENSION: ICD-10-CM

## 2021-10-04 DIAGNOSIS — I97.89 POSTOPERATIVE ATRIAL FIBRILLATION (HCC): ICD-10-CM

## 2021-10-04 DIAGNOSIS — I25.118 CORONARY ARTERY DISEASE OF NATIVE ARTERY OF NATIVE HEART WITH STABLE ANGINA PECTORIS (HCC): Primary | ICD-10-CM

## 2021-10-04 DIAGNOSIS — Z95.1 S/P CABG (CORONARY ARTERY BYPASS GRAFT): ICD-10-CM

## 2021-10-04 DIAGNOSIS — I48.91 POSTOPERATIVE ATRIAL FIBRILLATION (HCC): ICD-10-CM

## 2021-10-04 PROCEDURE — 93000 ELECTROCARDIOGRAM COMPLETE: CPT | Performed by: INTERNAL MEDICINE

## 2021-10-04 PROCEDURE — 99214 OFFICE O/P EST MOD 30 MIN: CPT | Performed by: INTERNAL MEDICINE

## 2021-10-07 ENCOUNTER — IMMUNIZATIONS (OUTPATIENT)
Dept: FAMILY MEDICINE CLINIC | Facility: HOSPITAL | Age: 71
End: 2021-10-07

## 2021-10-07 DIAGNOSIS — Z23 ENCOUNTER FOR IMMUNIZATION: Primary | ICD-10-CM

## 2021-10-07 PROCEDURE — 91300 SARS-COV-2 / COVID-19 MRNA VACCINE (PFIZER-BIONTECH) 30 MCG: CPT

## 2021-10-07 PROCEDURE — 0001A SARS-COV-2 / COVID-19 MRNA VACCINE (PFIZER-BIONTECH) 30 MCG: CPT

## 2021-10-11 ENCOUNTER — OFFICE VISIT (OUTPATIENT)
Dept: PODIATRY | Facility: CLINIC | Age: 71
End: 2021-10-11
Payer: MEDICARE

## 2021-10-11 VITALS
WEIGHT: 211 LBS | HEIGHT: 75 IN | HEART RATE: 53 BPM | DIASTOLIC BLOOD PRESSURE: 61 MMHG | BODY MASS INDEX: 26.24 KG/M2 | SYSTOLIC BLOOD PRESSURE: 115 MMHG

## 2021-10-11 DIAGNOSIS — E11.42 DIABETIC POLYNEUROPATHY ASSOCIATED WITH TYPE 2 DIABETES MELLITUS (HCC): Primary | ICD-10-CM

## 2021-10-11 DIAGNOSIS — B35.1 ONYCHOMYCOSIS: ICD-10-CM

## 2021-10-11 PROCEDURE — 11721 DEBRIDE NAIL 6 OR MORE: CPT | Performed by: PODIATRIST

## 2021-10-14 ENCOUNTER — HOSPITAL ENCOUNTER (OUTPATIENT)
Dept: NON INVASIVE DIAGNOSTICS | Facility: CLINIC | Age: 71
Discharge: HOME | End: 2021-10-14
Payer: MEDICARE

## 2021-10-14 DIAGNOSIS — I25.118 CORONARY ARTERY DISEASE OF NATIVE ARTERY OF NATIVE HEART WITH STABLE ANGINA PECTORIS (HCC): ICD-10-CM

## 2021-10-14 DIAGNOSIS — I48.91 POSTOPERATIVE ATRIAL FIBRILLATION (HCC): ICD-10-CM

## 2021-10-14 DIAGNOSIS — I97.89 POSTOPERATIVE ATRIAL FIBRILLATION (HCC): ICD-10-CM

## 2021-10-14 PROCEDURE — 93225 XTRNL ECG REC<48 HRS REC: CPT

## 2021-10-14 PROCEDURE — 93226 XTRNL ECG REC<48 HR SCAN A/R: CPT

## 2021-10-15 PROCEDURE — 93227 XTRNL ECG REC<48 HR R&I: CPT | Performed by: INTERNAL MEDICINE

## 2021-10-19 DIAGNOSIS — E11.65 TYPE 2 DIABETES MELLITUS WITH HYPERGLYCEMIA, WITHOUT LONG-TERM CURRENT USE OF INSULIN (HCC): ICD-10-CM

## 2021-10-26 ENCOUNTER — TELEPHONE (OUTPATIENT)
Dept: GASTROENTEROLOGY | Facility: CLINIC | Age: 71
End: 2021-10-26

## 2021-10-27 ENCOUNTER — OFFICE VISIT (OUTPATIENT)
Dept: FAMILY MEDICINE CLINIC | Facility: CLINIC | Age: 71
End: 2021-10-27
Payer: MEDICARE

## 2021-10-27 VITALS
DIASTOLIC BLOOD PRESSURE: 70 MMHG | WEIGHT: 208.8 LBS | TEMPERATURE: 97.2 F | OXYGEN SATURATION: 98 % | HEART RATE: 63 BPM | RESPIRATION RATE: 16 BRPM | BODY MASS INDEX: 25.96 KG/M2 | SYSTOLIC BLOOD PRESSURE: 120 MMHG | HEIGHT: 75 IN

## 2021-10-27 DIAGNOSIS — Z00.00 MEDICARE ANNUAL WELLNESS VISIT, SUBSEQUENT: Primary | ICD-10-CM

## 2021-10-27 DIAGNOSIS — Z23 NEED FOR PROPHYLACTIC VACCINATION AGAINST STREPTOCOCCUS PNEUMONIAE (PNEUMOCOCCUS): ICD-10-CM

## 2021-10-27 DIAGNOSIS — E11.9 TYPE 2 DIABETES MELLITUS WITHOUT COMPLICATION, WITHOUT LONG-TERM CURRENT USE OF INSULIN (HCC): ICD-10-CM

## 2021-10-27 DIAGNOSIS — Z23 NEED FOR IMMUNIZATION AGAINST INFLUENZA: ICD-10-CM

## 2021-10-27 PROBLEM — K58.9 IBS (IRRITABLE BOWEL SYNDROME): Status: ACTIVE | Noted: 2021-10-27

## 2021-10-27 PROCEDURE — 1123F ACP DISCUSS/DSCN MKR DOCD: CPT | Performed by: NURSE PRACTITIONER

## 2021-10-27 PROCEDURE — 90732 PPSV23 VACC 2 YRS+ SUBQ/IM: CPT

## 2021-10-27 PROCEDURE — G0439 PPPS, SUBSEQ VISIT: HCPCS | Performed by: NURSE PRACTITIONER

## 2021-10-27 PROCEDURE — G0009 ADMIN PNEUMOCOCCAL VACCINE: HCPCS

## 2021-10-27 PROCEDURE — 90662 IIV NO PRSV INCREASED AG IM: CPT

## 2021-10-27 PROCEDURE — G0008 ADMIN INFLUENZA VIRUS VAC: HCPCS

## 2021-10-28 ENCOUNTER — TELEPHONE (OUTPATIENT)
Dept: FAMILY MEDICINE CLINIC | Facility: CLINIC | Age: 71
End: 2021-10-28

## 2021-11-07 PROBLEM — Z00.00 MEDICARE ANNUAL WELLNESS VISIT, SUBSEQUENT: Status: ACTIVE | Noted: 2021-11-07

## 2021-11-08 ENCOUNTER — TELEPHONE (OUTPATIENT)
Dept: CARDIOLOGY CLINIC | Facility: CLINIC | Age: 71
End: 2021-11-08

## 2021-11-08 DIAGNOSIS — I25.118 CORONARY ARTERY DISEASE OF NATIVE ARTERY OF NATIVE HEART WITH STABLE ANGINA PECTORIS (HCC): ICD-10-CM

## 2021-11-08 RX ORDER — METOPROLOL SUCCINATE 50 MG/1
50 TABLET, EXTENDED RELEASE ORAL DAILY
Qty: 90 TABLET | Refills: 3 | Status: SHIPPED | OUTPATIENT
Start: 2021-11-08

## 2021-11-29 ENCOUNTER — TELEPHONE (OUTPATIENT)
Dept: ENDOCRINOLOGY | Facility: CLINIC | Age: 71
End: 2021-11-29

## 2021-12-01 ENCOUNTER — LAB (OUTPATIENT)
Dept: LAB | Facility: AMBULARY SURGERY CENTER | Age: 71
End: 2021-12-01
Payer: MEDICARE

## 2021-12-01 DIAGNOSIS — E11.65 TYPE 2 DIABETES MELLITUS WITH HYPERGLYCEMIA, WITHOUT LONG-TERM CURRENT USE OF INSULIN (HCC): ICD-10-CM

## 2021-12-01 DIAGNOSIS — I10 ESSENTIAL HYPERTENSION: ICD-10-CM

## 2021-12-01 LAB
ANION GAP SERPL CALCULATED.3IONS-SCNC: 4 MMOL/L (ref 4–13)
BUN SERPL-MCNC: 27 MG/DL (ref 5–25)
CALCIUM SERPL-MCNC: 9.2 MG/DL (ref 8.3–10.1)
CHLORIDE SERPL-SCNC: 109 MMOL/L (ref 100–108)
CO2 SERPL-SCNC: 26 MMOL/L (ref 21–32)
CREAT SERPL-MCNC: 1.13 MG/DL (ref 0.6–1.3)
CREAT UR-MCNC: 46 MG/DL
EST. AVERAGE GLUCOSE BLD GHB EST-MCNC: 171 MG/DL
GFR SERPL CREATININE-BSD FRML MDRD: 64 ML/MIN/1.73SQ M
GLUCOSE P FAST SERPL-MCNC: 156 MG/DL (ref 65–99)
HBA1C MFR BLD: 7.6 %
MICROALBUMIN UR-MCNC: 5 MG/L (ref 0–20)
MICROALBUMIN/CREAT 24H UR: 11 MG/G CREATININE (ref 0–30)
POTASSIUM SERPL-SCNC: 4.2 MMOL/L (ref 3.5–5.3)
SODIUM SERPL-SCNC: 139 MMOL/L (ref 136–145)

## 2021-12-01 PROCEDURE — 83036 HEMOGLOBIN GLYCOSYLATED A1C: CPT

## 2021-12-01 PROCEDURE — 82570 ASSAY OF URINE CREATININE: CPT

## 2021-12-01 PROCEDURE — 82043 UR ALBUMIN QUANTITATIVE: CPT

## 2021-12-01 PROCEDURE — 80048 BASIC METABOLIC PNL TOTAL CA: CPT

## 2021-12-01 PROCEDURE — 36415 COLL VENOUS BLD VENIPUNCTURE: CPT

## 2021-12-06 ENCOUNTER — OFFICE VISIT (OUTPATIENT)
Dept: ENDOCRINOLOGY | Facility: CLINIC | Age: 71
End: 2021-12-06
Payer: MEDICARE

## 2021-12-06 VITALS
SYSTOLIC BLOOD PRESSURE: 120 MMHG | BODY MASS INDEX: 25.86 KG/M2 | HEIGHT: 75 IN | TEMPERATURE: 96 F | WEIGHT: 208 LBS | HEART RATE: 60 BPM | DIASTOLIC BLOOD PRESSURE: 70 MMHG

## 2021-12-06 DIAGNOSIS — I10 ESSENTIAL HYPERTENSION: ICD-10-CM

## 2021-12-06 DIAGNOSIS — E11.65 TYPE 2 DIABETES MELLITUS WITH HYPERGLYCEMIA, WITHOUT LONG-TERM CURRENT USE OF INSULIN (HCC): Primary | ICD-10-CM

## 2021-12-06 DIAGNOSIS — E78.2 MIXED HYPERLIPIDEMIA: ICD-10-CM

## 2021-12-06 PROCEDURE — 99214 OFFICE O/P EST MOD 30 MIN: CPT | Performed by: PHYSICIAN ASSISTANT

## 2021-12-09 ENCOUNTER — TELEPHONE (OUTPATIENT)
Dept: FAMILY MEDICINE CLINIC | Facility: CLINIC | Age: 71
End: 2021-12-09

## 2021-12-13 ENCOUNTER — TELEPHONE (OUTPATIENT)
Dept: GASTROENTEROLOGY | Facility: CLINIC | Age: 71
End: 2021-12-13

## 2021-12-14 ENCOUNTER — PROCEDURE VISIT (OUTPATIENT)
Dept: UROLOGY | Facility: CLINIC | Age: 71
End: 2021-12-14
Payer: MEDICARE

## 2021-12-14 VITALS
SYSTOLIC BLOOD PRESSURE: 106 MMHG | BODY MASS INDEX: 25.61 KG/M2 | HEIGHT: 75 IN | WEIGHT: 206 LBS | HEART RATE: 51 BPM | DIASTOLIC BLOOD PRESSURE: 66 MMHG

## 2021-12-14 DIAGNOSIS — N43.3 HYDROCELE, UNSPECIFIED HYDROCELE TYPE: Primary | ICD-10-CM

## 2021-12-14 PROCEDURE — 55000 DRAINAGE OF HYDROCELE: CPT | Performed by: PHYSICIAN ASSISTANT

## 2021-12-14 RX ORDER — CLOBETASOL PROPIONATE 0.5 MG/G
CREAM TOPICAL DAILY
COMMUNITY
Start: 2021-11-03 | End: 2022-03-11

## 2021-12-22 ENCOUNTER — OFFICE VISIT (OUTPATIENT)
Dept: PODIATRY | Facility: CLINIC | Age: 71
End: 2021-12-22
Payer: MEDICARE

## 2021-12-22 VITALS
BODY MASS INDEX: 25.36 KG/M2 | DIASTOLIC BLOOD PRESSURE: 67 MMHG | WEIGHT: 204 LBS | HEART RATE: 53 BPM | HEIGHT: 75 IN | SYSTOLIC BLOOD PRESSURE: 124 MMHG

## 2021-12-22 DIAGNOSIS — B35.1 ONYCHOMYCOSIS: ICD-10-CM

## 2021-12-22 DIAGNOSIS — E11.42 DIABETIC POLYNEUROPATHY ASSOCIATED WITH TYPE 2 DIABETES MELLITUS (HCC): Primary | ICD-10-CM

## 2021-12-22 PROCEDURE — 11721 DEBRIDE NAIL 6 OR MORE: CPT | Performed by: PODIATRIST

## 2021-12-27 DIAGNOSIS — I25.118 CORONARY ARTERY DISEASE OF NATIVE ARTERY OF NATIVE HEART WITH STABLE ANGINA PECTORIS (HCC): ICD-10-CM

## 2021-12-27 RX ORDER — ROSUVASTATIN CALCIUM 10 MG/1
10 TABLET, COATED ORAL DAILY
Qty: 90 TABLET | Refills: 3 | Status: SHIPPED | OUTPATIENT
Start: 2021-12-27

## 2021-12-27 RX ORDER — ROSUVASTATIN CALCIUM 10 MG/1
10 TABLET, COATED ORAL DAILY
Qty: 90 TABLET | Refills: 3 | Status: SHIPPED | OUTPATIENT
Start: 2021-12-27 | End: 2021-12-27 | Stop reason: SDUPTHER

## 2022-01-16 DIAGNOSIS — E11.65 TYPE 2 DIABETES MELLITUS WITH HYPERGLYCEMIA, WITHOUT LONG-TERM CURRENT USE OF INSULIN (HCC): ICD-10-CM

## 2022-02-16 ENCOUNTER — TELEPHONE (OUTPATIENT)
Dept: UROLOGY | Facility: AMBULATORY SURGERY CENTER | Age: 72
End: 2022-02-16

## 2022-02-16 NOTE — TELEPHONE ENCOUNTER
Pt LVM yesterday 2/15    He is waiting to hear back from the  surgery scheduler and is not gettig any communication, seen by Tata Adrian 12/14/2021    Thanks

## 2022-02-16 NOTE — TELEPHONE ENCOUNTER
What you bring him in for appointments I can examine him and I will set him up for surgery at that time    That way we can assure that the fluid is adequate amount for surgery

## 2022-02-16 NOTE — TELEPHONE ENCOUNTER
I returned pt 's phone call and I was able to speak with him  I did confirm that per Preet's last OV note from 12/14/21 he was suppose to call our office once his hydrocele returned and then Mary Carroll would either re-evaluate pt or place surgical orders  Pt stated that his hydrocele was starting to return but was not fully back yet at this time  Pt would like to schedule this procedure for the [facility]  Since that is the closest to his home  I informed pt that I will reach out to [facility] to confirm follow up for pt and if surgery is to be scheduled I will reach out to him to discuss scheduling

## 2022-02-24 ENCOUNTER — PREP FOR PROCEDURE (OUTPATIENT)
Dept: UROLOGY | Facility: CLINIC | Age: 72
End: 2022-02-24

## 2022-02-24 ENCOUNTER — OFFICE VISIT (OUTPATIENT)
Dept: UROLOGY | Facility: CLINIC | Age: 72
End: 2022-02-24
Payer: MEDICARE

## 2022-02-24 VITALS
HEART RATE: 68 BPM | HEIGHT: 75 IN | SYSTOLIC BLOOD PRESSURE: 132 MMHG | WEIGHT: 205 LBS | BODY MASS INDEX: 25.49 KG/M2 | DIASTOLIC BLOOD PRESSURE: 78 MMHG

## 2022-02-24 DIAGNOSIS — N43.3 HYDROCELE, UNSPECIFIED HYDROCELE TYPE: Primary | ICD-10-CM

## 2022-02-24 DIAGNOSIS — Z01.810 PREOP CARDIOVASCULAR EXAM: ICD-10-CM

## 2022-02-24 DIAGNOSIS — Z01.818 PREOP EXAMINATION: ICD-10-CM

## 2022-02-24 PROCEDURE — 99213 OFFICE O/P EST LOW 20 MIN: CPT | Performed by: PHYSICIAN ASSISTANT

## 2022-02-24 RX ORDER — SODIUM CHLORIDE, SODIUM LACTATE, POTASSIUM CHLORIDE, CALCIUM CHLORIDE 600; 310; 30; 20 MG/100ML; MG/100ML; MG/100ML; MG/100ML
125 INJECTION, SOLUTION INTRAVENOUS CONTINUOUS
Status: CANCELLED | OUTPATIENT
Start: 2022-02-24

## 2022-02-24 NOTE — PROGRESS NOTES
UROLOGY PROGRESS NOTE   Patient Identifiers: Tatianna Radford (MRN [id number])  Date of Service: 2/24/2022    Subjective:     75-year-old man with history of right-sided hydrocele  I aspirated in December for about 250 cc of clear yellow fluid  He reports re accumulation fairly quickly    He is now interested in hydrocelectomy which we did discuss in the past       Reason for visit:  Hydrocele evaluation     Objective:     VITALS:    Vitals:    02/24/22 1430   BP: 132/78   Pulse: 68     AUA SYMPTOM SCORE      Most Recent Value   AUA SYMPTOM SCORE    How often have you had a sensation of not emptying your bladder completely after you finished urinating? 1 (P)     How often have you had to urinate again less than two hours after you finished urinating? 0 (P)     How often have you found you stopped and started again several times when you urinate? 0 (P)     How often have you found it difficult to postpone urination? 1 (P)     How often have you had a weak urinary stream? 1 (P)     How often have you had to push or strain to begin urination? 0 (P)     How many times did you most typically get up to urinate from the time you went to bed at night until the time you got up in the morning? 4 (P)     Quality of Life: If you were to spend the rest of your life with your urinary condition just the way it is now, how would you feel about that? 3 (P)     AUA SYMPTOM SCORE 7 (P)             LABS:  Lab Results   Component Value Date    HGB 14 5 03/22/2021    HCT 42 1 03/22/2021    WBC 8 24 03/22/2021     03/22/2021   ]    Lab Results   Component Value Date    K 4 2 12/01/2021     (H) 12/01/2021    CO2 26 12/01/2021    BUN 27 (H) 12/01/2021    CREATININE 1 13 12/01/2021    CALCIUM 9 2 12/01/2021   ]        INPATIENT MEDS:    Current Outpatient Medications:     acetaminophen (TYLENOL) 500 mg tablet, Take 500 mg by mouth daily at bedtime, Disp: , Rfl:     ascorbic acid (VITAMIN C) 250 mg tablet, Take 500 mg by mouth 2 (two) times a day, Disp: , Rfl:     aspirin 81 mg chewable tablet, Chew 81 mg daily, Disp: , Rfl:     clobetasol (TEMOVATE) 0 05 % cream, daily, Disp: , Rfl:     finasteride (PROSCAR) 5 mg tablet, Take 1 tablet (5 mg total) by mouth daily, Disp: 90 tablet, Rfl: 3    Norma, Zingiber officinalis, (Norma Root) 500 MG CAPS, , Disp: , Rfl:     Jardiance 25 MG TABS, Take 1 tablet (25 mg total) by mouth daily, Disp: 90 tablet, Rfl: 3    metFORMIN (GLUCOPHAGE) 500 mg tablet, TAKE TWO TABLETS BY MOUTH TWICE A DAY, Disp: 360 tablet, Rfl: 1    metoprolol succinate (TOPROL-XL) 50 mg 24 hr tablet, Take 1 tablet (50 mg total) by mouth daily, Disp: 90 tablet, Rfl: 3    niacin 500 mg tablet, Take 500 mg by mouth 4 (four) times a day, Disp: , Rfl:     Omega-3 Fatty Acids (fish oil) 1,000 mg, , Disp: , Rfl:     PROBIOTIC PRODUCT PO, Take 111 mg by mouth daily, Disp: , Rfl:     rosuvastatin (CRESTOR) 10 MG tablet, Take 1 tablet (10 mg total) by mouth daily, Disp: 90 tablet, Rfl: 3    sitaGLIPtin (JANUVIA) 100 mg tablet, Take 1 tablet (100 mg total) by mouth daily, Disp: 90 tablet, Rfl: 3    tamsulosin (FLOMAX) 0 4 mg, TAKE ONE CAPSULE BY MOUTH EVERY DAY WITH DINNER, Disp: 30 capsule, Rfl: 6    Turmeric 500 MG CAPS, , Disp: , Rfl:     NON FORMULARY, , Disp: , Rfl:       Physical Exam:   /78 (BP Location: Left arm, Patient Position: Sitting, Cuff Size: Adult)   Pulse 68   Ht 6' 3" (1 905 m)   Wt 93 kg (205 lb)   BMI 25 62 kg/m²   GEN: no acute distress     HEART:  Regular rate and rhythm  RESP: breathing comfortably with no accessory muscle use   Lungs clear to auscultation bilaterally  ABD: soft, non-tender, non-distended   INCISION:    EXT: no significant peripheral edema   (Male): Penis circumcised, phallus normal, meatus patent  Testicles descended into scrotum bilaterally without masses nor tenderness  Right-sided hydrocele  No inguinal hernias bilaterally       RADIOLOGY:   SCROTAL ULTRASOUND 1   No intratesticular mass or evidence of acute testicular torsion  2   Large right-sided hydrocele and small left-sided hydrocele       Assessment: 1  Right-sided hydrocele     Plan:   - will schedule right-sided hydrocelectomy  - case request is entered and the  notified  -  -

## 2022-03-01 ENCOUNTER — APPOINTMENT (OUTPATIENT)
Dept: RADIOLOGY | Facility: AMBULARY SURGERY CENTER | Age: 72
End: 2022-03-01
Payer: MEDICARE

## 2022-03-01 ENCOUNTER — OFFICE VISIT (OUTPATIENT)
Dept: OBGYN CLINIC | Facility: CLINIC | Age: 72
End: 2022-03-01
Payer: MEDICARE

## 2022-03-01 ENCOUNTER — LAB (OUTPATIENT)
Dept: LAB | Facility: AMBULARY SURGERY CENTER | Age: 72
End: 2022-03-01
Payer: MEDICARE

## 2022-03-01 ENCOUNTER — TELEPHONE (OUTPATIENT)
Dept: UROLOGY | Facility: AMBULATORY SURGERY CENTER | Age: 72
End: 2022-03-01

## 2022-03-01 VITALS
SYSTOLIC BLOOD PRESSURE: 144 MMHG | DIASTOLIC BLOOD PRESSURE: 77 MMHG | WEIGHT: 205 LBS | HEIGHT: 75 IN | HEART RATE: 57 BPM | BODY MASS INDEX: 25.49 KG/M2

## 2022-03-01 DIAGNOSIS — M79.642 LEFT HAND PAIN: Primary | ICD-10-CM

## 2022-03-01 DIAGNOSIS — M79.642 LEFT HAND PAIN: ICD-10-CM

## 2022-03-01 DIAGNOSIS — I10 ESSENTIAL HYPERTENSION: ICD-10-CM

## 2022-03-01 DIAGNOSIS — M18.12 ARTHRITIS OF CARPOMETACARPAL (CMC) JOINT OF LEFT THUMB: ICD-10-CM

## 2022-03-01 DIAGNOSIS — E11.65 TYPE 2 DIABETES MELLITUS WITH HYPERGLYCEMIA, WITHOUT LONG-TERM CURRENT USE OF INSULIN (HCC): ICD-10-CM

## 2022-03-01 LAB
ANION GAP SERPL CALCULATED.3IONS-SCNC: 4 MMOL/L (ref 4–13)
BUN SERPL-MCNC: 25 MG/DL (ref 5–25)
CALCIUM SERPL-MCNC: 9.7 MG/DL (ref 8.3–10.1)
CHLORIDE SERPL-SCNC: 108 MMOL/L (ref 100–108)
CO2 SERPL-SCNC: 28 MMOL/L (ref 21–32)
CREAT SERPL-MCNC: 1.21 MG/DL (ref 0.6–1.3)
GFR SERPL CREATININE-BSD FRML MDRD: 59 ML/MIN/1.73SQ M
GLUCOSE P FAST SERPL-MCNC: 200 MG/DL (ref 65–99)
POTASSIUM SERPL-SCNC: 4.1 MMOL/L (ref 3.5–5.3)
SODIUM SERPL-SCNC: 140 MMOL/L (ref 136–145)

## 2022-03-01 PROCEDURE — 99204 OFFICE O/P NEW MOD 45 MIN: CPT | Performed by: PHYSICIAN ASSISTANT

## 2022-03-01 PROCEDURE — 83036 HEMOGLOBIN GLYCOSYLATED A1C: CPT

## 2022-03-01 PROCEDURE — 36415 COLL VENOUS BLD VENIPUNCTURE: CPT

## 2022-03-01 PROCEDURE — 80048 BASIC METABOLIC PNL TOTAL CA: CPT

## 2022-03-01 PROCEDURE — 73130 X-RAY EXAM OF HAND: CPT

## 2022-03-01 NOTE — TELEPHONE ENCOUNTER
I called pt this afternoon to confirm that Dr Matilda Lopez had reviewed his chart and confirmed that I can schedule him for surgery on 4/8/22 at the [email or web address]  I was able to speak with pt and confirmed surgery  I then verbally went over all pre op instructions and prep information with him  He is aware that he needs to have a pre op EKG done as soon as possible along with a medical clearance by his PCP  Per pt 's request I will be mailing him a copy of his surgical packet and instructed him to call our office with any questions or concerns regarding this procedure

## 2022-03-01 NOTE — PROGRESS NOTES
CHIEF COMPLAINT:  Chief Complaint   Patient presents with    Left Thumb - Pain       SUBJECTIVE:  Venus Vasquez is a 66y o  year old RHD male who presents left thumb pain  Patient states it has been going on for several weeks  He states that the pain is at the base of his thumb  He states today it is not as discomforting as it was when he previously made the appointment  He states that he has taken some natural anti-inflammatories with some relief of his symptoms  He denies any numbness or tingling          PAST MEDICAL HISTORY:  Past Medical History:   Diagnosis Date    Acute systolic heart failure ([facility] [address] ) 2/23/2021    Athlete's foot     Bilateral impacted cerumen 3/9/2021    Diabetes mellitus ([facility] [address] )     Other hydrocele 6/11/2020    TMJ (dislocation of temporomandibular joint)        PAST SURGICAL HISTORY:  Past Surgical History:   Procedure Laterality Date    CARDIAC SURGERY      EYE SURGERY  03/2020    Cataract right eye    EYE SURGERY Left 12/03/2020    left eye cataract     TONSILLECTOMY         FAMILY HISTORY:  Family History   Problem Relation Age of Onset    Heart disease Mother     Cancer Mother     Heart disease Father     Hypertension Father     Colon cancer Neg Hx        SOCIAL HISTORY:  Social History     Tobacco Use    Smoking status: Never Smoker    Smokeless tobacco: Never Used   Vaping Use    Vaping Use: Never used   Substance Use Topics    Alcohol use: Yes     Comment: 1 glass of wine or 1 beer a week    Drug use: Never       MEDICATIONS:    Current Outpatient Medications:     acetaminophen (TYLENOL) 500 mg tablet, Take 500 mg by mouth daily at bedtime, Disp: , Rfl:     ascorbic acid (VITAMIN C) 250 mg tablet, Take 500 mg by mouth 2 (two) times a day, Disp: , Rfl:     aspirin 81 mg chewable tablet, Chew 81 mg daily, Disp: , Rfl:     clobetasol (TEMOVATE) 0 05 % cream, daily, Disp: , Rfl:     finasteride (PROSCAR) 5 mg tablet, Take 1 tablet (5 mg total) by mouth daily, Disp: 90 tablet, Rfl: 3    Norma, Zingiber officinalis, (Norma Root) 500 MG CAPS, , Disp: , Rfl:     Jardiance 25 MG TABS, Take 1 tablet (25 mg total) by mouth daily, Disp: 90 tablet, Rfl: 3    metFORMIN (GLUCOPHAGE) 500 mg tablet, TAKE TWO TABLETS BY MOUTH TWICE A DAY, Disp: 360 tablet, Rfl: 1    metoprolol succinate (TOPROL-XL) 50 mg 24 hr tablet, Take 1 tablet (50 mg total) by mouth daily, Disp: 90 tablet, Rfl: 3    niacin 500 mg tablet, Take 500 mg by mouth 4 (four) times a day, Disp: , Rfl:     NON FORMULARY, , Disp: , Rfl:     Omega-3 Fatty Acids (fish oil) 1,000 mg, , Disp: , Rfl:     PROBIOTIC PRODUCT PO, Take 111 mg by mouth daily, Disp: , Rfl:     rosuvastatin (CRESTOR) 10 MG tablet, Take 1 tablet (10 mg total) by mouth daily, Disp: 90 tablet, Rfl: 3    sitaGLIPtin (JANUVIA) 100 mg tablet, Take 1 tablet (100 mg total) by mouth daily, Disp: 90 tablet, Rfl: 3    tamsulosin (FLOMAX) 0 4 mg, TAKE ONE CAPSULE BY MOUTH EVERY DAY WITH DINNER, Disp: 30 capsule, Rfl: 6    Turmeric 500 MG CAPS, , Disp: , Rfl:     ALLERGIES:  No Known Allergies    REVIEW OF SYSTEMS:  Review of Systems  ROS:   General: no fever, no chills  HEENT:  No loss of hearing or eyesight problems  Eyes:  No red eyes  Respiratory:  No coughing, shortness of breath or wheezing  Cardiovascular:  No chest pain, no palpitations  GI:  Abdomen soft nontender, denies nausea  Endocrine:  No muscle weakness, no frequent urination, no excessive thirst  Urinary:  No dysuria, no incontinence  Musculoskeletal: see HPI and PE  SKIN:  No skin rash, no dry skin  Neurological:  No headaches, no confusion  Psychiatric:  No suicide thoughts, no anxiety, no depression  Review of all other systems is negative    VITALS:  Vitals:    03/01/22 0841   BP: 144/77   Pulse: 57       LABS:  HgA1c:   Lab Results   Component Value Date    HGBA1C 7 6 (H) 12/01/2021     BMP:   Lab Results   Component Value Date    CALCIUM 9 2 12/01/2021    K 4 2 12/01/2021    CO2 26 12/01/2021     (H) 12/01/2021    BUN 27 (H) 12/01/2021    CREATININE 1 13 12/01/2021       _____________________________________________________  PHYSICAL EXAMINATION:  General: well developed and well nourished, alert, oriented times 3 and appears comfortable  Psychiatric: Normal  HEENT: Trachea Midline, No torticollis  Pulmonary: No audible wheezing or strider  Cardiovascular: No discernable arrhythmia   Skin: No masses, erythema, lacerations, fluctation, ulcerations  Neurovascular: Sensation Intact to the Median, Ulnar, Radial Nerve, Motor Intact to the Median, Ulnar, Radial Nerve and Pulses Intact    MUSCULOSKELETAL EXAMINATION:  left CMC Exam:  No adduction contracture  No hyperextension deformity of MCP joint  Positive localized tenderness over radial and dorsal aspect of thumb (CMC joint)  Grind test is Positive for pain and Positive for crepitus  No triggering or tenderness over the A1 pulley  No pain with Finkelsteins maneuver             ___________________________________________________  STUDIES REVIEWED:  Images obtained today of the Left hand 3 views demonstrate No acute fractures or dislocations noted, mild to moderate CMC arthritis of the thumb noted      PROCEDURES PERFORMED:  Procedures  No Procedures performed today    _____________________________________________________  ASSESSMENT/PLAN:      Arthritis of carpometacarpal (CMC) joint of left thumb  · Conservative treatment was discussed with the patient and he would like to proceed with such   · He was offered a cortisone injection however declined at this time as he states it is not that discomforting  · He was advised about natural anti-inflammatories as well as taking Tylenol as needed for pain  · He was advised that if the symptoms were to get worse we can always offer him a cortisone injection   · He was also offered a comfort cool brace however declined at this time   · He will follow up with us as needed      Follow Up:  Return if symptoms worsen or fail to improve  To Do Next Visit:  Re-evaluation of current issue      Portions of the record may have been created with voice recognition software  Occasional wrong word or "sound a like" substitutions may have occurred due to the inherent limitations of voice recognition software  Read the chart carefully and recognize, using context, where substitutions have occurred

## 2022-03-01 NOTE — TELEPHONE ENCOUNTER
I called pt this morning to confirm that I am working on scheduling his procedure that was ordered by J Carlos Yeager last week  I was able to speak with pt and confirmed that the next available date I have available is with Dr Haile at the [facility] on 4/8/22  I informed pt that I have e-mailed Dr Haile to review his chart and confirm that he agrees with the procedure  Once Dr Haile confirms I will call pt to go over his pre op instructions and prep information before mailing out his surgical packet  Pt thanked me for the update and will wait to hear back from me

## 2022-03-02 ENCOUNTER — TELEPHONE (OUTPATIENT)
Dept: GASTROENTEROLOGY | Facility: AMBULARY SURGERY CENTER | Age: 72
End: 2022-03-02

## 2022-03-02 LAB
EST. AVERAGE GLUCOSE BLD GHB EST-MCNC: 157 MG/DL
HBA1C MFR BLD: 7.1 %

## 2022-03-03 ENCOUNTER — ANESTHESIA (OUTPATIENT)
Dept: GASTROENTEROLOGY | Facility: AMBULARY SURGERY CENTER | Age: 72
End: 2022-03-03

## 2022-03-03 ENCOUNTER — ANESTHESIA EVENT (OUTPATIENT)
Dept: GASTROENTEROLOGY | Facility: AMBULARY SURGERY CENTER | Age: 72
End: 2022-03-03

## 2022-03-03 ENCOUNTER — HOSPITAL ENCOUNTER (OUTPATIENT)
Dept: GASTROENTEROLOGY | Facility: AMBULARY SURGERY CENTER | Age: 72
Setting detail: OUTPATIENT SURGERY
Discharge: HOME | End: 2022-03-03
Attending: INTERNAL MEDICINE
Payer: MEDICARE

## 2022-03-03 VITALS
HEIGHT: 75 IN | DIASTOLIC BLOOD PRESSURE: 65 MMHG | OXYGEN SATURATION: 96 % | TEMPERATURE: 97 F | SYSTOLIC BLOOD PRESSURE: 111 MMHG | BODY MASS INDEX: 24.62 KG/M2 | WEIGHT: 198 LBS | HEART RATE: 68 BPM | RESPIRATION RATE: 18 BRPM

## 2022-03-03 DIAGNOSIS — Z12.11 COLON CANCER SCREENING: ICD-10-CM

## 2022-03-03 LAB — GLUCOSE SERPL-MCNC: 121 MG/DL (ref 65–140)

## 2022-03-03 PROCEDURE — 88305 TISSUE EXAM BY PATHOLOGIST: CPT | Performed by: PATHOLOGY

## 2022-03-03 PROCEDURE — 45385 COLONOSCOPY W/LESION REMOVAL: CPT | Performed by: INTERNAL MEDICINE

## 2022-03-03 PROCEDURE — 82948 REAGENT STRIP/BLOOD GLUCOSE: CPT

## 2022-03-03 RX ORDER — PROPOFOL 10 MG/ML
INJECTION, EMULSION INTRAVENOUS AS NEEDED
Status: DISCONTINUED | OUTPATIENT
Start: 2022-03-03 | End: 2022-03-03

## 2022-03-03 RX ORDER — HYDROCORTISONE 25 MG/G
CREAM TOPICAL 2 TIMES DAILY
Qty: 30 G | Refills: 2 | Status: SHIPPED | OUTPATIENT
Start: 2022-03-03

## 2022-03-03 RX ORDER — SODIUM CHLORIDE, SODIUM LACTATE, POTASSIUM CHLORIDE, CALCIUM CHLORIDE 600; 310; 30; 20 MG/100ML; MG/100ML; MG/100ML; MG/100ML
INJECTION, SOLUTION INTRAVENOUS CONTINUOUS PRN
Status: DISCONTINUED | OUTPATIENT
Start: 2022-03-03 | End: 2022-03-03

## 2022-03-03 RX ADMIN — PROPOFOL 100 MG: 10 INJECTION, EMULSION INTRAVENOUS at 08:16

## 2022-03-03 RX ADMIN — SODIUM CHLORIDE, SODIUM LACTATE, POTASSIUM CHLORIDE, AND CALCIUM CHLORIDE: .6; .31; .03; .02 INJECTION, SOLUTION INTRAVENOUS at 07:34

## 2022-03-03 RX ADMIN — PROPOFOL 20 MG: 10 INJECTION, EMULSION INTRAVENOUS at 08:18

## 2022-03-03 RX ADMIN — PROPOFOL 20 MG: 10 INJECTION, EMULSION INTRAVENOUS at 08:20

## 2022-03-03 NOTE — ANESTHESIA POSTPROCEDURE EVALUATION
Post-Op Assessment Note    CV Status:  Stable  Pain Score: 0    Pain management: adequate     Mental Status:  Alert and awake   Hydration Status:  Euvolemic   PONV Controlled:  Controlled   Airway Patency:  Patent      Post Op Vitals Reviewed: Yes      Staff: CRNA         No complications documented      BP   103/55   Temp  97   Pulse  68   Resp   12   SpO2   97

## 2022-03-03 NOTE — H&P
History and Physical - [facility]  Micah Pichardo 66 y o  male MRN: [id number]    HPI: Micah Pichardo is a 66y o  year old male who presents with colon cancer screening      Review of Systems    Historical Information   Past Medical History:   Diagnosis Date    Acute systolic heart failure ([facility] [address] ) 2/23/2021    Athlete's foot     Bilateral impacted cerumen 3/9/2021    Diabetes mellitus ([facility] [address]     Other hydrocele 6/11/2020    TMJ (dislocation of temporomandibular joint)      Past Surgical History:   Procedure Laterality Date    CARDIAC SURGERY      CARDIAC VALVE REPLACEMENT      CATARACT EXTRACTION      COLONOSCOPY      CORONARY ARTERY BYPASS GRAFT      EYE SURGERY  03/2020    Cataract right eye    EYE SURGERY Left 12/03/2020    left eye cataract     TONSILLECTOMY       Social History   Social History     Substance and Sexual Activity   Alcohol Use Yes    Comment: 1 glass of wine or 1 beer a week     Social History     Substance and Sexual Activity   Drug Use Never     Social History     Tobacco Use   Smoking Status Never Smoker   Smokeless Tobacco Never Used     Family History   Problem Relation Age of Onset    Heart disease Mother     Cancer Mother     Heart disease Father     Hypertension Father     Colon cancer Neg Hx        Meds/Allergies     (Not in a hospital admission)      No Known Allergies    Objective     /71   Pulse 80   Temp (!) 97 3 °F (36 3 °C) (Temporal)   Resp 18   Ht 6' 3" (1 905 m)   Wt 89 8 kg (198 lb)   SpO2 98%   BMI 24 75 kg/m²       PHYSICAL EXAM    Gen: NAD  CV: RRR  CHEST: Clear  ABD: soft, NT/ND  EXT: no edema  Neuro: AAO      ASSESSMENT/PLAN:  This is a 66y o  year old male here for colon cancer screening      PLAN:   Procedure: colonoscopy

## 2022-03-03 NOTE — ANESTHESIA PREPROCEDURE EVALUATION
Procedure:  COLONOSCOPY    Relevant Problems   CARDIO   (+) Aortic stenosis (s/p AVR)   (+) Essential hypertension   (+) Hypertriglyceridemia   (+) Mixed hyperlipidemia   (+) PVC (premature ventricular contraction)   (+) Postoperative atrial fibrillation (HCC)   (+) S/P AVR   (+) S/P CABG (coronary artery bypass graft)      ENDO   (+) DM (diabetes mellitus), type 2 (HCC)        Physical Exam    Airway    Mallampati score: II  TM Distance: <3 FB  Neck ROM: full     Dental   No notable dental hx     Cardiovascular  Murmur,     Pulmonary  Pulmonary exam normal     Other Findings      Results for Laice Plascencia (MRN [id number]) as of 3/3/2022 07:20   Ref  Range 3/1/2022 07:56   Sodium Latest Ref Range: 136 - 145 mmol/L 140   Potassium Latest Ref Range: 3 5 - 5 3 mmol/L 4 1   Chloride Latest Ref Range: 100 - 108 mmol/L 108   CO2 Latest Ref Range: 21 - 32 mmol/L 28   Anion Gap Latest Ref Range: 4 - 13 mmol/L 4   BUN Latest Ref Range: 5 - 25 mg/dL 25   Creatinine Latest Ref Range: 0 60 - 1 30 mg/dL 1 21   GLUCOSE FASTING Latest Ref Range: 65 - 99 mg/dL 200 (H)   Calcium Latest Ref Range: 8 3 - 10 1 mg/dL 9 7   eGFR Latest Units: ml/min/1 73sq m 59       ECHO (8/2021)  LEFT VENTRICLE:  Systolic function was normal  Ejection fraction was estimated to be 60 %  Wall thickness was mildly increased  There was mild concentric hypertrophy  Doppler parameters were consistent with abnormal left ventricular relaxation (grade 1 diastolic dysfunction)  Doppler parameters were consistent with elevated mean left atrial filling pressure      RIGHT VENTRICLE:  The ventricle was mildly to moderately dilated  Systolic function was normal      LEFT ATRIUM:  The atrium was mildly dilated      ATRIAL SEPTUM:  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  No definite shunt noted, but poor subcostal views      MITRAL VALVE:  There was mild annular calcification  There was trace regurgitation      AORTIC VALVE:  A bioprosthesis was present   It exhibited normal function  Valve peak gradient was 30 mmHg  Valve mean gradient was 16 mmHg          Anesthesia Plan  ASA Score- 3     Anesthesia Type- IV sedation with anesthesia with ASA Monitors  Additional Monitors:   Airway Plan:     Comment: Last of bowel prep before MN    Took beta-blocker in the evening of 3/2  Plan Factors-Exercise tolerance (METS): >4 METS  Chart reviewed  EKG reviewed  Existing labs reviewed  Patient summary reviewed  Patient is not a current smoker  Induction- intravenous  Postoperative Plan-     Informed Consent- Anesthetic plan and risks discussed with patient  I personally reviewed this patient with the CRNA  Discussed and agreed on the Anesthesia Plan with the SANJUANA Soares

## 2022-03-09 ENCOUNTER — OFFICE VISIT (OUTPATIENT)
Dept: LAB | Facility: CLINIC | Age: 72
End: 2022-03-09
Payer: MEDICARE

## 2022-03-09 ENCOUNTER — OFFICE VISIT (OUTPATIENT)
Dept: PODIATRY | Facility: CLINIC | Age: 72
End: 2022-03-09
Payer: MEDICARE

## 2022-03-09 VITALS
DIASTOLIC BLOOD PRESSURE: 70 MMHG | HEART RATE: 67 BPM | WEIGHT: 207 LBS | SYSTOLIC BLOOD PRESSURE: 131 MMHG | HEIGHT: 75 IN | BODY MASS INDEX: 25.74 KG/M2

## 2022-03-09 DIAGNOSIS — L03.031 CELLULITIS OF FOURTH TOE OF RIGHT FOOT: ICD-10-CM

## 2022-03-09 DIAGNOSIS — E11.42 DIABETIC POLYNEUROPATHY ASSOCIATED WITH TYPE 2 DIABETES MELLITUS (HCC): Primary | ICD-10-CM

## 2022-03-09 DIAGNOSIS — Z01.818 PREOP EXAMINATION: ICD-10-CM

## 2022-03-09 DIAGNOSIS — Z01.810 PREOP CARDIOVASCULAR EXAM: ICD-10-CM

## 2022-03-09 DIAGNOSIS — N43.3 HYDROCELE, UNSPECIFIED HYDROCELE TYPE: ICD-10-CM

## 2022-03-09 DIAGNOSIS — S90.426A BLISTER OF FOURTH TOE: ICD-10-CM

## 2022-03-09 LAB
ATRIAL RATE: 59 BPM
P AXIS: 80 DEGREES
PR INTERVAL: 196 MS
QRS AXIS: -56 DEGREES
QRSD INTERVAL: 140 MS
QT INTERVAL: 488 MS
QTC INTERVAL: 483 MS
T WAVE AXIS: 96 DEGREES
VENTRICULAR RATE: 59 BPM

## 2022-03-09 PROCEDURE — 93005 ELECTROCARDIOGRAM TRACING: CPT

## 2022-03-09 PROCEDURE — 93010 ELECTROCARDIOGRAM REPORT: CPT | Performed by: INTERNAL MEDICINE

## 2022-03-09 PROCEDURE — 99213 OFFICE O/P EST LOW 20 MIN: CPT | Performed by: PODIATRIST

## 2022-03-09 RX ORDER — CEPHALEXIN 500 MG/1
500 CAPSULE ORAL EVERY 6 HOURS SCHEDULED
Qty: 28 CAPSULE | Refills: 0 | Status: SHIPPED | OUTPATIENT
Start: 2022-03-09 | End: 2022-03-16

## 2022-03-09 RX ORDER — LORAZEPAM 0.5 MG
TABLET ORAL
COMMUNITY
Start: 2022-03-04 | End: 2022-04-22 | Stop reason: ALTCHOICE

## 2022-03-09 RX ORDER — BETAMETHASONE DIPROPIONATE 0.05 %
OINTMENT (GRAM) TOPICAL
COMMUNITY
Start: 2022-02-09

## 2022-03-09 NOTE — PROGRESS NOTES
Assessment/Plan:         Diagnoses and all orders for this visit:    Diabetic polyneuropathy associated with type 2 diabetes mellitus ([address] )  -     cephalexin (KEFLEX) 500 mg capsule; Take 1 capsule (500 mg total) by mouth every 6 (six) hours for 7 days    Blister of fourth toe  -     cephalexin (KEFLEX) 500 mg capsule; Take 1 capsule (500 mg total) by mouth every 6 (six) hours for 7 days    Cellulitis of fourth toe of right foot  -     cephalexin (KEFLEX) 500 mg capsule; Take 1 capsule (500 mg total) by mouth every 6 (six) hours for 7 days    Other orders  -     Misc Natural Products (Tart Cherry Advanced) CAPS  -     betamethasone dipropionate (DIPROSONE) 0 05 % ointment      Diagnosis and options discussed with patient  Patient agreeable to the plan as stated below     patient has mild infection from blister on his right 4th toe  The blister was cleaned and drained  Thankfully the dermis appears intact there is some cellulitis  Would recommend a short oral antibiotic  Local wound care instructions given to patient  Check in 1-2 weeks  Dosing and side effects of antibiotic discussed with patient  He is at high risk for infection given his diabetes and neuropathy  He should refrain from exercise for the next 2 weeks and wear open-toed shoes to protect the toe  Subjective:      Patient ID: Keshav Mendoza is a 66 y o  male  Patient delevoped redness and blistering to his right 4th toe  It began a few days ago, He thought it may be athletes foot and has been putting antifungal cream on it  HE denies drainage  It is tender but also states he has neuropathy so it's hard to tell         The following portions of the patient's history were reviewed and updated as appropriate:   He  has a past medical history of Acute systolic heart failure ([facility] [address] ) (2/23/2021), Athlete's foot, Bilateral impacted cerumen (3/9/2021), Diabetes mellitus ([address] ), Other hydrocele (6/11/2020), and TMJ (dislocation of temporomandibular joint)  He   Patient Active Problem List    Diagnosis Date Noted    Medicare annual wellness visit, subsequent 11/07/2021    IBS (irritable bowel syndrome) 10/27/2021    Stenosis of right subcl[facility] artery ([facility] ) 04/30/2021    Benign prostatic hyperplasia with urinary frequency 02/25/2021    Postoperative atrial fibrillation ([facility] ) 02/23/2021    Anus irritation 01/11/2021    Mixed hyperlipidemia 11/23/2020    S/P AVR 11/02/2020    PVC (premature ventricular contraction) 11/02/2020    Bilateral carotid artery stenosis 11/02/2020    Hypertriglyceridemia 10/18/2019    S/P CABG (coronary artery bypass graft) 08/01/2019    Coronary artery disease of native artery of native heart with stable angina pectoris ([place] [address] ) 07/27/2019    Aortic stenosis 07/26/2019    DM (diabetes mellitus), type 2 ([place] [address] ) 07/26/2019    Essential hypertension 07/26/2019    Hydrocele in adult 05/03/2015     He  has a past surgical history that includes Tonsillectomy; Cardiac surgery; Eye surgery (03/2020); Eye surgery (Left, 12/03/2020); Coronary artery bypass graft; Cardiac valve replacement; Colonoscopy; and Cataract extraction  His family history includes Cancer in his mother; Heart disease in his father and mother; Hypertension in his father  He  reports that he has never smoked  He has never used smokeless tobacco  He reports current alcohol use  He reports that he does not use drugs    Current Outpatient Medications   Medication Sig Dispense Refill    acetaminophen (TYLENOL) 500 mg tablet Take 500 mg by mouth daily at bedtime      ascorbic acid (VITAMIN C) 250 mg tablet Take 500 mg by mouth 2 (two) times a day      aspirin 81 mg chewable tablet Chew 81 mg daily      betamethasone dipropionate (DIPROSONE) 0 05 % ointment       finasteride (PROSCAR) 5 mg tablet Take 1 tablet (5 mg total) by mouth daily 90 tablet 3    Norma, Zingiber officinalis, (Norma Root) 500 MG CAPS       hydrocortisone (ANUSOL-HC) 2 5 % rectal cream Apply topically 2 (two) times a day 30 g 2    Jardiance 25 MG TABS Take 1 tablet (25 mg total) by mouth daily 90 tablet 3    metFORMIN (GLUCOPHAGE) 500 mg tablet TAKE TWO TABLETS BY MOUTH TWICE A  tablet 1    metoprolol succinate (TOPROL-XL) 50 mg 24 hr tablet Take 1 tablet (50 mg total) by mouth daily 90 tablet 3    Misc Natural Products (Tart Cherry Advanced) CAPS       niacin 500 mg tablet Take 500 mg by mouth 4 (four) times a day      NON FORMULARY        Omega-3 Fatty Acids (fish oil) 1,000 mg       PROBIOTIC PRODUCT PO Take 111 mg by mouth daily      rosuvastatin (CRESTOR) 10 MG tablet Take 1 tablet (10 mg total) by mouth daily 90 tablet 3    sitaGLIPtin (JANUVIA) 100 mg tablet Take 1 tablet (100 mg total) by mouth daily 90 tablet 3    tamsulosin (FLOMAX) 0 4 mg TAKE ONE CAPSULE BY MOUTH EVERY DAY WITH DINNER 30 capsule 6    Turmeric 500 MG CAPS       cephalexin (KEFLEX) 500 mg capsule Take 1 capsule (500 mg total) by mouth every 6 (six) hours for 7 days 28 capsule 0    clobetasol (TEMOVATE) 0 05 % cream daily       No current facility-administered medications for this visit       Current Outpatient Medications on File Prior to Visit   Medication Sig    acetaminophen (TYLENOL) 500 mg tablet Take 500 mg by mouth daily at bedtime    ascorbic acid (VITAMIN C) 250 mg tablet Take 500 mg by mouth 2 (two) times a day    aspirin 81 mg chewable tablet Chew 81 mg daily    betamethasone dipropionate (DIPROSONE) 0 05 % ointment     finasteride (PROSCAR) 5 mg tablet Take 1 tablet (5 mg total) by mouth daily    Norma, Zingiber officinalis, (Norma Root) 500 MG CAPS     hydrocortisone (ANUSOL-HC) 2 5 % rectal cream Apply topically 2 (two) times a day    Jardiance 25 MG TABS Take 1 tablet (25 mg total) by mouth daily    metFORMIN (GLUCOPHAGE) 500 mg tablet TAKE TWO TABLETS BY MOUTH TWICE A DAY    metoprolol succinate (TOPROL-XL) 50 mg 24 hr tablet Take 1 tablet (50 mg total) by mouth daily    Misc Natural Products (Tart Cherry Advanced) CAPS     niacin 500 mg tablet Take 500 mg by mouth 4 (four) times a day    NON FORMULARY      Omega-3 Fatty Acids (fish oil) 1,000 mg     PROBIOTIC PRODUCT PO Take 111 mg by mouth daily    rosuvastatin (CRESTOR) 10 MG tablet Take 1 tablet (10 mg total) by mouth daily    sitaGLIPtin (JANUVIA) 100 mg tablet Take 1 tablet (100 mg total) by mouth daily    tamsulosin (FLOMAX) 0 4 mg TAKE ONE CAPSULE BY MOUTH EVERY DAY WITH DINNER    Turmeric 500 MG CAPS     clobetasol (TEMOVATE) 0 05 % cream daily     No current facility-administered medications on file prior to visit  He has No Known Allergies       Review of Systems   Constitutional: Negative  Gastrointestinal: Negative for diarrhea, nausea and vomiting  Musculoskeletal: Positive for joint swelling  Negative for arthralgias  Skin: Positive for color change and wound  Neurological: Positive for numbness  Negative for weakness  Objective:      /70   Pulse 67   Ht 6' 3" (1 905 m) Comment: verbal  Wt 93 9 kg (207 lb)   BMI 25 87 kg/m²          Physical Exam  Vitals reviewed  Constitutional:       General: He is not in acute distress  Appearance: He is normal weight  He is not toxic-appearing  Cardiovascular:      Rate and Rhythm: Normal rate  Pulses: Normal pulses  Dorsalis pedis pulses are 2+ on the right side and 2+ on the left side  Posterior tibial pulses are 2+ on the right side and 2+ on the left side  Pulmonary:      Effort: Pulmonary effort is normal  No respiratory distress  Musculoskeletal:      Right foot: Normal range of motion  Deformity ( hammertoe deformity lesser digits bilaterally  adductovarus rotation of the 4th toe on the right) present  Left foot: Normal range of motion  Deformity present  Feet:      Right foot:      Protective Sensation: 10 sites tested  7 sites sensed        Skin integrity: Blister ( serial purulent blister right 4th digit  Dermis appears intact  No deeper sign of infection such as exposed tendon or bone  Mild periwound erythema), skin breakdown and erythema present  Toenail Condition: Right toenails are abnormally thick  Fungal disease present  Left foot:      Protective Sensation: 10 sites tested  7 sites sensed  Skin integrity: Dry skin present  No ulcer or skin breakdown  Toenail Condition: Left toenails are abnormally thick  Fungal disease present  Skin:     Findings: Erythema present  Neurological:      Mental Status: He is alert and oriented to person, place, and time  Sensory: Sensory deficit present  Motor: No weakness        Gait: Gait normal    Psychiatric:         Mood and Affect: Mood normal

## 2022-03-10 ENCOUNTER — OFFICE VISIT (OUTPATIENT)
Dept: ENDOCRINOLOGY | Facility: CLINIC | Age: 72
End: 2022-03-10
Payer: MEDICARE

## 2022-03-10 VITALS
WEIGHT: 205 LBS | DIASTOLIC BLOOD PRESSURE: 64 MMHG | HEART RATE: 72 BPM | HEIGHT: 75 IN | SYSTOLIC BLOOD PRESSURE: 106 MMHG | BODY MASS INDEX: 25.49 KG/M2 | TEMPERATURE: 96.7 F

## 2022-03-10 DIAGNOSIS — E11.65 TYPE 2 DIABETES MELLITUS WITH HYPERGLYCEMIA, WITHOUT LONG-TERM CURRENT USE OF INSULIN (HCC): Primary | ICD-10-CM

## 2022-03-10 DIAGNOSIS — E78.2 MIXED HYPERLIPIDEMIA: ICD-10-CM

## 2022-03-10 DIAGNOSIS — I10 ESSENTIAL HYPERTENSION: ICD-10-CM

## 2022-03-10 PROCEDURE — 99214 OFFICE O/P EST MOD 30 MIN: CPT | Performed by: PHYSICIAN ASSISTANT

## 2022-03-10 NOTE — PATIENT INSTRUCTIONS
Hypoglycemia instructions   [facility]  3/10/2022  [id number]    Low Blood Sugar    Steps to treat low blood sugar  1  Test blood sugar if you have symptoms of low blood sugar:   Low Blood Sugar Symptoms:  o Sweaty  o Dizzy  o Rapid heartbeat  o Shaky  o Bad mood  o Hungry      2  Treat blood sugar less than 70 with 15 grams of fast-acting carbohydrate:   Examples of 15 grams Fast-Acting Carbohydrate:  o 4 oz juice  o 4 oz regular soda  o 3-4 glucose tablets (chew)  o 3-4 hard candies (chew)          3  Wait 15 minutes and test your blood sugar again     4   If blood sugar is less than 100, repeat steps 2-3     5  When your blood sugar is 100 or more, eat a snack if it will be longer than one hour until your next meal  The snack should be 15 grams of carbohydrate and a protein:   Examples of snacks:  o ½ sandwich  o 6 crackers with cheese  o Piece of fruit with cheese or peanut butter  o 6 crackers with peanut butter

## 2022-03-10 NOTE — PROGRESS NOTES
Patient Progress Note      CC: DM      Referring Provider  No referring provider defined for this encounter  History of Present Illness:   Gissel Parish is a 66 y o  male with a history of type 2 diabetes without long term use of insulin  Diabetes course has been stable  Complications of DM: CAD  Denies recent illness or hospitalizations  Denies recent severe hypoglycemic or severe hyperglycemic episodes  Denies any issues with his current regimen  Home glucose monitoring: are performed sporadically     Home blood glucose readings: 131-191 mg/dl     Current regimen: metformin 1000 mg BID, Januvia 100 mg daily, Jardiance 25 mg daily  compliant most of the time, denies any side effects from medications  Hypoglycemic episodes: No, never  H/o of hypoglycemia causing hospitalization or Intervention such as glucagon injection or ambulance call :  No  Hypoglycemia symptoms: never   Treatment of hypoglycemia: discussed treatment     Medic alert tag: recommended: Yes     Diabetes education: Yes  Diet: 3 meals per day, 1-2 snacks per day  Timing of meals is predictable  Diabetic diet compliance:  compliant some of the time  Activity: Daily activity is predictable: Yes  He tries to exercise once a day  Ophthamology: Feb 2022  Podiatry: foot exam UTD, August 2021     Not on ACE inhibitor/ARB  Hyperlipidemia: on statin - tolerating well, no myalgias  compliant most of the time, denies any side effects from medications    Thyroid disorders: No  History of pancreatitis: No    Patient Active Problem List   Diagnosis    Aortic stenosis    Coronary artery disease of native artery of native heart with stable angina pectoris ([facility] )    [facility]    Essential hypertension    Hypertriglyceridemia    S/P CABG (coronary artery bypass graft)    S/P AVR    PVC (premature ventricular contraction)    Bilateral carotid artery stenosis    Mixed hyperlipidemia    Anus irritation    Postoperative atrial fibrillation (HCC)    Benign prostatic hyperplasia with urinary frequency    Stenosis of right subclavian artery (HCC)    Hydrocele in adult    IBS (irritable bowel syndrome)    Medicare annual wellness visit, subsequent      Past Medical History:   Diagnosis Date    Acute systolic heart failure [facility] ) 2/23/2021    Athlete's foot     Bilateral impacted cerumen 3/9/2021    Diabetes mellitus ([facility] )     Other hydrocele 6/11/2020    TMJ (dislocation of temporomandibular joint)       Past Surgical History:   Procedure Laterality Date    CARDIAC SURGERY      CARDIAC VALVE REPLACEMENT      CATARACT EXTRACTION      COLONOSCOPY      CORONARY ARTERY BYPASS GRAFT      EYE SURGERY  03/2020    Cataract right eye    EYE SURGERY Left 12/03/2020    left eye cataract     TONSILLECTOMY        Family History   Problem Relation Age of Onset    Heart disease Mother     Cancer Mother     Heart disease Father     Hypertension Father     Colon cancer Neg Hx      Social History     Tobacco Use    Smoking status: Never Smoker    Smokeless tobacco: Never Used   Substance Use Topics    Alcohol use: Yes     Comment: 1 glass of wine or 1 beer a week     No Known Allergies      Current Outpatient Medications:     acetaminophen (TYLENOL) 500 mg tablet, Take 500 mg by mouth daily at bedtime, Disp: , Rfl:     ascorbic acid (VITAMIN C) 250 mg tablet, Take 500 mg by mouth 2 (two) times a day, Disp: , Rfl:     aspirin 81 mg chewable tablet, Chew 81 mg daily, Disp: , Rfl:     betamethasone dipropionate (DIPROSONE) 0 05 % ointment, , Disp: , Rfl:     cephalexin (KEFLEX) 500 mg capsule, Take 1 capsule (500 mg total) by mouth every 6 (six) hours for 7 days, Disp: 28 capsule, Rfl: 0    finasteride (PROSCAR) 5 mg tablet, Take 1 tablet (5 mg total) by mouth daily, Disp: 90 tablet, Rfl: 3    Norma, Zingiber officinalis, (Norma Root) 500 MG CAPS, , Disp: , Rfl:     hydrocortisone (ANUSOL-HC) 2 5 % rectal cream, Apply topically 2 (two) times a day, Disp: 30 g, Rfl: 2    Jardiance 25 MG TABS, Take 1 tablet (25 mg total) by mouth daily, Disp: 90 tablet, Rfl: 3    metFORMIN (GLUCOPHAGE) 500 mg tablet, TAKE TWO TABLETS BY MOUTH TWICE A DAY, Disp: 360 tablet, Rfl: 1    metoprolol succinate (TOPROL-XL) 50 mg 24 hr tablet, Take 1 tablet (50 mg total) by mouth daily, Disp: 90 tablet, Rfl: 3    Misc Natural Products (Tart Cherry Advanced) CAPS, , Disp: , Rfl:     niacin 500 mg tablet, Take 500 mg by mouth 4 (four) times a day, Disp: , Rfl:     NON FORMULARY,  , Disp: , Rfl:     Omega-3 Fatty Acids (fish oil) 1,000 mg, , Disp: , Rfl:     PROBIOTIC PRODUCT PO, Take 111 mg by mouth daily, Disp: , Rfl:     rosuvastatin (CRESTOR) 10 MG tablet, Take 1 tablet (10 mg total) by mouth daily, Disp: 90 tablet, Rfl: 3    sitaGLIPtin (JANUVIA) 100 mg tablet, Take 1 tablet (100 mg total) by mouth daily, Disp: 90 tablet, Rfl: 3    tamsulosin (FLOMAX) 0 4 mg, TAKE ONE CAPSULE BY MOUTH EVERY DAY WITH DINNER, Disp: 30 capsule, Rfl: 6    Turmeric 500 MG CAPS, , Disp: , Rfl:     clobetasol (TEMOVATE) 0 05 % cream, daily, Disp: , Rfl:   Review of Systems   Constitutional: Negative for activity change, appetite change, fatigue and unexpected weight change  HENT: Negative for trouble swallowing  Eyes: Negative for visual disturbance  Respiratory: Negative for shortness of breath  Cardiovascular: Negative for chest pain and palpitations  Gastrointestinal: Negative for constipation and diarrhea  Endocrine: Negative for polydipsia and polyuria  Musculoskeletal: Negative  Skin: Positive for wound (right foot blister on 4th toe; managed by podiatry)  Neurological: Negative for numbness  Tingling in feet   Psychiatric/Behavioral: Negative  Physical Exam:  Body mass index is 25 62 kg/m²    /64   Pulse 72   Temp (!) 96 7 °F (35 9 °C) (Tympanic)   Ht 6' 3" (1 905 m)   Wt 93 kg (205 lb)   BMI 25 62 kg/m²    Wt Readings from Last 3 Encounters:   03/10/22 93 kg (205 lb)   03/09/22 93 9 kg (207 lb)   03/03/22 89 8 kg (198 lb)       Physical Exam  Vitals and nursing note reviewed  Constitutional:       Appearance: He is well-developed  HENT:      Head: Normocephalic  Eyes:      General: No scleral icterus  Pupils: Pupils are equal, round, and reactive to light  Neck:      Thyroid: No thyromegaly  Cardiovascular:      Rate and Rhythm: Normal rate and regular rhythm  Pulses:           Radial pulses are 2+ on the right side and 2+ on the left side  Heart sounds: No murmur heard  Pulmonary:      Effort: Pulmonary effort is normal  No respiratory distress  Breath sounds: Normal breath sounds  No wheezing  Musculoskeletal:      Cervical back: Neck supple  Skin:     General: Skin is warm and dry  Neurological:      Mental Status: He is alert  Patient's shoes and socks were not removed  Labs:   Component      Latest Ref Rng & Units 8/12/2021 12/1/2021 3/1/2022   Sodium      136 - 145 mmol/L  139 140   Potassium      3 5 - 5 3 mmol/L  4 2 4 1   Chloride      100 - 108 mmol/L  109 (H) 108   CO2      21 - 32 mmol/L  26 28   Anion Gap      4 - 13 mmol/L  4 4   BUN      5 - 25 mg/dL  27 (H) 25   Creatinine      0 60 - 1 30 mg/dL  1 13 1 21   GLUCOSE FASTING      65 - 99 mg/dL  156 (H) 200 (H)   Calcium      8 3 - 10 1 mg/dL  9 2 9 7   eGFR      ml/min/1 73sq m  64 59   EXT Creatinine Urine      mg/dL  46 0    MICROALBUM ,U,RANDOM      0 0 - 20 0 mg/L  5 0    MICROALBUMIN/CREATININE RATIO      0 - 30 mg/g creatinine  11    Hemoglobin A1C      Normal 3 8-5 6%; PreDiabetic 5 7-6 4%;  Diabetic >=6 5%; Glycemic control for adults with diabetes <7 0% % 7 4 (H) 7 6 (H) 7 1 (H)   eAG, EST AVG Glucose      mg/dl 166 171 157   ISLET CELL ANTIBODY      Neg:<1:1 Negative         Plan:    Diagnoses and all orders for this visit:    Type 2 diabetes mellitus with hyperglycemia, without long-term current use of insulin (HCC)  HGA1C 7 1%  Improved  Treatment regimen: continue current treatment   Episodes of hypoglycemia can lead to permanent disability and death  Discussed risks/complications associated with uncontrolled diabetes  Advised to adhere to diabetic diet, and recommended staying active/exercising routinely as tolerated  Keep carbohydrates consistent to limit blood glucose fluctuations  Advised to call if blood sugars less than 70 mg/dl or over 300 mg/dl  Check blood glucose 1-2 times a day  Discussed symptoms and treatment of hypoglycemia  Recommended routine follow-up with podiatry and ophthalmology  Send log in 2 weeks  Ordered blood work to complete prior to next visit  -     Hemoglobin A1C; Future  -     Basic metabolic panel; Future  -     Microalbumin / creatinine urine ratio; Future    Essential hypertension  Blood pressure well controlled, continue current treatment   -     Basic metabolic panel; Future    Mixed hyperlipidemia  LDL previously 39  Continue statin therapy  Managed by cardiology        Discussed with the patient diagnosis and treatment and all questions fully answered  He will call me if any problems arise  Counseled patient on diagnostic results, prognosis, risk and benefit of treatment options, instruction for management, importance of treatment compliance, risk factor reduction and impressions        Quin Gonzáles PA-C

## 2022-03-15 ENCOUNTER — OFFICE VISIT (OUTPATIENT)
Dept: FAMILY MEDICINE CLINIC | Facility: CLINIC | Age: 72
End: 2022-03-15
Payer: MEDICARE

## 2022-03-15 VITALS
HEIGHT: 75 IN | OXYGEN SATURATION: 96 % | BODY MASS INDEX: 25.64 KG/M2 | WEIGHT: 206.2 LBS | HEART RATE: 66 BPM | TEMPERATURE: 95.7 F | DIASTOLIC BLOOD PRESSURE: 68 MMHG | RESPIRATION RATE: 16 BRPM | SYSTOLIC BLOOD PRESSURE: 116 MMHG

## 2022-03-15 DIAGNOSIS — N43.3 HYDROCELE IN ADULT: ICD-10-CM

## 2022-03-15 DIAGNOSIS — I48.91 POSTOPERATIVE ATRIAL FIBRILLATION (HCC): ICD-10-CM

## 2022-03-15 DIAGNOSIS — I35.0 NONRHEUMATIC AORTIC VALVE STENOSIS: ICD-10-CM

## 2022-03-15 DIAGNOSIS — Z01.818 PREOP EXAMINATION: Primary | ICD-10-CM

## 2022-03-15 DIAGNOSIS — E78.2 MIXED HYPERLIPIDEMIA: ICD-10-CM

## 2022-03-15 DIAGNOSIS — E78.1 HYPERTRIGLYCERIDEMIA: ICD-10-CM

## 2022-03-15 DIAGNOSIS — N40.1 BENIGN PROSTATIC HYPERPLASIA WITH URINARY FREQUENCY: ICD-10-CM

## 2022-03-15 DIAGNOSIS — I65.23 BILATERAL CAROTID ARTERY STENOSIS: ICD-10-CM

## 2022-03-15 DIAGNOSIS — I97.89 POSTOPERATIVE ATRIAL FIBRILLATION (HCC): ICD-10-CM

## 2022-03-15 DIAGNOSIS — I25.118 CORONARY ARTERY DISEASE OF NATIVE ARTERY OF NATIVE HEART WITH STABLE ANGINA PECTORIS (HCC): ICD-10-CM

## 2022-03-15 DIAGNOSIS — E11.65 TYPE 2 DIABETES MELLITUS WITH HYPERGLYCEMIA, WITHOUT LONG-TERM CURRENT USE OF INSULIN (HCC): ICD-10-CM

## 2022-03-15 DIAGNOSIS — I10 ESSENTIAL HYPERTENSION: ICD-10-CM

## 2022-03-15 DIAGNOSIS — R35.0 BENIGN PROSTATIC HYPERPLASIA WITH URINARY FREQUENCY: ICD-10-CM

## 2022-03-15 PROCEDURE — 99214 OFFICE O/P EST MOD 30 MIN: CPT | Performed by: NURSE PRACTITIONER

## 2022-03-15 NOTE — PROGRESS NOTES
Subjective:     Willy Askew is a 66 y o  male who presents to the office today for a preoperative consultation at the request of surgeon dr Theodora Mcneal who plans on performing hydrocelectomy on April 8  This consultation is requested for the specific conditions prompting preoperative evaluation (i e  because of potential affect on operative risk): cad  Planned anesthesia: general  The patient has the following known anesthesia issues: none  Patients bleeding risk: no recent abnormal bleeding  Patient does not have objections to receiving blood products if needed      The following portions of the patient's history were reviewed and updated as appropriate: allergies, current medications, past family history, past medical history, past social history, past surgical history and problem list     Had colonoscopy few weeks ago, had couple polyps, 5 year recall  Right ankle still swells, comes and goes  Depends on the amount of water he drinks  To see podiatry for right foot blister  Continues to follow with him  Scar on chest has improved a little but it not pressing on it as much as showed him  Left thumb/wrist dx with arthritis by ortho  Taking tumeric and hansel      Review of Systems  Constitutional: negative  Eyes: negative  Ears, nose, mouth, throat, and face: negative  Respiratory: negative  Cardiovascular: negative  Gastrointestinal: negative  Genitourinary:positive for hydrocele  Integument/breast: negative  Hematologic/lymphatic: negative  Musculoskeletal:negative  Neurological: negative  Behavioral/Psych: negative  Endocrine: negative  Allergic/Immunologic: negative     Family History   Problem Relation Age of Onset    Heart disease Mother     Cancer Mother     Heart disease Father     Hypertension Father     Colon cancer Neg Hx      Past Medical History:   Diagnosis Date    Acute systolic heart failure ([facility] [address] ) 2/23/2021    Athlete's foot     Bilateral impacted cerumen 3/9/2021    Diabetes mellitus ([facility] )     Other hydrocele 6/11/2020    TMJ (dislocation of temporomandibular joint)      Social History     Socioeconomic History    Marital status: /Civil Union     Spouse name: Not on file    Number of children: Not on file    Years of education: Not on file    Highest education level: Not on file   Occupational History    Not on file   Tobacco Use    Smoking status: Never Smoker    Smokeless tobacco: Never Used   Vaping Use    Vaping Use: Never used   Substance and Sexual Activity    Alcohol use: Yes     Comment: 1 glass of wine or 1 beer a week    Drug use: Never    Sexual activity: Not on file   Other Topics Concern    Not on file   Social History Narrative    Not on file     Social Determinants of Health     Financial Resource Strain: Not on file   Food Insecurity: Not on file   Transportation Needs: Not on file   Physical Activity: Not on file   Stress: Not on file   Social Connections: Not on file   Intimate Partner Violence: Not on file   Housing Stability: Not on file       Current Outpatient Medications:     acetaminophen (TYLENOL) 500 mg tablet, Take 500 mg by mouth daily at bedtime, Disp: , Rfl:     ascorbic acid (VITAMIN C) 250 mg tablet, Take 500 mg by mouth 2 (two) times a day, Disp: , Rfl:     aspirin 81 mg chewable tablet, Chew 81 mg daily, Disp: , Rfl:     betamethasone dipropionate (DIPROSONE) 0 05 % ointment, , Disp: , Rfl:     cephalexin (KEFLEX) 500 mg capsule, Take 1 capsule (500 mg total) by mouth every 6 (six) hours for 7 days, Disp: 28 capsule, Rfl: 0    finasteride (PROSCAR) 5 mg tablet, Take 1 tablet (5 mg total) by mouth daily, Disp: 90 tablet, Rfl: 3    Norma, Zingiber officinalis, (Norma Root) 500 MG CAPS, , Disp: , Rfl:     hydrocortisone (ANUSOL-HC) 2 5 % rectal cream, Apply topically 2 (two) times a day, Disp: 30 g, Rfl: 2    Jardiance 25 MG TABS, Take 1 tablet (25 mg total) by mouth daily, Disp: 90 tablet, Rfl: 3    metFORMIN (GLUCOPHAGE) 500 mg tablet, TAKE TWO TABLETS BY MOUTH TWICE A DAY, Disp: 360 tablet, Rfl: 1    metoprolol succinate (TOPROL-XL) 50 mg 24 hr tablet, Take 1 tablet (50 mg total) by mouth daily, Disp: 90 tablet, Rfl: 3    Misc Natural Products (Tart Cherry Advanced) CAPS, , Disp: , Rfl:     niacin 500 mg tablet, Take 500 mg by mouth 4 (four) times a day, Disp: , Rfl:     Omega-3 Fatty Acids (fish oil) 1,000 mg, , Disp: , Rfl:     PROBIOTIC PRODUCT PO, Take 111 mg by mouth daily, Disp: , Rfl:     rosuvastatin (CRESTOR) 10 MG tablet, Take 1 tablet (10 mg total) by mouth daily, Disp: 90 tablet, Rfl: 3    sitaGLIPtin (JANUVIA) 100 mg tablet, Take 1 tablet (100 mg total) by mouth daily, Disp: 90 tablet, Rfl: 3    tamsulosin (FLOMAX) 0 4 mg, TAKE ONE CAPSULE BY MOUTH EVERY DAY WITH DINNER, Disp: 30 capsule, Rfl: 6    Turmeric 500 MG CAPS, , Disp: , Rfl:     NON FORMULARY,   (Patient not taking: Reported on 3/15/2022 ), Disp: , Rfl:   No Known Allergies      Objective:     /68   Pulse 66   Temp (!) 95 7 °F (35 4 °C)   Resp 16   Ht 6' 3" (1 905 m)   Wt 93 5 kg (206 lb 3 2 oz)   SpO2 96%   BMI 25 77 kg/m²     General Appearance:    Alert, cooperative, no distress, appears stated age   Head:    Normocephalic, without obvious abnormality, atraumatic   Eyes:    PERRL, conjunctiva/corneas clear, EOM's intact, fundi     benign, both eyes        Ears:    Normal TM's and external ear canals, both ears   Nose:   Nares normal, septum midline, mucosa normal, no drainage    or sinus tenderness   Throat:   Lips, mucosa, and tongue normal; teeth and gums normal   Neck:   Supple, symmetrical, trachea midline, no adenopathy;        thyroid:  No enlargement/tenderness/nodules; no carotid    bruit or JVD   Back:     Symmetric, no curvature, ROM normal, no CVA tenderness   Lungs:     Clear to auscultation bilaterally, respirations unlabored   Chest wall:    No tenderness or deformity   Heart:    Regular rate and rhythm, S1 and S2 normal, no murmur, rub   or gallop   Abdomen:     Soft, non-tender, bowel sounds active all four quadrants,     no masses, no organomegaly   Genitalia:    Normal male without lesion, discharge or tenderness   Rectal:    Normal tone, normal prostate, no masses or tenderness;    guaiac negative stool   Extremities:   Extremities normal, atraumatic, no cyanosis or edema   Pulses:   2+ and symmetric all extremities   Skin:   Skin color, texture, turgor normal, no rashes or lesions   Lymph nodes:   Cervical, supraclavicular, and axillary nodes normal   Neurologic:   CNII-XII intact  Normal strength, sensation and reflexes       throughout       Predictors of intubation difficulty:   Morbid obesity? no   Anatomically abnormal facies? no   Prominent incisors? no   Receding mandible? no   Short, thick neck? no   Neck range of motion: normal   Mallampati score: I (soft palate, uvula, fauces, and tonsillar pillars visible)   Thyromental distance: < 6cm   Mouth openincm   Dentition: No chipped, loose, or missing teeth  Cardiographics  ECG: no change since previous ECG dated     Echocardiogram: not done    Imaging  Chest x-ray: not performed       Lab Review   Office Visit on 2022   Component Date Value    Ventricular Rate 2022 59     Atrial Rate 2022 59     NV Interval 2022 196     QRSD Interval 2022 140     QT Interval 2022 488     QTC Interval 2022 483     P Axis 2022 80     QRS Axis 2022 -64     T [place] 2022 [address] [facility] Outpatient Visit on 2022   Component Date Value    POC Glucose 2022 121     Case Report 2022                      Value:Surgical Pathology Report                         Case: [id number]                                   Authorizing Provider:  Dimas Bourne MD            Collected:           2022 0828              Ordering Location:     [facility]        Received:            2022 [facility]                                                    Pathologist:           Germania Funes MD                                                           Specimen:    Polyp, Colorectal, sigmoid polyp, cold snare                                               Final Diagnosis 03/03/2022                      Value: This result contains rich text formatting which cannot be displayed here   Additional Information 03/03/2022                      Value: This result contains rich text formatting which cannot be displayed here   Synoptic Checklist 03/03/2022                      Value:                            COLON/RECTUM POLYP FORM - GI - A                                                                                     :    Adenoma(s)      Gross Description 03/03/2022                      Value: This result contains rich text formatting which cannot be displayed here  Lab on 03/01/2022   Component Date Value    Hemoglobin A1C 03/01/2022 7 1*    EAG 03/01/2022 157     Sodium 03/01/2022 140     Potassium 03/01/2022 4 1     Chloride 03/01/2022 108     CO2 03/01/2022 28     ANION GAP 03/01/2022 4     BUN 03/01/2022 25     Creatinine 03/01/2022 1 21     Glucose, Fasting 03/01/2022 200*    Calcium 03/01/2022 9 7     eGFR 03/01/2022 59       BMI Counseling: Body mass index is 25 77 kg/m²  The BMI is above normal  Nutrition recommendations include reducing portion sizes, decreasing overall calorie intake, 3-5 servings of fruits/vegetables daily, reducing fast food intake, consuming healthier snacks, decreasing soda and/or juice intake, moderation in carbohydrate intake, increasing intake of lean protein, reducing intake of saturated fat and trans fat and reducing intake of cholesterol  Exercise recommendations include vigorous aerobic physical activity for 75 minutes/week, exercising 3-5 times per week and strength training exercises    Assessment:     66 y o  male with planned surgery as above  Known risk factors for perioperative complications: None    Difficulty with intubation is not anticipated  Manda Rushing was seen today for pre-op exam     Diagnoses and all orders for this visit:    Preop examination    Hydrocele in adult    Type 2 diabetes mellitus with hyperglycemia, without long-term current use of insulin (HCC)    Postoperative atrial fibrillation ([facility] [address] )    Essential hypertension    Coronary artery disease of native artery of native heart with stable angina pectoris ([facility] [address] )    Bilateral carotid artery stenosis    Nonrheumatic aortic valve stenosis    Mixed hyperlipidemia    Hypertriglyceridemia    Benign prostatic hyperplasia with urinary frequency      Cardiac Risk Estimation: low    Current medications which may produce withdrawal symptoms if withheld perioperatively: none      Plan:  Medically cleared for surgery     1  Preoperative workup as follows ECG  2  Change in medication regimen before surgery: none, continue medication regimen including morning of surgery, with sip of water  3  Prophylaxis for cardiac events with perioperative beta-blockers: not indicated  4  Invasive hemodynamic monitoring perioperatively: not indicated    5  Deep vein thrombosis prophylaxis postoperatively:regimen to be chosen by surgical team   6  Surveillance for postoperative MI with ECG immediately postoperatively and on postoperative days 1 and 2 AND troponin levels 24 hours postoperatively and on day 4 or hospital discharge (whichever comes first): at the discretion of anesthesiologist   7  Other measures: none

## 2022-03-23 ENCOUNTER — OFFICE VISIT (OUTPATIENT)
Dept: PODIATRY | Facility: CLINIC | Age: 72
End: 2022-03-23
Payer: MEDICARE

## 2022-03-23 VITALS
SYSTOLIC BLOOD PRESSURE: 171 MMHG | HEIGHT: 75 IN | WEIGHT: 211 LBS | DIASTOLIC BLOOD PRESSURE: 88 MMHG | BODY MASS INDEX: 26.24 KG/M2 | HEART RATE: 67 BPM

## 2022-03-23 DIAGNOSIS — S90.426A BLISTER OF FOURTH TOE: Primary | ICD-10-CM

## 2022-03-23 PROCEDURE — 99212 OFFICE O/P EST SF 10 MIN: CPT | Performed by: PODIATRIST

## 2022-03-23 NOTE — PROGRESS NOTES
Assessment/Plan:      Diagnoses and all orders for this visit:    Blister of fourth toe      wound to the tip of the 4th toe is resolved  Resume at-risk foot care  Subjective:     Patient ID: Kristi Perez is a 66 y o  male  Patient was seen 2 weeks ago with a diabetic ulcer to the tip of his 4th toe  He has been doing local wound care  He states within a few days and it resolved  Review of Systems      Objective:     Physical Exam  Vitals reviewed  Constitutional:       Appearance: He is not ill-appearing or diaphoretic  Cardiovascular:      Pulses:           Dorsalis pedis pulses are 2+ on the right side  Posterior tibial pulses are 1+ on the right side  Musculoskeletal:        Feet:    Neurological:      Mental Status: He is alert

## 2022-03-31 ENCOUNTER — ANESTHESIA EVENT (OUTPATIENT)
Dept: PERIOP | Facility: AMBULARY SURGERY CENTER | Age: 72
End: 2022-03-31
Payer: MEDICARE

## 2022-04-05 DIAGNOSIS — E11.9 TYPE 2 DIABETES MELLITUS WITHOUT COMPLICATION, WITHOUT LONG-TERM CURRENT USE OF INSULIN (HCC): ICD-10-CM

## 2022-04-06 ENCOUNTER — TELEPHONE (OUTPATIENT)
Dept: OTHER | Facility: OTHER | Age: 72
End: 2022-04-06

## 2022-04-06 RX ORDER — CHOLECALCIFEROL (VITAMIN D3) 25 MCG
CAPSULE ORAL
COMMUNITY
End: 2022-04-22 | Stop reason: ALTCHOICE

## 2022-04-06 NOTE — PRE-PROCEDURE INSTRUCTIONS
Pre-Surgery Instructions:   Medication Instructions    acetaminophen (TYLENOL) 500 mg tablet Instructed patient per Anesthesia Guidelines   ascorbic acid (VITAMIN C) 250 mg tablet Instructed patient per Anesthesia Guidelines   aspirin 81 mg chewable tablet Patient was instructed by Physician and understands   betamethasone dipropionate (DIPROSONE) 0 05 % ointment Instructed patient per Anesthesia Guidelines   Cholecalciferol (Vitamin D-3) 25 MCG (1000 UT) CAPS Instructed patient per Anesthesia Guidelines   finasteride (PROSCAR) 5 mg tablet Instructed patient per Anesthesia Guidelines   Norma, Zingiber officinalis, (Norma Root) 500 MG CAPS Instructed patient per Anesthesia Guidelines   hydrocortisone (ANUSOL-HC) 2 5 % rectal cream Instructed patient per Anesthesia Guidelines   Jardiance 25 MG TABS Instructed patient per Anesthesia Guidelines   metFORMIN (GLUCOPHAGE) 500 mg tablet Instructed patient per Anesthesia Guidelines   metoprolol succinate (TOPROL-XL) 50 mg 24 hr tablet Instructed patient per Anesthesia Guidelines   Misc Natural Products (Tart Cherry Advanced) CAPS Instructed patient per Anesthesia Guidelines   niacin 500 mg tablet Instructed patient per Anesthesia Guidelines   Omega-3 Fatty Acids (fish oil) 1,000 mg Instructed patient per Anesthesia Guidelines   PROBIOTIC PRODUCT PO Instructed patient per Anesthesia Guidelines   rosuvastatin (CRESTOR) 10 MG tablet Instructed patient per Anesthesia Guidelines   sitaGLIPtin (JANUVIA) 100 mg tablet Instructed patient per Anesthesia Guidelines   tamsulosin (FLOMAX) 0 4 mg Instructed patient per Anesthesia Guidelines   Turmeric 500 MG CAPS Instructed patient per Anesthesia Guidelines  Spoke with patient medication list reviewed  Npo after midnight  Pt has already stopped all vitamins and nsaids including aspirin 1 week  Pt will not take any meds the DOS   ( pt takes metoprolol in the evening) pt has surgical soap and understands shower instructions  No shaving 24hrs prior to surgery  Use clean sheets towels and clothes  No powder or creams  Pt denies covid symptoms has been vaccinated  Bring photo id and insurance card  No jewelry or contacts  1 adult visitor ok must be vaccinated  Pt must have ride home after surgery  [facility] [place] will call 4/7 with arrival time and directions

## 2022-04-06 NOTE — TELEPHONE ENCOUNTER
I returned pt 's phone call to inform him that he has Medicare insurance so there is never any pre auth needed for surgeries with Medicare  There was no answer when I called so I did leave this information on pt 's voicemail and asked that he call our office back if he has any other questions or concerns regarding this procedure

## 2022-04-08 ENCOUNTER — HOSPITAL ENCOUNTER (OUTPATIENT)
Facility: AMBULARY SURGERY CENTER | Age: 72
Setting detail: OUTPATIENT SURGERY
Discharge: HOME | End: 2022-04-08
Attending: UROLOGY | Admitting: UROLOGY
Payer: MEDICARE

## 2022-04-08 ENCOUNTER — ANESTHESIA (OUTPATIENT)
Dept: PERIOP | Facility: AMBULARY SURGERY CENTER | Age: 72
End: 2022-04-08
Payer: MEDICARE

## 2022-04-08 VITALS
DIASTOLIC BLOOD PRESSURE: 72 MMHG | WEIGHT: 201 LBS | OXYGEN SATURATION: 97 % | HEART RATE: 75 BPM | TEMPERATURE: 97 F | SYSTOLIC BLOOD PRESSURE: 137 MMHG | RESPIRATION RATE: 18 BRPM | BODY MASS INDEX: 24.99 KG/M2 | HEIGHT: 75 IN

## 2022-04-08 DIAGNOSIS — N43.3 HYDROCELE, UNSPECIFIED HYDROCELE TYPE: Primary | ICD-10-CM

## 2022-04-08 LAB — GLUCOSE SERPL-MCNC: 116 MG/DL (ref 65–140)

## 2022-04-08 PROCEDURE — 55040 REMOVAL OF HYDROCELE: CPT | Performed by: UROLOGY

## 2022-04-08 PROCEDURE — 88302 TISSUE EXAM BY PATHOLOGIST: CPT | Performed by: PATHOLOGY

## 2022-04-08 PROCEDURE — NC001 PR NO CHARGE: Performed by: UROLOGY

## 2022-04-08 PROCEDURE — 82948 REAGENT STRIP/BLOOD GLUCOSE: CPT

## 2022-04-08 RX ORDER — BACITRACIN, NEOMYCIN, POLYMYXIN B 400; 3.5; 5 [USP'U]/G; MG/G; [USP'U]/G
OINTMENT TOPICAL AS NEEDED
Status: DISCONTINUED | OUTPATIENT
Start: 2022-04-08 | End: 2022-04-08 | Stop reason: HOSPADM

## 2022-04-08 RX ORDER — LIDOCAINE HYDROCHLORIDE 20 MG/ML
INJECTION, SOLUTION EPIDURAL; INFILTRATION; INTRACAUDAL; PERINEURAL AS NEEDED
Status: DISCONTINUED | OUTPATIENT
Start: 2022-04-08 | End: 2022-04-08

## 2022-04-08 RX ORDER — ONDANSETRON 2 MG/ML
4 INJECTION INTRAMUSCULAR; INTRAVENOUS ONCE AS NEEDED
Status: DISCONTINUED | OUTPATIENT
Start: 2022-04-08 | End: 2022-04-08 | Stop reason: HOSPADM

## 2022-04-08 RX ORDER — SODIUM CHLORIDE, SODIUM LACTATE, POTASSIUM CHLORIDE, CALCIUM CHLORIDE 600; 310; 30; 20 MG/100ML; MG/100ML; MG/100ML; MG/100ML
INJECTION, SOLUTION INTRAVENOUS CONTINUOUS PRN
Status: DISCONTINUED | OUTPATIENT
Start: 2022-04-08 | End: 2022-04-08

## 2022-04-08 RX ORDER — SODIUM CHLORIDE, SODIUM LACTATE, POTASSIUM CHLORIDE, CALCIUM CHLORIDE 600; 310; 30; 20 MG/100ML; MG/100ML; MG/100ML; MG/100ML
125 INJECTION, SOLUTION INTRAVENOUS CONTINUOUS
Status: DISCONTINUED | OUTPATIENT
Start: 2022-04-08 | End: 2022-04-08 | Stop reason: HOSPADM

## 2022-04-08 RX ORDER — OXYCODONE HYDROCHLORIDE 5 MG/1
5 TABLET ORAL EVERY 4 HOURS PRN
Qty: 5 TABLET | Refills: 0 | Status: SHIPPED | OUTPATIENT
Start: 2022-04-08 | End: 2022-04-13

## 2022-04-08 RX ORDER — HYDROMORPHONE HCL/PF 1 MG/ML
0.25 SYRINGE (ML) INJECTION
Status: DISCONTINUED | OUTPATIENT
Start: 2022-04-08 | End: 2022-04-08 | Stop reason: HOSPADM

## 2022-04-08 RX ORDER — OXYCODONE HYDROCHLORIDE 5 MG/1
5 TABLET ORAL EVERY 4 HOURS PRN
Status: DISCONTINUED | OUTPATIENT
Start: 2022-04-08 | End: 2022-04-08 | Stop reason: HOSPADM

## 2022-04-08 RX ORDER — ACETAMINOPHEN 325 MG/1
650 TABLET ORAL EVERY 4 HOURS PRN
Qty: 30 TABLET | Refills: 0
Start: 2022-04-08 | End: 2022-04-22 | Stop reason: ALTCHOICE

## 2022-04-08 RX ORDER — FENTANYL CITRATE 50 UG/ML
INJECTION, SOLUTION INTRAMUSCULAR; INTRAVENOUS AS NEEDED
Status: DISCONTINUED | OUTPATIENT
Start: 2022-04-08 | End: 2022-04-08

## 2022-04-08 RX ORDER — CEFAZOLIN SODIUM 2 G/50ML
2000 SOLUTION INTRAVENOUS ONCE
Status: COMPLETED | OUTPATIENT
Start: 2022-04-08 | End: 2022-04-08

## 2022-04-08 RX ORDER — DEXAMETHASONE SODIUM PHOSPHATE 10 MG/ML
INJECTION, SOLUTION INTRAMUSCULAR; INTRAVENOUS AS NEEDED
Status: DISCONTINUED | OUTPATIENT
Start: 2022-04-08 | End: 2022-04-08

## 2022-04-08 RX ORDER — ONDANSETRON 2 MG/ML
INJECTION INTRAMUSCULAR; INTRAVENOUS AS NEEDED
Status: DISCONTINUED | OUTPATIENT
Start: 2022-04-08 | End: 2022-04-08

## 2022-04-08 RX ORDER — FENTANYL CITRATE/PF 50 MCG/ML
25 SYRINGE (ML) INJECTION
Status: DISCONTINUED | OUTPATIENT
Start: 2022-04-08 | End: 2022-04-08 | Stop reason: HOSPADM

## 2022-04-08 RX ORDER — PROPOFOL 10 MG/ML
INJECTION, EMULSION INTRAVENOUS AS NEEDED
Status: DISCONTINUED | OUTPATIENT
Start: 2022-04-08 | End: 2022-04-08

## 2022-04-08 RX ORDER — MIDAZOLAM HYDROCHLORIDE 2 MG/2ML
INJECTION, SOLUTION INTRAMUSCULAR; INTRAVENOUS AS NEEDED
Status: DISCONTINUED | OUTPATIENT
Start: 2022-04-08 | End: 2022-04-08

## 2022-04-08 RX ORDER — EPHEDRINE SULFATE 50 MG/ML
INJECTION INTRAVENOUS AS NEEDED
Status: DISCONTINUED | OUTPATIENT
Start: 2022-04-08 | End: 2022-04-08

## 2022-04-08 RX ORDER — DOCUSATE SODIUM 100 MG/1
100 CAPSULE, LIQUID FILLED ORAL 2 TIMES DAILY
Qty: 30 CAPSULE | Refills: 0 | Status: SHIPPED | OUTPATIENT
Start: 2022-04-08 | End: 2022-04-22 | Stop reason: ALTCHOICE

## 2022-04-08 RX ADMIN — EPHEDRINE SULFATE 10 MG: 50 INJECTION, SOLUTION INTRAVENOUS at 14:41

## 2022-04-08 RX ADMIN — DEXAMETHASONE SODIUM PHOSPHATE 8 MG: 10 INJECTION, SOLUTION INTRAMUSCULAR; INTRAVENOUS at 14:18

## 2022-04-08 RX ADMIN — ONDANSETRON 4 MG: 2 INJECTION INTRAMUSCULAR; INTRAVENOUS at 14:18

## 2022-04-08 RX ADMIN — CEFAZOLIN SODIUM 2000 MG: 2 SOLUTION INTRAVENOUS at 14:21

## 2022-04-08 RX ADMIN — SODIUM CHLORIDE, SODIUM LACTATE, POTASSIUM CHLORIDE, AND CALCIUM CHLORIDE: .6; .31; .03; .02 INJECTION, SOLUTION INTRAVENOUS at 14:14

## 2022-04-08 RX ADMIN — MIDAZOLAM HYDROCHLORIDE 2 MG: 1 INJECTION, SOLUTION INTRAMUSCULAR; INTRAVENOUS at 14:14

## 2022-04-08 RX ADMIN — PROPOFOL 150 MG: 10 INJECTION, EMULSION INTRAVENOUS at 14:18

## 2022-04-08 RX ADMIN — FENTANYL CITRATE 25 MCG: 50 INJECTION, SOLUTION INTRAMUSCULAR; INTRAVENOUS at 14:18

## 2022-04-08 RX ADMIN — LIDOCAINE HYDROCHLORIDE 100 MG: 20 INJECTION, SOLUTION EPIDURAL; INFILTRATION; INTRACAUDAL at 14:18

## 2022-04-08 NOTE — ANESTHESIA PREPROCEDURE EVALUATION
Procedure:  HYDROCELECTOMY (Right Scrotum)    Relevant Problems   CARDIO   (+) Coronary artery disease of native artery of native heart with stable angina pectoris (HCC)   (+) Essential hypertension   (+) Hypertriglyceridemia   (+) Mixed hyperlipidemia   (+) PVC (premature ventricular contraction)   (+) Postoperative atrial fibrillation (HCC)   (+) S/P AVR (2019)   (+) S/P CABG (coronary artery bypass graft) (2019)      ENDO   (+) DM (diabetes mellitus), type 2 (HCC)        Physical Exam    Airway    Mallampati score: II  TM Distance: <3 FB  Neck ROM: full     Dental   No notable dental hx     Cardiovascular      Pulmonary      Other Findings      Results for Jessy Peterson (MRN [id number]) as of 4/8/2022 13:31   Ref  Range 3/1/2022 07:56   Sodium Latest Ref Range: 136 - 145 mmol/L 140   Potassium Latest Ref Range: 3 5 - 5 3 mmol/L 4 1   Chloride Latest Ref Range: 100 - 108 mmol/L 108   CO2 Latest Ref Range: 21 - 32 mmol/L 28   Anion Gap Latest Ref Range: 4 - 13 mmol/L 4   BUN Latest Ref Range: 5 - 25 mg/dL 25   Creatinine Latest Ref Range: 0 60 - 1 30 mg/dL 1 21   GLUCOSE FASTING Latest Ref Range: 65 - 99 mg/dL 200 (H)   Calcium Latest Ref Range: 8 3 - 10 1 mg/dL 9 7   eGFR Latest Units: ml/min/1 73sq m 59       EKG  (3/2022)  Sinus bradycardia  Right bundle branch block  Left anterior fascicular block   Bifascicular block   Left ventricular hypertrophy with repolarization abnormality  No previous ECGs available      ECHO (8/2021)  LEFT VENTRICLE:  Systolic function was normal  Ejection fraction was estimated to be 60 %  Wall thickness was mildly increased  There was mild concentric hypertrophy  Doppler parameters were consistent with abnormal left ventricular relaxation (grade 1 diastolic dysfunction)  Doppler parameters were consistent with elevated mean left atrial filling pressure      RIGHT VENTRICLE:  The ventricle was mildly to moderately dilated    Systolic function was normal      LEFT ATRIUM:  The atrium was mildly dilated      ATRIAL SEPTUM:  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  No definite shunt noted, but poor subcostal views      MITRAL VALVE:  There was mild annular calcification  There was trace regurgitation      AORTIC VALVE:  A bioprosthesis was present  It exhibited normal function  Valve peak gradient was 30 mmHg  Valve mean gradient was 16 mmHg      TRICUSPID VALVE:  There was mild regurgitation      PULMONIC VALVE:  There was mild regurgitation      AORTA:  There was mild dilatation of the ascending aorta at 4cm  Anesthesia Plan  ASA Score- 3     Anesthesia Type- IV sedation with anesthesia with ASA Monitors  Additional Monitors:   Airway Plan:     Comment: Accucheck = 116  NPO after: MN        Plan Factors-Exercise tolerance (METS): >4 METS  Exercise comment: Works out regularly  Chart reviewed  EKG reviewed  Existing labs reviewed  Patient summary reviewed  Patient is not a current smoker  Induction- intravenous  Postoperative Plan- Plan for postoperative opioid use  Planned trial extubation    Informed Consent- Anesthetic plan and risks discussed with patient  I personally reviewed this patient with the CRNA  Discussed and agreed on the Anesthesia Plan with the CRNA  Jamee Guan

## 2022-04-08 NOTE — DISCHARGE INSTRUCTIONS
Hydrocele   WHAT YOU NEED TO KNOW:   A hydrocele is a collection of fluid inside the scrotum  The scrotum holds the testicles  Hydroceles are simple or communicating  A simple hydrocele stays the same size  A communicating hydrocele gets bigger and smaller as fluid flows into and out of the scrotum  DISCHARGE INSTRUCTIONS:   Medicines:   · Pain medicine: You may need medicine to take away or decrease pain  ? Learn how to take your medicine  Ask what medicine and how much you should take  Be sure you know how, when, and how often to take it  ? Do not wait until the pain is severe before you take your medicine  Tell your healthcare provider if your pain does not decrease  ? Pain medicine can make you dizzy or sleepy  Prevent falls by calling someone when you get out of bed or if you need help  Take your medicine as directed  Contact your healthcare provider if you think your medicine is not helping or if you have side effects  Tell him of her if you are allergic to any medicine  Keep a list of the medicines, vitamins, and herbs you take  Include the amounts, and when and why you take them  Bring the list or the pill bottles to follow-up visits  Carry your medicine list with you in case of an emergency  Follow up with your healthcare provider or urologist as directed:  Write down your questions so you remember to ask them during your visits  Support: You may need to wear a fabric support device similar to a jock strap to decrease swelling  Contact your healthcare provider or urologist if:   · The swelling gets worse or does not go away  · You have questions or concerns about your condition or care  Return to the emergency department if:   · You have severe pain in your scrotum  · You have a fever and your scrotum is red and swollen  © Copyright [employer] 2022 Information is for End User's use only and may not be sold, redistributed or otherwise used for commercial purposes  All illustrations and images included in CareNotes® are the copyrighted property of [employer]  or Miguel Godinez  The above information is an  only  It is not intended as medical advice for individual conditions or treatments  Talk to your doctor, nurse or pharmacist before following any medical regimen to see if it is safe and effective for you

## 2022-04-08 NOTE — H&P
UROLOGY HISTORY AND PHYSICAL     Patient Identifiers: Emeka Thornton (MRN [id number])      Date of Service: 4/8/2022        ASSESSMENT:     66 y o  old male with  symptomatic 10 cm right hydrocele  Today we had a discussion on the multifactorial etiology of hydroceles and the standardized treatment approaches  I discussed aspiration as well as formal hydrocelectomy  I recommended t that he have a hydrocelectomy as the hydrocele often recurs very quickly after aspiration  I discussed the risks of the procedure including, but not limited to scrotal hematoma, testicular pain, testicular injury, infection, postoperative pain, and possible loss of the testicle        PLAN:     Patient has elected to proceed to the operating room for a right hydrocelectomy      History of Present Illness:     Emeka Thornton is a 66 y o  old with a history of 10 cm right hydrocele    Past Medical, Past Surgical History:     Past Medical History:   Diagnosis Date    Acute systolic heart failure ([facility] ) 2/23/2021    Athlete's foot     Bilateral impacted cerumen 3/9/2021    Diabetes mellitus ([facility] )     Other hydrocele 6/11/2020    TMJ (dislocation of temporomandibular joint)    :    Past Surgical History:   Procedure Laterality Date    CARDIAC SURGERY      CARDIAC VALVE REPLACEMENT      CATARACT EXTRACTION      COLONOSCOPY      CORONARY ARTERY BYPASS GRAFT      EYE SURGERY  03/2020    Cataract right eye    EYE SURGERY Left 12/03/2020    left eye cataract     TONSILLECTOMY     :    Medications, Allergies:     Current Facility-Administered Medications:     [START ON 4/9/2022] metroNIDAZOLE (FLAGYL) IVPB (premix) 500 mg 100 mL, 500 mg, Intravenous, On Call To OR **AND** ceFAZolin (ANCEF) IVPB (premix in dextrose) 2,000 mg 50 mL, 2,000 mg, Intravenous, Once, [employer], PA-RODDY    lactated ringers infusion, 125 mL/hr, Intravenous, Continuous, Chivo Turpin PA-C    Allergies:  No Known Allergies:    Social and Family History:   Social History:   Social History     Tobacco Use    Smoking status: Never Smoker    Smokeless tobacco: Never Used   Vaping Use    Vaping Use: Never used   Substance Use Topics    Alcohol use: Yes     Comment: 1 glass of wine or 1 beer a week    Drug use: Never     Social History     Tobacco Use   Smoking Status Never Smoker   Smokeless Tobacco Never Used       Family History:  Family History   Problem Relation Age of Onset    Heart disease Mother     Cancer Mother     Heart disease Father     Hypertension Father     Colon cancer Neg Hx    :     Review of Systems:     General: Fever, chills, or night sweats: negative  Cardiac: Negative for chest pain  Pulmonary: Negative for shortness of breath  Gastrointestinal: Abdominal pain negative  Nausea, vomiting, or diarrhea negative  Genitourinary: See HPI above  Patient does nothave hematuria  All other systems queried were negative  Physical Exam:   General: Patient is pleasant and in NAD  Awake and alert  There were no vitals taken for this visit  HEENT:  Normocephalic atraumatic  Cardiac:  Regular rate and rhythm, Peripheral edema: negative  Pulmonary: Non-labored breathing, CTAB  Abdomen: Soft, non-tender, non-distended  No surgical scars  No masses, tenderness, hernias noted  Genitourinary: negative CVA tenderness, neg suprapubic tenderness  Extremities: normal movement in all 4       Labs:     Lab Results   Component Value Date    HGB 14 5 03/22/2021    HCT 42 1 03/22/2021    WBC 8 24 03/22/2021     03/22/2021   ]    Lab Results   Component Value Date    K 4 1 03/01/2022     03/01/2022    CO2 28 03/01/2022    BUN 25 03/01/2022    CREATININE 1 21 03/01/2022    CALCIUM 9 7 03/01/2022   ]    Imaging:   I personally reviewed the images and report of the following studies, and reviewed them with the patient:        Thank you for allowing me to participate in this patients care    Please do not hesitate to call with any additional questions    Armin Garcia MD

## 2022-04-08 NOTE — OP NOTE
Operative Note     PATIENT:  Luz Maria Baumann (MRN [id number])    DATE OF PROCEDURE:   4/8/2022    PRE-OP DIAGNOSIS:   1) Right hydrocele    POST-OP DIAGNOSIS:   1) Right hydrocele    PROCEDURES PERFORMED:  1) Right hydrocelectomy     SURGEON:  Eve Reddy MD    ASSISTANTS:      NOTE:  There were no qualified teaching residents to assist with this case    ANESTHESIA: General     COMPLICATIONS:   None    ANTIBIOTICS:  Cephazolin    INTRAOPERATIVE THROMBOEMBOLISM PROPHYLAXIS:  Pneumatic compression stockings     INDICATIONS FOR PROCEDURE:  Luz Maria Baumann is an 66 y o  old male with a symptomatic Right hydrocele  He has had a scrotal ultrasound which was negative for intratesticular mass or tumor  After discussing the options, the patient elected to undergo a hydrocelectomy  We discussed the procedure in detail, the alternatives, and the risks, and they signed informed consent to proceed  PROCEDURE IN DETAIL:   The patient was identified and brought to the OR  Antibiotic prophylaxis and DVT prophylaxis were administered  They were placed in the comfortable supine position with care to pad all pressure points  The scrotal hair was clipped and they were prepped and draped in the usual sterile fashion using ChloraPrep  A surgical time out was performed with all in the room in agreement with the correct patient, procedure, indications, and laterality  Next a transverse incision was made on the Left hemiscrotum  This was carried down through the cremasteric fibers using electrocautery and down to the hydrocele sac  The spermatic cord and hydrocele sac was delivered through this incision  The hydrocele sac was then entered sharply away from the testes, epididymis, and cord  Straw-colored fluid was then drained  Next the sac was excised using electrocautery with care taken to avoid any injury to the testes, epididymis, vas deferens, or vascular pedicle along the spermatic cord   The sac was submitted for permanent pathology  The edges of the hydrocele sac were inverted and reapproximated on the far side of the spermatic cord using the Jabouele technique  next oversewn using 4-0 monofilament suture  The wound was copiously irrigated  Hemostasis was achieved using electrocautery  The testes was delivered back into the base of the scrotum  The dartos layer was closed using running 3-0 Vicryl  The skin was then closed using 4-0 chromic horizontal mattress suture  A sterile dressing and scrotal support was applied  All needle and instrument counts were correct  The patient was placed back supine, awakened from general anesthesia and brought to recovery room in stable condition          ESTIMATED BLOOD LOSS:  Minimal      DRAINS:      SPECIMENS:   Order Name Source Comment Collection Info Order Time   TISSUE EXAM Hydrocele Sac RIGHT HYDROCELE Collected By: Stephen Nur MD 4/8/2022  2:32 PM     [facility]   Immediate             COMPLICATIONS: None    DISPOSITION: PACU

## 2022-04-08 NOTE — ANESTHESIA POSTPROCEDURE EVALUATION
Post-Op Assessment Note    CV Status:  Stable  Pain Score: 0    Pain management: adequate     Mental Status:  Awake and alert   Hydration Status:  Stable   PONV Controlled:  None   Airway Patency:  Patent and adequate      Post Op Vitals Reviewed: Yes      Staff: CRNA, Anesthesiologist         No complications documented      /55 (04/08/22 1515)    Temp (!) 97 °F (36 1 °C) (04/08/22 1515)    Pulse 70 (04/08/22 1515)   Resp 16 (04/08/22 1515)    SpO2 (P) 97 % (04/08/22 1515)

## 2022-04-12 ENCOUNTER — NURSE TRIAGE (OUTPATIENT)
Dept: OTHER | Facility: OTHER | Age: 72
End: 2022-04-12

## 2022-04-12 NOTE — TELEPHONE ENCOUNTER
Patient calling in stating he has a couple drops of blood on the gauze over his incision site throughout the day  He denies any open areas, pain, redness or swelling at the site  He does have some mild bruising but he knew that was expected  Patient asking if he should change anything that he is currently doing  Informed him to monitor for any worsening bleeding or symptoms and to call back if he develops any  Reason for Disposition   Sutured or stapled surgical incision, questions about    Answer Assessment - Initial Assessment Questions  1  SYMPTOM: "What's the main symptom you're concerned about?" (e g , redness, pain, drainage)      Very slight bleeding from the hydrocelectomy site  States it is a couple small spots on the gauze  2  ONSET: "When did symptoms  start?"      Since he got his surgery 4/8  It has been getting better     3  SURGERY: "What surgery was performed?"      Hydrocelectomy    4  DATE of SURGERY: "When was surgery performed?"       4/8     5  INCISION SITE: "Where is the incision located?"       Testicle    6  REDNESS: "Is there any redness at the incision site?" If yes, ask: "How wide across is the redness?" (Inches, centimeters)       No     7  PAIN: "Is there any pain?" If Yes, ask: "How bad is it?"  (Scale 1-10; or mild, moderate, severe)      No     8  BLEEDING: "Is there any bleeding?" If Yes, ask: "How much?" and "Where?"      Very little spotting     9  DRAINAGE: "Is there any drainage from the incision site?" If yes, ask: "What color and how much?" (e g , red, cloudy, pus; drops, teaspoon)      No just small drops of blood     10  FEVER: "Do you have a fever?" If Yes, ask: "What is your temperature, how was it measured, and when did it start?"        No     11   OTHER SYMPTOMS: "Do you have any other symptoms?" (e g , shaking chills, weakness, rash elsewhere on body)        No    Protocols used: POST-OP INCISION SYMPTOMS AND QUESTIONS-ADULT-OH

## 2022-04-12 NOTE — TELEPHONE ENCOUNTER
Regarding:  bleeding at my incision site- medication question  ----- Message from [facility] sent at 4/12/2022 12:12 PM EDT -----  "I am experiencing bleeding at my incision site    I would like to know if I can take Asprin before my F/U appointment "

## 2022-04-15 ENCOUNTER — OFFICE VISIT (OUTPATIENT)
Dept: CARDIOLOGY CLINIC | Facility: CLINIC | Age: 72
End: 2022-04-15
Payer: MEDICARE

## 2022-04-15 VITALS
WEIGHT: 207 LBS | SYSTOLIC BLOOD PRESSURE: 118 MMHG | BODY MASS INDEX: 25.74 KG/M2 | HEIGHT: 75 IN | DIASTOLIC BLOOD PRESSURE: 72 MMHG | OXYGEN SATURATION: 96 % | HEART RATE: 67 BPM

## 2022-04-15 DIAGNOSIS — I77.1 STENOSIS OF RIGHT SUBCLAVIAN ARTERY (HCC): ICD-10-CM

## 2022-04-15 DIAGNOSIS — I35.0 NONRHEUMATIC AORTIC VALVE STENOSIS: ICD-10-CM

## 2022-04-15 DIAGNOSIS — R60.9 EDEMA, UNSPECIFIED TYPE: ICD-10-CM

## 2022-04-15 DIAGNOSIS — I25.118 CORONARY ARTERY DISEASE OF NATIVE ARTERY OF NATIVE HEART WITH STABLE ANGINA PECTORIS (HCC): Primary | ICD-10-CM

## 2022-04-15 DIAGNOSIS — Z95.1 S/P CABG (CORONARY ARTERY BYPASS GRAFT): ICD-10-CM

## 2022-04-15 DIAGNOSIS — I10 ESSENTIAL HYPERTENSION: ICD-10-CM

## 2022-04-15 DIAGNOSIS — I48.91 POSTOPERATIVE ATRIAL FIBRILLATION (HCC): ICD-10-CM

## 2022-04-15 DIAGNOSIS — Z95.2 S/P AVR: ICD-10-CM

## 2022-04-15 DIAGNOSIS — I97.89 POSTOPERATIVE ATRIAL FIBRILLATION (HCC): ICD-10-CM

## 2022-04-15 PROCEDURE — 99214 OFFICE O/P EST MOD 30 MIN: CPT | Performed by: INTERNAL MEDICINE

## 2022-04-15 RX ORDER — FUROSEMIDE 20 MG/1
20 TABLET ORAL DAILY PRN
Qty: 30 TABLET | Refills: 0 | Status: SHIPPED | OUTPATIENT
Start: 2022-04-15

## 2022-04-15 RX ORDER — ASPIRIN 81 MG/1
81 TABLET ORAL DAILY
COMMUNITY

## 2022-04-15 NOTE — PROGRESS NOTES
Cardiology Follow Up    Rowdy Huffman  [id number]  1950  [facility] [address]  [facility]  [address] [place] [address] John Hernandez [id number] [phone]      1  Coronary artery disease of native artery of native heart with stable angina pectoris ([facility] [address] )     2  Nonrheumatic aortic valve stenosis     3  Essential hypertension     4  S/P CABG (coronary artery bypass graft)     5  S/P AVR     6  Postoperative atrial fibrillation (HCC)     7  Edema, unspecified type  furosemide (LASIX) 20 mg tablet   8  Stenosis of right subclavian artery (HCC)  VAS carotid complete study       Discussion/Summary:    - CAD: prior CABG single vessel with LIMA to LAD  Myalgias with other statins, but tolerating Crestor 10mg, LDL is doing well  Discussed the niacin previously, he wishes to continue  No new symptoms  No repeat testing indicated, continue current medications     - AS: s/p AVR  Echo with normal bio AVR function, last done in Summer '21  Repeat next year  Abx prior to dental work  - Carotid stenosis: bilateral  < 50%  Medical management  R subclavian moderate disease also noted, advised to check BP measurements on the L  Repeat u/s ordered  - HTN: BP stable on L arm  Continue current regimen  I stopped the thiazide before  See below re: edema  - PAF: brief after surgery, no evidence of recurrence, not on anticoagulation, nor is there any indication for this currently  - Edema - worsened recently, likely with higher sodium  He asked about restarting chlorthalidone, but his blood pressure is controlled  I prefer to use loop diuretic as needed for the edema  Continue with elevation, compression  He does not drink a lot of fluid to begin with  Previous History:  75-year-old man  He has a history of coronary artery disease diagnosed in July of 2019 when he was having chest discomfort    He had single-vessel bypass with LIMA to the LAD and also at that time had aortic stenosis and underwent a 25 mm Almeida bioprosthetic aortic valve replacement ([place])    Preoperative testing showed that he had carotid artery stenosis bilaterally which has been monitored serially with ultrasound most recently with < 50% stenosis bilaterally but some R subclavian disease as well  Had atrial fibrillation which lasted < 24 hours after surgery  He was initially on amiodarone and warfarin for brief period of time after the surgery, but this was stopped  He has had some episodes of palpitations  He had a 1 week monitor with his prior cardiologist, no atrial fibrillation was found  He then had a 48 hour Holter monitor in 10/2020 which showed only PVCs  No other significant arrhythmias  Otherwise, he gets occasional lower extremity edema  He was on Lasix immediately after the surgery, but this was switched to chlorthalidone  I stopped this because he was getting dizzy  Doesn't seem to have made a major change, but BP is stable  He has had myalgias with several statins in the past   He now been on 10 mg of Crestor and is tolerating it  His LDL is in the 40s  He is also taking niacin  He has been on this for 15 years, has not had any side effects with it  I discussed stopping it, but he prefers to continue  Over-the-counter fish oil  Lovaza was too expensive  Interval History:  Returns for follow up  More edema over the last week, although thiazide was stopped a while ago  Eating more salt  No SOB  No other changes  BP is stable  Takes abx prior to dental work          Patient Active Problem List   Diagnosis    Aortic stenosis    Coronary artery disease of native artery of native heart with stable angina pectoris ([address] )    DM (diabetes mellitus), type 2 ([address] )    Essential hypertension    Hypertriglyceridemia    S/P CABG (coronary artery bypass graft)    S/P AVR    PVC (premature ventricular contraction)    Bilateral carotid artery stenosis    Mixed hyperlipidemia    Anus irritation    Postoperative atrial fibrillation (HCC)    Benign prostatic hyperplasia with urinary frequency    Stenosis of right subclavian artery (HCC)    Hydrocele in adult    IBS (irritable bowel syndrome)    Medicare annual wellness visit, subsequent    Lower urinary tract symptoms due to benign prostatic hyperplasia     Past Medical History:   Diagnosis Date    Acute systolic heart failure ([facility] [address] ) 2/23/2021    Athlete's foot     Bilateral impacted cerumen 3/9/2021    Diabetes mellitus ([facility] [address] )     Other hydrocele 6/11/2020    TMJ (dislocation of temporomandibular joint)      Social History     Tobacco Use    Smoking status: Never Smoker    Smokeless tobacco: Never Used   Vaping Use    Vaping Use: Never used   Substance Use Topics    Alcohol use: Yes     Comment: 1 glass of wine or 1 beer a week    Drug use: Never      Family History   Problem Relation Age of Onset    Heart disease Mother     Cancer Mother     Heart disease Father     Hypertension Father     Colon cancer Neg Hx      Past Surgical History:   Procedure Laterality Date    CARDIAC SURGERY      CARDIAC VALVE REPLACEMENT      CATARACT EXTRACTION      COLONOSCOPY      CORONARY ARTERY BYPASS GRAFT      EYE SURGERY  03/2020    Cataract right eye    EYE SURGERY Left 12/03/2020    left eye cataract     DC REMOVAL OF HYDROCELE,TUNICA,UNILAT Right 4/8/2022    Procedure: HYDROCELECTOMY;  Surgeon: Marisa Kaur MD;  [facility] MAIN OR;  Service: Urology    TONSILLECTOMY         Current Outpatient Medications:     acetaminophen (TYLENOL) 325 mg tablet, Take 2 tablets (650 mg total) by mouth every 4 (four) hours as needed for mild pain, Disp: 30 tablet, Rfl: 0    acetaminophen (TYLENOL) 500 mg tablet, Take 500 mg by mouth daily at bedtime, Disp: , Rfl:     ascorbic acid (VITAMIN C) 250 mg tablet, Take 500 mg by mouth 2 (two) times a day, Disp: , Rfl:     betamethasone dipropionate (DIPROSONE) 0 05 % ointment, , Disp: , Rfl:     Cholecalciferol (Vitamin D-3) 25 MCG (1000 UT) CAPS, Take by mouth, Disp: , Rfl:     diclofenac sodium (VOLTAREN) 50 mg EC tablet, Take 1 tablet (50 mg total) by mouth 3 (three) times a day for 7 days, Disp: 21 tablet, Rfl: 0    finasteride (PROSCAR) 5 mg tablet, Take 1 tablet (5 mg total) by mouth daily, Disp: 90 tablet, Rfl: 3    Ginger, Zingiber officinalis, (Ginger Root) 500 MG CAPS, , Disp: , Rfl:     hydrocortisone (ANUSOL-HC) 2 5 % rectal cream, Apply topically 2 (two) times a day (Patient taking differently: Apply topically 2 (two) times a day as needed  ), Disp: 30 g, Rfl: 2    Jardiance 25 MG TABS, Take 1 tablet (25 mg total) by mouth daily, Disp: 90 tablet, Rfl: 3    metFORMIN (GLUCOPHAGE) 500 mg tablet, TAKE TWO TABLETS BY MOUTH TWICE A DAY, Disp: 360 tablet, Rfl: 1    metoprolol succinate (TOPROL-XL) 50 mg 24 hr tablet, Take 1 tablet (50 mg total) by mouth daily, Disp: 90 tablet, Rfl: 3    Misc Natural Products (Tart Cherry Advanced) CAPS, , Disp: , Rfl:     niacin 500 mg tablet, Take 500 mg by mouth 4 (four) times a day, Disp: , Rfl:     Omega-3 Fatty Acids (fish oil) 1,000 mg, , Disp: , Rfl:     PROBIOTIC PRODUCT PO, Take 111 mg by mouth daily, Disp: , Rfl:     rosuvastatin (CRESTOR) 10 MG tablet, Take 1 tablet (10 mg total) by mouth daily, Disp: 90 tablet, Rfl: 3    sitaGLIPtin (JANUVIA) 100 mg tablet, Take 1 tablet (100 mg total) by mouth daily, Disp: 90 tablet, Rfl: 3    tamsulosin (FLOMAX) 0 4 mg, TAKE ONE CAPSULE BY MOUTH EVERY DAY WITH DINNER, Disp: 30 capsule, Rfl: 6    Tart Cherry (Tart Cherry Ultra) 1200 MG CAPS, , Disp: , Rfl:     Turmeric 500 MG CAPS, , Disp: , Rfl:     aspirin (ECOTRIN LOW STRENGTH) 81 mg EC tablet, Take 81 mg by mouth daily, Disp: , Rfl:     docusate sodium (COLACE) 100 mg capsule, Take 1 capsule (100 mg total) by mouth 2 (two) times a day for 15 days (Patient not taking: Reported on 4/15/2022 ), Disp: 30 capsule, Rfl: 0    furosemide (LASIX) 20 mg tablet, Take 1 tablet (20 mg total) by mouth daily as needed (edema), Disp: 30 tablet, Rfl: 0    NON FORMULARY,   (Patient not taking: Reported on 3/15/2022 ), Disp: , Rfl:   No Known Allergies    Vitals:    04/15/22 1436   BP: 118/72   BP Location: Left arm   Patient Position: Sitting   Cuff Size: Large   Pulse: 67   SpO2: 96%   Weight: 93 9 kg (207 lb)   Height: 6' 3" (1 905 m)     Vitals:    04/15/22 1436   Weight: 93 9 kg (207 lb)      Height: 6' 3" (190 5 cm)   Body mass index is 25 87 kg/m²  Physical Exam:  GEN: Jennifer Sienikhil appears well, alert and oriented x 3, pleasant and cooperative   HEENT: pupils equal, round, and reactive to light; extraocular muscles intact  NECK: supple, no carotid bruits   HEART: bio AVR sounds  LUNGS: clear to auscultation bilaterally; no wheezes, rales, or rhonchi   ABDOMEN: normal bowel sounds, soft, no tenderness, no distention  EXTREMITIES: 1+ LE edema  NEURO: no focal findings   SKIN: normal without suspicious lesions on exposed skin    ROS:  Positive for fatigue, sleepiness  No snoring, gets refreshed sleep  No chest pain, shortness of breath    Except as noted in HPI, is otherwise reviewed in detail and a 12 point ROS is negative in detail  ROS reviewed and is unchanged    Labs:  Lab Results   Component Value Date    K 4 1 03/01/2022     03/01/2022    CREATININE 1 21 03/01/2022    BUN 25 03/01/2022    CO2 28 03/01/2022    ALT 26 03/22/2021    AST 14 03/22/2021    GLUF 200 (H) 03/01/2022    HGBA1C 7 1 (H) 03/01/2022    WBC 8 24 03/22/2021    HGB 14 5 03/22/2021    HCT 42 1 03/22/2021     03/22/2021     No results found for: CHOL  Lab Results   Component Value Date    HDL 46 03/22/2021     Lab Results   Component Value Date    LDLCALC 39 03/22/2021     Lab Results   Component Value Date    TRIG 179 (H) 03/22/2021     Testing:  Echo 8/2021:  LEFT VENTRICLE:  Systolic function was normal  Ejection fraction was estimated to be 60 %  Wall thickness was mildly increased  There was mild concentric hypertrophy  Doppler parameters were consistent with abnormal left ventricular relaxation (grade 1 diastolic dysfunction)  Doppler parameters were consistent with elevated mean left atrial filling pressure      RIGHT VENTRICLE:  The ventricle was mildly to moderately dilated  Systolic function was normal      LEFT ATRIUM:  The atrium was mildly dilated      ATRIAL SEPTUM:  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  No definite shunt noted, but poor subcostal views      MITRAL VALVE:  There was mild annular calcification  There was trace regurgitation      AORTIC VALVE:  A bioprosthesis was present  It exhibited normal function  Valve peak gradient was 30 mmHg  Valve mean gradient was 16 mmHg      TRICUSPID VALVE:  There was mild regurgitation      PULMONIC VALVE:  There was mild regurgitation      AORTA:  There was mild dilatation of the ascending aorta at 4cm  Holter: The patient was in sinus rhythm throughout the recording      Minimum HR: 59  Average HR: 74  Maximum HR: 102     Premature ventricular contractions: 2954 (2%)  Ventricular runs: 0     Supraventricular contractions: 34  Supraventricular runs: 1 lasting 4 beats     Longest RR: 1 5 sec     Arrhythmias: none     Diary submitted:  No        Impression     Abnormal Holter monitor  Frequent PVCs without ventricular runs  Rare PACs

## 2022-04-17 DIAGNOSIS — R35.0 BENIGN PROSTATIC HYPERPLASIA WITH URINARY FREQUENCY: ICD-10-CM

## 2022-04-17 DIAGNOSIS — N40.1 BENIGN PROSTATIC HYPERPLASIA WITH URINARY FREQUENCY: ICD-10-CM

## 2022-04-18 RX ORDER — TAMSULOSIN HYDROCHLORIDE 0.4 MG/1
CAPSULE ORAL
Qty: 30 CAPSULE | Refills: 6 | Status: SHIPPED | OUTPATIENT
Start: 2022-04-18

## 2022-04-21 ENCOUNTER — HOSPITAL ENCOUNTER (OUTPATIENT)
Dept: VASCULAR ULTRASOUND | Facility: HOSPITAL | Age: 72
Discharge: HOME | End: 2022-04-21
Attending: INTERNAL MEDICINE
Payer: MEDICARE

## 2022-04-21 DIAGNOSIS — I77.1 STENOSIS OF RIGHT SUBCLAVIAN ARTERY (HCC): ICD-10-CM

## 2022-04-21 PROCEDURE — 93880 EXTRACRANIAL BILAT STUDY: CPT | Performed by: SURGERY

## 2022-04-21 PROCEDURE — 93880 EXTRACRANIAL BILAT STUDY: CPT

## 2022-04-22 ENCOUNTER — OFFICE VISIT (OUTPATIENT)
Dept: UROLOGY | Facility: CLINIC | Age: 72
End: 2022-04-22

## 2022-04-22 VITALS
DIASTOLIC BLOOD PRESSURE: 72 MMHG | WEIGHT: 203 LBS | HEIGHT: 75 IN | BODY MASS INDEX: 25.24 KG/M2 | SYSTOLIC BLOOD PRESSURE: 122 MMHG

## 2022-04-22 DIAGNOSIS — N13.8 BPH WITH OBSTRUCTION/LOWER URINARY TRACT SYMPTOMS: Primary | ICD-10-CM

## 2022-04-22 DIAGNOSIS — N40.1 BPH WITH OBSTRUCTION/LOWER URINARY TRACT SYMPTOMS: Primary | ICD-10-CM

## 2022-04-22 PROCEDURE — 99024 POSTOP FOLLOW-UP VISIT: CPT | Performed by: PHYSICIAN ASSISTANT

## 2022-04-22 NOTE — PROGRESS NOTES
Assessment:     1  Postop right-sided hydrocelectomy    Plan:  - appears to be healing well  - may follow up in August for his annual exam with PSA prior to visit    Doing well postoperatively  Wound care discussed  Subjective     Jus Christianson presents to the clinic 2 weeks status post  Right hydrocelectomy for post-op follow-up  The patient reports no problems with eating, bowel movements, voiding, or their wound  The patient is not having any pain       Objective     /72   Ht 6' 3" (1 905 m)   Wt 92 1 kg (203 lb)   BMI 25 37 kg/m²   General:  alert   Abdomen: soft   Incision:   healing well           Pathology:

## 2022-05-02 DIAGNOSIS — R35.0 BENIGN PROSTATIC HYPERPLASIA WITH URINARY FREQUENCY: ICD-10-CM

## 2022-05-02 DIAGNOSIS — N40.1 BENIGN PROSTATIC HYPERPLASIA WITH URINARY FREQUENCY: ICD-10-CM

## 2022-05-02 RX ORDER — FINASTERIDE 5 MG/1
5 TABLET, FILM COATED ORAL DAILY
Qty: 30 TABLET | Refills: 11 | Status: SHIPPED | OUTPATIENT
Start: 2022-05-02

## 2022-05-18 ENCOUNTER — OFFICE VISIT (OUTPATIENT)
Dept: PODIATRY | Facility: CLINIC | Age: 72
End: 2022-05-18
Payer: MEDICARE

## 2022-05-18 VITALS
HEART RATE: 61 BPM | HEIGHT: 75 IN | WEIGHT: 203 LBS | SYSTOLIC BLOOD PRESSURE: 105 MMHG | BODY MASS INDEX: 25.24 KG/M2 | DIASTOLIC BLOOD PRESSURE: 67 MMHG

## 2022-05-18 DIAGNOSIS — B35.1 ONYCHOMYCOSIS: ICD-10-CM

## 2022-05-18 DIAGNOSIS — E11.42 DIABETIC POLYNEUROPATHY ASSOCIATED WITH TYPE 2 DIABETES MELLITUS (HCC): Primary | ICD-10-CM

## 2022-05-18 PROCEDURE — 11721 DEBRIDE NAIL 6 OR MORE: CPT | Performed by: PODIATRIST

## 2022-05-18 NOTE — PROGRESS NOTES
Jus Chavez  1950  AT RISK FOOT CARE    1  Diabetic polyneuropathy associated with type 2 diabetes mellitus ([facility] )     2  Onychomycosis         Patient presents for at-risk foot care  Patient has no acute concerns today  Patient has significant lower extremity risk due to neuropathy, parasthesia, edema, and trophic skin changes to the lower extremity  On exam patient has thickened, hypertrophic, discolored, brittle toenails with subungual debris and tenderness x10   Callus: none  Patient has lower extremity edema  PAtients skin is atrophic, thickened nails, and decreased pedal hair  Patient has decreased pinprick and vibratory sensation to his feet and parasthesia    Today's treatment includes:  Debridement of toenails  Using nail nipper, an, and curette, nails were sharply debrided, reduced in thickness and length  Devitalized nail tissue and fungal debris excised and removed  Patient tolerated well  Discussed proper shoe gear, daily inspections of feet, and general foot health with patient  Patient has Q9  findings and is recommended for at risk foot care every 9-10 weeks      Patients most recent complete clinical foot exam was on: 8/2/21

## 2022-05-24 ENCOUNTER — OFFICE VISIT (OUTPATIENT)
Dept: UROLOGY | Facility: CLINIC | Age: 72
End: 2022-05-24

## 2022-05-24 VITALS
SYSTOLIC BLOOD PRESSURE: 122 MMHG | BODY MASS INDEX: 25.24 KG/M2 | HEIGHT: 75 IN | DIASTOLIC BLOOD PRESSURE: 78 MMHG | WEIGHT: 203 LBS

## 2022-05-24 DIAGNOSIS — N43.3 HYDROCELE, UNSPECIFIED HYDROCELE TYPE: Primary | ICD-10-CM

## 2022-05-24 PROCEDURE — 99024 POSTOP FOLLOW-UP VISIT: CPT | Performed by: PHYSICIAN ASSISTANT

## 2022-05-24 NOTE — PROGRESS NOTES
Assessment:      1  Postop right-sided hydrocelectomy    Plan:    - the incision is well healed  -  Follow-up scrotal ultrasound and I will call him with the results  -    Wound care discussed  Subjective     Jus Pop presents to the clinic 4 weeks status post  hydrocelectomy for post-op follow-up  The patient reports no problems with eating, bowel movements, voiding, or their wound  The patient is not having any pain       Objective     /78   Ht 6' 3" (1 905 m)   Wt 92 1 kg (203 lb)   BMI 25 37 kg/m²   General:  alert   Abdomen: soft   Incision:   healing well,  Swelling either hematoma or residual fluid with induration no ecchymosis       Lab Review        Pathology:

## 2022-05-27 ENCOUNTER — HOSPITAL ENCOUNTER (OUTPATIENT)
Dept: RADIOLOGY | Age: 72
Discharge: HOME | End: 2022-05-27
Payer: MEDICARE

## 2022-05-27 DIAGNOSIS — N43.3 HYDROCELE, UNSPECIFIED HYDROCELE TYPE: ICD-10-CM

## 2022-05-27 PROCEDURE — 76870 US EXAM SCROTUM: CPT

## 2022-06-17 ENCOUNTER — PROCEDURE VISIT (OUTPATIENT)
Dept: UROLOGY | Facility: CLINIC | Age: 72
End: 2022-06-17
Payer: MEDICARE

## 2022-06-17 VITALS
WEIGHT: 203 LBS | BODY MASS INDEX: 25.24 KG/M2 | DIASTOLIC BLOOD PRESSURE: 74 MMHG | HEIGHT: 75 IN | OXYGEN SATURATION: 98 % | SYSTOLIC BLOOD PRESSURE: 126 MMHG

## 2022-06-17 DIAGNOSIS — N40.0 BENIGN PROSTATIC HYPERPLASIA WITHOUT LOWER URINARY TRACT SYMPTOMS: Primary | ICD-10-CM

## 2022-06-17 DIAGNOSIS — N43.3 HYDROCELE, UNSPECIFIED HYDROCELE TYPE: ICD-10-CM

## 2022-06-17 PROCEDURE — 55000 DRAINAGE OF HYDROCELE: CPT | Performed by: PHYSICIAN ASSISTANT

## 2022-06-17 NOTE — PROGRESS NOTES
Puncture aspiration hydrocele     Date/Time 6/17/2022 2:59 PM     Performed by  Alysha Bceerril PA-C     Authorized by Alysha Becerril PA-C      Universal Protocol   Time out: Immediately prior to procedure a "time out" was called to verify the correct patient, procedure, equipment, support staff and site/side marked as required  Timeout called at: 6/17/2022 2:00 PM   Patient understanding: patient states understanding of the procedure being performed  Patient identity confirmed: verbally with patient        Local anesthesia used: yes     Anesthesia   Local anesthesia used: yes  Local Anesthetic: lidocaine 1% without epinephrine     Procedure Details   Procedure Notes: 78-year-old man with history of recurrent right-sided hydrocele  He had a hydrocelectomy in April and has developed recurrent hydrocele by ultrasound  The scrotum is prepped and draped in usual fashion  1% lidocaine used for local anesthetic  An 18 gauge Angiocath is placed into the hydrocele sac   500 cc of serosanguineous fluid is aspirated  Patient tolerated the procedure well  Antibiotic ointment and a dry bandage applied to the puncture site  He has a follow-up in October with PSA prior to visit  He will call me if he has recurrence of the fluid  We talked about reoperation and possible sclerosis with doxycycline  However I do believe this may have been from postoperative hematoma and hopefully will not recur

## 2022-06-20 DIAGNOSIS — N48.1 BALANITIS: Primary | ICD-10-CM

## 2022-06-20 RX ORDER — CLOTRIMAZOLE AND BETAMETHASONE DIPROPIONATE 10; .64 MG/G; MG/G
CREAM TOPICAL 2 TIMES DAILY PRN
Qty: 30 G | Refills: 3 | Status: SHIPPED | OUTPATIENT
Start: 2022-06-20 | End: 2022-10-31

## 2022-06-28 ENCOUNTER — LAB (OUTPATIENT)
Dept: LAB | Facility: AMBULARY SURGERY CENTER | Age: 72
End: 2022-06-28
Payer: MEDICARE

## 2022-06-28 DIAGNOSIS — N13.8 BPH WITH OBSTRUCTION/LOWER URINARY TRACT SYMPTOMS: ICD-10-CM

## 2022-06-28 DIAGNOSIS — N40.1 BPH WITH OBSTRUCTION/LOWER URINARY TRACT SYMPTOMS: ICD-10-CM

## 2022-06-28 DIAGNOSIS — I10 ESSENTIAL HYPERTENSION: ICD-10-CM

## 2022-06-28 DIAGNOSIS — E11.65 TYPE 2 DIABETES MELLITUS WITH HYPERGLYCEMIA, WITHOUT LONG-TERM CURRENT USE OF INSULIN (HCC): ICD-10-CM

## 2022-06-28 LAB
ANION GAP SERPL CALCULATED.3IONS-SCNC: 4 MMOL/L (ref 4–13)
BUN SERPL-MCNC: 22 MG/DL (ref 5–25)
CALCIUM SERPL-MCNC: 9.3 MG/DL (ref 8.3–10.1)
CHLORIDE SERPL-SCNC: 110 MMOL/L (ref 100–108)
CO2 SERPL-SCNC: 26 MMOL/L (ref 21–32)
CREAT SERPL-MCNC: 1.03 MG/DL (ref 0.6–1.3)
CREAT UR-MCNC: 74.5 MG/DL
EST. AVERAGE GLUCOSE BLD GHB EST-MCNC: 169 MG/DL
GFR SERPL CREATININE-BSD FRML MDRD: 71 ML/MIN/1.73SQ M
GLUCOSE P FAST SERPL-MCNC: 168 MG/DL (ref 65–99)
HBA1C MFR BLD: 7.5 %
MICROALBUMIN UR-MCNC: 9 MG/L (ref 0–20)
MICROALBUMIN/CREAT 24H UR: 12 MG/G CREATININE (ref 0–30)
POTASSIUM SERPL-SCNC: 4.2 MMOL/L (ref 3.5–5.3)
PSA SERPL-MCNC: 0.4 NG/ML (ref 0–4)
SODIUM SERPL-SCNC: 140 MMOL/L (ref 136–145)

## 2022-06-28 PROCEDURE — 82043 UR ALBUMIN QUANTITATIVE: CPT

## 2022-06-28 PROCEDURE — 83036 HEMOGLOBIN GLYCOSYLATED A1C: CPT

## 2022-06-28 PROCEDURE — 82570 ASSAY OF URINE CREATININE: CPT

## 2022-06-28 PROCEDURE — 84153 ASSAY OF PSA TOTAL: CPT

## 2022-06-28 PROCEDURE — 80048 BASIC METABOLIC PNL TOTAL CA: CPT

## 2022-06-28 PROCEDURE — 36415 COLL VENOUS BLD VENIPUNCTURE: CPT

## 2022-07-14 ENCOUNTER — OFFICE VISIT (OUTPATIENT)
Dept: ENDOCRINOLOGY | Facility: CLINIC | Age: 72
End: 2022-07-14
Payer: MEDICARE

## 2022-07-14 VITALS
SYSTOLIC BLOOD PRESSURE: 120 MMHG | WEIGHT: 205 LBS | TEMPERATURE: 97 F | DIASTOLIC BLOOD PRESSURE: 62 MMHG | HEIGHT: 75 IN | BODY MASS INDEX: 25.49 KG/M2 | HEART RATE: 60 BPM

## 2022-07-14 DIAGNOSIS — I10 ESSENTIAL HYPERTENSION: ICD-10-CM

## 2022-07-14 DIAGNOSIS — E78.2 MIXED HYPERLIPIDEMIA: ICD-10-CM

## 2022-07-14 DIAGNOSIS — E11.65 TYPE 2 DIABETES MELLITUS WITH HYPERGLYCEMIA, WITHOUT LONG-TERM CURRENT USE OF INSULIN (HCC): Primary | ICD-10-CM

## 2022-07-14 PROCEDURE — 99214 OFFICE O/P EST MOD 30 MIN: CPT | Performed by: PHYSICIAN ASSISTANT

## 2022-07-14 RX ORDER — REPAGLINIDE 0.5 MG/1
0.5 TABLET ORAL
Qty: 90 TABLET | Refills: 1 | Status: SHIPPED | OUTPATIENT
Start: 2022-07-14

## 2022-07-14 NOTE — PATIENT INSTRUCTIONS
Hypoglycemia instructions   Rosalba Weir  7/14/2022  [id number]    Low Blood Sugar    Steps to treat low blood sugar  1  Test blood sugar if you have symptoms of low blood sugar:   Low Blood Sugar Symptoms:  o Sweaty  o Dizzy  o Rapid heartbeat  o Shaky  o Bad mood  o Hungry      2  Treat blood sugar less than 70 with 15 grams of fast-acting carbohydrate:   Examples of 15 grams Fast-Acting Carbohydrate:  o 4 oz juice  o 4 oz regular soda  o 3-4 glucose tablets (chew)  o 3-4 hard candies (chew)          3  Wait 15 minutes and test your blood sugar again     4   If blood sugar is less than 100, repeat steps 2-3     5  When your blood sugar is 100 or more, eat a snack if it will be longer than one hour until your next meal  The snack should be 15 grams of carbohydrate and a protein:   Examples of snacks:  o ½ sandwich  o 6 crackers with cheese  o Piece of fruit with cheese or peanut butter  o 6 crackers with peanut butter

## 2022-07-14 NOTE — PROGRESS NOTES
Patient Progress Note      CC: Dm      Referring Provider  No referring provider defined for this encounter  History of Present Illness:   Sara Andersen is a 74 y o  male with a history of type 2 diabetes without long term use of insulin  Diabetes course has been stable  Complications of DM: CAD  Denies recent illness or hospitalizations  Denies recent severe hypoglycemic or severe hyperglycemic episodes  Denies any issues with his current regimen  Home glucose monitoring: are performed sporadically     Home blood glucose readings: 122-190 mg/dl     Current regimen: metformin 1000 mg BID, Januvia 100 mg daily, Jardiance 25 mg daily  Not using GLP1 agonist instead of DPP4 inhibitor due SE / injection site reaction  compliant most of the time, denies any side effects from medications  Hypoglycemic episodes: No, never  H/o of hypoglycemia causing hospitalization or Intervention such as glucagon injection or ambulance call :  No  Hypoglycemia symptoms: never   Treatment of hypoglycemia: discussed treatment     Medic alert tag: recommended: Yes     Diabetes education: Yes  Diet: 3 meals per day, 1-2 snacks per day  Timing of meals is predictable  Diabetic diet compliance:  compliant some of the time  Activity: Daily activity is predictable: Yes  He tries to exercise/swim once a day  Ophthamology: Feb 2022  Podiatry: foot exam UTD, March 2022     Not on ACE inhibitor/ARB  Hyperlipidemia: on statin - tolerating well, no myalgias  compliant most of the time, denies any side effects from medications    Thyroid disorders: No  History of pancreatitis: No    Patient Active Problem List   Diagnosis    Aortic stenosis    Coronary artery disease of native artery of native heart with stable angina pectoris ([facility] )    DM (diabetes mellitus), type 2 ([facility] )    Essential hypertension    Hypertriglyceridemia    S/P CABG (coronary artery bypass graft)    S/P AVR    PVC (premature ventricular contraction)    Bilateral carotid artery stenosis    Mixed hyperlipidemia    Anus irritation    Postoperative atrial fibrillation (HCC)    Benign prostatic hyperplasia with urinary frequency    Stenosis of right subclavian artery (HCC)    Hydrocele in adult    IBS (irritable bowel syndrome)    Medicare annual wellness visit, subsequent    Lower urinary tract symptoms due to benign prostatic hyperplasia      Past Medical History:   Diagnosis Date    Acute systolic heart failure ([facility] ) 2/23/2021    Athlete's foot     Bilateral impacted cerumen 3/9/2021    Diabetes mellitus ([facility] )     Other hydrocele 6/11/2020    TMJ (dislocation of temporomandibular joint)       Past Surgical History:   Procedure Laterality Date    CARDIAC SURGERY      CARDIAC VALVE REPLACEMENT      CATARACT EXTRACTION      COLONOSCOPY      CORONARY ARTERY BYPASS GRAFT      EYE SURGERY  03/2020    Cataract right eye    EYE SURGERY Left 12/03/2020    left eye cataract     GA REMOVAL OF HYDROCELE,TUNICA,UNILAT Right 4/8/2022    Procedure: HYDROCELECTOMY;  Surgeon: Parish Brooks MD;  [facility]: [facility] MAIN OR;  Service: Urology    TONSILLECTOMY        Family History   Problem Relation Age of Onset    Heart disease Mother     Cancer Mother     Heart disease Father     Hypertension Father     Colon cancer Neg Hx      Social History     Tobacco Use    Smoking status: Never Smoker    Smokeless tobacco: Never Used   Substance Use Topics    Alcohol use: Yes     Comment: 1 glass of wine or 1 beer a week     No Known Allergies      Current Outpatient Medications:     acetaminophen (TYLENOL) 500 mg tablet, Take 500 mg by mouth daily at bedtime, Disp: , Rfl:     ascorbic acid (VITAMIN C) 250 mg tablet, Take 500 mg by mouth 2 (two) times a day, Disp: , Rfl:     aspirin (ECOTRIN LOW STRENGTH) 81 mg EC tablet, Take 81 mg by mouth daily, Disp: , Rfl:     betamethasone dipropionate (DIPROSONE) 0 05 % ointment, , Disp: , Rfl:    clotrimazole-betamethasone (LOTRISONE) 1-0 05 % cream, Apply topically 2 (two) times a day as needed (reddness), Disp: 30 g, Rfl: 3    finasteride (PROSCAR) 5 mg tablet, Take 1 tablet (5 mg total) by mouth daily, Disp: 30 tablet, Rfl: 11    furosemide (LASIX) 20 mg tablet, Take 1 tablet (20 mg total) by mouth daily as needed (edema), Disp: 30 tablet, Rfl: 0    Ginger, Zingiber officinalis, (Ginger Root) 500 MG CAPS, , Disp: , Rfl:     hydrocortisone (ANUSOL-HC) 2 5 % rectal cream, Apply topically 2 (two) times a day (Patient taking differently: Apply topically 2 (two) times a day as needed), Disp: 30 g, Rfl: 2    Jardiance 25 MG TABS, Take 1 tablet (25 mg total) by mouth daily, Disp: 90 tablet, Rfl: 3    metFORMIN (GLUCOPHAGE) 500 mg tablet, TAKE TWO TABLETS BY MOUTH TWICE A DAY, Disp: 360 tablet, Rfl: 1    metoprolol succinate (TOPROL-XL) 50 mg 24 hr tablet, Take 1 tablet (50 mg total) by mouth daily, Disp: 90 tablet, Rfl: 3    niacin 500 mg tablet, Take 500 mg by mouth 4 (four) times a day, Disp: , Rfl:     NON FORMULARY,  , Disp: , Rfl:     Omega-3 Fatty Acids (fish oil) 1,000 mg, , Disp: , Rfl:     PROBIOTIC PRODUCT PO, Take 111 mg by mouth daily, Disp: , Rfl:     repaglinide (PRANDIN) 0 5 mg tablet, Take 1 tablet (0 5 mg total) by mouth daily before dinner, Disp: 90 tablet, Rfl: 1    rosuvastatin (CRESTOR) 10 MG tablet, Take 1 tablet (10 mg total) by mouth daily, Disp: 90 tablet, Rfl: 3    sitaGLIPtin (JANUVIA) 100 mg tablet, Take 1 tablet (100 mg total) by mouth daily, Disp: 90 tablet, Rfl: 3    tamsulosin (FLOMAX) 0 4 mg, TAKE ONE CAPSULE BY MOUTH EVERY DAY WITH DINNER, Disp: 30 capsule, Rfl: 6    Tart Cherry (Tart Cherry Ultra) 1200 MG CAPS, , Disp: , Rfl:     Turmeric 500 MG CAPS, , Disp: , Rfl:   Review of Systems   Constitutional: Positive for fatigue (chronic since cardiac surgery)  Negative for activity change, appetite change and unexpected weight change     HENT: Negative for trouble swallowing  Eyes: Negative for visual disturbance  Respiratory: Negative for shortness of breath  Cardiovascular: Negative for chest pain and palpitations  Gastrointestinal: Negative for constipation and diarrhea  Endocrine: Negative for polydipsia and polyuria  Musculoskeletal: Negative  Skin: Negative for wound  Neurological: Negative for numbness  Tingling in toes   Psychiatric/Behavioral: Negative  Physical Exam:  Body mass index is 25 62 kg/m²  /62   Pulse 60   Temp (!) 97 °F (36 1 °C) (Tympanic)   Ht 6' 3" (1 905 m)   Wt 93 kg (205 lb)   BMI 25 62 kg/m²    Wt Readings from Last 3 Encounters:   07/14/22 93 kg (205 lb)   06/17/22 92 1 kg (203 lb)   05/24/22 92 1 kg (203 lb)       Physical Exam  Vitals and nursing note reviewed  Constitutional:       Appearance: He is well-developed  HENT:      Head: Normocephalic  Eyes:      General: No scleral icterus  Pupils: Pupils are equal, round, and reactive to light  Neck:      Thyroid: No thyromegaly  Cardiovascular:      Rate and Rhythm: Normal rate and regular rhythm  Pulses:           Radial pulses are 2+ on the right side and 2+ on the left side  Heart sounds: No murmur heard  Pulmonary:      Effort: Pulmonary effort is normal  No respiratory distress  Breath sounds: Normal breath sounds  No wheezing  Musculoskeletal:      Cervical back: Neck supple  Skin:     General: Skin is warm and dry  Neurological:      Mental Status: He is alert  Patient's shoes and socks were not removed            Labs:   Component      Latest Ref Rng & Units 12/1/2021 3/1/2022 6/28/2022   Sodium      136 - 145 mmol/L 139 140 140   Potassium      3 5 - 5 3 mmol/L 4 2 4 1 4 2   Chloride      100 - 108 mmol/L 109 (H) 108 110 (H)   CO2      21 - 32 mmol/L 26 28 26   Anion Gap      4 - 13 mmol/L 4 4 4   BUN      5 - 25 mg/dL 27 (H) 25 22   Creatinine      0 60 - 1 30 mg/dL 1 13 1 21 1 03   GLUCOSE FASTING 65 - 99 mg/dL 156 (H) 200 (H) 168 (H)   Calcium      8 3 - 10 1 mg/dL 9 2 9 7 9 3   eGFR      ml/min/1 73sq m 64 59 71   EXT Creatinine Urine      mg/dL 46 0  74 5   MICROALBUM ,U,RANDOM      0 0 - 20 0 mg/L 5 0  9 0   MICROALBUMIN/CREATININE RATIO      0 - 30 mg/g creatinine 11  12   Hemoglobin A1C      Normal 3 8-5 6%; PreDiabetic 5 7-6 4%; Diabetic >=6 5%; Glycemic control for adults with diabetes <7 0% % 7 6 (H) 7 1 (H) 7 5 (H)   eAG, EST AVG Glucose      mg/dl 171 157 169         Plan:    Diagnoses and all orders for this visit:    Type 2 diabetes mellitus with hyperglycemia, without long-term current use of insulin ([facility])  HGA1C 7 5%  Worsened  Treatment regimen: BG levels tend to be elevated in the mornings  [place] is also larger meal which likely results in higher BG in the evenings  Start Prandin 0 5 mg with dinner  Skip dose if skipping dinner or eating dinner without carbs  Episodes of hypoglycemia can lead to permanent disability and death  Discussed risks/complications associated with uncontrolled diabetes  Advised to adhere to diabetic diet, and recommended staying active/exercising routinely as tolerated  Keep carbohydrates consistent to limit blood glucose fluctuations  Advised to call if blood sugars less than 70 mg/dl or over 300 mg/dl  Check blood glucose 1-2 times a day  Discussed symptoms and treatment of hypoglycemia  Recommended routine follow-up with podiatry and ophthalmology  Send log in 1-2 weeks  Ordered blood work to complete prior to next visit  -     Hemoglobin A1C; Future  -     Basic metabolic panel; Future  -     Microalbumin / creatinine urine ratio; Future  -     repaglinide (PRANDIN) 0 5 mg tablet; Take 1 tablet (0 5 mg total) by mouth daily before dinner    Essential hypertension  Blood pressure well controlled, continue current treatment   -     Basic metabolic panel;  Future    Mixed hyperlipidemia  LDL previously 39  Continue statin therapy   Managed by cardiology  -     Lipid panel; Future         Discussed with the patient diagnosis and treatment and all questions fully answered  He will call me if any problems arise  Counseled patient on diagnostic results, prognosis, risk and benefit of treatment options, instruction for management, importance of treatment compliance, risk factor reduction and impressions        Quin Gonzáles PA-C

## 2022-07-15 ENCOUNTER — OFFICE VISIT (OUTPATIENT)
Dept: FAMILY MEDICINE CLINIC | Facility: CLINIC | Age: 72
End: 2022-07-15
Payer: MEDICARE

## 2022-07-15 ENCOUNTER — TELEPHONE (OUTPATIENT)
Dept: CARDIOLOGY CLINIC | Facility: CLINIC | Age: 72
End: 2022-07-15

## 2022-07-15 VITALS
HEIGHT: 75 IN | WEIGHT: 204.4 LBS | TEMPERATURE: 97.8 F | SYSTOLIC BLOOD PRESSURE: 98 MMHG | HEART RATE: 55 BPM | DIASTOLIC BLOOD PRESSURE: 60 MMHG | RESPIRATION RATE: 16 BRPM | BODY MASS INDEX: 25.41 KG/M2 | OXYGEN SATURATION: 96 %

## 2022-07-15 DIAGNOSIS — E78.2 MIXED HYPERLIPIDEMIA: ICD-10-CM

## 2022-07-15 DIAGNOSIS — N43.3 HYDROCELE IN ADULT: ICD-10-CM

## 2022-07-15 DIAGNOSIS — I97.89 POSTOPERATIVE ATRIAL FIBRILLATION (HCC): ICD-10-CM

## 2022-07-15 DIAGNOSIS — I10 ESSENTIAL HYPERTENSION: ICD-10-CM

## 2022-07-15 DIAGNOSIS — E11.65 TYPE 2 DIABETES MELLITUS WITH HYPERGLYCEMIA, WITHOUT LONG-TERM CURRENT USE OF INSULIN (HCC): Primary | ICD-10-CM

## 2022-07-15 DIAGNOSIS — I48.91 POSTOPERATIVE ATRIAL FIBRILLATION (HCC): ICD-10-CM

## 2022-07-15 DIAGNOSIS — E11.65 TYPE 2 DIABETES MELLITUS WITH HYPERGLYCEMIA, WITHOUT LONG-TERM CURRENT USE OF INSULIN (HCC): ICD-10-CM

## 2022-07-15 PROCEDURE — 99214 OFFICE O/P EST MOD 30 MIN: CPT | Performed by: NURSE PRACTITIONER

## 2022-07-15 RX ORDER — CHOLECALCIFEROL (VITAMIN D3) 25 MCG
TABLET ORAL
COMMUNITY

## 2022-07-15 NOTE — ASSESSMENT & PLAN NOTE
Cont f/u with cardiology  If bp and dizziness/lightheadedness continue may need to decrease metoprolol to 25mg  Patient instructed to notify cardiology for further instructions and to monitor bp and symptoms at home

## 2022-07-15 NOTE — PROGRESS NOTES
FAMILY PRACTICE OFFICE VISIT       NAME: Jus Chavez  AGE: 74 y o  SEX: male       : 1950        MRN: [id number]    DATE: 7/15/2022  TIME: 2:01 PM    Assessment and Plan   1  Type 2 diabetes mellitus with hyperglycemia, without long-term current use of insulin (HCC)    2  Hydrocele in adult  Assessment & Plan:  Cont f/u with urology        3  Postoperative atrial fibrillation [facility])  Assessment & Plan:  Cont f/u with cardiology  If bp and dizziness/lightheadedness continue may need to decrease metoprolol to 25mg  Patient instructed to notify cardiology for further instructions and to monitor bp and symptoms at home        4  Essential hypertension  Assessment & Plan:  Monitor        5  Mixed hyperlipidemia  Assessment & Plan:  Stable  Cont meds                     Chief Complaint     Chief Complaint   Patient presents with    Follow-up     Patient is here for his DM type 2, hydrocele, atrial fib, hyperlipid, hx of cad       History of Present Illness   Sascha Lucero is a 74y o -year-old male who is here for f/u to chronic medical conditions  Today had episode of lightheadedness   Drank water and felt better  Had episode one other time of the same, drank water and improved  Blood sugars have been mild elevated at times and endo added prandin   Follows with cardiology for CAD  Had hydrocelectomy with urology, continues with hydrocele despite treatment  Review of Systems   Review of Systems   Constitutional: Negative for fatigue and fever  HENT: Negative for congestion and postnasal drip  Eyes: Negative for photophobia and visual disturbance  Respiratory: Negative for cough, shortness of breath and wheezing  Cardiovascular: Negative for chest pain and palpitations  Gastrointestinal: Negative for constipation, diarrhea, nausea and vomiting  Endocrine: Negative for polydipsia, polyphagia and polyuria  Genitourinary: Negative for dysuria and frequency     Musculoskeletal: Negative for arthralgias and myalgias  Skin: Negative for rash  Neurological: Positive for dizziness and light-headedness  Negative for headaches  Psychiatric/Behavioral: Negative for dysphoric mood and sleep disturbance  The patient is not nervous/anxious          Active Problem List     Patient Active Problem List   Diagnosis    Aortic stenosis    Coronary artery disease of native artery of native heart with stable angina pectoris ([facility] [address] )    DM [facility]abetes mellitus[facility] 2 [facility]    Essential hypertension    Hypertriglyceridemia    S/P CABG (coronary artery bypass graft)    S/P AVR    PVC (premature ventricular contraction)    Bilateral carotid artery stenosis    Mixed hyperlipidemia    Anus irritation    Postoperative atrial fibrillation (HCC)    Benign prostatic hyperplasia with urinary frequency    Stenosis of right subclavian artery (HCC)    Hydrocele in adult    IBS (irritable bowel syndrome)    Medicare annual wellness visit, subsequent    Lower urinary tract symptoms due to benign prostatic hyperplasia         Past Medical History:  Past Medical History:   Diagnosis Date    Acute systolic heart failure [facility] ) 02/23/2021    Athlete's foot     Bilateral impacted cerumen 03/09/2021    Diabetes mellitus ([facility]     Other hydrocele 06/11/2020    TMJ (dislocation of temporomandibular joint)     Yeast infection        Past Surgical History:  Past Surgical History:   Procedure Laterality Date    CARDIAC SURGERY      CARDIAC VALVE REPLACEMENT      CATARACT EXTRACTION      COLONOSCOPY      CORONARY ARTERY BYPASS GRAFT      EYE SURGERY  03/2020    Cataract right eye    EYE SURGERY Left 12/03/2020    left eye cataract     HYDROCELE EXCISION / REPAIR      DC REMOVAL OF HYDROCELE,TUNICA,UNILAT Right 04/08/2022    Procedure: HYDROCELECTOMY;  Surgeon: Landon Jalloh MD;  [facility];  Service: Urology    TONSILLECTOMY         Family History:  Family History   Problem Relation Age of Onset    Heart disease Mother     Cancer Mother     Heart disease Father     Hypertension Father     Mitral valve prolapse Sister     Colon cancer Neg Hx        Social History:  Social History     Socioeconomic History    Marital status: /Civil Union     Spouse name: Not on file    Number of children: Not on file    Years of education: Not on file    Highest education level: Not on file   Occupational History    Not on file   Tobacco Use    Smoking status: Never Smoker    Smokeless tobacco: Never Used   Vaping Use    Vaping Use: Never used   Substance and Sexual Activity    Alcohol use: Yes     Comment: 1 glass of wine or 1 beer a week    Drug use: Never    Sexual activity: Not on file   Other Topics Concern    Not on file   Social History Narrative    Not on file     Social Determinants of Health     Financial Resource Strain: Not on file   Food Insecurity: Not on file   Transportation Needs: Not on file   Physical Activity: Not on file   Stress: Not on file   Social Connections: Not on file   Intimate Partner Violence: Not on file   Housing Stability: Not on file       Objective     Vitals:    07/15/22 1016   BP: 98/60   Pulse: 55   Resp: 16   Temp: 97 8 °F (36 6 °C)   SpO2: 96%     Wt Readings from Last 3 Encounters:   07/15/22 92 7 kg (204 lb 6 4 oz)   07/14/22 93 kg (205 lb)   06/17/22 92 1 kg (203 lb)       Physical Exam  Vitals and nursing note reviewed  Constitutional:       Appearance: Normal appearance  HENT:      Head: Normocephalic and atraumatic  Right Ear: Tympanic membrane, ear canal and external ear normal       Left Ear: Tympanic membrane, ear canal and external ear normal       Nose: Nose normal       Mouth/Throat:      Mouth: Mucous membranes are moist    Eyes:      Extraocular Movements: Extraocular movements intact  Conjunctiva/sclera: Conjunctivae normal       Pupils: Pupils are equal, round, and reactive to light     Cardiovascular:      Rate and Rhythm: Normal rate and regular rhythm  Pulses: Normal pulses  Heart sounds: Normal heart sounds  Pulmonary:      Effort: Pulmonary effort is normal       Breath sounds: Normal breath sounds  Abdominal:      General: Bowel sounds are normal    Musculoskeletal:         General: Normal range of motion  Cervical back: Normal range of motion and neck supple  Skin:     General: Skin is warm and dry  Capillary Refill: Capillary refill takes less than 2 seconds  Neurological:      General: No focal deficit present  Mental Status: He is alert  Psychiatric:         Mood and Affect: Mood normal          Behavior: Behavior normal          Thought Content: Thought content normal          Judgment: Judgment normal          Pertinent Laboratory/Diagnostic Studies:  Lab Results   Component Value Date    BUN 22 06/28/2022    CREATININE 1 03 06/28/2022    CALCIUM 9 3 06/28/2022    K 4 2 06/28/2022    CO2 26 06/28/2022     (H) 06/28/2022     Lab Results   Component Value Date    ALT 26 03/22/2021    AST 14 03/22/2021    ALKPHOS 62 03/22/2021       Lab Results   Component Value Date    WBC 8 24 03/22/2021    HGB 14 5 03/22/2021    HCT 42 1 03/22/2021    MCV 89 03/22/2021     03/22/2021       No results found for: TSH    No results found for: CHOL  Lab Results   Component Value Date    TRIG 179 (H) 03/22/2021     Lab Results   Component Value Date    HDL 46 03/22/2021     Lab Results   Component Value Date    LDLCALC 39 03/22/2021     Lab Results   Component Value Date    HGBA1C 7 5 (H) 06/28/2022       Results for orders placed or performed in visit on 06/28/22   PSA Total, Diagnostic   Result Value Ref Range    PSA, Diagnostic 0 4 0 0 - 4 0 ng/mL   Microalbumin / creatinine urine ratio   Result Value Ref Range    Creatinine, Ur 74 5 mg/dL    Microalbum  ,U,Random 9 0 0 0 - 20 0 mg/L    Microalb Creat Ratio 12 0 - 30 mg/g creatinine   Basic metabolic panel   Result Value Ref Range    Sodium 140 136 - 145 mmol/L    Potassium 4 2 3 5 - 5 3 mmol/L    Chloride 110 (H) 100 - 108 mmol/L    CO2 26 21 - 32 mmol/L    ANION GAP 4 4 - 13 mmol/L    BUN 22 5 - 25 mg/dL    Creatinine 1 03 0 60 - 1 30 mg/dL    Glucose, Fasting 168 (H) 65 - 99 mg/dL    Calcium 9 3 8 3 - 10 1 mg/dL    eGFR 71 ml/min/1 73sq m   Hemoglobin A1C   Result Value Ref Range    Hemoglobin A1C 7 5 (H) Normal 3 8-5 6%; PreDiabetic 5 7-6 4%; Diabetic >=6 5%; Glycemic control for adults with diabetes <7 0% %     mg/dl       No orders of the defined types were placed in this encounter        ALLERGIES:  No Known Allergies    Current Medications     Current Outpatient Medications   Medication Sig Dispense Refill    acetaminophen (TYLENOL) 500 mg tablet Take 500 mg by mouth daily at bedtime      ascorbic acid (VITAMIN C) 250 mg tablet Take 500 mg by mouth 2 (two) times a day      aspirin (ECOTRIN LOW STRENGTH) 81 mg EC tablet Take 81 mg by mouth daily      betamethasone dipropionate (DIPROSONE) 0 05 % ointment       Cholecalciferol (Vitamin D3) 25 MCG TABS Take by mouth      clotrimazole-betamethasone (LOTRISONE) 1-0 05 % cream Apply topically 2 (two) times a day as needed (reddness) 30 g 3    finasteride (PROSCAR) 5 mg tablet Take 1 tablet (5 mg total) by mouth daily 30 tablet 11    furosemide (LASIX) 20 mg tablet Take 1 tablet (20 mg total) by mouth daily as needed (edema) 30 tablet 0    Ginger, Zingiber officinalis, (Ginger Root) 500 MG CAPS       hydrocortisone (ANUSOL-HC) 2 5 % rectal cream Apply topically 2 (two) times a day (Patient taking differently: Apply topically 2 (two) times a day as needed) 30 g 2    Jardiance 25 MG TABS Take 1 tablet (25 mg total) by mouth daily 90 tablet 3    metFORMIN (GLUCOPHAGE) 500 mg tablet TAKE TWO TABLETS BY MOUTH TWICE A  tablet 1    metoprolol succinate (TOPROL-XL) 50 mg 24 hr tablet Take 1 tablet (50 mg total) by mouth daily 90 tablet 3    niacin 500 mg tablet Take 500 mg by mouth 4 (four) times a day      NON FORMULARY        Omega-3 Fatty Acids (fish oil) 1,000 mg       PROBIOTIC PRODUCT PO Take 111 mg by mouth daily      repaglinide (PRANDIN) 0 5 mg tablet Take 1 tablet (0 5 mg total) by mouth daily before dinner 90 tablet 1    rosuvastatin (CRESTOR) 10 MG tablet Take 1 tablet (10 mg total) by mouth daily 90 tablet 3    sitaGLIPtin (JANUVIA) 100 mg tablet Take 1 tablet (100 mg total) by mouth daily 90 tablet 3    tamsulosin (FLOMAX) 0 4 mg TAKE ONE CAPSULE BY MOUTH EVERY DAY WITH DINNER 30 capsule 6    Tart Cherry (Tart Cherry Ultra) 1200 MG CAPS       Turmeric 500 MG CAPS       Wheat Dextrin (BENEFIBER DRINK MIX PO) Take by mouth       No current facility-administered medications for this visit  Health Maintenance     Health Maintenance   Topic Date Due    COVID-19 Vaccine (4 - Booster for Pfizer series) 02/07/2022    Diabetic Foot Exam  08/02/2022    Influenza Vaccine (1) 09/01/2022    Hepatitis C Screening  08/15/2024 (Originally 1950)    Medicare Annual Wellness Visit (AWV)  10/27/2022    Pneumococcal Vaccine: 65+ Years (2 - PCV) 10/27/2022    HEMOGLOBIN A1C  12/28/2022    DM Eye Exam  02/21/2023    BMI: Followup Plan  03/15/2023    URINE MICROALBUMIN  06/28/2023    Fall Risk  07/15/2023    Depression Screening  07/15/2023    BMI: Adult  07/15/2023    Colorectal Cancer Screening  03/02/2027    HIB Vaccine  Aged Out    Hepatitis B Vaccine  Aged Out    IPV Vaccine  Aged Out    Hepatitis A Vaccine  Aged Out    Meningococcal ACWY Vaccine  Aged Out    HPV Vaccine  Aged Out     Immunization History   Administered Date(s) Administered    COVID-19 PFIZER VACCINE 0 3 ML IM 03/07/2021, 03/26/2021, 10/07/2021    Influenza, high dose seasonal 0 7 mL 10/27/2021    Pneumococcal Polysaccharide PPV23 10/27/2021       Depression Screening and Follow-up Plan: Patient was screened for depression during today's encounter   They screened negative with a PHQ-2 score of 0          HESHAM Wallace

## 2022-07-15 NOTE — PATIENT INSTRUCTIONS
10% - bad control"> 10% - bad control,Hemoglobin A1c (HbA1c) greater than 10% indicating poor diabetic control,Haemoglobin A1c greater than 10% indicating poor diabetic control">   Diabetes Mellitus Type 2 in Adults, Ambulatory Care   GENERAL INFORMATION:   Diabetes mellitus type 2  is a disease that affects how your body uses glucose (sugar)  Insulin helps move sugar out of the blood so it can be used for energy  Normally, when the blood sugar level increases, the pancreas makes more insulin  Type 2 diabetes develops because either the body cannot make enough insulin, or it cannot use the insulin correctly  After many years, your pancreas may stop making insulin  Common symptoms include the following:   · More hunger or thirst than usual     · Frequent urination     · Weight loss without trying     · Blurred vision  Seek immediate care for the following symptoms:   · Severe abdominal pain, or pain that spreads to your back  You may also be vomiting  · Trouble staying awake or focusing    · Shaking or sweating    · Blurred or double vision    · Breath has a fruity, sweet smell    · Breathing is deep and labored, or rapid and shallow    · Heartbeat is fast and weak  Treatment for diabetes mellitus type 2  includes keeping your blood sugar at a normal level  You must eat the right foods, and exercise regularly  You may also need medicine if you cannot control your blood sugar level with nutrition and exercise  Manage diabetes mellitus type 2:   · Check your blood sugar level  You will be taught how to check a small drop of blood in a glucose monitor  Ask your healthcare provider when and how often to check during the day  Ask your healthcare provider what your blood sugar levels should be when you check them  · Keep track of carbohydrates (sugar and starchy foods)  Your blood sugar level can get too high if you eat too many carbohydrates   Your dietitian will help you plan meals and snacks that have the right amount of carbohydrates  · Eat low-fat foods  Some examples are skinless chicken and low-fat milk  · Eat less sodium (salt)  Some examples of high-sodium foods to limit are soy sauce, potato chips, and soup  Do not add salt to food you cook  Limit your use of table salt  · Eat high-fiber foods  Foods that are a good source of fiber include vegetables, whole grain bread, and beans  · Limit alcohol  Alcohol affects your blood sugar level and can make it harder to manage your diabetes  Women should limit alcohol to 1 drink a day  Men should limit alcohol to 2 drinks a day  A drink of alcohol is 12 ounces of beer, 5 ounces of wine, or 1½ ounces of liquor  · Get regular exercise  Exercise can help keep your blood sugar level steady, decrease your risk of heart disease, and help you lose weight  Exercise for at least 30 minutes, 5 days a week  Include muscle strengthening activities 2 days each week  Work with your healthcare provider to create an exercise plan  · Check your feet each day  for injuries or open sores  Ask your healthcare provider for activities you can do if you have an open sore  · Quit smoking  If you smoke, it is never too late to quit  Smoking can worsen the problems that may occur with diabetes  Ask your healthcare provider for information about how to stop smoking if you are having trouble quitting  · Ask about your weight:  Ask healthcare providers if you need to lose weight, and how much to lose  Ask them to help you with a weight loss program  Even a 10 to 15 pound weight loss can help you manage your blood sugar level  · Carry medical alert identification  Wear medical alert jewelry or carry a card that says you have diabetes  Ask your healthcare provider where to get these items  · Ask about vaccines  Diabetes puts you at risk of serious illness if you get the flu, pneumonia, or hepatitis   Ask your healthcare provider if you should get a flu, pneumonia, or hepatitis B vaccine, and when to get the vaccine  Follow up with your healthcare provider as directed:  Write down your questions so you remember to ask them during your visits  CARE AGREEMENT:   You have the right to help plan your care  Learn about your health condition and how it may be treated  Discuss treatment options with your caregivers to decide what care you want to receive  You always have the right to refuse treatment  The above information is an  only  It is not intended as medical advice for individual conditions or treatments  Talk to your doctor, nurse or pharmacist before following any medical regimen to see if it is safe and effective for you  © 2014 [address] is for End User's use only and may not be sold, redistributed or otherwise used for commercial purposes  All illustrations and images included in CareNotes® are the copyrighted property of [employer]  or Adonay Garcia  Basic Carbohydrate Counting   AMBULATORY CARE:   Carbohydrate counting  is a way to plan your meals by counting the amount of carbohydrate in foods  Carbohydrates are the sugars, starches, and fiber found in fruit, grains, vegetables, and milk products  Carbohydrates increase your blood sugar levels  Carbohydrate counting can help you eat the right amount of carbohydrate to keep your blood sugar levels under control  What you need to know about planning meals using carbohydrate counting:  · A dietitian or healthcare provider will help you develop a healthy meal plan that works best for you  You will be taught how much carbohydrate to eat or drink for each meal and snack  Your meal plan will be based on your age, weight, usual food intake, and physical activity level  If you have diabetes, it will also include your blood sugar levels and diabetes medicine   Once you know how much carbohydrate you should eat, you can decide what type of food you want to eat  · You will need to know what foods contain carbohydrate and how much they contain  Keep track of the amount of carbohydrate in meals and snacks in order to follow your meal plan  Do not avoid carbohydrates or skip meals  Your blood sugar may fall too low if you do not eat enough carbohydrate or you skip meals  Foods that contain carbohydrate:   · Breads:  Each serving of food listed below contains about 15 g of carbohydrate   ? 1 slice of bread (1 ounce) or 1 flour or corn tortilla (6 inch)    ? ½ of a hamburger bun or ¼ of a large bagel (about 1 ounce)    ? 1 pancake (about 4 inches across and ¼ inch thick)    · Cereals and grains:  Serving sizes of ready-to-eat cereals vary  Look at the serving size and the total carbohydrate amount listed on the food label  Each serving of food listed below contains about 15 g of carbohydrate   ? ¾ cup of dry, unsweetened, ready-to-eat cereal or ¼ cup of low-fat granola     ? ½ cup of oatmeal or other cooked cereal     ? ? cup of cooked rice or pasta    · Starchy vegetables and beans:  Each serving of food listed below contains about 15 g of carbohydrate   ? ½ cup of corn, green peas, sweet potatoes, or mashed potatoes    ? ¼ of a large baked potato    ? ½ cup of beans, lentils, and peas (garbanzo, schreiber, kidney, white, split, black-eyed)    · Crackers and snacks:  Each serving of food listed below contains about 15 g of carbohydrate   ? 3 juan david cracker squares or 8 animal crackers     ? 6 saltine-type crackers    ? 3 cups of popcorn or ¾ ounce of pretzels, potato chips, or tortilla chips    · Fruit:  Each serving of food listed below contains about 15 g of carbohydrate   ? 1 small (4 ounce) piece of fresh fruit or ¾ to 1 cup of fresh fruit    ? ½ cup of canned or frozen fruit, packed in natural juice    ? ½ cup (4 ounces) of unsweetened fruit juice    ?  2 tablespoons of dried fruit    · Desserts or sugary foods:  Each serving of food listed below contains about 15 g of carbohydrate   ? 2-inch square unfrosted cake or brownie     ? 2 small cookies    ? ½ cup of ice cream, frozen yogurt, or nondairy frozen yogurt    ? ¼ cup of sherbet or sorbet    ? 1 tablespoon of regular syrup, jam, or jelly    ? 2 tablespoons of light syrup    · Milk and yogurt:  Foods from the milk group contain about 12 g of carbohydrate per serving  ? 1 cup of fat-free or low-fat milk    ? 1 cup of soy milk    ? ? cup of fat-free, yogurt sweetened with artificial sweetener    · Non-starchy vegetables:  Each serving contains about 5 g of carbohydrate   Three servings of non-starch vegetables count as 1 carbohydrate serving  ? ½ cup of cooked vegetables or 1 cup of raw vegetables  This includes beets, broccoli, cabbage, cauliflower, cucumber, mushrooms, tomatoes, and zucchini    ? ½ cup of vegetable juice    How to use carbohydrate counting to plan meals:   · Count carbohydrate amounts using serving sizes:      ? Pasta dinner example: You plan to have pasta, tossed salad, and an 8-ounce glass of milk  Your healthcare provider tells you that you may have 4 carbohydrate servings for dinner  One carbohydrate serving of pasta is ? cup  One cup of pasta will equal 3 carbohydrate servings  An 8-ounce glass of milk will count as 1 carbohydrate serving  These amounts of food would equal 4 carbohydrate servings  One cup of tossed salad does not count toward your carbohydrate servings  · Count carbohydrate amounts using food labels:  Find the total amount of carbohydrate in a packaged food by reading the food label  Food labels tell you the serving size of the food and the total carbohydrate amount in each serving  Find the serving size on the food label and then decide how many servings you will eat  Multiply the number of servings you plan to eat by the carbohydrate amount per serving  ? Granola bar snack example:   Your meal plan allows you to have 2 carbohydrate servings (30 grams) of carbohydrate for a snack  You plan to eat 1 package of granola bars, which contains 2 bars  According to the food label, the serving size of food in this package is 1 bar  Each serving (1 bar) contains 25 grams of carbohydrate  The total amount of carbohydrate in this package of granola bars would be 50 g  Based on your meal plan, you should eat only 1 bar  Follow up with your doctor as directed:  Write down your questions so you remember to ask them during your visits  © Copyright [employer] 2022 Information is for End User's use only and may not be sold, redistributed or otherwise used for commercial purposes  All illustrations and images included in CareNotes® are the copyrighted property of A D A M [employer] Inc  or [facility] Savita Rice   The above information is an  only  It is not intended as medical advice for individual conditions or treatments  Talk to your doctor, nurse or pharmacist before following any medical regimen to see if it is safe and effective for you  Foot Care for People with Diabetes   AMBULATORY CARE:   What you need to know about foot care:   · Foot care helps protect your feet and prevent foot ulcers or sores  Long-term high blood sugar levels can damage the blood vessels and nerves in your legs and feet  This damage makes it hard to feel pressure, pain, temperature, and touch  You may not be able to feel a cut or sore, or shoes that are too tight  Foot care is needed to prevent serious problems, such as an infection or amputation  · Diabetes may cause your toes to become crooked or curved under  These changes may affect the way you walk and can lead to increased pressure on your foot  The pressure can decrease blood flow to your feet  Lack of blood flow increases your risk for a foot ulcer  Do not ignore small problems, such as dry skin or small wounds  These can become life-threatening over time without proper care      Call your care team provider if: · Your feet become numb, weak, or hard to move  · You have pus draining from a sore on your foot  · You have a wound on your foot that gets bigger, deeper, or does not heal      · You see blisters, cuts, scratches, calluses, or sores on your foot  · You have a fever, and your feet become red, warm, and swollen  · Your toenails become thick, curled, or yellow  · You find it hard to check your feet because your vision is poor  · You have questions or concerns about your condition or care  How to care for your feet:   · Check your feet each day  Look at your whole foot, including the bottom, and between and under your toes  Check for wounds, corns, and calluses  Use a mirror to see the bottom of your feet  The skin on your feet may be shiny, tight, or darker than normal  Your feet may also be cold and pale  Feel your feet by running your hands along the tops, bottoms, sides, and between your toes  Redness, swelling, and warmth are signs of blood flow problems that can lead to a foot ulcer  Do not try to remove corns or calluses yourself  · Wash your feet each day with soap and warm water  Do not use hot water, because this can injure your foot  Dry your feet gently with a towel after you wash them  Dry between and under your toes  · Apply lotion or a moisturizer on your dry feet  Ask your care team provider what lotions are best to use  Do not put lotion or moisturizer between your toes  Moisture between your toes could lead to skin breakdown  · Cut your toenails correctly  File or cut your toenails straight across  Use a soft brush to clean around your toenails  If your toenails are very thick, you may need to have a care team provider or specialist cut them  · Protect your feet  Do not walk barefoot or wear your shoes without socks  Check your shoes for rocks or other objects that can hurt your feet  Wear cotton socks to help keep your feet dry   Wear socks without toe seams, or wear them with the seams inside out  Change your socks each day  Do not wear socks that are dirty or damp  · Wear shoes that fit well  Wear shoes that do not rub against any area of your feet  Your shoes should be ½ to ¾ inch (1 to 2 centimeters) longer than your feet  Your shoes should also have extra space around the widest part of your feet  Walking or athletic shoes with laces or straps that adjust are best  Ask your care team provider for help to choose shoes that fit you best  Ask him or her if you need to wear an insert, orthotic, or bandage on your feet  · Go to your follow-up visits  Your care team provider will do a foot exam at least once a year  You may need a foot exam more often if you have nerve damage, foot deformities, or ulcers  He will check for nerve damage and how well you can feel your feet  He will check your shoes to see if they fit well  · Do not smoke  Smoking can damage your blood vessels and put you at increased risk for foot ulcers  Ask your care team provider for information if you currently smoke and need help to quit  E-cigarettes or smokeless tobacco still contain nicotine  Talk to your care team provider before you use these products  Follow up with your diabetes care team provider or foot specialist as directed: You will need to have your feet checked at least once a year  You may need a foot exam more often if you have nerve damage, foot deformities, or ulcers  Write down your questions so you remember to ask them during your visits  © Copyright [employer] 2022 Information is for End User's use only and may not be sold, redistributed or otherwise used for commercial purposes  All illustrations and images included in CareNotes® are the copyrighted property of [employer]  or Miguel Rice   The above information is an  only  It is not intended as medical advice for individual conditions or treatments   Talk to your doctor, nurse or pharmacist before following any medical regimen to see if it is safe and effective for you  What to Do if Your Blood Sugar is Low   AMBULATORY CARE:   Low blood sugar levels  (hypoglycemia) can happen with Type 1 and Type 2 diabetes  Low levels are more likely to happen if you use insulin  Hypoglycemia can cause you to have falls, accidents, and injuries  A blood sugar level that gets too low can lead to seizures, coma, and death  Learn to recognize the symptoms early so you can get treatment quickly  When your blood sugar is low you may feel:  · Sweaty    · Nervous or shaky    · Anxious or irritable    · Confused    · A fast, pounding heartbeat    · Extremely hungry    Have someone call your local emergency number (911 in the [address]) if:   · You cannot be woken  · You have a seizure  Call your doctor if:   · You have symptoms of a low blood sugar level, such as trouble thinking, sweating, or a pounding heartbeat  · Your blood sugar level is lower than normal and it does not improve with treatment  · You often have lower blood sugar levels than your target goals  · You have trouble coping with your illness, or you feel anxious or depressed  · You have questions or concerns about your condition or care  What to do if you have symptoms of low blood sugar:   · Check your blood sugar level, if possible  Your blood sugar level is too low if it is at or below 70 mg/dL  · Eat or drink 15 grams of fast-acting carbohydrate  Fast-acting carbohydrates will raise your blood sugar level quickly  Examples of 15 grams of fast-acting carbohydrates:     ? 4 ounces (½ cup) of fruit juice     ? 4 ounces of regular soda    ? 2 tablespoons of raisins     ? 1 tube of glucose gel or 3 to 4 glucose tablets       · Check your blood sugar level 15 minutes later  If the level is still low (less than 100 mg/dL), eat another 15 grams of carbohydrate   When the level returns to 100 mg/dL, eat a snack or meal that contains carbohydrates  This will help prevent another drop in blood sugar  · Teach people close to you how to use your glucagon kit  Your blood sugar may be too low for you to be awake  People need to know when and how to use your kit  Prevent low blood sugar levels:  Prevent low blood sugar by knowing what increases your risk  Ask your healthcare provider for ways to prevent low blood sugar levels  Any of the following can increase your risk of low blood sugar:  · Fasting for tests or procedures    · During or after intense exercise    · Late or postponed meals    · Sleeping (you may need a bedtime snack)     · Drinking alcohol if you use insulin or insulin releasing pills    Follow up with your doctor as directed:  Write down your questions so you remember to ask them during your visits  © Copyright [employer] 2022 Information is for End User's use only and may not be sold, redistributed or otherwise used for commercial purposes  All illustrations and images included in CareNotes® are the copyrighted property of A D A M , Inc  or [facility] Savita Rice   The above information is an  only  It is not intended as medical advice for individual conditions or treatments  Talk to your doctor, nurse or pharmacist before following any medical regimen to see if it is safe and effective for you

## 2022-07-19 ENCOUNTER — TELEPHONE (OUTPATIENT)
Dept: CARDIOLOGY CLINIC | Facility: CLINIC | Age: 72
End: 2022-07-19

## 2022-07-19 NOTE — TELEPHONE ENCOUNTER
Pt returned phone call from  re: medication changes    Pt has been taking medications, only change is Metoprolol taking 25 mg now  No S O B  No swelling  Exercising regularly no changes to report  B/P has not been below 115  Only concern is some lightheadedness pt reports not drinking enough water and can experience symptoms but was concerned if changing metoprolol was contributing to lightheadedness?    Advised pt to continue to hydrate and I will route message to Dr Bryson Thornton

## 2022-07-19 NOTE — TELEPHONE ENCOUNTER
----- Message from Dylan Chavez sent at 7/19/2022 10:55 AM EDT -----  Regarding: Follow up   I reduced my intake of metoprolol in half as you suggested (from 50 mg to 25 mg per day)  Results over the weekend have been my blood pressure has been ranging 115 to 125 over the weekend  However, I have been experiencing a moderate headache that comes and goes

## 2022-07-19 NOTE — TELEPHONE ENCOUNTER
Pt left message on vm, stated he has cut his metoprolol in  half as directed by Dr Moses Escalera and his B/P is 115 to 125 and has been dizzy, called pt back no answer left message

## 2022-07-25 NOTE — TELEPHONE ENCOUNTER
Called pt to f/u on symptoms of lightheadness  Left message and call back number to discuss if symptoms continued

## 2022-07-26 DIAGNOSIS — E11.65 TYPE 2 DIABETES MELLITUS WITH HYPERGLYCEMIA, WITHOUT LONG-TERM CURRENT USE OF INSULIN (HCC): ICD-10-CM

## 2022-08-03 ENCOUNTER — OFFICE VISIT (OUTPATIENT)
Dept: PODIATRY | Facility: CLINIC | Age: 72
End: 2022-08-03
Payer: MEDICARE

## 2022-08-03 VITALS
HEIGHT: 75 IN | HEART RATE: 65 BPM | WEIGHT: 204 LBS | SYSTOLIC BLOOD PRESSURE: 126 MMHG | BODY MASS INDEX: 25.36 KG/M2 | DIASTOLIC BLOOD PRESSURE: 74 MMHG

## 2022-08-03 DIAGNOSIS — S90.426A BLISTER OF FOURTH TOE: ICD-10-CM

## 2022-08-03 DIAGNOSIS — E11.42 DIABETIC POLYNEUROPATHY ASSOCIATED WITH TYPE 2 DIABETES MELLITUS (HCC): Primary | ICD-10-CM

## 2022-08-03 DIAGNOSIS — B35.1 ONYCHOMYCOSIS: ICD-10-CM

## 2022-08-03 DIAGNOSIS — M20.42 HAMMER TOES OF BOTH FEET: ICD-10-CM

## 2022-08-03 DIAGNOSIS — M20.41 HAMMER TOES OF BOTH FEET: ICD-10-CM

## 2022-08-03 PROCEDURE — 99214 OFFICE O/P EST MOD 30 MIN: CPT | Performed by: PODIATRIST

## 2022-08-03 NOTE — PROGRESS NOTES
Assessment/Plan:         Diagnoses and all orders for this visit:    Diabetic polyneuropathy associated with type 2 diabetes mellitus (HCC)    Hammer toes of both feet    Onychomycosis    Blister of fourth toe  Comments:  resolved          Diagnosis and options discussed with patient  Patient agreeable to the plan as stated below    -Educated on DM risk to lower extremities, proper shoe gear, and daily monitoring of feet    -Educated on A1C and the risks of poorly controlled Diabetes  Reviewed recent A1C, 7 6  -Discussed weight loss and suitable exercise regiment  Reviewed most recent PCP visit on 7/15/22 and endocrine visit 7/14/22  He was prescribed prandin at night which he hopes is helping his BG    -Continue at risk DM foot care  Previous DM ulcer, deformity, neuropathy, edema, parasthesia, trophic changes to skin,   No acute pedal concerns today      Subjective:      Patient ID: Gissel Parish is a 74 y o  male  Patient presentsfor annual DM foot exam  He has extensive neuropathy in his feet  The nails are fungal  His A1C was 7 6 and just saw his PCP and endocrinologist  He was prescribed a new medication  The following portions of the patient's history were reviewed and updated as appropriate: allergies, current medications, past family history, past medical history, past social history, past surgical history and problem list     Review of Systems    Constitutional: Negative  HENT: Negative for sinus pressure and sinus pain  Respiratory: Negative for cough and shortness of breath  Cardiovascular: Negative for chest pain and leg swelling  Gastrointestinal: Negative for diarrhea, nausea and vomiting  Musculoskeletal: Negative for arthralgias, gait problem, joint swelling and myalgias  Skin: thick toenails  Neurological: Neuropthy  Psychiatric/Behavioral: The patient is not nervous/anxious          Objective:      /74   Pulse 65   Ht 6' 3" (1 905 m)   Wt 92 5 kg (204 lb)   BMI 25 50 kg/m²     Contains abnormal data Hemoglobin A1C  Order: [id number]   Status: Final result     Visible to patient: Yes (seen)     Next appt: Today at 10:45 AM in Podiatry [facility]     Dx: Type 2 diabetes mellitus with hypergl        1 Result Note     1 HM Topic    Component Ref Range & Units 6/28/22  8:24 AM 3/1/22  7:56 AM 12/1/21  8:20 AM 8/12/21 10:50 AM 3/22/21  7:36 AM 11/17/20  8:23 AM 7/20/20 12:00 AM   Hemoglobin A1C Normal 3 8-5 6%; PreDiabetic 5 7-6 4%; Diabetic >=6 5%; Glycemic control for adults with diabetes <7 0% % 7 5 High   7 1 High   7 6 High   7 4 High   7 4 High   6 9 High   7 4 R    EAG mg/dl 169  157  171  166                 Physical Exam  Vitals reviewed  Constitutional:       General: He is not in acute distress  Appearance: He is normal weight  He is not toxic-appearing  HENT:      Nose: No congestion or rhinorrhea  Cardiovascular:      Rate and Rhythm: Normal rate  Pulses: Normal pulses  no weak pulses          Dorsalis pedis pulses are 2+ on the right side and 2+ on the left side  Posterior tibial pulses are 2+ on the right side and 2+ on the left side  Pulmonary:      Effort: Pulmonary effort is normal  No respiratory distress  Musculoskeletal:         General: Deformity present  Right lower leg: Edema present  Left lower leg: Edema present  Right foot: Normal range of motion  Deformity present  No Charcot foot, foot drop or prominent metatarsal heads  Left foot: Normal range of motion  Deformity present  No Charcot foot, foot drop or prominent metatarsal heads  Feet:      Right foot:      Protective Sensation: 10 sites tested  5 sites sensed  Skin integrity: Dry skin present  No ulcer, skin breakdown, erythema or callus  Toenail Condition: Right toenails are abnormally thick  Fungal disease present  Left foot:      Protective Sensation: 10 sites tested  5 sites sensed        Skin integrity: Dry skin present  No ulcer, skin breakdown, erythema or callus  Toenail Condition: Left toenails are abnormally thick  Fungal disease present  Skin:     Capillary Refill: Capillary refill takes less than 2 seconds  Findings: No erythema or rash  Neurological:      Mental Status: He is alert and oriented to person, place, and time  Sensory: Sensory deficit present  Motor: No weakness  Gait: Gait normal    Psychiatric:         Mood and Affect: Mood normal        Diabetic Foot Exam    Patient's shoes and socks removed  Right Foot/Ankle   Right Foot Inspection  Skin Exam: skin intact and dry skin  No callus, no erythema, no maceration, no ulcer and no callus  Toe Exam: swelling and right toe deformity  Sensory   Vibration: absent  Proprioception: diminished  Monofilament testing: diminished    Vascular  Capillary refills: < 3 seconds  The right DP pulse is 2+  The right PT pulse is 2+  Right Toe  - Comprehensive Exam  Ecchymosis: none  Arch: pes planus  Hammertoes: second toe, third toe, fourth toe and fifth toe  Swelling: dorsum   Tenderness: none         Left Foot/Ankle  Left Foot Inspection  Skin Exam: skin intact and dry skin  No erythema, no maceration, no ulcer and no callus  Toe Exam: swelling and left toe deformity  Sensory   Vibration: absent  Proprioception: diminished  Monofilament testing: diminished    Vascular  Capillary refills: < 3 seconds  The left DP pulse is 2+  The left PT pulse is 2+       Left Toe  - Comprehensive Exam  Ecchymosis: none  Arch: pes planus  Hammertoes: second toe, fifth toe, fourth toe and third toe  Swelling: dorsum   Tenderness: none           Assign Risk Category  Deformity present  Loss of protective sensation  No weak pulses  Risk: 2

## 2022-08-03 NOTE — PATIENT INSTRUCTIONS
Foot Care for People with Diabetes   WHAT YOU NEED TO KNOW:   Foot care helps protect your feet and prevent foot ulcers or sores  Long-term high blood sugar levels can damage the blood vessels and nerves in your legs and feet  This damage makes it hard to feel pressure, pain, temperature, and touch  You may not be able to feel a cut or sore, or shoes that are too tight  Foot care is needed to prevent serious problems, such as an infection or amputation  Diabetes may cause your toes to become crooked or curved under  These changes may affect the way you walk and can lead to increased pressure on your foot  The pressure can decrease blood flow to your feet  Lack of blood flow increases your risk for a foot ulcer  Do not ignore small problems, such as dry skin or small wounds  These can become life-threatening over time without proper care  DISCHARGE INSTRUCTIONS:   Call your care team provider if:   Your feet become numb, weak, or hard to move  You have pus draining from a sore on your foot  You have a wound on your foot that gets bigger, deeper, or does not heal      You see blisters, cuts, scratches, calluses, or sores on your foot  You have a fever, and your feet become red, warm, and swollen  Your toenails become thick, curled, or yellow  You find it hard to check your feet because your vision is poor  You have questions or concerns about your condition or care  Foot care:   Check your feet each day  Look at your whole foot, including the bottom, and between and under your toes  Check for wounds, corns, and calluses  Use a mirror to see the bottom of your feet  The skin on your feet may be shiny, tight, or darker than normal  Your feet may also be cold and pale  Feel your feet by running your hands along the tops, bottoms, sides, and between your toes  Redness, swelling, and warmth are signs of blood flow problems that can lead to a foot ulcer   Do not try to remove corns or calluses yourself  Wash your feet each day with soap and warm water  Do not use hot water, because this can injure your foot  Dry your feet gently with a towel after you wash them  Dry between and under your toes  Apply lotion or a moisturizer on your dry feet  Ask your care team provider what lotions are best to use  Do not put lotion or moisturizer between your toes  Moisture between your toes could lead to skin breakdown  Cut your toenails correctly  File or cut your toenails straight across  Use a soft brush to clean around your toenails  If your toenails are very thick, you may need to have a care team provider or specialist cut them  Protect your feet  Do not walk barefoot or wear your shoes without socks  Check your shoes for rocks or other objects that can hurt your feet  Wear cotton socks to help keep your feet dry  Wear socks without toe seams, or wear them with the seams inside out  Change your socks each day  Do not wear socks that are dirty or damp  Wear shoes that fit well  Wear shoes that do not rub against any area of your feet  Your shoes should be ½ to ¾ inch (1 to 2 centimeters) longer than your feet  Your shoes should also have extra space around the widest part of your feet  Walking or athletic shoes with laces or straps that adjust are best  Ask your care team provider for help to choose shoes that fit you best  Ask him or her if you need to wear an insert, orthotic, or bandage on your feet  Do not smoke  Smoking can damage your blood vessels and put you at increased risk for foot ulcers  Ask your care team provider for information if you currently smoke and need help to quit  E-cigarettes or smokeless tobacco still contain nicotine  Talk to your care team provider before you use these products  Follow up with your diabetes care team provider or foot specialist as directed: You will need to have your feet checked at least once a year   You may need a foot exam more often if you have nerve damage, foot deformities, or ulcers  Write down your questions so you remember to ask them during your visits  © Copyright [employer] 2022 Information is for End User's use only and may not be sold, redistributed or otherwise used for commercial purposes  All illustrations and images included in CareNotes® are the copyrighted property of [employer]  or [facility] Savita Rice   The above information is an  only  It is not intended as medical advice for individual conditions or treatments  Talk to your doctor, nurse or pharmacist before following any medical regimen to see if it is safe and effective for you

## 2022-08-09 ENCOUNTER — OFFICE VISIT (OUTPATIENT)
Dept: PODIATRY | Facility: CLINIC | Age: 72
End: 2022-08-09
Payer: MEDICARE

## 2022-08-09 VITALS
BODY MASS INDEX: 25.36 KG/M2 | SYSTOLIC BLOOD PRESSURE: 120 MMHG | HEIGHT: 75 IN | HEART RATE: 65 BPM | DIASTOLIC BLOOD PRESSURE: 62 MMHG | WEIGHT: 204 LBS

## 2022-08-09 DIAGNOSIS — S90.821A BLISTER OF RIGHT FOOT, INITIAL ENCOUNTER: Primary | ICD-10-CM

## 2022-08-09 DIAGNOSIS — E11.42 DIABETIC POLYNEUROPATHY ASSOCIATED WITH TYPE 2 DIABETES MELLITUS (HCC): ICD-10-CM

## 2022-08-09 PROCEDURE — 99213 OFFICE O/P EST LOW 20 MIN: CPT | Performed by: PODIATRIST

## 2022-08-09 RX ORDER — CEPHALEXIN 500 MG/1
500 CAPSULE ORAL EVERY 6 HOURS SCHEDULED
Qty: 20 CAPSULE | Refills: 0 | Status: SHIPPED | OUTPATIENT
Start: 2022-08-09 | End: 2022-08-14

## 2022-08-09 NOTE — PROGRESS NOTES
Assessment/Plan:         Diagnoses and all orders for this visit:    Blister of right foot, initial encounter  -     cephalexin (KEFLEX) 500 mg capsule; Take 1 capsule (500 mg total) by mouth every 6 (six) hours for 5 days    Diabetic polyneuropathy associated with type 2 diabetes mellitus ([address] )      Diagnosis and options discussed with patient  Patient agreeable to the plan as stated below    Patient has developed a blister in the interspace of his 3rd 4th 5th toes on the right foot  The I suspect this was due to him wearing a toe crest that was causing too much friction  We will discontinue use of the toe crest   The blister was cleaned  It does not look overtly infected but given his neuropathy and history of infections I did recommend a short antibiotic  Dosing and side effects discussed  Betadine between toes and keep cotton gauze between all the toes  Follow-up in 2 weeks  We discussed signs of worsening infection  Call if suspected  Patient told to rest and elevate feet throughout the day  Subjective:      Patient ID: Micah Pichardo is a 74 y o  male  Patient has been wearing a toe creat but it caused his foot to blister  He made an emergency appt today because his wife was worried about how it looked  The following portions of the patient's history were reviewed and updated as appropriate: allergies, current medications, past family history, past medical history, past social history, past surgical history and problem list     Review of Systems    Constitutional: Negative  HENT: Negative for sinus pressure and sinus pain  Respiratory: Negative for cough and shortness of breath  Cardiovascular: Negative for chest pain and leg swelling  Gastrointestinal: Negative for diarrhea, nausea and vomiting  Musculoskeletal: Negative for arthralgias, gait problem, joint swelling and myalgias  Skin: blister  Neurological: Negative for weakness, numbness and headaches  Psychiatric/Behavioral: The patient is not nervous/anxious  Objective:      /62   Pulse 65   Ht 6' 3" (1 905 m)   Wt 92 5 kg (204 lb)   BMI 25 50 kg/m²          Physical Exam  Vitals reviewed  Cardiovascular:      Rate and Rhythm: Normal rate  Pulses: Normal pulses  Dorsalis pedis pulses are 2+ on the right side  Posterior tibial pulses are 2+ on the right side  Pulmonary:      Effort: Pulmonary effort is normal  No respiratory distress  Musculoskeletal:      Right foot: Deformity (hammertoe) present  Feet:      Right foot:      Skin integrity: Blister (right interspace 3,4,5), skin breakdown and erythema present  Skin:     Capillary Refill: Capillary refill takes less than 2 seconds  Findings: Erythema present  Neurological:      Mental Status: He is alert  Sensory: Sensory deficit present

## 2022-08-10 ENCOUNTER — TELEPHONE (OUTPATIENT)
Dept: ENDOCRINOLOGY | Facility: CLINIC | Age: 72
End: 2022-08-10

## 2022-08-16 ENCOUNTER — OFFICE VISIT (OUTPATIENT)
Dept: UROLOGY | Facility: CLINIC | Age: 72
End: 2022-08-16
Payer: MEDICARE

## 2022-08-16 VITALS
BODY MASS INDEX: 26.24 KG/M2 | HEART RATE: 64 BPM | HEIGHT: 75 IN | SYSTOLIC BLOOD PRESSURE: 120 MMHG | WEIGHT: 211 LBS | DIASTOLIC BLOOD PRESSURE: 80 MMHG | OXYGEN SATURATION: 96 %

## 2022-08-16 DIAGNOSIS — N43.3 HYDROCELE IN ADULT: Primary | ICD-10-CM

## 2022-08-16 PROCEDURE — 55000 DRAINAGE OF HYDROCELE: CPT | Performed by: PHYSICIAN ASSISTANT

## 2022-08-16 PROCEDURE — 99024 POSTOP FOLLOW-UP VISIT: CPT | Performed by: PHYSICIAN ASSISTANT

## 2022-08-16 NOTE — PROGRESS NOTES
Puncture aspiration hydrocele     Date/Time 8/16/2022 8:00 AM     Performed by  Kylie Garcia PA-C     Authorized by Kylie Garcia PA-C      Universal Protocol   Risks and benefits: risks, benefits and alternatives were discussed  Consent given by: patient  Time out: Immediately prior to procedure a "time out" was called to verify the correct patient, procedure, equipment, support staff and site/side marked as required  Timeout called at: 8/16/2022 8:00 AM   Patient understanding: patient states understanding of the procedure being performed  Patient identity confirmed: verbally with patient        Local anesthesia used: yes      Anesthesia: local infiltration     Anesthesia   Local anesthesia used: yes  Local Anesthetic: lidocaine 1% without epinephrine     Procedure Details   Procedure Notes: 57-year-old man with history of chronic right-sided hydrocele  He had a hydrocelectomy and had a postop hematoma (this was aspirated for 500 cc of bloody fluid)  He has residual hydrocele  The scrotum was prepped and draped in usual fashion  1% lidocaine used for local anesthetic  An 18 gauge Angiocath is advanced into the hydrocele sac   300 cc dark stephon fluid is aspirated without difficulty  Puncture site is covered with antibiotic ointment and a dry bandage  We discussed doxycycline sclerosis  He will follow-up in 3 months for repeat aspiration if needed

## 2022-08-30 ENCOUNTER — OFFICE VISIT (OUTPATIENT)
Dept: PODIATRY | Facility: CLINIC | Age: 72
End: 2022-08-30
Payer: MEDICARE

## 2022-08-30 VITALS
HEIGHT: 75 IN | SYSTOLIC BLOOD PRESSURE: 126 MMHG | WEIGHT: 211 LBS | DIASTOLIC BLOOD PRESSURE: 74 MMHG | HEART RATE: 64 BPM | BODY MASS INDEX: 26.24 KG/M2

## 2022-08-30 DIAGNOSIS — M20.42 HAMMER TOES OF BOTH FEET: ICD-10-CM

## 2022-08-30 DIAGNOSIS — S90.821A BLISTER OF RIGHT FOOT, INITIAL ENCOUNTER: ICD-10-CM

## 2022-08-30 DIAGNOSIS — Z86.31 H/O DIABETIC FOOT ULCER: ICD-10-CM

## 2022-08-30 DIAGNOSIS — M20.41 HAMMER TOES OF BOTH FEET: ICD-10-CM

## 2022-08-30 DIAGNOSIS — E11.42 DIABETIC POLYNEUROPATHY ASSOCIATED WITH TYPE 2 DIABETES MELLITUS (HCC): Primary | ICD-10-CM

## 2022-08-30 PROCEDURE — 99213 OFFICE O/P EST LOW 20 MIN: CPT | Performed by: PODIATRIST

## 2022-08-30 NOTE — PROGRESS NOTES
Assessment/Plan:     Diagnoses and all orders for this visit:    Diabetic polyneuropathy associated with type 2 diabetes mellitus ([facility] [address] )  -     Diabetic Shoe    Hammer toes of both feet  -     Diabetic Shoe    Blister of right foot, initial encounter  Comments:  resolved  Orders:  -     Diabetic Shoe    H/O diabetic foot ulcer  -     Diabetic Shoe      Infection resolved  Discussed flexor tenotomy right 2,3,4 digits  Will schedule in a few weeks  Also recommended DM shoes, high risk due to recent DM ulcer, neuropathy, toe deformity  Subjective:      Patient ID: Fatmata Lindsay is a 74 y o  male  Patient returns for follow-up of his right foot infection  He developed a blister in the base of his toes  Was put on some antibiotics and has been doing local wound care  He states the infection is resolved  He is nervous about infections in his feet given the neuropathy and this is the 2nd time he developed this issue  He feels well today  The following portions of the patient's history were reviewed and updated as appropriate:   He  has a past medical history of Acute systolic heart failure ([facility] ) (02/23/2021), Athlete's foot, Bilateral impacted cerumen (03/09/2021), Diabetes mellitus ([facility] ), Other hydrocele (06/11/2020), TMJ (dislocation of temporomandibular joint), and Yeast infection    He   Patient Active Problem List    Diagnosis Date Noted    H/O diabetic foot ulcer 08/30/2022    Hammer toes of both feet 08/30/2022    Lower urinary tract symptoms due to benign prostatic hyperplasia 03/15/2022    Medicare annual wellness visit, subsequent 11/07/2021    IBS (irritable bowel syndrome) 10/27/2021    Stenosis of right subclavian artery ([facility] [address] ) 04/30/2021    Benign prostatic hyperplasia with urinary frequency 02/25/2021    Postoperative atrial fibrillation ([facility] ) 02/23/2021    Anus irritation 01/11/2021    Mixed hyperlipidemia 11/23/2020    S/P AVR 11/02/2020    PVC (premature ventricular contraction) 11/02/2020    Bilateral carotid artery stenosis 11/02/2020    Hypertriglyceridemia 10/18/2019    S/P CABG (coronary artery bypass graft) 08/01/2019    Coronary artery disease of native artery of native heart with stable angina pectoris ([facility] ) 07/27/2019    Aortic stenosis 07/26/2019    DM (diabetes mellitus), type 2 ([facility] ) 07/26/2019    Essential hypertension 07/26/2019    Hydrocele in adult 05/03/2015     He  has a past surgical history that includes Tonsillectomy; Cardiac surgery; Eye surgery (03/2020); Eye surgery (Left, 12/03/2020); Coronary artery bypass graft; Cardiac valve replacement; Colonoscopy; Cataract extraction; pr removal of hydrocele,tunica,unilat (Right, 04/08/2022); and Hydrocele excision / repair  His family history includes Cancer in his mother; Heart disease in his father and mother; Hypertension in his father; Mitral valve prolapse in his sister  He  reports that he has never smoked  He has never used smokeless tobacco  He reports current alcohol use  He reports that he does not use drugs    Current Outpatient Medications   Medication Sig Dispense Refill    acetaminophen (TYLENOL) 500 mg tablet Take 500 mg by mouth daily at bedtime      ascorbic acid (VITAMIN C) 250 mg tablet Take 500 mg by mouth 2 (two) times a day      aspirin (ECOTRIN LOW STRENGTH) 81 mg EC tablet Take 81 mg by mouth daily      betamethasone dipropionate (DIPROSONE) 0 05 % ointment       Cholecalciferol (Vitamin D3) 25 MCG TABS Take by mouth      clotrimazole-betamethasone (LOTRISONE) 1-0 05 % cream Apply topically 2 (two) times a day as needed (reddness) 30 g 3    finasteride (PROSCAR) 5 mg tablet Take 1 tablet (5 mg total) by mouth daily 30 tablet 11    furosemide (LASIX) 20 mg tablet Take 1 tablet (20 mg total) by mouth daily as needed (edema) 30 tablet 0    Ginger, Zingiber officinalis, (Ginger Root) 500 MG CAPS       hydrocortisone (ANUSOL-HC) 2 5 % rectal cream Apply topically 2 (two) times a day (Patient taking differently: Apply topically 2 (two) times a day as needed) 30 g 2    Jardiance 25 MG TABS Take 1 tablet (25 mg total) by mouth daily 90 tablet 3    metFORMIN (GLUCOPHAGE) 500 mg tablet TAKE TWO TABLETS BY MOUTH TWICE A  tablet 1    metoprolol succinate (TOPROL-XL) 50 mg 24 hr tablet Take 1 tablet (50 mg total) by mouth daily 90 tablet 3    niacin 500 mg tablet Take 500 mg by mouth 4 (four) times a day      NON FORMULARY        Omega-3 Fatty Acids (fish oil) 1,000 mg       PROBIOTIC PRODUCT PO Take 111 mg by mouth daily      repaglinide (PRANDIN) 0 5 mg tablet Take 1 tablet (0 5 mg total) by mouth daily before dinner 90 tablet 1    rosuvastatin (CRESTOR) 10 MG tablet Take 1 tablet (10 mg total) by mouth daily 90 tablet 3    sitaGLIPtin (JANUVIA) 100 mg tablet Take 1 tablet (100 mg total) by mouth daily 90 tablet 3    tamsulosin (FLOMAX) 0 4 mg TAKE ONE CAPSULE BY MOUTH EVERY DAY WITH DINNER 30 capsule 6    Tart Cherry (Tart Cherry Ultra) 1200 MG CAPS       Turmeric 500 MG CAPS       Wheat Dextrin (BENEFIBER DRINK MIX PO) Take by mouth       No current facility-administered medications for this visit       Current Outpatient Medications on File Prior to Visit   Medication Sig    acetaminophen (TYLENOL) 500 mg tablet Take 500 mg by mouth daily at bedtime    ascorbic acid (VITAMIN C) 250 mg tablet Take 500 mg by mouth 2 (two) times a day    aspirin (ECOTRIN LOW STRENGTH) 81 mg EC tablet Take 81 mg by mouth daily    betamethasone dipropionate (DIPROSONE) 0 05 % ointment     Cholecalciferol (Vitamin D3) 25 MCG TABS Take by mouth    clotrimazole-betamethasone (LOTRISONE) 1-0 05 % cream Apply topically 2 (two) times a day as needed (reddness)    finasteride (PROSCAR) 5 mg tablet Take 1 tablet (5 mg total) by mouth daily    furosemide (LASIX) 20 mg tablet Take 1 tablet (20 mg total) by mouth daily as needed (edema)    Ginger, Zingiber officinalis, (Ginger Root) 500 MG CAPS     hydrocortisone (ANUSOL-HC) 2 5 % rectal cream Apply topically 2 (two) times a day (Patient taking differently: Apply topically 2 (two) times a day as needed)    Jardiance 25 MG TABS Take 1 tablet (25 mg total) by mouth daily    metFORMIN (GLUCOPHAGE) 500 mg tablet TAKE TWO TABLETS BY MOUTH TWICE A DAY    metoprolol succinate (TOPROL-XL) 50 mg 24 hr tablet Take 1 tablet (50 mg total) by mouth daily    niacin 500 mg tablet Take 500 mg by mouth 4 (four) times a day    NON FORMULARY      Omega-3 Fatty Acids (fish oil) 1,000 mg     PROBIOTIC PRODUCT PO Take 111 mg by mouth daily    repaglinide (PRANDIN) 0 5 mg tablet Take 1 tablet (0 5 mg total) by mouth daily before dinner    rosuvastatin (CRESTOR) 10 MG tablet Take 1 tablet (10 mg total) by mouth daily    sitaGLIPtin (JANUVIA) 100 mg tablet Take 1 tablet (100 mg total) by mouth daily    tamsulosin (FLOMAX) 0 4 mg TAKE ONE CAPSULE BY MOUTH EVERY DAY WITH DINNER    Tart Cherry (Tart Cherry Ultra) 1200 MG CAPS     Turmeric 500 MG CAPS     Wheat Dextrin (BENEFIBER DRINK MIX PO) Take by mouth     No current facility-administered medications on file prior to visit  He has No Known Allergies       Review of Systems    Constitutional: Negative  HENT: Negative for sinus pressure and sinus pain  Respiratory: Negative for cough and shortness of breath  Cardiovascular: Negative for chest pain and leg swelling  Gastrointestinal: Negative for diarrhea, nausea and vomiting  Musculoskeletal:   Swollen hammertoe   Skin:  Resolved blister  Neurological:  Neuropathy in feet  Psychiatric/Behavioral: The patient is not nervous/anxious  Objective:      /74   Pulse 64   Ht 6' 3" (1 905 m)   Wt 95 7 kg (211 lb)   BMI 26 37 kg/m²          Physical Exam  Vitals reviewed  Cardiovascular:      Rate and Rhythm: Normal rate  Pulses: Normal pulses  Pulmonary:      Effort: Pulmonary effort is normal  No respiratory distress  Musculoskeletal:         General: Swelling and deformity (hammertoe deformity right 2,3,4 semireducible) present  Skin:     Capillary Refill: Capillary refill takes less than 2 seconds  Findings: No bruising, erythema, lesion or rash  Comments: Interdigital blistering fully resolved, no open wounds or cellulitis     Neurological:      Mental Status: He is alert and oriented to person, place, and time  Sensory: Sensory deficit present  Motor: No weakness        Gait: Gait normal    Psychiatric:         Mood and Affect: Mood normal

## 2022-09-07 ENCOUNTER — TELEPHONE (OUTPATIENT)
Dept: ENDOCRINOLOGY | Facility: CLINIC | Age: 72
End: 2022-09-07

## 2022-09-07 ENCOUNTER — TELEPHONE (OUTPATIENT)
Dept: PODIATRY | Facility: CLINIC | Age: 72
End: 2022-09-07

## 2022-09-07 NOTE — TELEPHONE ENCOUNTER
Pt called states perdomo will be faxing rx for shoes advised pt he needs to have seen the Dr within last 6 months pt was scheduled in september

## 2022-09-09 ENCOUNTER — TELEPHONE (OUTPATIENT)
Dept: OTHER | Facility: OTHER | Age: 72
End: 2022-09-09

## 2022-09-09 ENCOUNTER — TELEPHONE (OUTPATIENT)
Dept: UROLOGY | Facility: CLINIC | Age: 72
End: 2022-09-09

## 2022-09-09 DIAGNOSIS — E11.65 TYPE 2 DIABETES MELLITUS WITH HYPERGLYCEMIA, WITHOUT LONG-TERM CURRENT USE OF INSULIN (HCC): Primary | ICD-10-CM

## 2022-09-09 NOTE — TELEPHONE ENCOUNTER
Patient went to the lab this morning for blood work, but the dates on the orders need to be updated  Please contact Smita Magana @ [phone]

## 2022-09-09 NOTE — TELEPHONE ENCOUNTER
Me  to Fortino Vega    SD      12:36 PM  Good afternoon,   The providers recommend being scheduled sooner for potential repeat aspiration and/or discussion about a surgical procedure, hydrocelectomy  Please give us a call to schedule  Ulises Capone RN    ----- Message from Phan Covington PA-C sent at 9/9/2022 11:52 AM EDT -----  Regarding: FW: Hydrocele Unresolved  With quick recurrence of hydrocele, can be brought in for potential second aspiration vs discussion of hydrocelectomy  Thanks  ----- Message -----  From: Rafael Merino RN  Sent: 9/6/2022   5:08 PM EDT  To: [facility] Provider  Subject: FW: Hydrocele Unresolved                         Last aspiration was less then a month ago  FU plan was to come back in 3 months  Please advise  ----- Message -----  From: Haven Paulino  Sent: 9/6/2022  12:30 PM EDT  To: [facility] Clinical  Subject: Hydrocele Unresolved                             My last visit was on August 16th, with an aspiration being performed  My scrotum is again swelling up  What do you and/or Dr Keith Carlos advise?

## 2022-09-12 ENCOUNTER — LAB (OUTPATIENT)
Dept: LAB | Facility: AMBULARY SURGERY CENTER | Age: 72
End: 2022-09-12
Payer: MEDICARE

## 2022-09-12 DIAGNOSIS — E11.65 TYPE 2 DIABETES MELLITUS WITH HYPERGLYCEMIA, WITHOUT LONG-TERM CURRENT USE OF INSULIN (HCC): ICD-10-CM

## 2022-09-12 LAB
ANION GAP SERPL CALCULATED.3IONS-SCNC: -1 MMOL/L (ref 4–13)
BUN SERPL-MCNC: 21 MG/DL (ref 5–25)
CALCIUM SERPL-MCNC: 9.3 MG/DL (ref 8.3–10.1)
CHLORIDE SERPL-SCNC: 109 MMOL/L (ref 96–108)
CHOLEST SERPL-MCNC: 117 MG/DL
CO2 SERPL-SCNC: 30 MMOL/L (ref 21–32)
CREAT SERPL-MCNC: 1.08 MG/DL (ref 0.6–1.3)
CREAT UR-MCNC: 24 MG/DL
EST. AVERAGE GLUCOSE BLD GHB EST-MCNC: 157 MG/DL
GFR SERPL CREATININE-BSD FRML MDRD: 67 ML/MIN/1.73SQ M
GLUCOSE P FAST SERPL-MCNC: 170 MG/DL (ref 65–99)
HBA1C MFR BLD: 7.1 %
HDLC SERPL-MCNC: 46 MG/DL
LDLC SERPL CALC-MCNC: 48 MG/DL (ref 0–100)
MICROALBUMIN UR-MCNC: <5 MG/L (ref 0–20)
MICROALBUMIN/CREAT 24H UR: <21 MG/G CREATININE (ref 0–30)
NONHDLC SERPL-MCNC: 71 MG/DL
POTASSIUM SERPL-SCNC: 4.4 MMOL/L (ref 3.5–5.3)
SODIUM SERPL-SCNC: 138 MMOL/L (ref 135–147)
TRIGL SERPL-MCNC: 114 MG/DL

## 2022-09-12 PROCEDURE — 82570 ASSAY OF URINE CREATININE: CPT

## 2022-09-12 PROCEDURE — 80061 LIPID PANEL: CPT

## 2022-09-12 PROCEDURE — 82043 UR ALBUMIN QUANTITATIVE: CPT

## 2022-09-12 PROCEDURE — 83036 HEMOGLOBIN GLYCOSYLATED A1C: CPT

## 2022-09-12 PROCEDURE — 36415 COLL VENOUS BLD VENIPUNCTURE: CPT

## 2022-09-12 PROCEDURE — 80048 BASIC METABOLIC PNL TOTAL CA: CPT

## 2022-09-14 ENCOUNTER — OFFICE VISIT (OUTPATIENT)
Dept: ENDOCRINOLOGY | Facility: CLINIC | Age: 72
End: 2022-09-14
Payer: MEDICARE

## 2022-09-14 ENCOUNTER — TELEPHONE (OUTPATIENT)
Dept: ENDOCRINOLOGY | Facility: CLINIC | Age: 72
End: 2022-09-14

## 2022-09-14 VITALS
DIASTOLIC BLOOD PRESSURE: 70 MMHG | HEART RATE: 54 BPM | BODY MASS INDEX: 26.25 KG/M2 | WEIGHT: 210 LBS | SYSTOLIC BLOOD PRESSURE: 128 MMHG

## 2022-09-14 DIAGNOSIS — M20.41 HAMMER TOES OF BOTH FEET: ICD-10-CM

## 2022-09-14 DIAGNOSIS — M20.42 HAMMER TOES OF BOTH FEET: ICD-10-CM

## 2022-09-14 DIAGNOSIS — I25.118 CORONARY ARTERY DISEASE OF NATIVE ARTERY OF NATIVE HEART WITH STABLE ANGINA PECTORIS (HCC): ICD-10-CM

## 2022-09-14 DIAGNOSIS — E11.42 DIABETIC POLYNEUROPATHY ASSOCIATED WITH TYPE 2 DIABETES MELLITUS (HCC): ICD-10-CM

## 2022-09-14 DIAGNOSIS — E78.2 MIXED HYPERLIPIDEMIA: ICD-10-CM

## 2022-09-14 DIAGNOSIS — I10 ESSENTIAL HYPERTENSION: ICD-10-CM

## 2022-09-14 DIAGNOSIS — E11.65 TYPE 2 DIABETES MELLITUS WITH HYPERGLYCEMIA, WITHOUT LONG-TERM CURRENT USE OF INSULIN (HCC): Primary | ICD-10-CM

## 2022-09-14 DIAGNOSIS — Z95.1 S/P CABG (CORONARY ARTERY BYPASS GRAFT): ICD-10-CM

## 2022-09-14 PROCEDURE — 99214 OFFICE O/P EST MOD 30 MIN: CPT | Performed by: INTERNAL MEDICINE

## 2022-09-14 NOTE — PROGRESS NOTES
Haven Paulino 74 y o  male MRN: [id number]    Encounter: [id number]      Assessment/Plan     Assessment: This is a 74y o -year-old male with diabetes with hyperglycemia  Plan:    Diagnoses and all orders for this visit:    Type 2 diabetes mellitus with hyperglycemia, without long-term current use of insulin (HCC)  A1c is at goal 7 1%, improved  Continue current management  Continue lifestyle modifications  Educated about hypoglycemia symptoms and treatment  Follow-up in 4 months  -     Basic metabolic panel; Future  -     Hemoglobin A1C; Future    Essential hypertension  Blood pressure well controlled  Continue current management as per PCP  Mixed hyperlipidemia  LDL is at goal  Continue statins  Coronary artery disease of native artery of native heart with stable angina pectoris ([facility] [address] )  Stable  Continue to follow with Cardiology  S/P CABG (coronary artery bypass graft)  Continue to follow with Cardiology    Bilateral socorro-patient is going to podiatry, and is prescribed to get diabetic shoes    Diabetic neuropathy  Symptoms controlled  CC: Diabetes    History of Present Illness     HPI:    Haven Paulino is 73-year-old gentleman with medical history of type 2 diabetes, hypertension, hyperlipidemia is here for follow-up  Diabetes course has been stable  He denies any recent hospitalization for hyperglycemia or hypoglycemia  Last A1c is   Lab Results   Component Value Date    HGBA1C 7 1 (H) 09/12/2022     Current regimen is metformin, 1000 mg twice a day, Jardiance 25 mg daily, Januvia 100 mg daily  Patient also takes Prandin 0 5 mg 1 before dinner only  He checks blood sugars once or twice daily and his blood sugars are usually in  mg/dL range,    He also has hypertension which is controlled on metoprolol XL 50 mg daily  For hyperlipidemia, he takes Crestor 10 mg daily      His  eye appointment is up-to-date, no retinopathy  He follows with Podiatry regularly   Latest Reference Range & Units 09/12/22 07:19   Sodium 135 - 147 mmol/L 138   Potassium 3 5 - 5 3 mmol/L 4 4   Chloride 96 - 108 mmol/L 109 (H)   CO2 21 - 32 mmol/L 30   Anion Gap 4 - 13 mmol/L -1 (L)   BUN 5 - 25 mg/dL 21   Creatinine 0 60 - 1 30 mg/dL 1 08   GLUCOSE FASTING 65 - 99 mg/dL 170 (H)   Calcium 8 3 - 10 1 mg/dL 9 3   eGFR ml/min/1 73sq m 67   Cholesterol See Comment mg/dL 117   Triglycerides See Comment mg/dL 114   HDL >=40 mg/dL 46   Non-HDL Cholesterol mg/dl 71   LDL Calculated 0 - 100 mg/dL 48   Hemoglobin A1C Normal 3 8-5 6%; PreDiabetic 5 7-6 4%; Diabetic >=6 5%; Glycemic control for adults with diabetes <7 0% % 7 1 (H)   eAG, EST AVG Glucose mg/dl 157   EXT Creatinine Urine mg/dL 24 0   MICROALBUMIN/CREATININE RATIO 0 - 30 mg/g creatinine <21   MICROALBUM ,U,RANDOM 0 0 - 20 0 mg/L <5 0      Latest Reference Range & Units 09/12/22 07:19   Sodium 135 - 147 mmol/L 138   Potassium 3 5 - 5 3 mmol/L 4 4   Chloride 96 - 108 mmol/L 109 (H)   CO2 21 - 32 mmol/L 30   Anion Gap 4 - 13 mmol/L -1 (L)   BUN 5 - 25 mg/dL 21   Creatinine 0 60 - 1 30 mg/dL 1 08   GLUCOSE FASTING 65 - 99 mg/dL 170 (H)   Calcium 8 3 - 10 1 mg/dL 9 3   eGFR ml/min/1 73sq m 67   Cholesterol See Comment mg/dL 117   Triglycerides See Comment mg/dL 114   HDL >=40 mg/dL 46   Non-HDL Cholesterol mg/dl 71   LDL Calculated 0 - 100 mg/dL 48   Hemoglobin A1C Normal 3 8-5 6%; PreDiabetic 5 7-6 4%; Diabetic >=6 5%; Glycemic control for adults with diabetes <7 0% % 7 1 (H)   eAG, EST AVG Glucose mg/dl 157   EXT Creatinine Urine mg/dL 24 0   MICROALBUMIN/CREATININE RATIO 0 - 30 mg/g creatinine <21   MICROALBUM ,U,RANDOM 0 0 - 20 0 mg/L <5 0   (H): Data is abnormally high  (L): Data is abnormally low     Review of Systems   Constitutional: Negative for activity change, diaphoresis, fatigue, fever and unexpected weight change  HENT: Negative  Eyes: Negative for visual disturbance  Respiratory: Negative for cough, chest tightness and shortness of breath  Cardiovascular: Negative for chest pain, palpitations and leg swelling  Gastrointestinal: Negative for abdominal pain, blood in stool, constipation, diarrhea, nausea and vomiting  Endocrine: Negative for cold intolerance, heat intolerance, polydipsia, polyphagia and polyuria  Genitourinary: Negative for dysuria, enuresis, frequency and urgency  Musculoskeletal: Negative for arthralgias and myalgias  Skin: Negative for pallor, rash and wound  Allergic/Immunologic: Negative  Neurological: Negative for dizziness, tremors, weakness and numbness  Hematological: Negative  Psychiatric/Behavioral: Negative          Historical Information   Past Medical History:   Diagnosis Date    Acute systolic heart failure ([facility] ) 02/23/2021    Athlete's foot     Bilateral impacted cerumen 03/09/2021    Diabetes mellitus ([facility] )     Other hydrocele 06/11/2020    TMJ (dislocation of temporomandibular joint)     Yeast infection      Past Surgical History:   Procedure Laterality Date    CARDIAC SURGERY      CARDIAC VALVE REPLACEMENT      CATARACT EXTRACTION      COLONOSCOPY      CORONARY ARTERY BYPASS GRAFT      EYE SURGERY  03/2020    Cataract right eye    EYE SURGERY Left 12/03/2020    left eye cataract     HYDROCELE EXCISION / REPAIR      [place] REMOVAL OF HYDROCELE,TUNICA,UNILAT Right 04/08/2022    Procedure: HYDROCELECTOMY;  Surgeon: Erlin Redd MD;  [facility];  Service: Urology    TONSILLECTOMY       Social History   Social History     Substance and Sexual Activity   Alcohol Use Yes    Comment: 1 glass of wine or 1 beer a week     Social History     Substance and Sexual Activity   Drug Use Never     Social History     Tobacco Use   Smoking Status Never Smoker   Smokeless Tobacco Never Used     Family History:   Family History   Problem Relation Age of Onset    Heart disease Mother     Cancer Mother     Heart disease Father     Hypertension Father     Mitral valve prolapse Sister     Colon cancer Neg Hx        Meds/Allergies   Current Outpatient Medications   Medication Sig Dispense Refill    acetaminophen (TYLENOL) 500 mg tablet Take 500 mg by mouth daily at bedtime      ascorbic acid (VITAMIN C) 250 mg tablet Take 500 mg by mouth 2 (two) times a day      aspirin (ECOTRIN LOW STRENGTH) 81 mg EC tablet Take 81 mg by mouth daily      betamethasone dipropionate (DIPROSONE) 0 05 % ointment       Cholecalciferol (Vitamin D3) 25 MCG TABS Take by mouth      clotrimazole-betamethasone (LOTRISONE) 1-0 05 % cream Apply topically 2 (two) times a day as needed (reddness) 30 g 3    finasteride (PROSCAR) 5 mg tablet Take 1 tablet (5 mg total) by mouth daily 30 tablet 11    furosemide (LASIX) 20 mg tablet Take 1 tablet (20 mg total) by mouth daily as needed (edema) 30 tablet 0    Ginger, Zingiber officinalis, (Ginger Root) 500 MG CAPS       hydrocortisone (ANUSOL-HC) 2 5 % rectal cream Apply topically 2 (two) times a day (Patient taking differently: Apply topically 2 (two) times a day as needed) 30 g 2    Jardiance 25 MG TABS Take 1 tablet (25 mg total) by mouth daily 90 tablet 3    metFORMIN (GLUCOPHAGE) 500 mg tablet TAKE TWO TABLETS BY MOUTH TWICE A  tablet 1    metoprolol succinate (TOPROL-XL) 50 mg 24 hr tablet Take 1 tablet (50 mg total) by mouth daily 90 tablet 3    niacin 500 mg tablet Take 500 mg by mouth 4 (four) times a day      NON FORMULARY        Omega-3 Fatty Acids (fish oil) 1,000 mg       PROBIOTIC PRODUCT PO Take 111 mg by mouth daily      repaglinide (PRANDIN) 0 5 mg tablet Take 1 tablet (0 5 mg total) by mouth daily before dinner 90 tablet 1    rosuvastatin (CRESTOR) 10 MG tablet Take 1 tablet (10 mg total) by mouth daily 90 tablet 3    sitaGLIPtin (JANUVIA) 100 mg tablet Take 1 tablet (100 mg total) by mouth daily 90 tablet 3    tamsulosin (FLOMAX) 0 4 mg TAKE ONE CAPSULE BY MOUTH EVERY DAY WITH DINNER 30 capsule 6    Jocelyn Pan [address] Ultra) 1200 MG CAPS       Turmeric 500 MG CAPS       Wheat Dextrin (BENEFIBER DRINK MIX PO) Take by mouth       No current facility-administered medications for this visit  No Known Allergies    Objective   Vitals: Blood pressure 128/70, pulse (!) 54, weight 95 3 kg (210 lb)  Physical Exam    The history was obtained from the review of the chart, patient  Lab Results:   Lab Results   Component Value Date/Time    Hemoglobin A1C 7 1 (H) 09/12/2022 07:19 AM    Hemoglobin A1C 7 5 (H) 06/28/2022 08:24 AM    Hemoglobin A1C 7 1 (H) 03/01/2022 07:56 AM    BUN 21 09/12/2022 07:19 AM    BUN 22 06/28/2022 08:24 AM    BUN 25 03/01/2022 07:56 AM    Potassium 4 4 09/12/2022 07:19 AM    Potassium 4 2 06/28/2022 08:24 AM    Potassium 4 1 03/01/2022 07:56 AM    Chloride 109 (H) 09/12/2022 07:19 AM    Chloride 110 (H) 06/28/2022 08:24 AM    Chloride 108 03/01/2022 07:56 AM    CO2 30 09/12/2022 07:19 AM    CO2 26 06/28/2022 08:24 AM    CO2 28 03/01/2022 07:56 AM    Creatinine 1 08 09/12/2022 07:19 AM    Creatinine 1 03 06/28/2022 08:24 AM    Creatinine 1 21 03/01/2022 07:56 AM    HDL, Direct 46 09/12/2022 07:19 AM    Triglycerides 114 09/12/2022 07:19 AM           Imaging Studies: I have personally reviewed pertinent reports  Portions of the record may have been created with voice recognition software  Occasional wrong word or "sound a like" substitutions may have occurred due to the inherent limitations of voice recognition software  Read the chart carefully and recognize, using context, where substitutions have occurred

## 2022-09-14 NOTE — PATIENT INSTRUCTIONS
Hypoglycemia instructions   Josefina Shiva  9/14/2022  [id number]    Low Blood Sugar    Steps to treat low blood sugar  1  Test blood sugar if you have symptoms of low blood sugar:   Low Blood Sugar Symptoms:  o Sweaty  o Dizzy  o Rapid heartbeat  o Shaky    o Bad mood  o Hungry      2  Treat blood sugar less than 70 with 15 grams of fast-acting carbohydrate:   Examples of 15 grams Fast-Acting Carbohydrate:  o 4 oz juice  o 4 oz regular soda  o 3-4 glucose tablets (chew)  o 3-4 hard candies (chew)              3    Wait 15 minutes and test your blood sugar again           4   If blood sugar is less than 100, repeat steps 2-3       5  When your blood sugar is 100 or more, eat a snack if it will be longer than one hour until your next meal  The snack should be 15 grams of carbohydrate and a protein:   Examples of snacks:  o ½ sandwich  o 6 crackers with cheese  o Piece of fruit with cheese or peanut butter  o 6 crackers with peanut butter

## 2022-09-15 NOTE — TELEPHONE ENCOUNTER
Called patient and scheduled him with CLEMENT Oviedo for 10/4/22 @ [address] office   Patient verbalized understanding and was thankful for call

## 2022-09-15 NOTE — TELEPHONE ENCOUNTER
Pt called to return missed call to r/s appt    Was not sure if this needed to be a long procedure or if non cysto with Rob Ruiz please review and c/b pt to get scheduled appropriately    Pt call [phone]

## 2022-09-17 DIAGNOSIS — R60.9 EDEMA, UNSPECIFIED TYPE: ICD-10-CM

## 2022-09-19 RX ORDER — FUROSEMIDE 20 MG/1
TABLET ORAL
Qty: 30 TABLET | Refills: 0 | Status: SHIPPED | OUTPATIENT
Start: 2022-09-19

## 2022-09-21 ENCOUNTER — TELEPHONE (OUTPATIENT)
Dept: PODIATRY | Facility: CLINIC | Age: 72
End: 2022-09-21

## 2022-10-04 ENCOUNTER — PROCEDURE VISIT (OUTPATIENT)
Dept: UROLOGY | Facility: CLINIC | Age: 72
End: 2022-10-04
Payer: MEDICARE

## 2022-10-04 VITALS
BODY MASS INDEX: 26.11 KG/M2 | DIASTOLIC BLOOD PRESSURE: 72 MMHG | SYSTOLIC BLOOD PRESSURE: 126 MMHG | HEIGHT: 75 IN | HEART RATE: 66 BPM | WEIGHT: 210 LBS | OXYGEN SATURATION: 98 %

## 2022-10-04 DIAGNOSIS — N43.3 HYDROCELE, UNSPECIFIED HYDROCELE TYPE: Primary | ICD-10-CM

## 2022-10-04 PROCEDURE — 99214 OFFICE O/P EST MOD 30 MIN: CPT | Performed by: PHYSICIAN ASSISTANT

## 2022-10-05 ENCOUNTER — OFFICE VISIT (OUTPATIENT)
Dept: PODIATRY | Facility: CLINIC | Age: 72
End: 2022-10-05
Payer: MEDICARE

## 2022-10-05 VITALS
WEIGHT: 210 LBS | DIASTOLIC BLOOD PRESSURE: 72 MMHG | HEIGHT: 75 IN | SYSTOLIC BLOOD PRESSURE: 128 MMHG | BODY MASS INDEX: 26.11 KG/M2 | HEART RATE: 78 BPM

## 2022-10-05 DIAGNOSIS — M20.41 HAMMERTOE OF RIGHT FOOT: Primary | ICD-10-CM

## 2022-10-05 PROCEDURE — 28010 INCISION OF TOE TENDON: CPT | Performed by: PODIATRIST

## 2022-10-05 RX ORDER — LIDOCAINE HYDROCHLORIDE 10 MG/ML
5 INJECTION, SOLUTION INFILTRATION; PERINEURAL ONCE
Status: COMPLETED | OUTPATIENT
Start: 2022-10-05 | End: 2022-10-05

## 2022-10-05 RX ADMIN — LIDOCAINE HYDROCHLORIDE 5 ML: 10 INJECTION, SOLUTION INFILTRATION; PERINEURAL at 15:28

## 2022-10-05 NOTE — PROGRESS NOTES
Patient arrives for release of his right 2,3,4 hammertoe flexor contractions    Written consent obtained  1  Hammertoe of right foot  lidocaine (XYLOCAINE) 1 % injection 5 mL     Percutaneous Tenotomy of 2,3,4 right toe; single tendon  I obtained informed consent prior to the procedure  I recommended we proceed with a tenotomy to help reduce the pressures on the tip of the deformed toe  Patient is agreeable to that  No guarantees were given of successful correction of the toe deformity nor the toe ulcer  I explained complications such as over or under correction, bleeding, infection, pain  The consent was signed  A formal timeout including patient identification, laterality and existing allergies using [facility] protocol was conducted  I prepped and draped the right 2,3,4 toe on the right foot  A 5cc injection of 1% lidocaine plain was given at the plantar sulcus of all 3 toes  After adequate anesthesia, an 18 gauge needle was introduced plantarly at the skin crease of the IPJ and was utilized to transect the flexor tendon  Immediate release of the tendon contracture was noted  A small DSD was applied followed by a series of gauze and tape strips to hold the toe in the corrected position  He was instructed to keep this dressing clean, dry and intact until his next visit

## 2022-10-05 NOTE — PROGRESS NOTES
UROLOGY PROGRESS NOTE   Patient Identifiers: Erik Lord (MRN [id number])  Date of Service: 10/5/2022    Subjective:    77-year-old man with history of chronic right-sided hydrocele  He had a hydrocelectomy in April and has had a recurrence of fluid  I have aspirated twice he is back for follow-up      Reason for visit:  Recurrent hydrocele follow-up      Objective:     VITALS:    Vitals:    10/04/22 1356   BP: 126/72   Pulse: 66   SpO2: 98%     AUA SYMPTOM SCORE    Flowsheet Row Most Recent Value   AUA SYMPTOM SCORE    How often have you had a sensation of not emptying your bladder completely after you finished urinating? 0 (P)     How often have you had to urinate again less than two hours after you finished urinating? 1 (P)     How often have you found you stopped and started again several times when you urinate? 0 (P)     How often have you found it difficult to postpone urination? 1 (P)     How often have you had a weak urinary stream? 1 (P)     How often have you had to push or strain to begin urination? 0 (P)     How many times did you most typically get up to urinate from the time you went to bed at night until the time you got up in the morning? 5 (P)     Quality of Life: If you were to spend the rest of your life with your urinary condition just the way it is now, how would you feel about that? 3 (P)     AUA SYMPTOM SCORE 8 (P)             LABS:  Lab Results   Component Value Date    HGB 14 5 03/22/2021    HCT 42 1 03/22/2021    WBC 8 24 03/22/2021     03/22/2021   ]    Lab Results   Component Value Date    K 4 4 09/12/2022     (H) 09/12/2022    CO2 30 09/12/2022    BUN 21 09/12/2022    CREATININE 1 08 09/12/2022    CALCIUM 9 3 09/12/2022   ]        INPATIENT MEDS:    Current Outpatient Medications:     acetaminophen (TYLENOL) 500 mg tablet, Take 500 mg by mouth daily at bedtime, Disp: , Rfl:     ascorbic acid (VITAMIN C) 250 mg tablet, Take 500 mg by mouth 2 (two) times a day, Disp: , Rfl:     aspirin (ECOTRIN LOW STRENGTH) 81 mg EC tablet, Take 81 mg by mouth daily, Disp: , Rfl:     betamethasone dipropionate (DIPROSONE) 0 05 % ointment, , Disp: , Rfl:     Cholecalciferol (Vitamin D3) 25 MCG TABS, Take by mouth, Disp: , Rfl:     clotrimazole-betamethasone (LOTRISONE) 1-0 05 % cream, Apply topically 2 (two) times a day as needed (reddness), Disp: 30 g, Rfl: 3    finasteride (PROSCAR) 5 mg tablet, Take 1 tablet (5 mg total) by mouth daily, Disp: 30 tablet, Rfl: 11    furosemide (LASIX) 20 mg tablet, TAKE ONE TABLET BY MOUTH EVERY DAY AS NEEDED FOR EDEMA, Disp: 30 tablet, Rfl: 0    Ginger, Zingiber officinalis, (Ginger Root) 500 MG CAPS, , Disp: , Rfl:     hydrocortisone (ANUSOL-HC) 2 5 % rectal cream, Apply topically 2 (two) times a day (Patient taking differently: Apply topically 2 (two) times a day as needed), Disp: 30 g, Rfl: 2    Jardiance 25 MG TABS, Take 1 tablet (25 mg total) by mouth daily, Disp: 90 tablet, Rfl: 3    metFORMIN (GLUCOPHAGE) 500 mg tablet, TAKE TWO TABLETS BY MOUTH TWICE A DAY, Disp: 360 tablet, Rfl: 1    metoprolol succinate (TOPROL-XL) 50 mg 24 hr tablet, Take 1 tablet (50 mg total) by mouth daily, Disp: 90 tablet, Rfl: 3    niacin 500 mg tablet, Take 500 mg by mouth 4 (four) times a day, Disp: , Rfl:     NON FORMULARY,  , Disp: , Rfl:     Omega-3 Fatty Acids (fish oil) 1,000 mg, , Disp: , Rfl:     PROBIOTIC PRODUCT PO, Take 111 mg by mouth daily, Disp: , Rfl:     repaglinide (PRANDIN) 0 5 mg tablet, Take 1 tablet (0 5 mg total) by mouth daily before dinner, Disp: 90 tablet, Rfl: 1    rosuvastatin (CRESTOR) 10 MG tablet, Take 1 tablet (10 mg total) by mouth daily, Disp: 90 tablet, Rfl: 3    sitaGLIPtin (JANUVIA) 100 mg tablet, Take 1 tablet (100 mg total) by mouth daily, Disp: 90 tablet, Rfl: 3    tamsulosin (FLOMAX) 0 4 mg, TAKE ONE CAPSULE BY MOUTH EVERY DAY WITH DINNER, Disp: 30 capsule, Rfl: 6    Tart Cherry (Tart Cherry Ultra) 1200 MG CAPS, , Disp: , Rfl:     Turmeric 500 MG CAPS, , Disp: , Rfl:     Wheat Dextrin (BENEFIBER DRINK MIX PO), Take by mouth, Disp: , Rfl:       Physical Exam:   /72   Pulse 66   Ht 6' 3" (1 905 m)   Wt 95 3 kg (210 lb)   SpO2 98%   BMI 26 25 kg/m²   GEN: no acute distress    RESP: breathing comfortably with no accessory muscle use    ABD: soft, non-tender, non-distended   INCISION:    EXT: no significant peripheral edema   (Male): Penis circumcised, phallus normal, meatus patent  Testicles descended into scrotum bilaterally large recurrent right-sided hydrocele  No inguinal hernias bilaterally  RADIOLOGY:   None     Assessment:   1    Recurrent right-sided hydrocele     Plan:   -seen with Dr Leonel Hong  -plan for redo hydrocelectomy  -will hold off on aspiration at this visit to assist with surgical outcome  -

## 2022-10-06 ENCOUNTER — PREP FOR PROCEDURE (OUTPATIENT)
Dept: UROLOGY | Facility: CLINIC | Age: 72
End: 2022-10-06

## 2022-10-06 DIAGNOSIS — Z01.818 PREOP EXAMINATION: ICD-10-CM

## 2022-10-06 DIAGNOSIS — N43.3 HYDROCELE, UNSPECIFIED HYDROCELE TYPE: Primary | ICD-10-CM

## 2022-10-06 DIAGNOSIS — Z01.810 PRE-OPERATIVE CARDIOVASCULAR EXAMINATION: ICD-10-CM

## 2022-10-06 RX ORDER — SODIUM CHLORIDE, SODIUM LACTATE, POTASSIUM CHLORIDE, CALCIUM CHLORIDE 600; 310; 30; 20 MG/100ML; MG/100ML; MG/100ML; MG/100ML
125 INJECTION, SOLUTION INTRAVENOUS CONTINUOUS
OUTPATIENT
Start: 2022-10-06

## 2022-10-10 ENCOUNTER — TELEPHONE (OUTPATIENT)
Dept: UROLOGY | Facility: AMBULATORY SURGERY CENTER | Age: 72
End: 2022-10-10

## 2022-10-10 NOTE — TELEPHONE ENCOUNTER
I called pt this afternoon to discuss scheduling his procedure with Dr Robert Ng at the [facility] on 11/16/2022  I was able to speak with pt and he confirmed that this will work for him  I verbally went over all of pt 's pre op instructions and prep information with him  He is aware that he needs to have a EKG done prior to this procedure along with a medical clearance appt with his PCP and its scheduled on 10/31/2022  Pt confirmed that he will make sure to have a  on the day of surgery and will stop any Aspirin and/or vitamins 1 week prior to this procedure  Per pt 's request I will be mailing him a copy of his surgical packet and instructed him to call our office with any questions or concerns regarding this procedure

## 2022-10-12 ENCOUNTER — OFFICE VISIT (OUTPATIENT)
Dept: PODIATRY | Facility: CLINIC | Age: 72
End: 2022-10-12

## 2022-10-12 VITALS
HEART RATE: 84 BPM | BODY MASS INDEX: 26.11 KG/M2 | DIASTOLIC BLOOD PRESSURE: 85 MMHG | HEIGHT: 75 IN | SYSTOLIC BLOOD PRESSURE: 125 MMHG | WEIGHT: 210 LBS

## 2022-10-12 DIAGNOSIS — E11.42 DIABETIC POLYNEUROPATHY ASSOCIATED WITH TYPE 2 DIABETES MELLITUS (HCC): ICD-10-CM

## 2022-10-12 DIAGNOSIS — M20.41 HAMMERTOE OF RIGHT FOOT: Primary | ICD-10-CM

## 2022-10-12 PROBLEM — Z00.00 MEDICARE ANNUAL WELLNESS VISIT, SUBSEQUENT: Status: RESOLVED | Noted: 2021-11-07 | Resolved: 2022-10-12

## 2022-10-12 PROCEDURE — 99024 POSTOP FOLLOW-UP VISIT: CPT | Performed by: PODIATRIST

## 2022-10-12 NOTE — PROGRESS NOTES
Assessment/Plan:      Diagnoses and all orders for this visit:    Hammertoe of right foot    Diabetic polyneuropathy associated with type 2 diabetes mellitus ([facility] [address] )      Patient healed well from tendon release  Toes are straighter, no further care  Resume at risk foot care  Subjective:     Patient ID: Fatmata Lindsay is a 74 y o  male  Patient underwent release of the flexor tendons to his right 2nd 3rd 4th digits last week  He reports no acute pain or concerns  The needle puncture wounds are healed  Review of Systems   Constitutional: Negative  Objective:     Physical Exam    No sign of infection, puncture wounds from needle healed  The PIPJ are flexible and the toes are straighter  No pain or deformity, no instability   PEdal pulses intact

## 2022-10-31 ENCOUNTER — TELEPHONE (OUTPATIENT)
Dept: PODIATRY | Facility: CLINIC | Age: 72
End: 2022-10-31

## 2022-10-31 ENCOUNTER — OFFICE VISIT (OUTPATIENT)
Dept: FAMILY MEDICINE CLINIC | Facility: CLINIC | Age: 72
End: 2022-10-31

## 2022-10-31 VITALS
DIASTOLIC BLOOD PRESSURE: 64 MMHG | BODY MASS INDEX: 27.83 KG/M2 | OXYGEN SATURATION: 97 % | TEMPERATURE: 95.1 F | SYSTOLIC BLOOD PRESSURE: 118 MMHG | RESPIRATION RATE: 16 BRPM | WEIGHT: 210 LBS | HEART RATE: 58 BPM | HEIGHT: 73 IN

## 2022-10-31 DIAGNOSIS — N40.1 BENIGN PROSTATIC HYPERPLASIA WITH URINARY FREQUENCY: ICD-10-CM

## 2022-10-31 DIAGNOSIS — I35.0 NONRHEUMATIC AORTIC VALVE STENOSIS: ICD-10-CM

## 2022-10-31 DIAGNOSIS — I65.23 BILATERAL CAROTID ARTERY STENOSIS: ICD-10-CM

## 2022-10-31 DIAGNOSIS — E11.65 TYPE 2 DIABETES MELLITUS WITH HYPERGLYCEMIA, WITHOUT LONG-TERM CURRENT USE OF INSULIN (HCC): ICD-10-CM

## 2022-10-31 DIAGNOSIS — E78.2 MIXED HYPERLIPIDEMIA: ICD-10-CM

## 2022-10-31 DIAGNOSIS — Z01.818 PREOP EXAMINATION: Primary | ICD-10-CM

## 2022-10-31 DIAGNOSIS — R35.0 BENIGN PROSTATIC HYPERPLASIA WITH URINARY FREQUENCY: ICD-10-CM

## 2022-10-31 DIAGNOSIS — I77.1 STENOSIS OF RIGHT SUBCLAVIAN ARTERY (HCC): ICD-10-CM

## 2022-10-31 DIAGNOSIS — I10 ESSENTIAL HYPERTENSION: ICD-10-CM

## 2022-10-31 DIAGNOSIS — I25.118 CORONARY ARTERY DISEASE OF NATIVE ARTERY OF NATIVE HEART WITH STABLE ANGINA PECTORIS (HCC): ICD-10-CM

## 2022-10-31 DIAGNOSIS — E78.1 HYPERTRIGLYCERIDEMIA: ICD-10-CM

## 2022-10-31 DIAGNOSIS — N43.3 HYDROCELE IN ADULT: ICD-10-CM

## 2022-10-31 PROBLEM — K62.89 ANUS IRRITATION: Status: RESOLVED | Noted: 2021-01-11 | Resolved: 2022-10-31

## 2022-10-31 NOTE — TELEPHONE ENCOUNTER
Caller: Tom/Holli    Doctor: Dr Corrina Hill    Reason for call: asking if dr stoney Banks for pt/nothing in chart/refaxing to [facility] office for signature    Call back#: NA

## 2022-10-31 NOTE — PROGRESS NOTES
Subjective:     Xi Davies is a 74 y o  male who presents to the office today for a preoperative consultation at the request of surgeon Dr Chanda Machuca who plans on performing hydrocolectomy on November 16  This consultation is requested for the specific conditions prompting preoperative evaluation (i e  because of potential affect on operative risk): CAD  Planned anesthesia: general  The patient has the following known anesthesia issues: none  Patients bleeding risk: no recent abnormal bleeding  Patient does not have objections to receiving blood products if needed      The following portions of the patient's history were reviewed and updated as appropriate: allergies, current medications, past family history, past medical history, past social history, past surgical history and problem list     Review of Systems  Constitutional: negative  Eyes: negative  Ears, nose, mouth, throat, and face: negative  Respiratory: negative  Cardiovascular: negative  Gastrointestinal: negative  Genitourinary:negative  Integument/breast: negative  Hematologic/lymphatic: negative  Musculoskeletal:negative  Neurological: negative  Behavioral/Psych: negative  Endocrine: negative  Allergic/Immunologic: negative     Objective:     /64 (BP Location: Left arm, Patient Position: Sitting, Cuff Size: Adult)   Pulse 58   Temp (!) 95 1 °F (35 1 °C) (Tympanic)   Resp 16   Ht 6' 1 31" (1 862 m)   Wt 95 3 kg (210 lb)   SpO2 97%   BMI 27 47 kg/m²     General Appearance:    Alert, cooperative, no distress, appears stated age   Head:    Normocephalic, without obvious abnormality, atraumatic   Eyes:    PERRL, conjunctiva/corneas clear, EOM's intact, fundi     benign, both eyes        Ears:    Normal TM's and external ear canals, both ears   Nose:   Nares normal, septum midline, mucosa normal, no drainage    or sinus tenderness   Throat:   Lips, mucosa, and tongue normal; teeth and gums normal   Neck:   Supple, symmetrical, trachea midline, no adenopathy;        thyroid:  No enlargement/tenderness/nodules; no carotid    bruit or JVD   Back:     Symmetric, no curvature, ROM normal, no CVA tenderness   Lungs:     Clear to auscultation bilaterally, respirations unlabored   Chest wall:    No tenderness or deformity   Heart:    Regular rate and rhythm, S1 and S2 normal, no murmur, rub   or gallop   Abdomen:     Soft, non-tender, bowel sounds active all four quadrants,     no masses, no organomegaly   Genitalia:    Normal male without lesion, discharge or tenderness   Rectal:    Normal tone, normal prostate, no masses or tenderness;    guaiac negative stool   Extremities:   Extremities normal, atraumatic, no cyanosis or edema   Pulses:   2+ and symmetric all extremities   Skin:   Skin color, texture, turgor normal, no rashes or lesions   Lymph nodes:   Cervical, supraclavicular, and axillary nodes normal   Neurologic:   CNII-XII intact   Normal strength, sensation and reflexes       throughout     Family History   Problem Relation Age of Onset   • Heart disease Mother    • Cancer Mother    • Heart disease Father    • Hypertension Father    • Mitral valve prolapse Sister    • Colon cancer Neg Hx      Past Medical History:   Diagnosis Date   • Acute systolic heart failure ([facility] [place] [address] ) 02/23/2021   • Athlete's foot    • Bilateral impacted cerumen 03/09/2021   • Diabetes mellitus ([facility] [place] [address] )    • Other hydrocele 06/11/2020   • TMJ (dislocation of temporomandibular joint)    • Yeast infection      Social History     Socioeconomic History   • Marital status: /Civil Union     Spouse name: Not on file   • Number of children: Not on file   • Years of education: Not on file   • Highest education level: Not on file   Occupational History   • Not on file   Tobacco Use   • Smoking status: Never Smoker   • Smokeless tobacco: Never Used   Vaping Use   • Vaping Use: Never used   Substance and Sexual Activity   • Alcohol use: Yes     Comment: 1 glass of wine or 1 beer a week   • Drug use: Never   • Sexual activity: Not Currently     Partners: Female   Other Topics Concern   • Not on file   Social History Narrative   • Not on file     Social Determinants of Health     Financial Resource Strain: Not on file   Food Insecurity: Not on file   Transportation Needs: Not on file   Physical Activity: Not on file   Stress: Not on file   Social Connections: Not on file   Intimate Partner Violence: Not on file   Housing Stability: Not on file       Current Outpatient Medications:   •  acetaminophen (TYLENOL) 500 mg tablet, Take 500 mg by mouth daily at bedtime, Disp: , Rfl:   •  ascorbic acid (VITAMIN C) 250 mg tablet, Take 500 mg by mouth 2 (two) times a day, Disp: , Rfl:   •  aspirin (ECOTRIN LOW STRENGTH) 81 mg EC tablet, Take 81 mg by mouth daily, Disp: , Rfl:   •  Cholecalciferol (Vitamin D3) 25 MCG TABS, Take by mouth, Disp: , Rfl:   •  finasteride (PROSCAR) 5 mg tablet, Take 1 tablet (5 mg total) by mouth daily, Disp: 30 tablet, Rfl: 11  •  furosemide (LASIX) 20 mg tablet, TAKE ONE TABLET BY MOUTH EVERY DAY AS NEEDED FOR EDEMA, Disp: 30 tablet, Rfl: 0  •  Ginger, Zingiber officinalis, (Ginger Root) 500 MG CAPS, , Disp: , Rfl:   •  Jardiance 25 MG TABS, Take 1 tablet (25 mg total) by mouth daily, Disp: 90 tablet, Rfl: 3  •  metFORMIN (GLUCOPHAGE) 500 mg tablet, TAKE TWO TABLETS BY MOUTH TWICE A DAY, Disp: 360 tablet, Rfl: 1  •  metoprolol succinate (TOPROL-XL) 50 mg 24 hr tablet, Take 1 tablet (50 mg total) by mouth daily, Disp: 90 tablet, Rfl: 3  •  niacin 500 mg tablet, Take 500 mg by mouth 4 (four) times a day, Disp: , Rfl:   •  NON FORMULARY,  , Disp: , Rfl:   •  Omega-3 Fatty Acids (fish oil) 1,000 mg, , Disp: , Rfl:   •  PROBIOTIC PRODUCT PO, Take 111 mg by mouth daily, Disp: , Rfl:   •  repaglinide (PRANDIN) 0 5 mg tablet, Take 1 tablet (0 5 mg total) by mouth daily before dinner, Disp: 90 tablet, Rfl: 1  •  rosuvastatin (CRESTOR) 10 MG tablet, Take 1 tablet (10 mg total) by mouth daily, Disp: 90 tablet, Rfl: 3  •  sitaGLIPtin (JANUVIA) 100 mg tablet, Take 1 tablet (100 mg total) by mouth daily, Disp: 90 tablet, Rfl: 3  •  tamsulosin (FLOMAX) 0 4 mg, TAKE ONE CAPSULE BY MOUTH EVERY DAY WITH DINNER, Disp: 30 capsule, Rfl: 6  •  Tart Cherry (Tart Cherry Ultra) 1200 MG CAPS, , Disp: , Rfl:   •  Turmeric 500 MG CAPS, , Disp: , Rfl:   •  Wheat Dextrin (BENEFIBER DRINK MIX PO), Take by mouth, Disp: , Rfl:   No Known Allergies  Predictors of intubation difficulty:   Morbid obesity? no   Anatomically abnormal facies? no   Prominent incisors? no   Receding mandible? no   Short, thick neck? no   Neck range of motion: normal   Mallampati score: I (soft palate, uvula, fauces, and tonsillar pillars visible)   Thyromental distance: < 6cm   Mouth openincm   Dentition: No chipped, loose, or missing teeth  Cardiographics  ECG: no change since previous ECG dated   Echocardiogram: not done    Imaging  Chest x-ray: not performed     Lab Review   Lab on 2022   Component Date Value   • Sodium 2022 138    • Potassium 2022 4 4    • Chloride 2022 109 (A)   • CO2 2022 30    • ANION GAP 2022 -1 (A)   • BUN 2022 21    • Creatinine 2022 1 08    • Glucose, Fasting 2022 170 (A)   • Calcium 2022 9 3    • eGFR 2022 67    • Cholesterol 2022 117    • Triglycerides 2022 114    • HDL, Direct 2022 46    • LDL Calculated 2022 48    • Non-HDL-Chol (CHOL-HDL) 2022 71    • Hemoglobin A1C 2022 7 1 (A)   • EAG 2022 157    • Creatinine, Ur 2022 24 0    • Microalbum  ,U,Random 2022 <5 0    • Microalb Creat Ratio 2022 <21         Assessment:     74 y o  male with planned surgery as above  Known risk factors for perioperative complications: None    Difficulty with intubation is not anticipated  Jackie Ashby was seen today for pre-op exam and medicare wellness visit      Diagnoses and all orders for this visit:    Preop examination    Hydrocele in adult    Type 2 diabetes mellitus with hyperglycemia, without long-term current use of insulin (HCC)    Stenosis of right subclavian artery (HCC)    Essential hypertension    Coronary artery disease of native artery of native heart with stable angina pectoris (HCC)    Bilateral carotid artery stenosis    Nonrheumatic aortic valve stenosis    Mixed hyperlipidemia    Hypertriglyceridemia    Benign prostatic hyperplasia with urinary frequency      Cardiac Risk Estimation: low    Current medications which may produce withdrawal symptoms if withheld perioperatively: hold diabetes meds am of surgery  Plan:     1  Preoperative workup as follows ECG  2  Change in medication regimen before surgery: hold diabetes meds am of surgery  3  Prophylaxis for cardiac events with perioperative beta-blockers: not indicated  4  Invasive hemodynamic monitoring perioperatively: not indicated    5  Deep vein thrombosis prophylaxis postoperatively:regimen to be chosen by surgical team   6  Surveillance for postoperative MI with ECG immediately postoperatively and on postoperative days 1 and 2 AND troponin levels 24 hours postoperatively and on day 4 or hospital discharge (whichever comes first): at the discretion of anesthesiologist   7  Other measures: none

## 2022-10-31 NOTE — PROGRESS NOTES
Assessment and Plan:     Problem List Items Addressed This Visit        Endocrine    DM (diabetes mellitus), type 2 ([address] )       Cardiovascular and Mediastinum    Stenosis of right subclavian artery ([facility] [address] )    Essential hypertension    Coronary artery disease of native artery of native heart with stable angina pectoris ([facility] [address] )    Bilateral carotid artery stenosis    Aortic stenosis       Genitourinary    Hydrocele in adult       Other    Mixed hyperlipidemia    Hypertriglyceridemia    Benign prostatic hyperplasia with urinary frequency      Other Visit Diagnoses     Preop examination    -  Primary          Depression Screening and Follow-up Plan: Patient was screened for depression during today's encounter  They screened negative with a PHQ-2 score of 0  Preventive health issues were discussed with patient, and age appropriate screening tests were ordered as noted in patient's After Visit Summary  Personalized health advice and appropriate referrals for health education or preventive services given if needed, as noted in patient's After Visit Summary  History of Present Illness:     Patient presents for a Medicare Wellness Visit    Here for preop clearance and medicare wellness exam  Feels well       Patient Care Team:  Harpal Schmitz as PCP - General (Family Medicine)  Kendrick Hilton MD as Consulting Physician (Urology)  Becca Temple MD as Consulting Physician (Endocrinology)  Gena Wyman MD as Consulting Physician (Cardiology)  Mark Mckeon MD as Consulting Physician (Ophthalmology)  Yash Good DPM as Consulting Physician (Podiatry)  Ramy Marte DC as Consulting Physician (Chiropractic Medicine)     Review of Systems:     Review of Systems   Constitutional: Negative for fatigue and fever  HENT: Negative for congestion, postnasal drip and rhinorrhea  Eyes: Negative for photophobia and visual disturbance     Respiratory: Negative for cough, shortness of breath and wheezing  Cardiovascular: Negative for chest pain and palpitations  Gastrointestinal: Negative for constipation, diarrhea, nausea and vomiting  Genitourinary: Negative for dysuria and frequency  Musculoskeletal: Negative for arthralgias and myalgias  Skin: Negative for rash  Neurological: Negative for dizziness, light-headedness and numbness  Hematological: Negative for adenopathy  Psychiatric/Behavioral: Negative for dysphoric mood and sleep disturbance  The patient is not nervous/anxious           Problem List:     Patient Active Problem List   Diagnosis   • Aortic stenosis   • Coronary artery disease of native artery of native heart with stable angina pectoris ([facility] )   • DM (diabetes mellitus), type 2 ([place] [address] )   • Essential hypertension   • Hypertriglyceridemia   • S/P CABG (coronary artery bypass graft)   • S/P AVR   • PVC (premature ventricular contraction)   • Bilateral carotid artery stenosis   • Mixed hyperlipidemia   • Postoperative atrial fibrillation (HCC)   • Benign prostatic hyperplasia with urinary frequency   • Stenosis of right subclavian artery (HCC)   • Hydrocele in adult   • IBS (irritable bowel syndrome)   • Lower urinary tract symptoms due to benign prostatic hyperplasia   • H/O diabetic foot ulcer   • Hammer toes of both feet      Past Medical and Surgical History:     Past Medical History:   Diagnosis Date   • Acute systolic heart failure ([place] [address] ) 02/23/2021   • Athlete's foot    • Bilateral impacted cerumen 03/09/2021   • Diabetes mellitus ([place] [address] )    • Other hydrocele 06/11/2020   • TMJ (dislocation of temporomandibular joint)    • Yeast infection      Past Surgical History:   Procedure Laterality Date   • CARDIAC SURGERY     • CARDIAC VALVE REPLACEMENT     • CATARACT EXTRACTION     • COLONOSCOPY     • CORONARY ARTERY BYPASS GRAFT     • EYE SURGERY  03/2020    Cataract right eye   • EYE SURGERY Left 12/03/2020    left eye cataract    • HYDROCELE EXCISION / REPAIR     • CA REMOVAL OF HYDROCELE,TUNICA,UNILAT Right 04/08/2022    Procedure: HYDROCELECTOMY;  Surgeon: Lisette Hicks MD;  Location: [facility] MAIN [facility];  Service: Urology   • TONSILLECTOMY        Family History:     Family History   Problem Relation Age of Onset   • Heart disease Mother    • Cancer Mother    • Heart disease Father    • Hypertension Father    • Mitral valve prolapse Sister    • Colon cancer Neg Hx       Social History:     Social History     Socioeconomic History   • Marital status: /Civil Union     Spouse name: None   • Number of children: None   • Years of education: None   • Highest education level: None   Occupational History   • None   Tobacco Use   • Smoking status: Never Smoker   • Smokeless tobacco: Never Used   Vaping Use   • Vaping Use: Never used   Substance and Sexual Activity   • Alcohol use: Yes     Comment: 1 glass of wine or 1 beer a week   • Drug use: Never   • Sexual activity: Not Currently     Partners: Female   Other Topics Concern   • None   Social History Narrative   • None     Social Determinants of Health     Financial Resource Strain: Low Risk    • Difficulty of Paying Living Expenses: Not hard at all   Food Insecurity: Not on file   Transportation Needs: No Transportation Needs   • Lack of Transportation (Medical): No   • Lack of Transportation (Non-Medical):  No   Physical Activity: Not on file   Stress: Not on file   Social Connections: Not on file   Intimate Partner Violence: Not on file   Housing Stability: Not on file      Medications and Allergies:     Current Outpatient Medications   Medication Sig Dispense Refill   • acetaminophen (TYLENOL) 500 mg tablet Take 500 mg by mouth daily at bedtime     • ascorbic acid (VITAMIN C) 250 mg tablet Take 500 mg by mouth 2 (two) times a day     • aspirin (ECOTRIN LOW STRENGTH) 81 mg EC tablet Take 81 mg by mouth daily     • Cholecalciferol (Vitamin D3) 25 MCG TABS Take by mouth     • finasteride (PROSCAR) 5 mg tablet Take 1 tablet (5 mg total) by mouth daily 30 tablet 11   • furosemide (LASIX) 20 mg tablet TAKE ONE TABLET BY MOUTH EVERY DAY AS NEEDED FOR EDEMA 30 tablet 0   • Ginger, Zingiber officinalis, (Ginger Root) 500 MG CAPS      • Jardiance 25 MG TABS Take 1 tablet (25 mg total) by mouth daily 90 tablet 3   • metFORMIN (GLUCOPHAGE) 500 mg tablet TAKE TWO TABLETS BY MOUTH TWICE A  tablet 1   • metoprolol succinate (TOPROL-XL) 50 mg 24 hr tablet Take 1 tablet (50 mg total) by mouth daily 90 tablet 3   • niacin 500 mg tablet Take 500 mg by mouth 4 (four) times a day     • NON FORMULARY       • Omega-3 Fatty Acids (fish oil) 1,000 mg      • PROBIOTIC PRODUCT PO Take 111 mg by mouth daily     • repaglinide (PRANDIN) 0 5 mg tablet Take 1 tablet (0 5 mg total) by mouth daily before dinner 90 tablet 1   • rosuvastatin (CRESTOR) 10 MG tablet Take 1 tablet (10 mg total) by mouth daily 90 tablet 3   • sitaGLIPtin (JANUVIA) 100 mg tablet Take 1 tablet (100 mg total) by mouth daily 90 tablet 3   • tamsulosin (FLOMAX) 0 4 mg TAKE ONE CAPSULE BY MOUTH EVERY DAY WITH DINNER 30 capsule 6   • Tart Cherry (Tart Cherry Ultra) 1200 MG CAPS      • Turmeric 500 MG CAPS      • Wheat Dextrin (BENEFIBER DRINK MIX PO) Take by mouth       No current facility-administered medications for this visit       No Known Allergies   Immunizations:     Immunization History   Administered Date(s) Administered   • COVID-19 PFIZER VACCINE 0 3 ML IM 03/07/2021, 03/26/2021, 10/07/2021   • COVID-19 Pfizer Vac BIVALENT Rubio-sucrose 12 Yr+ IM (BOOSTER ONLY) 10/31/2022   • INFLUENZA 10/31/2022   • Influenza, high dose seasonal 0 7 mL 10/27/2021   • Pneumococcal Polysaccharide PPV23 10/27/2021      Health Maintenance:         Topic Date Due   • Hepatitis C Screening  08/15/2024 (Originally 1950)   • Colorectal Cancer Screening  03/02/2027         Topic Date Due   • COVID-19 Vaccine (4 - Booster for Pfizer series) 02/07/2022   • Pneumococcal Vaccine: 65+ Years (2 - PCV) 10/27/2022 Medicare Screening Tests and Risk Assessments:     Niranjan Davis is here for his Subsequent Wellness visit  Last Medicare Wellness visit information reviewed, patient interviewed and updates made to the record today  Health Risk Assessment:   Patient rates overall health as good  Patient feels that their physical health rating is same  Patient is satisfied with their life  Eyesight was rated as same  Hearing was rated as same  Patient feels that their emotional and mental health rating is same  Patients states they are never, rarely angry  Patient states they are often unusually tired/fatigued  Pain experienced in the last 7 days has been some  Patient's pain rating has been 5/10  Patient states that he has experienced no weight loss or gain in last 6 months  Depression Screening:   PHQ-2 Score: 0      Fall Risk Screening: In the past year, patient has experienced: no history of falling in past year      Home Safety:  Patient does not have trouble with stairs inside or outside of their home  Patient has working smoke alarms and has working carbon monoxide detector  Home safety hazards include: none  Nutrition:   Current diet is Regular  Medications:   Patient is currently taking over-the-counter supplements  OTC medications include: see medication list  Patient is not able to manage medications  Activities of Daily Living (ADLs)/Instrumental Activities of Daily Living (IADLs):   Walk and transfer into and out of bed and chair?: Yes  Dress and groom yourself?: Yes    Bathe or shower yourself?: Yes    Feed yourself? Yes  Do your laundry/housekeeping?: Yes  Manage your money, pay your bills and track your expenses?: Yes  Make your own meals?: Yes    Do your own shopping?: Yes    Previous Hospitalizations:   Any hospitalizations or ED visits within the last 12 months?: No      Advance Care Planning:   Living will: Yes    Durable POA for healthcare:  Yes    Advanced directive: Yes    End of Life Decisions reviewed with patient: Yes    Provider agrees with end of life decisions: Yes      Cognitive Screening:   Provider or family/friend/caregiver concerned regarding cognition?: No    PREVENTIVE SCREENINGS      Cardiovascular Screening:    General: Screening Not Indicated and History Lipid Disorder      Diabetes Screening:     General: Screening Not Indicated and History Diabetes      Colorectal Cancer Screening:     General: Screening Current      Prostate Cancer Screening:    General: Screening Current      Osteoporosis Screening:    General: Screening Not Indicated      Abdominal Aortic Aneurysm (AAA) Screening:    Risk factors include: age between 73-69 yo        Lung Cancer Screening:     General: Screening Not Indicated      Hepatitis C Screening:    General: Screening Current    Screening, Brief Intervention, and Referral to Treatment (SBIRT)    Screening  Typical number of drinks in a day: 0  Typical number of drinks in a week: 2  Interpretation: Low risk drinking behavior  AUDIT-C Screenin) How often did you have a drink containing alcohol in the past year? monthly or less  2) How many drinks did you have on a typical day when you were drinking in the past year? 1 to 2  3) How often did you have 6 or more drinks on one occasion in the past year? never    AUDIT-C Score: 1  Interpretation: Score 0-3 (male): Negative screen for alcohol misuse    Single Item Drug Screening:  How often have you used an illegal drug (including marijuana) or a prescription medication for non-medical reasons in the past year? never    Single Item Drug Screen Score: 0  Interpretation: Negative screen for possible drug use disorder    Brief Intervention  Alcohol & drug use screenings were reviewed  No concerns regarding substance use disorder identified  Other Counseling Topics:   Car/seat belt/driving safety, skin self-exam, sunscreen and regular weightbearing exercise and calcium and vitamin D intake       No exam data present     Physical Exam:     /64 (BP Location: Left arm, Patient Position: Sitting, Cuff Size: Adult)   Pulse 58   Temp (!) 95 1 °F (35 1 °C) (Tympanic)   Resp 16   Ht 6' 1 31" (1 862 m)   Wt 95 3 kg (210 lb)   SpO2 97%   BMI 27 47 kg/m²     Physical Exam  Vitals and nursing note reviewed  Constitutional:       Appearance: Normal appearance  HENT:      Head: Normocephalic and atraumatic  Right Ear: Tympanic membrane, ear canal and external ear normal       Left Ear: Tympanic membrane, ear canal and external ear normal       Nose: Nose normal       Mouth/Throat:      Mouth: Mucous membranes are moist    Eyes:      Conjunctiva/sclera: Conjunctivae normal    Cardiovascular:      Rate and Rhythm: Normal rate and regular rhythm  Pulses: Normal pulses  Heart sounds: Normal heart sounds  Pulmonary:      Effort: Pulmonary effort is normal    Abdominal:      General: Bowel sounds are normal    Musculoskeletal:         General: Normal range of motion  Cervical back: Normal range of motion and neck supple  Skin:     General: Skin is warm and dry  Neurological:      General: No focal deficit present  Mental Status: He is alert and oriented to person, place, and time  Psychiatric:         Mood and Affect: Mood normal          Behavior: Behavior normal          Thought Content:  Thought content normal          Judgment: Judgment normal           HESHAM Borja

## 2022-11-02 ENCOUNTER — ANESTHESIA EVENT (OUTPATIENT)
Dept: PERIOP | Facility: AMBULARY SURGERY CENTER | Age: 72
End: 2022-11-02

## 2022-11-06 DIAGNOSIS — R35.0 BENIGN PROSTATIC HYPERPLASIA WITH URINARY FREQUENCY: ICD-10-CM

## 2022-11-06 DIAGNOSIS — N40.1 BENIGN PROSTATIC HYPERPLASIA WITH URINARY FREQUENCY: ICD-10-CM

## 2022-11-07 RX ORDER — TAMSULOSIN HYDROCHLORIDE 0.4 MG/1
CAPSULE ORAL
Qty: 30 CAPSULE | Refills: 6 | Status: SHIPPED | OUTPATIENT
Start: 2022-11-07

## 2022-11-08 ENCOUNTER — TELEPHONE (OUTPATIENT)
Dept: OTHER | Facility: OTHER | Age: 72
End: 2022-11-08

## 2022-11-08 NOTE — TELEPHONE ENCOUNTER
Patient wanted to clarify the instructions on his medications prior to surgery, he had a question about these in particular: Metoprolol, rosuvastatin,  tamsulosin, repaglinide (PRANDIN) 0 5 mg tablet, furosemide, finasteride

## 2022-11-09 NOTE — TELEPHONE ENCOUNTER
I returned call to pt and informed him that the only thing that we have him stop prior to surgery is any Aspirin and/or vitamins  Pt verbalized understanding  I also informed him that there will be a nurse from the hospital that will call him to go over what medications he can take the morning of surgery and what to stay away from

## 2022-11-09 NOTE — TELEPHONE ENCOUNTER
Patient called in stating he still has not received a call back yet in regards to what medications he should and should not be taking before his procedure  Please call patient back today to discuss

## 2022-11-14 DIAGNOSIS — I25.118 CORONARY ARTERY DISEASE OF NATIVE ARTERY OF NATIVE HEART WITH STABLE ANGINA PECTORIS (HCC): ICD-10-CM

## 2022-11-14 RX ORDER — METOPROLOL SUCCINATE 50 MG/1
50 TABLET, EXTENDED RELEASE ORAL DAILY
Qty: 90 TABLET | Refills: 3 | Status: SHIPPED | OUTPATIENT
Start: 2022-11-14

## 2022-11-14 NOTE — PRE-PROCEDURE INSTRUCTIONS
Pre-Surgery Instructions:   Medication Instructions   • acetaminophen (TYLENOL) 500 mg tablet Uses PRN- OK to take day of surgery   • ascorbic acid (VITAMIN C) 250 mg tablet Stop taking 7 days prior to surgery  • aspirin (ECOTRIN LOW STRENGTH) 81 mg EC tablet Stop taking 7 days prior to surgery  • Cholecalciferol (Vitamin D3) 25 MCG TABS Stop taking 7 days prior to surgery  • finasteride (PROSCAR) 5 mg tablet Take night before surgery   • furosemide (LASIX) 20 mg tablet Hold day of surgery  • Norma, Zingiber officinalis, (Norma Root) 500 MG CAPS Stop taking 7 days prior to surgery  • Jardiance 25 MG TABS Hold day of surgery  • metFORMIN (GLUCOPHAGE) 500 mg tablet Hold day of surgery  • metoprolol succinate (TOPROL-XL) 50 mg 24 hr tablet Take night before surgery   • niacin 500 mg tablet Stop taking 7 days prior to surgery  • Omega-3 Fatty Acids (fish oil) 1,000 mg Stop taking 7 days prior to surgery  • PROBIOTIC PRODUCT PO Stop taking 7 days prior to surgery  • repaglinide (PRANDIN) 0 5 mg tablet Hold day of surgery  • rosuvastatin (CRESTOR) 10 MG tablet Take night before surgery   • sitaGLIPtin (JANUVIA) 100 mg tablet Hold day of surgery  • tamsulosin (FLOMAX) 0 4 mg Take day of surgery  • Tart Cherry (Tart Cherry Ultra) 1200 MG CAPS Hold day of surgery  • Turmeric 500 MG CAPS Hold day of surgery  • Wheat Dextrin (BENEFIBER DRINK MIX PO) Hold day of surgery  You will receive a phone call from hospital for arrival time  Please call surgeons office if any changes in your condition  Wear easy on/off clothing; consider type of surgery;  Valuables, jewelry, piercing's please keep at home  **COVID-19  education/surgical guidelines  Updated covid    Visitation policy  Please: No contact lenses or eye make up, artificial eyelashes      Please secure transportation     Follow pre surgery showering or cleaning instructions as  Reviewed by nurse or surgeons office      Questions answered and concerns addressed

## 2022-11-16 ENCOUNTER — ANESTHESIA (OUTPATIENT)
Dept: PERIOP | Facility: AMBULARY SURGERY CENTER | Age: 72
End: 2022-11-16

## 2022-11-16 ENCOUNTER — HOSPITAL ENCOUNTER (OUTPATIENT)
Facility: AMBULARY SURGERY CENTER | Age: 72
Setting detail: OUTPATIENT SURGERY
Discharge: HOME | End: 2022-11-16
Attending: UROLOGY | Admitting: UROLOGY

## 2022-11-16 VITALS
HEIGHT: 74 IN | HEART RATE: 67 BPM | SYSTOLIC BLOOD PRESSURE: 134 MMHG | OXYGEN SATURATION: 94 % | TEMPERATURE: 97.2 F | WEIGHT: 206 LBS | RESPIRATION RATE: 16 BRPM | BODY MASS INDEX: 26.44 KG/M2 | DIASTOLIC BLOOD PRESSURE: 67 MMHG

## 2022-11-16 DIAGNOSIS — N43.3 HYDROCELE, UNSPECIFIED HYDROCELE TYPE: Primary | ICD-10-CM

## 2022-11-16 LAB — GLUCOSE SERPL-MCNC: 164 MG/DL (ref 65–140)

## 2022-11-16 RX ORDER — ONDANSETRON 2 MG/ML
INJECTION INTRAMUSCULAR; INTRAVENOUS AS NEEDED
Status: DISCONTINUED | OUTPATIENT
Start: 2022-11-16 | End: 2022-11-16

## 2022-11-16 RX ORDER — PROPOFOL 10 MG/ML
INJECTION, EMULSION INTRAVENOUS AS NEEDED
Status: DISCONTINUED | OUTPATIENT
Start: 2022-11-16 | End: 2022-11-16

## 2022-11-16 RX ORDER — SODIUM CHLORIDE, SODIUM LACTATE, POTASSIUM CHLORIDE, CALCIUM CHLORIDE 600; 310; 30; 20 MG/100ML; MG/100ML; MG/100ML; MG/100ML
125 INJECTION, SOLUTION INTRAVENOUS CONTINUOUS
Status: DISCONTINUED | OUTPATIENT
Start: 2022-11-16 | End: 2022-11-16 | Stop reason: HOSPADM

## 2022-11-16 RX ORDER — LIDOCAINE HYDROCHLORIDE 10 MG/ML
INJECTION, SOLUTION EPIDURAL; INFILTRATION; INTRACAUDAL; PERINEURAL AS NEEDED
Status: DISCONTINUED | OUTPATIENT
Start: 2022-11-16 | End: 2022-11-16 | Stop reason: HOSPADM

## 2022-11-16 RX ORDER — MIDAZOLAM HYDROCHLORIDE 2 MG/2ML
INJECTION, SOLUTION INTRAMUSCULAR; INTRAVENOUS AS NEEDED
Status: DISCONTINUED | OUTPATIENT
Start: 2022-11-16 | End: 2022-11-16

## 2022-11-16 RX ORDER — DOCUSATE SODIUM 100 MG/1
100 CAPSULE, LIQUID FILLED ORAL 2 TIMES DAILY
Qty: 30 CAPSULE | Refills: 0 | Status: SHIPPED | OUTPATIENT
Start: 2022-11-16 | End: 2022-12-01

## 2022-11-16 RX ORDER — OXYCODONE HYDROCHLORIDE 5 MG/1
5 TABLET ORAL EVERY 4 HOURS PRN
Status: DISCONTINUED | OUTPATIENT
Start: 2022-11-16 | End: 2022-11-16 | Stop reason: HOSPADM

## 2022-11-16 RX ORDER — DEXAMETHASONE SODIUM PHOSPHATE 10 MG/ML
INJECTION, SOLUTION INTRAMUSCULAR; INTRAVENOUS AS NEEDED
Status: DISCONTINUED | OUTPATIENT
Start: 2022-11-16 | End: 2022-11-16

## 2022-11-16 RX ORDER — OXYCODONE HYDROCHLORIDE 5 MG/1
5 TABLET ORAL EVERY 4 HOURS PRN
Qty: 5 TABLET | Refills: 0 | Status: SHIPPED | OUTPATIENT
Start: 2022-11-16 | End: 2022-11-21

## 2022-11-16 RX ORDER — CEFAZOLIN SODIUM 2 G/50ML
2000 SOLUTION INTRAVENOUS
Status: COMPLETED | OUTPATIENT
Start: 2022-11-16 | End: 2022-11-16

## 2022-11-16 RX ORDER — METOCLOPRAMIDE HYDROCHLORIDE 5 MG/ML
10 INJECTION INTRAMUSCULAR; INTRAVENOUS ONCE AS NEEDED
Status: DISCONTINUED | OUTPATIENT
Start: 2022-11-16 | End: 2022-11-16 | Stop reason: HOSPADM

## 2022-11-16 RX ORDER — FENTANYL CITRATE 50 UG/ML
INJECTION, SOLUTION INTRAMUSCULAR; INTRAVENOUS AS NEEDED
Status: DISCONTINUED | OUTPATIENT
Start: 2022-11-16 | End: 2022-11-16

## 2022-11-16 RX ORDER — HYDROMORPHONE HCL/PF 1 MG/ML
0.2 SYRINGE (ML) INJECTION
Status: DISCONTINUED | OUTPATIENT
Start: 2022-11-16 | End: 2022-11-16 | Stop reason: HOSPADM

## 2022-11-16 RX ORDER — FENTANYL CITRATE/PF 50 MCG/ML
50 SYRINGE (ML) INJECTION
Status: DISCONTINUED | OUTPATIENT
Start: 2022-11-16 | End: 2022-11-16 | Stop reason: HOSPADM

## 2022-11-16 RX ORDER — FENTANYL CITRATE/PF 50 MCG/ML
25 SYRINGE (ML) INJECTION
Status: DISCONTINUED | OUTPATIENT
Start: 2022-11-16 | End: 2022-11-16 | Stop reason: HOSPADM

## 2022-11-16 RX ORDER — KETAMINE HYDROCHLORIDE 100 MG/ML
INJECTION, SOLUTION INTRAMUSCULAR; INTRAVENOUS AS NEEDED
Status: DISCONTINUED | OUTPATIENT
Start: 2022-11-16 | End: 2022-11-16

## 2022-11-16 RX ORDER — LIDOCAINE HYDROCHLORIDE 20 MG/ML
INJECTION, SOLUTION EPIDURAL; INFILTRATION; INTRACAUDAL; PERINEURAL AS NEEDED
Status: DISCONTINUED | OUTPATIENT
Start: 2022-11-16 | End: 2022-11-16

## 2022-11-16 RX ORDER — PROMETHAZINE HYDROCHLORIDE 25 MG/ML
25 INJECTION, SOLUTION INTRAMUSCULAR; INTRAVENOUS ONCE AS NEEDED
Status: DISCONTINUED | OUTPATIENT
Start: 2022-11-16 | End: 2022-11-16 | Stop reason: HOSPADM

## 2022-11-16 RX ORDER — HYDROMORPHONE HCL/PF 1 MG/ML
0.5 SYRINGE (ML) INJECTION
Status: DISCONTINUED | OUTPATIENT
Start: 2022-11-16 | End: 2022-11-16 | Stop reason: HOSPADM

## 2022-11-16 RX ORDER — GLYCOPYRROLATE 0.2 MG/ML
INJECTION INTRAMUSCULAR; INTRAVENOUS AS NEEDED
Status: DISCONTINUED | OUTPATIENT
Start: 2022-11-16 | End: 2022-11-16

## 2022-11-16 RX ADMIN — KETAMINE HYDROCHLORIDE 20 MG: 100 INJECTION INTRAMUSCULAR; INTRAVENOUS at 11:50

## 2022-11-16 RX ADMIN — LIDOCAINE HYDROCHLORIDE 50 MG: 20 INJECTION, SOLUTION EPIDURAL; INFILTRATION; INTRACAUDAL at 11:30

## 2022-11-16 RX ADMIN — CEFAZOLIN SODIUM 2000 MG: 2 SOLUTION INTRAVENOUS at 11:23

## 2022-11-16 RX ADMIN — ONDANSETRON 4 MG: 2 INJECTION INTRAMUSCULAR; INTRAVENOUS at 12:54

## 2022-11-16 RX ADMIN — SODIUM CHLORIDE, SODIUM LACTATE, POTASSIUM CHLORIDE, AND CALCIUM CHLORIDE: .6; .31; .03; .02 INJECTION, SOLUTION INTRAVENOUS at 11:38

## 2022-11-16 RX ADMIN — DEXAMETHASONE SODIUM PHOSPHATE 10 MG: 10 INJECTION, SOLUTION INTRAMUSCULAR; INTRAVENOUS at 12:54

## 2022-11-16 RX ADMIN — SODIUM CHLORIDE, SODIUM LACTATE, POTASSIUM CHLORIDE, AND CALCIUM CHLORIDE: .6; .31; .03; .02 INJECTION, SOLUTION INTRAVENOUS at 11:16

## 2022-11-16 RX ADMIN — FENTANYL CITRATE 25 MCG: 50 INJECTION, SOLUTION INTRAMUSCULAR; INTRAVENOUS at 12:01

## 2022-11-16 RX ADMIN — MIDAZOLAM HYDROCHLORIDE 2 MG: 1 INJECTION, SOLUTION INTRAMUSCULAR; INTRAVENOUS at 11:22

## 2022-11-16 RX ADMIN — PROPOFOL 200 MG: 10 INJECTION, EMULSION INTRAVENOUS at 11:30

## 2022-11-16 RX ADMIN — GLYCOPYRROLATE 0.2 MCG: 0.2 INJECTION, SOLUTION INTRAMUSCULAR; INTRAVENOUS at 11:50

## 2022-11-16 NOTE — ANESTHESIA PREPROCEDURE EVALUATION
Procedure:  HYDROCELECTOMY (Right: Scrotum)    Relevant Problems   CARDIO   (+) Aortic stenosis   (+) Coronary artery disease of native artery of native heart with stable angina pectoris (HCC)   (+) Essential hypertension   (+) Hypertriglyceridemia   (+) Mixed hyperlipidemia   (+) PVC (premature ventricular contraction)   (+) Postoperative atrial fibrillation (HCC)   (+) S/P AVR   (+) S/P CABG (coronary artery bypass graft)      ENDO   (+) DM (diabetes mellitus), type 2 (HCC)      NEURO/PSYCH   (+) H/O diabetic foot ulcer        Physical Exam    Airway    Mallampati score: I  TM Distance: >3 FB  Neck ROM: full     Dental   No notable dental hx     Cardiovascular      Pulmonary      Other Findings        Anesthesia Plan  ASA Score- 2     Anesthesia Type- general with ASA Monitors  Additional Monitors:   Airway Plan: LMA  Plan Factors-Exercise tolerance (METS): >4 METS  Chart reviewed  EKG reviewed  Existing labs reviewed  Patient summary reviewed  Patient is not a current smoker  There is medical exclusion for perioperative obstructive sleep apnea risk education  Induction- intravenous  Postoperative Plan- Plan for postoperative opioid use  Informed Consent- Anesthetic plan and risks discussed with patient  I personally reviewed this patient with the CRNA  Discussed and agreed on the Anesthesia Plan with the CRNA  Grisel Tervino

## 2022-11-16 NOTE — DISCHARGE INSTRUCTIONS
Debbie Hull: Your surgery went very well  Recurrent right hydrocele was removed in its entirety  A drain was left in place  Please continue to keep the drainage tube to suction as instructed by the nurses  I will schedule office visit for you on Friday for drain removal           Please call with any questions or concerns      Marcus Tinajero MD,PhD  [facility]  [phone]

## 2022-11-16 NOTE — H&P
UROLOGY HISTORY AND PHYSICAL     Patient Identifiers: Errol Fan (MRN [id number])      Date of Service: 11/16/2022        ASSESSMENT:     74 y o  old male with  recurrent right hydrocele presents for redo hydrocelectomy  PLAN:     To OR for redo hydrocelectomy, right side      History of Present Illness:     Errol Fan is a 74 y o  old with a history of recurrent right-sided hydrocele    Past Medical, Past Surgical History:     Past Medical History:   Diagnosis Date   • Acute systolic heart failure ([facility] [address] ) 02/23/2021   • Athlete's foot    • Bilateral impacted cerumen 03/09/2021   • Diabetes mellitus ([facility] [address] )    • Other hydrocele 06/11/2020   • TMJ (dislocation of temporomandibular joint)    • Yeast infection    :    Past Surgical History:   Procedure Laterality Date   • CARDIAC SURGERY     • CARDIAC VALVE REPLACEMENT     • CATARACT EXTRACTION     • COLONOSCOPY     • CORONARY ARTERY BYPASS GRAFT     • EYE SURGERY  03/2020    Cataract right eye   • EYE SURGERY Left 12/03/2020    left eye cataract    • HYDROCELE EXCISION / REPAIR     • TN REMOVAL OF HYDROCELE,TUNICA,UNILAT Right 04/08/2022    Procedure: HYDROCELECTOMY;  Surgeon: José Miguel Guerra MD;  [facility]: [facility] MAIN OR;  Service: Urology   • TONSILLECTOMY     :    Medications, Allergies:     Current Facility-Administered Medications:   •  ceFAZolin (ANCEF) IVPB (premix in dextrose) 2,000 mg 50 mL, 2,000 mg, Intravenous, On Call To OR, Sayra Turpin PA-C  •  lactated ringers infusion, 125 mL/hr, Intravenous, Continuous, Alena Bone MD  •  lactated ringers infusion, 125 mL/hr, Intravenous, Continuous, Chivo Turpin PA-C    Allergies:  No Known Allergies:    Social and Family History:   Social History:   Social History     Tobacco Use   • Smoking status: Never   • Smokeless tobacco: Never   Vaping Use   • Vaping Use: Never used   Substance Use Topics   • Alcohol use: Yes     Comment: 2-3 glass of wine or  beer a week   • Drug use: Never     Social History     Tobacco Use   Smoking Status Never   Smokeless Tobacco Never       Family History:  Family History   Problem Relation Age of Onset   • Heart disease Mother    • Cancer Mother    • Heart disease Father    • Hypertension Father    • Mitral valve prolapse Sister    • Colon cancer Neg Hx    :     Review of Systems:     General: Fever, chills, or night sweats: negative  Cardiac: Negative for chest pain  Pulmonary: Negative for shortness of breath  Gastrointestinal: Abdominal pain negative  Nausea, vomiting, or diarrhea negative  Genitourinary: See HPI above  Patient does nothave hematuria  All other systems queried were negative  Physical Exam:   General: Patient is pleasant and in NAD  Awake and alert  /64   Pulse (!) 54   Temp (!) 97 °F (36 1 °C) (Temporal)   Resp 18   Ht 6' 2" (1 88 m)   Wt 93 4 kg (206 lb)   SpO2 95%   BMI 26 45 kg/m²   HEENT:  Normocephalic atraumatic  Cardiac:  Regular rate and rhythm, Peripheral edema: negative  Pulmonary: Non-labored breathing, CTAB  Abdomen: Soft, non-tender, non-distended  No surgical scars  No masses, tenderness, hernias noted  Genitourinary: negative CVA tenderness, neg suprapubic tenderness  Extremities: normal movement in all 4       Labs:     Lab Results   Component Value Date    HGB 14 5 03/22/2021    HCT 42 1 03/22/2021    WBC 8 24 03/22/2021     03/22/2021   ]    Lab Results   Component Value Date    K 4 4 09/12/2022     (H) 09/12/2022    CO2 30 09/12/2022    BUN 21 09/12/2022    CREATININE 1 08 09/12/2022    CALCIUM 9 3 09/12/2022   ]    Imaging:   I personally reviewed the images and report of the following studies, and reviewed them with the patient:        Thank you for allowing me to participate in this patients’ care  Please do not hesitate to call with any additional questions    Nahum Lovett MD

## 2022-11-16 NOTE — OP NOTE
Operative Note     PATIENT:  Regino Waite (MRN [id number])    DATE OF PROCEDURE:   11/17/2022    PRE-OP DIAGNOSIS:   1) recurrent right hydrocele    POST-OP DIAGNOSIS:   1) recurrent right hydrocele    PROCEDURES PERFORMED:  1) right hydrocelectomy, redo    SURGEON:  Chely Laureano MD    ASSISTANTS:      NOTE:  There were no qualified teaching residents to assist with this case    ANESTHESIA: General     COMPLICATIONS:   None    ANTIBIOTICS:  Cephazolin    INTRAOPERATIVE THROMBOEMBOLISM PROPHYLAXIS:  Pneumatic compression stockings     FINDINGS:  A very thick walled encapsulated hydrocele is present within the right hemiscrotum  The testicle and spermatic cord structures are free-floating within this  This was a noncommunicating hydrocele, confirmed to be confined to the scrotum  The spermatic cord was mobilized towards the inguinal ring to confirm this finding that there was no patent processus vaginalis  Extensive dissection was performed in order to remove the entirety of the hydrocele sac  This required dissection and mobilization of the median raphe  Close suction drain was left postoperatively  INDICATIONS FOR PROCEDURE:  Regino Waite is an 74 y o  old male with a symptomatic recurrent Right hydrocele  He has had a scrotal ultrasound which was negative for intratesticular mass or tumor  After discussing the options, the patient elected to undergo a hydrocelectomy  We discussed the procedure in detail, the alternatives, and the risks, and they signed informed consent to proceed  PROCEDURE IN DETAIL:   The patient was identified and brought to the OR  Antibiotic prophylaxis and DVT prophylaxis were administered  They were placed in the comfortable supine position with care to pad all pressure points  The scrotal hair was clipped and they were prepped and draped in the usual sterile fashion using ChloraPrep    A surgical time out was performed with all in the room in agreement with the correct patient, procedure, indications, and laterality  Next a transverse incision was made on the Right hemiscrotum  This was carried down through the cremasteric fibers using electrocautery and down to the hydrocele sac  The hydrocele sac was significantly adhesed to the inner cremasteric fibers as well as the median rough a  Extensive dissection was required  The spermatic cord and hydrocele sac was delivered through this incision  The hydrocele sac was then entered sharply away from the testes, epididymis, and cord  Straw-colored fluid was then drained  Next the sac was excised using electrocautery with care taken to avoid any injury to the testes, epididymis, vas deferens, or vascular pedicle along the spermatic cord  The sac was submitted for permanent pathology  The edges of the hydrocele sac were next oversewn using 4-0 monofilament suture  The wound was copiously irrigated  Hemostasis was achieved using electrocautery  The testes was delivered back into the base of the scrotum  The dartos layer was closed using running 3-0 Vicryl  The skin was then closed using 4-0 chromic horizontal mattress suture  A sterile dressing and scrotal support was applied  A close suction drain was placed postoperatively    All needle and instrument counts were correct  The patient was placed back supine, awakened from general anesthesia and brought to recovery room in stable condition  ESTIMATED BLOOD LOSS:  Minimal      DRAINS:   FELICITY    SPECIMENS:   Order Name Source Comment Collection Info Order Time   TISSUE EXAM Hydrocele Sac  Collected By: Claudia Salazar MD 11/16/2022 12:02 PM     Release to patient through Mychart   Immediate             COMPLICATIONS: none    DISPOSITION: PACU      Patient will present to the office on postoperative day 2    For FELICITY drain removal and a postoperative visit will be scheduled thereafter

## 2022-11-16 NOTE — ANESTHESIA POSTPROCEDURE EVALUATION
Post-Op Assessment Note    CV Status:  Stable  Pain Score: 0    Pain management: adequate     Mental Status:  Alert and awake   Hydration Status:  Euvolemic   PONV Controlled:  Controlled   Airway Patency:  Patent   Two or more mitigation strategies used for obstructive sleep apnea   Post Op Vitals Reviewed: Yes      Staff: Anesthesiologist, CRNA         No notable events documented      /78 (11/16/22 1300)    Temp 98 3 °F (36 8 °C) (11/16/22 1300)    Pulse 72 (11/16/22 1300)   Resp 14 (11/16/22 1300)    SpO2 93 % (11/16/22 1300)

## 2022-11-17 ENCOUNTER — TELEPHONE (OUTPATIENT)
Dept: UROLOGY | Facility: CLINIC | Age: 72
End: 2022-11-17

## 2022-11-17 NOTE — TELEPHONE ENCOUNTER
Status post removal of recurrent hydrocele  Patient was discharged home with a FELICITY drain  Please schedule a nurse visit on Friday to remove his FELICITY drain before the weekend  Follow-up as scheduled after that

## 2022-11-17 NOTE — TELEPHONE ENCOUNTER
Nathanael Reyes PA-C  [facility] 5 hours ago (10:59 AM)     BL  Can restart Aspirin and fish oil next week  Would try to avoid use of neosporin on surgical site unless it is looking infected  Thanks      Spoke with patient and relayed Anastasia's message  He verbalized understanding and agrees to plan

## 2022-11-17 NOTE — TELEPHONE ENCOUNTER
Post Op Note    Brenden King is a 74 y o  male s/p HYDROCELECTOMY (Right: Scrotum) performed 11/16/22  Brenden King is a patient of Dr Rand Mckeon and is seen at the [facility] office  How would you rate your pain on a scale from 1 to 10, 10 being the worst pain ever? 3 minimal pain   Have you had a fever? No  Have your bowel movements been regular? No  Do you have any difficulty urinating? No  If the patient has an incision- do you have any redness around the incision or any drainage from the incision? No  If the patient has a drain- are you having any issues with the drain? No    Do you have any other questions or concerns that I can address at this time?   - Advised on well supporting and fitting underwear/ jock strap    Using ice  And elevation when sitting down    -will find out when he can restart the fish oil and aspirin    -wants to know if he can use neosporin on surgical site will find out and tell him tomorrow   Confirmed appt for tomorrow

## 2022-11-18 ENCOUNTER — PROCEDURE VISIT (OUTPATIENT)
Dept: UROLOGY | Facility: CLINIC | Age: 72
End: 2022-11-18

## 2022-11-18 VITALS
SYSTOLIC BLOOD PRESSURE: 140 MMHG | WEIGHT: 206 LBS | OXYGEN SATURATION: 99 % | DIASTOLIC BLOOD PRESSURE: 78 MMHG | BODY MASS INDEX: 26.45 KG/M2 | HEART RATE: 71 BPM

## 2022-11-18 DIAGNOSIS — N43.3 HYDROCELE IN ADULT: Primary | ICD-10-CM

## 2022-11-18 NOTE — PROGRESS NOTES
11/18/2022    Micah Pichardo is a 74 y o  male  [id number]    Diagnosis:      Patient presents for FELICITY drain removal s/p hydrocelectomy on 11/16/22 with Dr Danielle Skinner:  Patient will follow up as scheduled    Procedure:  One stitch cut and removed without issue  FELICITY easily removed without issue  Patient replaced jock strap after gauze was placed  Educated on normal healing of FELICITY site  Scrotum is swollen and ecchymotic  Educated on elevation and icing area  When to resume blood thinners  Patient pleased   No further concerns     Vitals:    11/18/22 0934   BP: 140/78   Pulse: 71   SpO2: 99%   Weight: 93 4 kg (206 lb)         Lulú Rodriguez RN

## 2022-12-01 ENCOUNTER — PATIENT MESSAGE (OUTPATIENT)
Dept: UROLOGY | Facility: CLINIC | Age: 72
End: 2022-12-01

## 2022-12-05 ENCOUNTER — APPOINTMENT (OUTPATIENT)
Dept: LAB | Facility: AMBULARY SURGERY CENTER | Age: 72
End: 2022-12-05

## 2022-12-05 DIAGNOSIS — N40.0 BENIGN PROSTATIC HYPERPLASIA WITHOUT LOWER URINARY TRACT SYMPTOMS: ICD-10-CM

## 2022-12-05 LAB — PSA SERPL-MCNC: 0.3 NG/ML (ref 0–4)

## 2022-12-06 ENCOUNTER — OFFICE VISIT (OUTPATIENT)
Dept: UROLOGY | Facility: CLINIC | Age: 72
End: 2022-12-06

## 2022-12-06 VITALS
BODY MASS INDEX: 26.95 KG/M2 | DIASTOLIC BLOOD PRESSURE: 70 MMHG | HEIGHT: 74 IN | SYSTOLIC BLOOD PRESSURE: 128 MMHG | WEIGHT: 210 LBS

## 2022-12-06 DIAGNOSIS — N43.3 HYDROCELE IN ADULT: Primary | ICD-10-CM

## 2022-12-06 NOTE — PROGRESS NOTES
Assessment:  Recurrent right hydrocele      Plan:  -Follow-up in 6 weeks     Doing well postoperatively  Wound care discussed  Subjective     Wilmar Zaman presents to the clinic 2 weeks status post recurrent hydrocelectomy for post-op follow-up  The patient reports no problems with eating, bowel movements, voiding, or their wound  The patient is not having any pain       Objective     /70 (BP Location: Left arm, Patient Position: Sitting, Cuff Size: Adult)   Ht 6' 2" (1 88 m)   Wt 95 3 kg (210 lb)   BMI 26 96 kg/m²   General:  alert   Abdomen:    Incision:   healing well, moderate induration bilaterally    Otherwise healing well     Patholgy Review[de-identified]  Final Diagnosis   HYDROCELE SAC, HYDROCELECTOMY:    - Benign fibrovascular tissue     - Negative for malignancy

## 2022-12-21 ENCOUNTER — OFFICE VISIT (OUTPATIENT)
Dept: PODIATRY | Facility: CLINIC | Age: 72
End: 2022-12-21

## 2022-12-21 VITALS
SYSTOLIC BLOOD PRESSURE: 109 MMHG | BODY MASS INDEX: 26.95 KG/M2 | WEIGHT: 210 LBS | DIASTOLIC BLOOD PRESSURE: 66 MMHG | HEART RATE: 70 BPM | HEIGHT: 74 IN

## 2022-12-21 DIAGNOSIS — B35.1 ONYCHOMYCOSIS: ICD-10-CM

## 2022-12-21 DIAGNOSIS — E11.42 DIABETIC POLYNEUROPATHY ASSOCIATED WITH TYPE 2 DIABETES MELLITUS (HCC): Primary | ICD-10-CM

## 2022-12-21 NOTE — PROGRESS NOTES
Jus Chavez  1950  AT RISK FOOT CARE    1  Diabetic polyneuropathy associated with type 2 diabetes mellitus ([facility] )        2  Onychomycosis            Patient presents for at-risk foot care  Patient has no acute concerns today  Patient has significant lower extremity risk due to neuropathy, parasthesia, edema, and trophic skin changes to the lower extremity  On exam patient has thickened, hypertrophic, discolored, brittle toenails with subungual debris and tenderness x10   Callus: none  Patient has lower extremity edema  PAtients skin is atrophic, thickened nails, and decreased pedal hair  Patient has decreased pinprick and vibratory sensation to his feet and parasthesia    Today's treatment includes:  Debridement of toenails  Using nail nipper, an, and curette, nails were sharply debrided, reduced in thickness and length  Devitalized nail tissue and fungal debris excised and removed  Patient tolerated well  Discussed proper shoe gear, daily inspections of feet, and general foot health with patient  Patient has Q9  findings and is recommended for at risk foot care every 9-10 weeks      Patients most recent complete clinical foot exam was on: 8/3/2022

## 2023-01-15 DIAGNOSIS — E11.65 TYPE 2 DIABETES MELLITUS WITH HYPERGLYCEMIA, WITHOUT LONG-TERM CURRENT USE OF INSULIN (HCC): ICD-10-CM

## 2023-01-16 ENCOUNTER — LAB (OUTPATIENT)
Dept: LAB | Facility: AMBULARY SURGERY CENTER | Age: 73
End: 2023-01-16

## 2023-01-16 DIAGNOSIS — I10 ESSENTIAL HYPERTENSION: ICD-10-CM

## 2023-01-16 DIAGNOSIS — E78.2 MIXED HYPERLIPIDEMIA: ICD-10-CM

## 2023-01-16 DIAGNOSIS — E11.65 TYPE 2 DIABETES MELLITUS WITH HYPERGLYCEMIA, WITHOUT LONG-TERM CURRENT USE OF INSULIN (HCC): ICD-10-CM

## 2023-01-16 DIAGNOSIS — R60.9 EDEMA, UNSPECIFIED TYPE: ICD-10-CM

## 2023-01-16 LAB
ANION GAP SERPL CALCULATED.3IONS-SCNC: 5 MMOL/L (ref 4–13)
BUN SERPL-MCNC: 28 MG/DL (ref 5–25)
CALCIUM SERPL-MCNC: 9.6 MG/DL (ref 8.3–10.1)
CHLORIDE SERPL-SCNC: 107 MMOL/L (ref 96–108)
CHOLEST SERPL-MCNC: 118 MG/DL
CO2 SERPL-SCNC: 27 MMOL/L (ref 21–32)
CREAT SERPL-MCNC: 1.02 MG/DL (ref 0.6–1.3)
CREAT UR-MCNC: 54.9 MG/DL
EST. AVERAGE GLUCOSE BLD GHB EST-MCNC: 163 MG/DL
GFR SERPL CREATININE-BSD FRML MDRD: 72 ML/MIN/1.73SQ M
GLUCOSE P FAST SERPL-MCNC: 199 MG/DL (ref 65–99)
HBA1C MFR BLD: 7.3 %
HDLC SERPL-MCNC: 46 MG/DL
LDLC SERPL CALC-MCNC: 45 MG/DL (ref 0–100)
MICROALBUMIN UR-MCNC: 6.1 MG/L (ref 0–20)
MICROALBUMIN/CREAT 24H UR: 11 MG/G CREATININE (ref 0–30)
NONHDLC SERPL-MCNC: 72 MG/DL
POTASSIUM SERPL-SCNC: 4.3 MMOL/L (ref 3.5–5.3)
SODIUM SERPL-SCNC: 139 MMOL/L (ref 135–147)
TRIGL SERPL-MCNC: 135 MG/DL

## 2023-01-16 RX ORDER — FUROSEMIDE 20 MG/1
TABLET ORAL
Qty: 30 TABLET | Refills: 0 | Status: SHIPPED | OUTPATIENT
Start: 2023-01-16

## 2023-01-18 ENCOUNTER — OFFICE VISIT (OUTPATIENT)
Dept: ENDOCRINOLOGY | Facility: CLINIC | Age: 73
End: 2023-01-18

## 2023-01-18 VITALS
WEIGHT: 210 LBS | BODY MASS INDEX: 26.95 KG/M2 | HEART RATE: 64 BPM | SYSTOLIC BLOOD PRESSURE: 110 MMHG | HEIGHT: 74 IN | TEMPERATURE: 97.3 F | DIASTOLIC BLOOD PRESSURE: 70 MMHG

## 2023-01-18 DIAGNOSIS — E78.2 MIXED HYPERLIPIDEMIA: ICD-10-CM

## 2023-01-18 DIAGNOSIS — E11.65 TYPE 2 DIABETES MELLITUS WITH HYPERGLYCEMIA, WITHOUT LONG-TERM CURRENT USE OF INSULIN (HCC): Primary | ICD-10-CM

## 2023-01-18 DIAGNOSIS — I10 ESSENTIAL HYPERTENSION: ICD-10-CM

## 2023-01-18 NOTE — PATIENT INSTRUCTIONS
Hypoglycemia instructions   Asiya Starr  1/18[birth year]2023  [id number]    Low Blood Sugar    Steps to treat low blood sugar  1  Test blood sugar if you have symptoms of low blood sugar:   Low Blood Sugar Symptoms:  o Sweaty  o Dizzy  o Rapid heartbeat  o Shaky  o Bad mood  o Hungry      2  Treat blood sugar less than 70 with 15 grams of fast-acting carbohydrate:   Examples of 15 grams Fast-Acting Carbohydrate:  o 4 oz juice  o 4 oz regular soda  o 3-4 glucose tablets (chew)  o 3-4 hard candies (chew)          3  Wait 15 minutes and test your blood sugar again     4   If blood sugar is less than 100, repeat steps 2-3     5  When your blood sugar is 100 or more, eat a snack if it will be longer than one hour until your next meal  The snack should be 15 grams of carbohydrate and a protein:   Examples of snacks:  o ½ sandwich  o 6 crackers with cheese  o Piece of fruit with cheese or peanut butter  o 6 crackers with peanut butter

## 2023-01-18 NOTE — PROGRESS NOTES
Patient Progress Note      CC: Dm      Referring Provider  No referring provider defined for this encounter  History of Present Illness:   Trina Jhaveri is a 74 y o  male with a history of type 2 diabetes without long term use of insulin  Diabetes course has been stable  Complications of DM: CAD  Denies recent illness or hospitalizations  Denies recent severe hypoglycemic or severe hyperglycemic episodes  Denies any issues with his current regimen  Home glucose monitoring: are performed sporadically  Home blood glucose readings: 120-198 mg/dl     Current regimen: metformin 1000 mg BID, Januvia 100 mg daily, Jardiance 25 mg daily, Prandin 0 5 mg before dinner  Not using GLP1 agonist instead of DPP4 inhibitor due SE / injection site reaction  compliant most of the time, denies any side effects from medications  Hypoglycemic episodes: No, never  H/o of hypoglycemia causing hospitalization or Intervention such as glucagon injection or ambulance call :  No  Hypoglycemia symptoms: never   Treatment of hypoglycemia: discussed treatment     Medic alert tag: recommended: Yes     Diabetes education: Yes  Diet: 3 meals per day, 1-2 snacks per day  Timing of meals is predictable  Diabetic diet compliance:  compliant some of the time; less compliant during the holidays  Activity: Daily activity is predictable: Yes  He just started some light exercise yesterday  Prior to that he was not exercising temporarily due to surgery  Ophthamology: Feb 2022  Podiatry: foot exam UTD, August 2022     Not on ACE inhibitor/ARB  Hyperlipidemia: on statin - tolerating well, no myalgias  compliant most of the time, denies any side effects from medications    Thyroid disorders: No  History of pancreatitis: No    Patient Active Problem List   Diagnosis   • Aortic stenosis   • Coronary artery disease of native artery of native heart with stable angina pectoris ([facility] )   • DM (diabetes mellitus), type 2 ([facility] [address] )   • Essential hypertension   • Hypertriglyceridemia   • S/P CABG (coronary artery bypass graft)   • S/P AVR   • PVC (premature ventricular contraction)   • Bilateral carotid artery stenosis   • Mixed hyperlipidemia   • Postoperative atrial fibrillation (HCC)   • Benign prostatic hyperplasia with urinary frequency   • Stenosis of right subclavian artery (HCC)   • Hydrocele in adult   • IBS (irritable bowel syndrome)   • Lower urinary tract symptoms due to benign prostatic hyperplasia   • H/O diabetic foot ulcer   • Hammer toes of both feet      Past Medical History:   Diagnosis Date   • Acute systolic heart failure ([facility] [address] ) 02/23/2021   • Athlete's foot    • Bilateral impacted cerumen 03/09/2021   • Diabetes mellitus ([facility] [address] )    • Other hydrocele 06/11/2020   • TMJ (dislocation of temporomandibular joint)    • Yeast infection       Past Surgical History:   Procedure Laterality Date   • CARDIAC SURGERY     • CARDIAC VALVE REPLACEMENT     • CATARACT EXTRACTION     • COLONOSCOPY     • CORONARY ARTERY BYPASS GRAFT     • EYE SURGERY  03/2020    Cataract right eye   • EYE SURGERY Left 12/03/2020    left eye cataract    • HYDROCELE EXCISION / REPAIR     • MA EXCISION HYDROCELE UNILATERAL Right 04/08/2022    Procedure: HYDROCELECTOMY;  Surgeon: Yesy Livingston MD;  [facility] MA[facility];  Service: Urology   • MA EXCISION HYDROCELE UNILATERAL Right 11/16/2022    Procedure: HYDROCELECTOMY;  Surgeon: Yesy Livingston MD;  Location: [facility] MAIN OR;  Service: Urology   • TONSILLECTOMY        Family History   Problem Relation Age of Onset   • Heart disease Mother    • Cancer Mother    • Heart disease Father    • Hypertension Father    • Mitral valve prolapse Sister    • Colon cancer Neg Hx      Social History     Tobacco Use   • Smoking status: Never   • Smokeless tobacco: Never   Substance Use Topics   • Alcohol use: Yes     Comment: 2-3 glass of wine or  beer a week     No Known Allergies      Current Outpatient Medications:   • acetaminophen (TYLENOL) 500 mg tablet, Take 500 mg by mouth daily at bedtime, Disp: , Rfl:   •  ascorbic acid (VITAMIN C) 250 mg tablet, Take 500 mg by mouth 2 (two) times a day, Disp: , Rfl:   •  finasteride (PROSCAR) 5 mg tablet, Take 1 tablet (5 mg total) by mouth daily, Disp: 30 tablet, Rfl: 11  •  furosemide (LASIX) 20 mg tablet, TAKE ONE TABLET BY MOUTH EVERY DAY AS NEEDED FOR EDEMA, Disp: 30 tablet, Rfl: 0  •  Ginger, Zingiber officinalis, (Ginger Root) 500 MG CAPS, , Disp: , Rfl:   •  Jardiance 25 MG TABS, Take 1 tablet (25 mg total) by mouth daily, Disp: 90 tablet, Rfl: 3  •  metFORMIN (GLUCOPHAGE) 500 mg tablet, TAKE TWO TABLETS BY MOUTH TWICE A DAY, Disp: 360 tablet, Rfl: 1  •  metoprolol succinate (TOPROL-XL) 50 mg 24 hr tablet, Take 1 tablet (50 mg total) by mouth daily, Disp: 90 tablet, Rfl: 3  •  niacin 500 mg tablet, Take 500 mg by mouth 4 (four) times a day, Disp: , Rfl:   •  NON FORMULARY,  , Disp: , Rfl:   •  Omega-3 Fatty Acids (FISH OIL BURP-LESS PO), Take by mouth, Disp: , Rfl:   •  PROBIOTIC PRODUCT PO, Take 111 mg by mouth daily, Disp: , Rfl:   •  repaglinide (PRANDIN) 0 5 mg tablet, Take 1 tablet (0 5 mg total) by mouth daily before dinner, Disp: 90 tablet, Rfl: 1  •  rosuvastatin (CRESTOR) 10 MG tablet, Take 1 tablet (10 mg total) by mouth daily, Disp: 90 tablet, Rfl: 3  •  sitaGLIPtin (JANUVIA) 100 mg tablet, Take 1 tablet (100 mg total) by mouth daily, Disp: 90 tablet, Rfl: 3  •  tamsulosin (FLOMAX) 0 4 mg, TAKE ONE CAPSULE BY MOUTH EVERY DAY WITH DINNER, Disp: 30 capsule, Rfl: 6  •  Tart Cherry (Tart Cherry Ultra) 1200 MG CAPS, , Disp: , Rfl:   •  Turmeric 500 MG CAPS, , Disp: , Rfl:   •  Wheat Dextrin (BENEFIBER DRINK MIX PO), Take by mouth, Disp: , Rfl:   Review of Systems   Constitutional: Negative for activity change, appetite change, fatigue and unexpected weight change  HENT: Negative for trouble swallowing  Eyes: Negative for visual disturbance     Respiratory: Negative for shortness of breath  Cardiovascular: Negative for chest pain and palpitations  Gastrointestinal: Negative for constipation and diarrhea  Endocrine: Negative for polydipsia and polyuria  Musculoskeletal: Negative  Neurological: Positive for numbness (and tingling)  Psychiatric/Behavioral: Negative  Physical Exam:  Body mass index is 26 96 kg/m²  /70   Pulse 64   Temp (!) 97 3 °F (36 3 °C) (Skin)   Ht 6' 2" (1 88 m)   Wt 95 3 kg (210 lb)   BMI 26 96 kg/m²    Wt Readings from Last 3 Encounters:   01/18/23 95 3 kg (210 lb)   12/21/22 95 3 kg (210 lb)   12/06/22 95 3 kg (210 lb)       Physical Exam  Vitals and nursing note reviewed  Constitutional:       Appearance: He is well-developed  HENT:      Head: Normocephalic  Eyes:      General: No scleral icterus  Pupils: Pupils are equal, round, and reactive to light  Neck:      Thyroid: No thyromegaly  Cardiovascular:      Rate and Rhythm: Normal rate and regular rhythm  Pulses:           Radial pulses are 2+ on the right side and 2+ on the left side  Heart sounds: No murmur heard  Pulmonary:      Effort: Pulmonary effort is normal  No respiratory distress  Breath sounds: Normal breath sounds  No wheezing  Musculoskeletal:      Cervical back: Neck supple  Skin:     General: Skin is warm and dry  Neurological:      Mental Status: He is alert  Patient's shoes and socks were not removed            Labs:   Component      Latest Ref Rng & Units 9/12/2022 1/16/2023   Sodium      135 - 147 mmol/L 138 139   Potassium      3 5 - 5 3 mmol/L 4 4 4 3   Chloride      96 - 108 mmol/L 109 (H) 107   CO2      21 - 32 mmol/L 30 27   Anion Gap      4 - 13 mmol/L -1 (L) 5   BUN      5 - 25 mg/dL 21 28 (H)   Creatinine      0 60 - 1 30 mg/dL 1 08 1 02   GLUCOSE FASTING      65 - 99 mg/dL 170 (H) 199 (H)   Calcium      8 3 - 10 1 mg/dL 9 3 9 6   eGFR      ml/min/1 73sq m 67 72   Cholesterol      See Comment mg/dL 117 118 Triglycerides      See Comment mg/dL 114 135   HDL      >=40 mg/dL 46 46   LDL Calculated      0 - 100 mg/dL 48 45   Non-HDL Cholesterol      mg/dl 71 72   EXT Creatinine Urine      mg/dL 24 0 54 9   MICROALBUM ,U,RANDOM      0 0 - 20 0 mg/L <5 0 6 1   MICROALBUMIN/CREATININE RATIO      0 - 30 mg/g creatinine <21 11   Hemoglobin A1C      Normal 3 8-5 6%; PreDiabetic 5 7-6 4%; Diabetic >=6 5%; Glycemic control for adults with diabetes <7 0% % 7 1 (H) 7 3 (H)   eAG, EST AVG Glucose      mg/dl 157 163       Plan:    Diagnoses and all orders for this visit:    Type 2 diabetes mellitus with hyperglycemia, without long-term current use of insulin ([facility])  HGA1C 7 3%  Worsened  Treatment regimen: continue current treatment  Will work on dietary changes now that the holidays are over  Episodes of hypoglycemia can lead to permanent disability and death  Discussed risks/complications associated with uncontrolled diabetes  Advised to adhere to diabetic diet, and recommended staying active/exercising routinely as tolerated  Keep carbohydrates consistent to limit blood glucose fluctuations  Advised to call if blood sugars less than 70 mg/dl or over 300 mg/dl  Check blood glucose 1-2 times a day  Discussed symptoms and treatment of hypoglycemia  Recommended routine follow-up with podiatry and ophthalmology  Send log in 2 weeks  Ordered blood work to complete prior to next visit  -     Hemoglobin A1C; Future  -     Basic metabolic panel; Future    Essential hypertension  Blood pressure well controlled, continue current treatment  -     Basic metabolic panel; Future    Mixed hyperlipidemia  LDL 45  Continue statin therapy  Managed by cardiology        Discussed with the patient diagnosis and treatment and all questions fully answered  He will call me if any problems arise      Counseled patient on diagnostic results, prognosis, risk and benefit of treatment options, instruction for management, importance of treatment compliance, risk factor reduction and impressions        Quin Gonzáles PA-C

## 2023-01-20 ENCOUNTER — OFFICE VISIT (OUTPATIENT)
Dept: UROLOGY | Facility: CLINIC | Age: 73
End: 2023-01-20

## 2023-01-20 VITALS
WEIGHT: 210 LBS | SYSTOLIC BLOOD PRESSURE: 120 MMHG | HEART RATE: 58 BPM | OXYGEN SATURATION: 96 % | BODY MASS INDEX: 26.96 KG/M2 | DIASTOLIC BLOOD PRESSURE: 70 MMHG

## 2023-01-20 DIAGNOSIS — N43.3 HYDROCELE IN ADULT: Primary | ICD-10-CM

## 2023-01-20 NOTE — PROGRESS NOTES
UROLOGY PROGRESS NOTE   Patient Identifiers: Josephine Piedra (MRN [id number])  Date of Service: 1/20/2023    Subjective:   40-year-old man with history of recurrent right-sided hydrocele  He had redo hydrocele surgery in November  He reports no further pain  He still wears an athletic supporter      Reason for visit: Hydrocele follow-up    Objective:     VITALS:    Vitals:    01/20/23 1101   BP: 120/70   Pulse: 58   SpO2: 96%           LABS:  Lab Results   Component Value Date    HGB 14 5 03/22/2021    HCT 42 1 03/22/2021    WBC 8 24 03/22/2021     03/22/2021   ]    Lab Results   Component Value Date    K 4 3 01/16/2023     01/16/2023    CO2 27 01/16/2023    BUN 28 (H) 01/16/2023    CREATININE 1 02 01/16/2023    CALCIUM 9 6 01/16/2023   ]        INPATIENT MEDS:    Current Outpatient Medications:   •  acetaminophen (TYLENOL) 500 mg tablet, Take 500 mg by mouth daily at bedtime, Disp: , Rfl:   •  ascorbic acid (VITAMIN C) 250 mg tablet, Take 500 mg by mouth 2 (two) times a day, Disp: , Rfl:   •  finasteride (PROSCAR) 5 mg tablet, Take 1 tablet (5 mg total) by mouth daily, Disp: 30 tablet, Rfl: 11  •  furosemide (LASIX) 20 mg tablet, TAKE ONE TABLET BY MOUTH EVERY DAY AS NEEDED FOR EDEMA, Disp: 30 tablet, Rfl: 0  •  Ginger, Zingiber officinalis, (Ginger Root) 500 MG CAPS, , Disp: , Rfl:   •  Jardiance 25 MG TABS, Take 1 tablet (25 mg total) by mouth daily, Disp: 90 tablet, Rfl: 3  •  metFORMIN (GLUCOPHAGE) 500 mg tablet, TAKE TWO TABLETS BY MOUTH TWICE A DAY, Disp: 360 tablet, Rfl: 1  •  metoprolol succinate (TOPROL-XL) 50 mg 24 hr tablet, Take 1 tablet (50 mg total) by mouth daily, Disp: 90 tablet, Rfl: 3  •  niacin 500 mg tablet, Take 500 mg by mouth 4 (four) times a day, Disp: , Rfl:   •  NON FORMULARY,  , Disp: , Rfl:   •  Omega-3 Fatty Acids (FISH OIL BURP-LESS PO), Take by mouth, Disp: , Rfl:   •  PROBIOTIC PRODUCT PO, Take 111 mg by mouth daily, Disp: , Rfl:   •  repaglinide (PRANDIN) 0 5 mg tablet, Take 1 tablet (0 5 mg total) by mouth daily before dinner, Disp: 90 tablet, Rfl: 1  •  rosuvastatin (CRESTOR) 10 MG tablet, Take 1 tablet (10 mg total) by mouth daily, Disp: 90 tablet, Rfl: 3  •  sitaGLIPtin (JANUVIA) 100 mg tablet, Take 1 tablet (100 mg total) by mouth daily, Disp: 90 tablet, Rfl: 3  •  tamsulosin (FLOMAX) 0 4 mg, TAKE ONE CAPSULE BY MOUTH EVERY DAY WITH DINNER, Disp: 30 capsule, Rfl: 6  •  Tart Cherry (Tart Cherry Ultra) 1200 MG CAPS, , Disp: , Rfl:   •  Turmeric 500 MG CAPS, , Disp: , Rfl:   •  Wheat Dextrin (BENEFIBER DRINK MIX PO), Take by mouth, Disp: , Rfl:       Physical Exam:   /70 (BP Location: Left arm, Patient Position: Sitting, Cuff Size: Adult)   Pulse 58   Wt 95 3 kg (210 lb)   SpO2 96%   BMI 26 96 kg/m²   GEN: no acute distress    RESP: breathing comfortably with no accessory muscle use    ABD: soft, non-tender, non-distended   INCISION:    EXT: no significant peripheral edema   (Male): No further ecchymosis all incisions are well-healed  Decreasing induration on the right side  RADIOLOGY:   None    Assessment:   #1    Right hydrocelectomy redo    Plan:   -Very slow healing of his right hydrocele surgery but shows improvement at each visit  -Follow-up in 4 months for reexamination  -  -

## 2023-02-09 DIAGNOSIS — E11.9 TYPE 2 DIABETES MELLITUS WITHOUT COMPLICATION, WITHOUT LONG-TERM CURRENT USE OF INSULIN (HCC): ICD-10-CM

## 2023-02-09 RX ORDER — EMPAGLIFLOZIN 25 MG/1
25 TABLET, FILM COATED ORAL DAILY
Qty: 90 TABLET | Refills: 1 | Status: SHIPPED | OUTPATIENT
Start: 2023-02-09

## 2023-02-09 NOTE — TELEPHONE ENCOUNTER
Pt called he needs his jardiance 25 mg 1 daily 90 day to go to [facility] #[id number]  Fax #[phone]    Please notify the pt when it is faxed over

## 2023-02-10 ENCOUNTER — TELEPHONE (OUTPATIENT)
Dept: ENDOCRINOLOGY | Facility: CLINIC | Age: 73
End: 2023-02-10

## 2023-02-21 DIAGNOSIS — E11.65 TYPE 2 DIABETES MELLITUS WITH HYPERGLYCEMIA, WITHOUT LONG-TERM CURRENT USE OF INSULIN (HCC): ICD-10-CM

## 2023-02-22 RX ORDER — REPAGLINIDE 0.5 MG/1
TABLET ORAL
Qty: 90 TABLET | Refills: 1 | Status: SHIPPED | OUTPATIENT
Start: 2023-02-22

## 2023-03-08 ENCOUNTER — OFFICE VISIT (OUTPATIENT)
Dept: PODIATRY | Facility: CLINIC | Age: 73
End: 2023-03-08

## 2023-03-08 VITALS
BODY MASS INDEX: 26.95 KG/M2 | HEART RATE: 75 BPM | DIASTOLIC BLOOD PRESSURE: 80 MMHG | WEIGHT: 210 LBS | SYSTOLIC BLOOD PRESSURE: 140 MMHG | HEIGHT: 74 IN

## 2023-03-08 DIAGNOSIS — E11.42 DIABETIC POLYNEUROPATHY ASSOCIATED WITH TYPE 2 DIABETES MELLITUS (HCC): Primary | ICD-10-CM

## 2023-03-08 DIAGNOSIS — Z86.31 H/O DIABETIC FOOT ULCER: ICD-10-CM

## 2023-03-08 DIAGNOSIS — B35.1 ONYCHOMYCOSIS: ICD-10-CM

## 2023-03-08 RX ORDER — AMOXICILLIN 500 MG/1
CAPSULE ORAL
COMMUNITY
Start: 2023-02-28 | End: 2023-03-16

## 2023-03-08 NOTE — PROGRESS NOTES
Jus Chavez  1950  AT RISK FOOT CARE    1  Diabetic polyneuropathy associated with type 2 diabetes mellitus ([facility] )        2  Onychomycosis        3  H/O diabetic foot ulcer            Patient presents for at-risk foot care  Patient has no acute concerns today  Patient has significant lower extremity risk due to neuropathy, parasthesia, edema, and trophic skin changes to the lower extremity  On exam patient has thickened, hypertrophic, discolored, brittle toenails with subungual debris and tenderness x10   Callus: none  Patient has lower extremity edema  PAtients skin is atrophic, thickened nails, and decreased pedal hair  Patient has decreased pinprick and vibratory sensation to his feet and parasthesia    Today's treatment includes:  Debridement of toenails  Using nail nipper, an, and curette, nails were sharply debrided, reduced in thickness and length  Devitalized nail tissue and fungal debris excised and removed  Patient tolerated well  Discussed proper shoe gear, daily inspections of feet, and general foot health with patient  Patient has Q9  findings and is recommended for at risk foot care every 9-10 weeks      Patients most recent complete clinical foot exam was on: 8/3/2022

## 2023-03-09 ENCOUNTER — RA CDI HCC (OUTPATIENT)
Dept: OTHER | Facility: HOSPITAL | Age: 73
End: 2023-03-09

## 2023-03-09 NOTE — PROGRESS NOTES
[facility] [address]  coding opportunities       Chart reviewed, no opportunity found: CHART REVIEWED, NO OPPORTUNITY FOUND        Patients Insurance     Medicare Insurance: Medicare

## 2023-03-10 ENCOUNTER — OFFICE VISIT (OUTPATIENT)
Dept: CARDIOLOGY CLINIC | Facility: CLINIC | Age: 73
End: 2023-03-10

## 2023-03-10 VITALS
BODY MASS INDEX: 27.46 KG/M2 | SYSTOLIC BLOOD PRESSURE: 130 MMHG | OXYGEN SATURATION: 97 % | HEART RATE: 57 BPM | DIASTOLIC BLOOD PRESSURE: 70 MMHG | TEMPERATURE: 97.4 F | WEIGHT: 214 LBS | HEIGHT: 74 IN

## 2023-03-10 DIAGNOSIS — I35.0 NONRHEUMATIC AORTIC VALVE STENOSIS: ICD-10-CM

## 2023-03-10 DIAGNOSIS — Z95.2 S/P AVR: ICD-10-CM

## 2023-03-10 DIAGNOSIS — I25.118 CORONARY ARTERY DISEASE OF NATIVE ARTERY OF NATIVE HEART WITH STABLE ANGINA PECTORIS (HCC): Primary | ICD-10-CM

## 2023-03-10 DIAGNOSIS — I48.91 POSTOPERATIVE ATRIAL FIBRILLATION (HCC): ICD-10-CM

## 2023-03-10 DIAGNOSIS — I49.3 PVC (PREMATURE VENTRICULAR CONTRACTION): ICD-10-CM

## 2023-03-10 DIAGNOSIS — I97.89 POSTOPERATIVE ATRIAL FIBRILLATION (HCC): ICD-10-CM

## 2023-03-10 DIAGNOSIS — I10 ESSENTIAL HYPERTENSION: ICD-10-CM

## 2023-03-10 DIAGNOSIS — Z95.1 S/P CABG (CORONARY ARTERY BYPASS GRAFT): ICD-10-CM

## 2023-03-10 RX ORDER — METOPROLOL SUCCINATE 50 MG/1
25 TABLET, EXTENDED RELEASE ORAL DAILY
Qty: 90 TABLET | Refills: 3
Start: 2023-03-10

## 2023-03-10 NOTE — PROGRESS NOTES
Cardiology Follow Up    Oracio Francis  [id number]  1950  [facility] [address]  [facility] [address] [facility] [address] [phone]      1  Coronary artery disease of native artery of native heart with stable angina pectoris (HCC)  metoprolol succinate (TOPROL-XL) 50 mg 24 hr tablet    POCT ECG      2  Nonrheumatic aortic valve stenosis  Echo complete w/ contrast if indicated      3  PVC (premature ventricular contraction)  POCT ECG      4  Essential hypertension        5  Postoperative atrial fibrillation (HCC)        6  S/P CABG (coronary artery bypass graft)        7  S/P AVR            Discussion/Summary:    - CAD: Prior CABG  Had one episode which may have been angina but this was very short-lived  No recurrence  The pain when he is sleeping sounds more musculoskeletal  Discussed whether or not we should do a stress test with him  If he has recurrence of symptoms, I will order 1  Discussed that he needs to stay on aspirin lifelong  Lipid panel well controlled  Blood pressure is controlled  - AS: Status post AVR  Update echo  - Carotid stenosis: bilateral  < 50%  Medical management  R subclavian moderate disease also noted, advised to check BP measurements on the L     - HTN: BP stable to use left arm  He is fatigued  We will try to decrease the metoprolol to 12 5 and see if this helps  - PAF: brief after surgery, no evidence of recurrence, not on anticoagulation, nor is there any indication for this currently  - Edema -up-and-down  Discussed conservative means such as elevation and compression stockings  He can continue to use Lasix even if he needs this daily  Previous History:  49-year-old man  He has a history of coronary artery disease diagnosed in July of 2019 when he was having chest discomfort    He had single-vessel bypass with LIMA to the LAD and also at that time had aortic stenosis and underwent a 25 mm [place] bioprosthetic aortic valve replacement ([place])    Preoperative testing showed that he had carotid artery stenosis bilaterally which has been monitored serially with ultrasound most recently with < 50% stenosis bilaterally but some R subclavian disease as well  Had atrial fibrillation which lasted < 24 hours after surgery  He was initially on amiodarone and warfarin for brief period of time after the surgery, but this was stopped  He has had some episodes of palpitations  He had a 1 week monitor with his prior cardiologist, no atrial fibrillation was found  He then had a 48 hour Holter monitor in 10/2020 which showed only PVCs  No other significant arrhythmias  Occasional LE edema    He has had myalgias with several statins in the past   He now been on 10 mg of Crestor and is tolerating it  His LDL is in the 40s  He is also taking niacin  He has been on this for 15 years, has not had any side effects with it  I discussed stopping it, but he prefers to continue  Over-the-counter fish oil  Lovaza was too expensive  Interval History:    Last visit, I gave him as needed Lasix to take  He was getting lightheaded with it and at 1 point we decreased the metoprolol, as well  He had a headache  At this point, he said for the last month or so he has been taking Lasix pretty much daily  He is on 50 mg of metoprolol  Complaints are of lower extremity edema  He feels tired and fatigued throughout the day  Wakes up feeling unrefreshed, but denies snoring or other signs of sleep apnea  Has painful scar with keloid formation post CABG  He did not mention, but his wife did that he had 1 episode of chest pain when he was taking a walk a while back  Since then, he has done similar or more vigorous walks and denies any symptoms      He feels pain of his left arm when he lays on it and sleeps, but it is positional and not exertional         Patient Active Problem List   Diagnosis   • Aortic stenosis   • Coronary artery disease of native artery of native heart with stable angina pectoris (HCC)   • DM (diabetes mellitus), type 2 ([facility] [address] )   • Essential hypertension   • Hypertriglyceridemia   • S/P CABG (coronary artery bypass graft)   • S/P AVR   • PVC (premature ventricular contraction)   • Bilateral carotid artery stenosis   • Mixed hyperlipidemia   • Postoperative atrial fibrillation (HCC)   • Benign prostatic hyperplasia with urinary frequency   • Stenosis of right subclavian artery ([facility])   • Hydrocele in adult   • IBS (irritable bowel syndrome)   • Lower urinary tract symptoms due to benign prostatic hyperplasia   • H/O diabetic foot ulcer   • Hammer toes of both feet     Past Medical History:   Diagnosis Date   • Acute systolic heart failure ([facility] ) 02/23/2021   • Athlete's foot    • Bilateral impacted cerumen 03/09/2021   • Diabetes mellitus ([facility] )    • Other hydrocele 06/11/2020   • TMJ (dislocation of temporomandibular joint)    • Yeast infection      Social History     Tobacco Use   • Smoking status: Never   • Smokeless tobacco: Never   Vaping Use   • Vaping Use: Never used   Substance Use Topics   • Alcohol use: Yes     Comment: 2-3 glass of wine or  beer a week   • Drug use: Never      Family History   Problem Relation Age of Onset   • Heart disease Mother    • Cancer Mother    • Heart disease Father    • Hypertension Father    • Mitral valve prolapse Sister    • Colon cancer Neg Hx      Past Surgical History:   Procedure Laterality Date   • CARDIAC SURGERY     • CARDIAC VALVE REPLACEMENT     • CATARACT EXTRACTION     • COLONOSCOPY     • CORONARY ARTERY BYPASS GRAFT     • EYE SURGERY  03/2020    Cataract right eye   • EYE SURGERY Left 12/03/2020    left eye cataract    • HYDROCELE EXCISION / REPAIR     • FL EXCISION HYDROCELE UNILATERAL Right 04/08/2022    Procedure: HYDROCELECTOMY;  Surgeon: Zita Ruiz MD;  [facility];  Service: Urology   • FL EXCISION HYDROCELE UNILATERAL Right 11/16/2022    Procedure: HYDROCELECTOMY;  Surgeon: Michael Dalton MD;  [facility] MA[facility];  Service: Urology   • TONSILLECTOMY         Current Outpatient Medications:   •  acetaminophen (TYLENOL) 500 mg tablet, Take 500 mg by mouth daily at bedtime, Disp: , Rfl:   •  ascorbic acid (VITAMIN C) 250 mg tablet, Take 500 mg by mouth 2 (two) times a day, Disp: , Rfl:   •  finasteride (PROSCAR) 5 mg tablet, Take 1 tablet (5 mg total) by mouth daily, Disp: 30 tablet, Rfl: 11  •  furosemide (LASIX) 20 mg tablet, TAKE ONE TABLET BY MOUTH EVERY DAY AS NEEDED FOR EDEMA, Disp: 30 tablet, Rfl: 0  •  Ginger, Zingiber officinalis, (Ginger Root) 500 MG CAPS, , Disp: , Rfl:   •  Jardiance 25 MG TABS, Take 1 tablet (25 mg total) by mouth daily, Disp: 90 tablet, Rfl: 1  •  metFORMIN (GLUCOPHAGE) 500 mg tablet, TAKE TWO TABLETS BY MOUTH TWICE A DAY, Disp: 360 tablet, Rfl: 1  •  metoprolol succinate (TOPROL-XL) 50 mg 24 hr tablet, Take 0 5 tablets (25 mg total) by mouth daily, Disp: 90 tablet, Rfl: 3  •  niacin 500 mg tablet, Take 500 mg by mouth 4 (four) times a day, Disp: , Rfl:   •  NON FORMULARY,  , Disp: , Rfl:   •  Omega-3 Fatty Acids (FISH OIL BURP-LESS PO), Take by mouth, Disp: , Rfl:   •  PROBIOTIC PRODUCT PO, Take 111 mg by mouth daily, Disp: , Rfl:   •  repaglinide (PRANDIN) 0 5 mg tablet, TAKE ONE TABLET BY MOUTH EVERY DAY BEFORE DINNER, Disp: 90 tablet, Rfl: 1  •  rosuvastatin (CRESTOR) 10 MG tablet, Take 1 tablet (10 mg total) by mouth daily, Disp: 90 tablet, Rfl: 3  •  sitaGLIPtin (JANUVIA) 100 mg tablet, Take 1 tablet (100 mg total) by mouth daily, Disp: 90 tablet, Rfl: 3  •  tamsulosin (FLOMAX) 0 4 mg, TAKE ONE CAPSULE BY MOUTH EVERY DAY WITH DINNER, Disp: 30 capsule, Rfl: 6  •  Tart Cherry (Tart Cherry Ultra) 1200 MG CAPS, , Disp: , Rfl:   •  Turmeric 500 MG CAPS, , Disp: , Rfl:   •  Wheat Dextrin (BENEFIBER DRINK MIX PO), Take by mouth, Disp: , Rfl:   •  amoxicillin (AMOXIL) 500 mg capsule, , Disp: , Rfl:   No Known Allergies    Vitals:    03/10/23 1425   BP: 130/70   BP Location: Left arm   Patient Position: Sitting   Cuff Size: Standard   Pulse: 57   Temp: (!) 97 4 °F (36 3 °C)   SpO2: 97%   Weight: 97 1 kg (214 lb)   Height: 6' 2" (1 88 m)     Vitals:    03/10/23 1425   Weight: 97 1 kg (214 lb)      Height: 6' 2" (188 cm)   Body mass index is 27 48 kg/m²  Physical Exam:  GEN: Erlinda Huntley appears well, alert and oriented x 3, pleasant and cooperative   HEENT: pupils equal, round, and reactive to light; extraocular muscles intact  NECK: supple, no carotid bruits   HEART: regular bio avr  LUNGS: clear to auscultation bilaterally; no wheezes, rales, or rhonchi   ABDOMEN: normal bowel sounds, soft, no tenderness, no distention  EXTREMITIES: 1+ Edema  NEURO: no focal findings   SKIN: keloid scar    ROS:  Positive for fatigue, sleepiness  No snoring, gets refreshed sleep  No chest pain, shortness of breath  Except as noted in HPI, is otherwise reviewed in detail and a 12 point ROS is negative in detail  ROS reviewed and is unchanged    Labs:  Lab Results   Component Value Date    K 4 3 01/16/2023     01/16/2023    CREATININE 1 02 01/16/2023    BUN 28 (H) 01/16/2023    CO2 27 01/16/2023    ALT 26 03/22/2021    AST 14 03/22/2021    GLUF 199 (H) 01/16/2023    HGBA1C 7 3 (H) 01/16/2023    WBC 8 24 03/22/2021    HGB 14 5 03/22/2021    HCT 42 1 03/22/2021     03/22/2021     No results found for: CHOL  Lab Results   Component Value Date    HDL 46 01/16/2023    HDL 46 09/12/2022    HDL 46 03/22/2021     Lab Results   Component Value Date    LDLCALC 45 01/16/2023    LDLCALC 48 09/12/2022    LDLCALC 39 03/22/2021     Lab Results   Component Value Date    TRIG 135 01/16/2023    TRIG 114 09/12/2022    TRIG 179 (H) 03/22/2021     Testing:  Echo 8/2021:  LEFT VENTRICLE:  Systolic function was normal  Ejection fraction was estimated to be 60 %  Wall thickness was mildly increased    There was mild concentric hypertrophy  Doppler parameters were consistent with abnormal left ventricular relaxation (grade 1 diastolic dysfunction)  Doppler parameters were consistent with elevated mean left atrial filling pressure      RIGHT VENTRICLE:  The ventricle was mildly to moderately dilated  Systolic function was normal      LEFT ATRIUM:  The atrium was mildly dilated      ATRIAL SEPTUM:  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  No definite shunt noted, but poor subcostal views      MITRAL VALVE:  There was mild annular calcification  There was trace regurgitation      AORTIC VALVE:  A bioprosthesis was present  It exhibited normal function  Valve peak gradient was 30 mmHg  Valve mean gradient was 16 mmHg      TRICUSPID VALVE:  There was mild regurgitation      PULMONIC VALVE:  There was mild regurgitation      AORTA:  There was mild dilatation of the ascending aorta at 4cm  Holter: The patient was in sinus rhythm throughout the recording      Minimum HR: 59  Average HR: 74  Maximum HR: 102     Premature ventricular contractions: 2954 (2%)  Ventricular runs: 0     Supraventricular contractions: 34  Supraventricular runs: 1 lasting 4 beats     Longest RR: 1 5 sec     Arrhythmias: none     Diary submitted: No        Impression     Abnormal Holter monitor  Frequent PVCs without ventricular runs  Rare PACs    EKG:  Sinus bradycardia  Left anterior fascicular block  LVH

## 2023-03-12 DIAGNOSIS — R60.9 EDEMA, UNSPECIFIED TYPE: ICD-10-CM

## 2023-03-13 RX ORDER — FUROSEMIDE 20 MG/1
TABLET ORAL
Qty: 30 TABLET | Refills: 0 | Status: SHIPPED | OUTPATIENT
Start: 2023-03-13

## 2023-03-16 ENCOUNTER — OFFICE VISIT (OUTPATIENT)
Dept: FAMILY MEDICINE CLINIC | Facility: CLINIC | Age: 73
End: 2023-03-16

## 2023-03-16 VITALS
RESPIRATION RATE: 16 BRPM | BODY MASS INDEX: 26.68 KG/M2 | HEIGHT: 75 IN | WEIGHT: 214.6 LBS | DIASTOLIC BLOOD PRESSURE: 58 MMHG | HEART RATE: 63 BPM | TEMPERATURE: 96.5 F | SYSTOLIC BLOOD PRESSURE: 110 MMHG | OXYGEN SATURATION: 97 %

## 2023-03-16 DIAGNOSIS — Z00.00 MEDICARE ANNUAL WELLNESS VISIT, SUBSEQUENT: ICD-10-CM

## 2023-03-16 DIAGNOSIS — I77.1 STENOSIS OF RIGHT SUBCLAVIAN ARTERY (HCC): ICD-10-CM

## 2023-03-16 DIAGNOSIS — E11.65 TYPE 2 DIABETES MELLITUS WITH HYPERGLYCEMIA, WITHOUT LONG-TERM CURRENT USE OF INSULIN (HCC): Primary | ICD-10-CM

## 2023-03-16 DIAGNOSIS — E78.2 MIXED HYPERLIPIDEMIA: ICD-10-CM

## 2023-03-16 DIAGNOSIS — I10 ESSENTIAL HYPERTENSION: ICD-10-CM

## 2023-03-16 NOTE — PATIENT INSTRUCTIONS
Medicare Preventive Visit Patient Instructions  Thank you for completing your Welcome to Medicare Visit or Medicare Annual Wellness Visit today  Your next wellness visit will be due in one year (3/16/2024)  The screening/preventive services that you may require over the next 5-10 years are detailed below  Some tests may not apply to you based off risk factors and/or age  Screening tests ordered at today's visit but not completed yet may show as past due  Also, please note that scanned in results may not display below  Preventive Screenings:  Service Recommendations Previous Testing/Comments   Colorectal Cancer Screening  · Colonoscopy    · Fecal Occult Blood Test (FOBT)/Fecal Immunochemical Test (FIT)  · Fecal DNA/Cologuard Test  · Flexible Sigmoidoscopy Age: 39-70 years old   Colonoscopy: every 10 years (May be performed more frequently if at higher risk)  OR  FOBT/FIT: every 1 year  OR  Cologuard: every 3 years  OR  Sigmoidoscopy: every 5 years  Screening may be recommended earlier than age 39 if at higher risk for colorectal cancer  Also, an individualized decision between you and your healthcare provider will decide whether screening between the ages of 74-80 would be appropriate   Colonoscopy: 03/03/2022  FOBT/FIT: Not on file  Cologuard: Not on file  Sigmoidoscopy: Not on file    Screening Current     Prostate Cancer Screening Individualized decision between patient and health care provider in men between ages of 53-78   Medicare will cover every 12 months beginning on the day after your 50th birthday PSA: 0 3 ng/mL     Screening Current     Hepatitis C Screening Once for adults born between 1945 and 1965  More frequently in patients at high risk for Hepatitis C Hep C Antibody: Not on file    Screening Current   Diabetes Screening 1-2 times per year if you're at risk for diabetes or have pre-diabetes Fasting glucose: 199 mg/dL (1/16/2023)  A1C: 7 3 % (1/16/2023)  Screening Not Indicated  History Diabetes Cholesterol Screening Once every 5 years if you don't have a lipid disorder  May order more often based on risk factors  Lipid panel: 01/16/2023  Screening Not Indicated  History Lipid Disorder      Other Preventive Screenings Covered by Medicare:  1  Abdominal Aortic Aneurysm (AAA) Screening: covered once if your at risk  You're considered to be at risk if you have a family history of AAA or a male between the age of 73-68 who smoking at least 100 cigarettes in your lifetime  2  Lung Cancer Screening: covers low dose CT scan once per year if you meet all of the following conditions: (1) Age 50-69; (2) No signs or symptoms of lung cancer; (3) Current smoker or have quit smoking within the last 15 years; (4) You have a tobacco smoking history of at least 20 pack years (packs per day x number of years you smoked); (5) You get a written order from a healthcare provider  3  Glaucoma Screening: covered annually if you're considered high risk: (1) You have diabetes OR (2) Family history of glaucoma OR (3)  aged 48 and older OR (3)  American aged 72 and older  3  Osteoporosis Screening: covered every 2 years if you meet one of the following conditions: (1) Have a vertebral abnormality; (2) On glucocorticoid therapy for more than 3 months; (3) Have primary hyperparathyroidism; (4) On osteoporosis medications and need to assess response to drug therapy  5  HIV Screening: covered annually if you're between the age of 12-76  Also covered annually if you are younger than 13 and older than 72 with risk factors for HIV infection  For pregnant patients, it is covered up to 3 times per pregnancy      Immunizations:  Immunization Recommendations   Influenza Vaccine Annual influenza vaccination during flu season is recommended for all persons aged >= 6 months who do not have contraindications   Pneumococcal Vaccine   * Pneumococcal conjugate vaccine = PCV13 (Prevnar 13), PCV15 (Vaxneuvance), PCV20 (Prevnar 20)  * Pneumococcal polysaccharide vaccine = PPSV23 (Pneumovax) Adults 2364 years old: 1-3 doses may be recommended based on certain risk factors  Adults 72 years old: 1-2 doses may be recommended based off what pneumonia vaccine you previously received   Hepatitis B Vaccine 3 dose series if at intermediate or high risk (ex: diabetes, end stage renal disease, liver disease)   Tetanus (Td) Vaccine - COST NOT COVERED BY MEDICARE PART B Following completion of primary series, a booster dose should be given every 10 years to maintain immunity against tetanus  Td may also be given as tetanus wound prophylaxis  Tdap Vaccine - COST NOT COVERED BY MEDICARE PART B Recommended at least once for all adults  For pregnant patients, recommended with each pregnancy  Shingles Vaccine (Shingrix) - COST NOT COVERED BY MEDICARE PART B  2 shot series recommended in those aged 48 and above     Health Maintenance Due:      Topic Date Due   • Hepatitis C Screening  08/15/2024 (Originally 1950)   • Colorectal Cancer Screening  03/02/2027     Immunizations Due:      Topic Date Due   • Pneumococcal Vaccine: 65+ Years (2 - PCV) 10/27/2022     Advance Directives   What are advance directives? Advance directives are legal documents that state your wishes and plans for medical care  These plans are made ahead of time in case you lose your ability to make decisions for yourself  Advance directives can apply to any medical decision, such as the treatments you want, and if you want to donate organs  What are the types of advance directives? There are many types of advance directives, and each state has rules about how to use them  You may choose a combination of any of the following:  · Living will: This is a written record of the treatment you want  You can also choose which treatments you do not want, which to limit, and which to stop at a certain time  This includes surgery, medicine, IV fluid, and tube feedings     · Durable power of  for healthcare [facility]): This is a written record that states who you want to make healthcare choices for you when you are unable to make them for yourself  This person, called a proxy, is usually a family member or a friend  You may choose more than 1 proxy  · Do not resuscitate (DNR) order:  A DNR order is used in case your heart stops beating or you stop breathing  It is a request not to have certain forms of treatment, such as CPR  A DNR order may be included in other types of advance directives  · Medical directive: This covers the care that you want if you are in a coma, near death, or unable to make decisions for yourself  You can list the treatments you want for each condition  Treatment may include pain medicine, surgery, blood transfusions, dialysis, IV or tube feedings, and a ventilator (breathing machine)  · Values history: This document has questions about your views, beliefs, and how you feel and think about life  This information can help others choose the care that you would choose  Why are advance directives important? An advance directive helps you control your care  Although spoken wishes may be used, it is better to have your wishes written down  Spoken wishes can be misunderstood, or not followed  Treatments may be given even if you do not want them  An advance directive may make it easier for your family to make difficult choices about your care  Weight Management   Why it is important to manage your weight:  Being overweight increases your risk of health conditions such as heart disease, high blood pressure, type 2 diabetes, and certain types of cancer  It can also increase your risk for osteoarthritis, sleep apnea, and other respiratory problems  Aim for a slow, steady weight loss  Even a small amount of weight loss can lower your risk of health problems    How to lose weight safely:  A safe and healthy way to lose weight is to eat fewer calories and get regular exercise  You can lose up about 1 pound a week by decreasing the number of calories you eat by 500 calories each day  Healthy meal plan for weight management:  A healthy meal plan includes a variety of foods, contains fewer calories, and helps you stay healthy  A healthy meal plan includes the following:  · Eat whole-grain foods more often  A healthy meal plan should contain fiber  Fiber is the part of grains, fruits, and vegetables that is not broken down by your body  Whole-grain foods are healthy and provide extra fiber in your diet  Some examples of whole-grain foods are whole-wheat breads and pastas, oatmeal, brown rice, and bulgur  · Eat a variety of vegetables every day  Include dark, leafy greens such as spinach, kale, eduardo greens, and mustard greens  Eat yellow and orange vegetables such as carrots, sweet potatoes, and winter squash  · Eat a variety of fruits every day  Choose fresh or canned fruit (canned in its own juice or light syrup) instead of juice  Fruit juice has very little or no fiber  · Eat low-fat dairy foods  Drink fat-free (skim) milk or 1% milk  Eat fat-free yogurt and low-fat cottage cheese  Try low-fat cheeses such as mozzarella and other reduced-fat cheeses  · Choose meat and other protein foods that are low in fat  Choose beans or other legumes such as split peas or lentils  Choose fish, skinless poultry (chicken or turkey), or lean cuts of red meat (beef or pork)  Before you cook meat or poultry, cut off any visible fat  · Use less fat and oil  Try baking foods instead of frying them  Add less fat, such as margarine, sour cream, regular salad dressing and mayonnaise to foods  Eat fewer high-fat foods  Some examples of high-fat foods include french fries, doughnuts, ice cream, and cakes  · Eat fewer sweets  Limit foods and drinks that are high in sugar  This includes candy, cookies, regular soda, and sweetened drinks    Exercise:  Exercise at least 30 minutes per day on most days of the week  Some examples of exercise include walking, biking, dancing, and swimming  You can also fit in more physical activity by taking the stairs instead of the elevator or parking farther away from stores  Ask your healthcare provider about the best exercise plan for you  Alcohol Use and Your Health    Drinking too much can harm your health  Excessive alcohol use leads to about 88,000 death in the [place] each year, and shortens the life of those who diet by almost 30 years  Further, excessive drinking cost the economy $249 billion in 2010  Most excessive drinkers are not alcohol dependent  Excessive alcohol use has immediate effects that increase the risk of many harmful health conditions  These are most often the result of binge drinking  Over time, excessive alcohol use can lead to the development of chronic diseases and other series health problems  What is considered a "drink"? Excessive alcohol use includes:  · Binge Drinking: For women, 4 or more drinks consumed on one occasion  For men, 5 or more drinks consumed on one occasion  · Heavy Drinking: For women, 8 or more drinks per week  For men, 15 or more drinks per week  · Any alcohol used by pregnant women  · Any alcohol used by those under the age of 21 years    If you choose to drink, do so in moderation:  · Do not drink at all if you are under the age of 24, or if you are or may be pregnant, or have health problems that could be made worse by drinking    · For women, up to 1 drink per day  · For men, up to 2 drinks a day    No one should begin drinking or drink more frequently based on potential health benefits    Short-Term Health Risks:  · Injuries: motor vehicle crashes, falls, drownings, burns  · Violence: homicide, suicide, sexual assault, intimate partner violence  · Alcohol poisoning  · Reproductive health: risky sexual behaviors, unintended prengnacy, sexually transmitted diseases, miscarriage, stillbirth, fetal alcohol syndrome    Long-Term Health Risks:  · Chronic diseases: high blood pressure, heart disease, stroke, liver disease, digestive problems  · Cancers: breast, mouth and throat, liver, colon  · Learning and memory problems: dementia, poor school performance  · Mental health: depression, anxiety, insomnia  · Social problems: lost productivity, family problems, unemployment  · Alcohol dependence    For support and more information:  · Substance Abuse and [address] , [address] [place]  Web Address: https://[employer]/    · Alcoholics Anonymous        Web Address: http://www thomas info/    https://www cdc gov/alcohol/fact-sheets/alcohol-use htm     © [address] [facility] 2018 Information is for End User's use only and may not be sold, redistributed or otherwise used for commercial purposes  All illustrations and images included in CareNotes® are the copyrighted property of [employer] D A M , [employer]  or [address] [place] [address]  Type 2 Diabetes Management for Adults   AMBULATORY CARE:   Type 2 diabetes  is a disease that affects how your body uses glucose (sugar)  Either your body cannot make enough insulin, or it cannot use the insulin correctly  It is important to keep diabetes controlled to prevent damage to your heart, blood vessels, and other organs  Management will help you feel well and enjoy your daily activities  Your diabetes care team providers can help you make a plan to fit diabetes care into your schedule  Your plan can change over time to fit your needs and your family's needs  Have someone call your local emergency number (911 in the [address] [facility]) if:   • You cannot be woken  • You have signs of diabetic ketoacidosis:     ? confusion, fatigue    ? vomiting    ? rapid heartbeat    ? fruity smelling breath    ? extreme thirst    ? dry mouth and skin    • You have any of the following signs of a heart attack:      ?  Squeezing, pressure, or pain in your chest    ? You may  also have any of the following:     - Discomfort or pain in your back, neck, jaw, stomach, or arm    - Shortness of breath    - Nausea or vomiting    - Lightheadedness or a sudden cold sweat    • You have any of the following signs of a stroke:      ? Numbness or drooping on one side of your face     ? Weakness in an arm or leg    ? Confusion or difficulty speaking    ? Dizziness, a severe headache, or vision loss    Call your doctor or diabetes care team provider if:   • You have a sore or wound that will not heal     • You have a change in the amount you urinate  • Your blood sugar levels are higher than your target goals  • You often have lower blood sugar levels than your target goals  • Your skin is red, dry, warm, or swollen  • You have trouble coping with diabetes, or you feel anxious or depressed  • You have questions or concerns about your condition or care  What you need to know about high blood sugar levels:  High blood sugar levels may not cause any symptoms  You may feel more thirsty or urinate more often than usual  Over time, high blood sugar levels can damage your nerves, blood vessels, tissues, and organs  The following can increase your blood sugar levels:  • Large meals or large amounts of carbohydrates at one time    • Less physical activity    • Stress    • Illness    • A lower dose of diabetes medicine or insulin, or a late dose    What you need to know about low blood sugar levels:  Symptoms include feeling shaky, dizzy, irritable, or confused  You can prevent symptoms by keeping your blood sugar levels from going too low  • Treat a low blood sugar level right away:      ? Drink 4 ounces of juice or have 1 tube of glucose gel  ? Check your blood sugar level again 10 to 15 minutes later  ? When the level goes back to normal, eat a meal or snack to prevent another decrease         • Keep glucose gel, raisins, or hard candy with you at all times to treat a low blood sugar level  • Your blood sugar level can get too low if you take diabetes medicine or insulin and do not eat enough food  • If you use insulin, check your blood sugar level before you exercise  ? If your blood sugar level is below 100 mg/dL, eat 4 crackers or 2 ounces of raisins, or drink 4 ounces of juice  ? Check your level every 30 minutes if you exercise longer than 1 hour  ? You may need a snack during or after exercise  What you can do to manage your blood sugar levels:   • Check your blood sugar levels as directed and as needed  Several items are available to use to check your levels  You may need to check by testing a drop of blood in a glucose monitor  You may instead be given a continuous glucose monitoring (CGM) device  The device is worn at all times  The CGM checks your blood sugar level every 5 minutes  It sends results to an electronic device such as a smart phone  A CGM can be used with or without an insulin pump  You and your diabetes care team providers will decide on the best method for you  The goal for blood sugar levels before meals  is between 80 and 130 mg/dL and 2 hours after eating  is lower than 180 mg/dL  • Make healthy food choices  Work with a dietitian to develop a meal plan that works for you and your schedule  A dietitian can help you learn how to eat the right amount of carbohydrates during your meals and snacks  Carbohydrates can raise your blood sugar level if you eat too many at one time  Examples of foods that contain carbohydrates are breads, cereals, rice, pasta, and sweets  • Eat high-fiber foods as directed  Fiber helps improve blood sugar levels  Fiber also lowers your risk for heart disease and other problems diabetes can cause  Examples of high-fiber foods include vegetables, whole-grain bread, and beans such as schreiber beans  Your dietitian can tell you how much fiber to have each day           • Get regular physical activity  Physical activity can help you get to your target blood sugar level goal and manage your weight  Get at least 150 minutes of moderate to vigorous aerobic physical activity each week  Do not miss more than 2 days in a row  Do not sit longer than 30 minutes at a time  Your healthcare provider can help you create an activity plan  The plan can include the best activities for you and can help you build your strength and endurance  • Maintain a healthy weight  Ask your team what a healthy weight is for you  A healthy weight can help you control diabetes and prevent heart disease  Ask your team to help you create a weight loss plan, if needed  Weight loss can help make a difference in managing diabetes  Your team will help you set a weight-loss goal, such as 10 to 15 pounds, or 5% of your extra weight  Together you and your team can set manageable weight loss goals  • Take your diabetes medicine or insulin as directed  You may need diabetes medicine, insulin, or both to help control your blood sugar levels  Your healthcare provider will teach you how and when to take your diabetes medicine or insulin  You will also be taught about side effects oral diabetes medicine can cause  Insulin may be injected or given through a pump or pen  You and your providers will decide on the best method for you:    ? An insulin pump  is an implanted device that gives your insulin 24 hours a day  An insulin pump prevents the need for multiple insulin injections in a day  ? An insulin pen  is a device prefilled with the right amount of insulin  ? You and your family members will be taught how to draw up and give insulin  if this is the best method for you  Your providers will also teach you how to dispose of needles and syringes  ? You will learn how much insulin you need  and when to give it  You will be taught when not to give insulin   You will also be taught what to do if your blood sugar level drops too low  This may happen if you take insulin and do not eat the right amount of carbohydrates  More ways to manage type 2 diabetes:   • Wear medical alert identification  Wear medical alert jewelry or carry a card that says you have diabetes  Ask your provider where to get these items  • Do not smoke  Nicotine and other chemicals in cigarettes and cigars can cause lung and blood vessel damage  It also makes it more difficult to manage your diabetes  Ask your provider for information if you currently smoke and need help to quit  Do not use e-cigarettes or smokeless tobacco in place of cigarettes or to help you quit  They still contain nicotine  • Check your feet each day for cuts, scratches, calluses, or other wounds  Look for redness and swelling, and feel for warmth  Wear shoes that fit well  Check your shoes for rocks or other objects that can hurt your feet  Do not walk barefoot or wear shoes without socks  Wear cotton socks to help keep your feet dry  • Ask about vaccines you may need  You have a higher risk for serious illness if you get the flu, pneumonia, COVID-19, or hepatitis  Ask your provider if you should get vaccines to prevent these or other diseases, and when to get the vaccines  • Talk to your provider if you become stressed about diabetes care  Sometimes being able to fit diabetes care into your life can cause increased stress  The stress can cause you not to take care of yourself properly  Your care team providers can help by offering tips about self-care  Your providers may suggest you talk to a mental health provider who can listen and offer help with self-care issues  • Have your A1c checked as directed  Your provider may check your A1c every 3 months, or 2 times each year if your diabetes is controlled  An A1c test shows the average amount of sugar in your blood over the past 2 to 3 months   Your provider will tell you what your A1c level should be  • Have screening tests as directed  Your provider may recommend screening for complications of diabetes and other conditions that may develop  Some screenings may begin right away and some may happen within the first 5 years of diagnosis:    ? Examples of diabetes complications  include kidney problems, high cholesterol, high blood pressure, blood vessel problems, eye problems, and sleep apnea  ? You may be screened for a low vitamin B level  if you take oral diabetes medicine for a long time  ? Women of childbearing years may be screened  for polycystic ovarian syndrome (PCOS)  Follow up with your doctor or diabetes care team providers as directed: You may need to have blood tests done before your follow-up visit  The test results will show if changes need to be made in your treatment or self-care  Talk to your provider if you cannot afford your medicine  Write down your questions so you remember to ask them during your visits  © Copyright Tayler [employer] 2022 Information is for End User's use only and may not be sold, redistributed or otherwise used for commercial purposes  The above information is an  only  It is not intended as medical advice for individual conditions or treatments  Talk to your doctor, nurse or pharmacist before following any medical regimen to see if it is safe and effective for you  What to Do if Your Blood Sugar is Low   AMBULATORY CARE:   Low blood sugar levels  (hypoglycemia) can happen with Type 1 and Type 2 diabetes  Low levels are more likely to happen if you use insulin  Hypoglycemia can cause you to have falls, accidents, and injuries  A blood sugar level that gets too low can lead to seizures, coma, and death  Learn to recognize the symptoms early so you can get treatment quickly    When your blood sugar is low you may feel:  • Sweaty    • Nervous or shaky    • Anxious or irritable    • Confused    • A fast, pounding heartbeat    • Extremely hungry    Have someone call your local emergency number (911 in the [address]) if:   • You cannot be woken  • You have a seizure  Call your doctor if:   • You have symptoms of a low blood sugar level, such as trouble thinking, sweating, or a pounding heartbeat  • Your blood sugar level is lower than normal and it does not improve with treatment  • You often have lower blood sugar levels than your target goals  • You have trouble coping with your illness, or you feel anxious or depressed  • You have questions or concerns about your condition or care  What to do if you have symptoms of low blood sugar:   • Check your blood sugar level, if possible  Your blood sugar level is too low if it is at or below 70 mg/dL  • Eat or drink 15 grams of fast-acting carbohydrate  Fast-acting carbohydrates will raise your blood sugar level quickly  Examples of 15 grams of fast-acting carbohydrates:     ? 4 ounces (½ cup) of fruit juice     ? 4 ounces of regular soda    ? 2 tablespoons of raisins     ? 1 tube of glucose gel or 3 to 4 glucose tablets       • Check your blood sugar level 15 minutes later  If the level is still low (less than 100 mg/dL), eat another 15 grams of carbohydrate  When the level returns to 100 mg/dL, eat a snack or meal that contains carbohydrates  This will help prevent another drop in blood sugar  • Teach people close to you how to use your glucagon kit  Your blood sugar may be too low for you to be awake  People need to know when and how to use your kit  Prevent low blood sugar levels:  Prevent low blood sugar by knowing what increases your risk  Ask your healthcare provider for ways to prevent low blood sugar levels   Any of the following can increase your risk of low blood sugar:  • Fasting for tests or procedures    • During or after intense exercise    • Late or postponed meals    • Sleeping (you may need a bedtime snack)     • Drinking alcohol if you use insulin or insulin releasing pills    Follow up with your doctor as directed:  Write down your questions so you remember to ask them during your visits  © [employer] 2022 Information is for End User's use only and may not be sold, redistributed or otherwise used for commercial purposes  The above information is an  only  It is not intended as medical advice for individual conditions or treatments  Talk to your doctor, nurse or pharmacist before following any medical regimen to see if it is safe and effective for you

## 2023-03-16 NOTE — PROGRESS NOTES
Assessment and Plan:     Problem List Items Addressed This Visit        Endocrine    DM (diabetes mellitus), type 2 ([address] ) - Primary     Stable  Cont meds  Cont f/u with endo    Lab Results   Component Value Date    HGBA1C 7 3 (H) 01/16/2023            Relevant Orders    Hemoglobin A1C    CBC and Platelet       Cardiovascular and Mediastinum    Stenosis of right subclavian artery (HCC)    Essential hypertension     Stable  Cont meds              Other    Mixed hyperlipidemia     Stable  Cont meds          Other Visit Diagnoses     Medicare annual wellness visit, subsequent            BMI Counseling: Body mass index is 26 99 kg/m²  The BMI is above normal  Nutrition recommendations include decreasing portion sizes, encouraging healthy choices of fruits and vegetables, decreasing fast food intake, consuming healthier snacks, limiting drinks that contain sugar, moderation in carbohydrate intake, increasing intake of lean protein, reducing intake of saturated and trans fat and reducing intake of cholesterol  Exercise recommendations include moderate physical activity 150 minutes/week, exercising 3-5 times per week and strength training exercises  No pharmacotherapy was ordered  Rationale for BMI follow-up plan is due to patient being overweight or obese  Depression Screening and Follow-up Plan: Patient was screened for depression during today's encounter  They screened negative with a PHQ-2 score of 0  Preventive health issues were discussed with patient, and age appropriate screening tests were ordered as noted in patient's After Visit Summary  Personalized health advice and appropriate referrals for health education or preventive services given if needed, as noted in patient's After Visit Summary       History of Present Illness:     Patient presents for a Medicare Wellness Visit    Here for f/u to chronic medical conditions and medicare wellness  Saw cardiology  On yearly follow up with him  Adjusting metoprolol   ekg was the same  Chest scar still bothers him on and off  Second surgery for hydrocele with urology  Now starting to get better  Swelling on and off with his lower legs, stable  Follows with podiatry  Using pumps at home for swelling in his legs  Diabetes well controlled  Follows with endocrinology  To go see eye dr next month       Patient Care Team:  HESHAM Huerta as PCP - General (Family Medicine)  Michael Dalton MD as Consulting Physician (Urology)  Analia Schaffer MD as Consulting Physician (Endocrinology)  Bryn Myers MD as Consulting Physician (Cardiology)  Eduard Tse MD as Consulting Physician (Ophthalmology)  Bernardo Louie DPM as Consulting Physician (Podiatry)  Ignacio Jay DC as Consulting Physician (Chiropractic Medicine)     Review of Systems:     Review of Systems   Constitutional: Negative for fatigue and fever  HENT: Negative for congestion, postnasal drip and rhinorrhea  Eyes: Negative for photophobia and visual disturbance  Respiratory: Negative for cough, shortness of breath and wheezing  Cardiovascular: Negative for chest pain and palpitations  Gastrointestinal: Negative for constipation, diarrhea, nausea and vomiting  Genitourinary: Negative for dysuria and frequency  Musculoskeletal: Negative for arthralgias and myalgias  Skin: Negative for rash  Neurological: Negative for dizziness, light-headedness and headaches  Hematological: Negative for adenopathy  Psychiatric/Behavioral: Negative for dysphoric mood and sleep disturbance  The patient is not nervous/anxious           Problem List:     Patient Active Problem List   Diagnosis   • Aortic stenosis   • Coronary artery disease of native artery of native heart with stable angina pectoris ([facility] [address] )   • DM (diabetes mellitus), type 2 ([facility] [address] )   • Essential hypertension   • Hypertriglyceridemia   • S/P CABG (coronary artery bypass graft)   • S/P AVR   • PVC (premature ventricular contraction)   • Bilateral carotid artery stenosis   • Mixed hyperlipidemia   • Postoperative atrial fibrillation (HCC)   • Benign prostatic hyperplasia with urinary frequency   • Stenosis of right subclavian artery (HCC)   • Hydrocele in adult   • IBS (irritable bowel syndrome)   • Lower urinary tract symptoms due to benign prostatic hyperplasia   • H/O diabetic foot ulcer   • Hammer toes of both feet      Past Medical and Surgical History:     Past Medical History:   Diagnosis Date   • Acute systolic heart failure ([facility] [address] ) 02/23/2021   • Athlete's foot    • Bilateral impacted cerumen 03/09/2021   • Diabetes mellitus ([facility] [address] )    • Other hydrocele 06/11/2020   • TMJ (dislocation of temporomandibular joint)    • Yeast infection      Past Surgical History:   Procedure Laterality Date   • CARDIAC SURGERY     • CARDIAC VALVE REPLACEMENT     • CATARACT EXTRACTION     • COLONOSCOPY     • CORONARY ARTERY BYPASS GRAFT     • EYE SURGERY  03/2020    Cataract right eye   • EYE SURGERY Left 12/03/2020    left eye cataract    • HYDROCELE EXCISION / REPAIR     • MS EXCISION HYDROCELE UNILATERAL Right 04/08/2022    Procedure: HYDROCELECTOMY;  Surgeon: Temitope Burkett MD;  Location: [facility];  Service: Urology   • MS EXCISION HYDROCELE UNILATERAL Right 11/16/2022    Procedure: HYDROCELECTOMY;  Surgeon: Temitope Burkett MD;  Location: [facility];  Service: Urology   • TONSILLECTOMY        Family History:     Family History   Problem Relation Age of Onset   • Heart disease Mother    • Cancer Mother    • Heart disease Father    • Hypertension Father    • Mitral valve prolapse Sister    • Colon cancer Neg Hx       Social History:     Social History     Socioeconomic History   • Marital status: /Civil Union     Spouse name: None   • Number of children: None   • Years of education: None   • Highest education level: None   Occupational History   • None   Tobacco Use   • Smoking status: Never   • Smokeless tobacco: Never   Vaping Use   • Vaping Use: Never used   Substance and Sexual Activity   • Alcohol use: Yes     Comment: 2-3 glass of wine or  beer a week   • Drug use: Never   • Sexual activity: Not Currently     Partners: Female   Other Topics Concern   • None   Social History Narrative   • None     Social Determinants of Health     Financial Resource Strain: Low Risk    • Difficulty of Paying Living Expenses: Not very hard   Food Insecurity: Not on file   Transportation Needs: No Transportation Needs   • Lack of Transportation (Medical): No   • Lack of Transportation (Non-Medical):  No   Physical Activity: Not on file   Stress: Not on file   Social Connections: Not on file   Intimate Partner Violence: Not on file   Housing Stability: Not on file      Medications and Allergies:     Current Outpatient Medications   Medication Sig Dispense Refill   • acetaminophen (TYLENOL) 500 mg tablet Take 500 mg by mouth daily at bedtime     • ascorbic acid (VITAMIN C) 250 mg tablet Take 500 mg by mouth 2 (two) times a day     • finasteride (PROSCAR) 5 mg tablet Take 1 tablet (5 mg total) by mouth daily 30 tablet 11   • furosemide (LASIX) 20 mg tablet TAKE ONE TABLET BY MOUTH EVERY DAY AS NEEDED FOR EDEMA 30 tablet 0   • Ginger, Zingiber officinalis, (Ginger Root) 500 MG CAPS      • Jardiance 25 MG TABS Take 1 tablet (25 mg total) by mouth daily 90 tablet 1   • metFORMIN (GLUCOPHAGE) 500 mg tablet TAKE TWO TABLETS BY MOUTH TWICE A  tablet 1   • metoprolol succinate (TOPROL-XL) 50 mg 24 hr tablet Take 0 5 tablets (25 mg total) by mouth daily 90 tablet 3   • niacin 500 mg tablet Take 500 mg by mouth 4 (four) times a day     • NON FORMULARY       • Omega-3 Fatty Acids (FISH OIL BURP-LESS PO) Take by mouth     • PROBIOTIC PRODUCT PO Take 111 mg by mouth daily     • repaglinide (PRANDIN) 0 5 mg tablet TAKE ONE TABLET BY MOUTH EVERY DAY BEFORE DINNER 90 tablet 1   • rosuvastatin (CRESTOR) 10 MG tablet Take 1 tablet (10 mg total) by mouth daily 90 tablet 3   • sitaGLIPtin (JANUVIA) 100 mg tablet Take 1 tablet (100 mg total) by mouth daily 90 tablet 3   • tamsulosin (FLOMAX) 0 4 mg TAKE ONE CAPSULE BY MOUTH EVERY DAY WITH DINNER 30 capsule 6   • Tart Cherry (Tart Cherry Ultra) 1200 MG CAPS      • Turmeric 500 MG CAPS      • Wheat Dextrin (BENEFIBER DRINK MIX PO) Take by mouth       No current facility-administered medications for this visit  No Known Allergies   Immunizations:     Immunization History   Administered Date(s) Administered   • COVID-19 PFIZER VACCINE 0 3 ML IM 03/07/2021, 03/26/2021, 10/07/2021   • COVID-19 Pfizer Vac BIVALENT Rubio-sucrose 12 Yr+ IM (BOOSTER ONLY) 10/31/2022   • INFLUENZA 10/31/2022   • Influenza, high dose seasonal 0 7 mL 10/27/2021   • Pneumococcal Polysaccharide PPV23 10/27/2021      Health Maintenance:         Topic Date Due   • Hepatitis C Screening  08/15/2024 (Originally 1950)   • Colorectal Cancer Screening  03/02/2027         Topic Date Due   • Pneumococcal Vaccine: 65+ Years (2 - PCV) 10/27/2022      Medicare Screening Tests and Risk Assessments:     Sabiha Georges is here for his Subsequent Wellness visit  Last Medicare Wellness visit information reviewed, patient interviewed and updates made to the record today  Health Risk Assessment:   Patient rates overall health as good  Patient feels that their physical health rating is slightly better  Patient is satisfied with their life  Eyesight was rated as same  Hearing was rated as same  Patient feels that their emotional and mental health rating is same  Patients states they are never, rarely angry  Patient states they are often unusually tired/fatigued  Pain experienced in the last 7 days has been some  Patient's pain rating has been 4/10  Patient states that he has experienced weight loss or gain in last 6 months  Depression Screening:   PHQ-2 Score: 0      Fall Risk Screening:    In the past year, patient has experienced: no history of falling in past year      Home Safety:  Patient does not have trouble with stairs inside or outside of their home  Patient has working smoke alarms and has working carbon monoxide detector  Home safety hazards include: none  Nutrition:   Current diet is Regular and Diabetic  Medications:   Patient is currently taking over-the-counter supplements  OTC medications include: niacin, salmon fish oil, tumeric-curcumin, D3, probiotic, C, tart cherry, aspirin-83 mg  Patient is able to manage medications  Activities of Daily Living (ADLs)/Instrumental Activities of Daily Living (IADLs):   Walk and transfer into and out of bed and chair?: Yes  Dress and groom yourself?: Yes    Bathe or shower yourself?: Yes    Feed yourself? Yes  Do your laundry/housekeeping?: Yes  Manage your money, pay your bills and track your expenses?: Yes  Make your own meals?: Yes    Do your own shopping?: Yes    Previous Hospitalizations:   Any hospitalizations or ED visits within the last 12 months?: No      Hospitalization Comments: hydrocelectomy    Advance Care Planning:   Living will: Yes    Durable POA for healthcare:  Yes    Advanced directive: Yes    End of Life Decisions reviewed with patient: Yes    Provider agrees with end of life decisions: Yes      Cognitive Screening:   Provider or family/friend/caregiver concerned regarding cognition?: No    PREVENTIVE SCREENINGS      Cardiovascular Screening:    General: Screening Not Indicated and History Lipid Disorder      Diabetes Screening:     General: Screening Not Indicated and History Diabetes      Colorectal Cancer Screening:     General: Screening Current      Prostate Cancer Screening:    General: Screening Current      Osteoporosis Screening:    General: Screening Not Indicated      Abdominal Aortic Aneurysm (AAA) Screening:    Risk factors include: age between 73-67 yo        Lung Cancer Screening:     General: Screening Not Indicated      Hepatitis C Screening:    General: Screening Current    Screening, Brief Intervention, and Referral to Treatment (SBIRT)    Screening  Typical number of drinks in a day: 1  Typical number of drinks in a week: 4  Interpretation: Low risk drinking behavior  AUDIT-C Screenin) How often did you have a drink containing alcohol in the past year? 4 or more times a week  2) How many drinks did you have on a typical day when you were drinking in the past year? 1 to 2  3) How often did you have 6 or more drinks on one occasion in the past year? never    AUDIT-C Score: 4  Interpretation: Score 4-12 (male): POSITIVE screen for alcohol misuse    AUDIT Screenin) How often during the last year have you found that you were not able to stop drinking once you had started? 0 - never  5) How often during the last year have you failed to do what was normally expected from you because of drinking? 0 - never  6) How often during the last year have you needed a first drink in the morning to get yourself going after a heavy drinking session? 0 - never  7) How often during the last year have you had a feeling of guilt or remorse after drinking? 0 - never  8) How often during the last year have you been unable to remember what happened the night before because you had been drinking? 0 - never  9) Have you or someone else been injured as a result of your drinking? 0 - no  10) Has a relative or friend or a doctor or another health worker been concerned about your drinking or suggested you cut down? 0 - no    AUDIT Score: 4  Interpretation: Low risk alcohol consumption    Single Item Drug Screening:  How often have you used an illegal drug (including marijuana) or a prescription medication for non-medical reasons in the past year? never    Single Item Drug Screen Score: 0  Interpretation: Negative screen for possible drug use disorder    Brief Intervention  Alcohol & drug use screenings were reviewed  No concerns regarding substance use disorder identified       Other Counseling Topics:   Car/seat belt/driving safety, skin self-exam, sunscreen and calcium and vitamin D intake and regular weightbearing exercise  No results found  Physical Exam:     /58   Pulse 63   Temp (!) 96 5 °F (35 8 °C)   Resp 16   Ht 6' 2 76" (1 899 m)   Wt 97 3 kg (214 lb 9 6 oz)   SpO2 97%   BMI 26 99 kg/m²     Physical Exam  Vitals and nursing note reviewed  Constitutional:       Appearance: Normal appearance  HENT:      Head: Normocephalic and atraumatic  Right Ear: Tympanic membrane, ear canal and external ear normal       Left Ear: Tympanic membrane, ear canal and external ear normal       Nose: Nose normal       Mouth/Throat:      Mouth: Mucous membranes are moist    Eyes:      Extraocular Movements: Extraocular movements intact  Conjunctiva/sclera: Conjunctivae normal    Neck:      Vascular: No carotid bruit  Cardiovascular:      Rate and Rhythm: Normal rate and regular rhythm  Pulses: Normal pulses  Heart sounds: Normal heart sounds  Pulmonary:      Effort: Pulmonary effort is normal       Breath sounds: Normal breath sounds  Abdominal:      General: Bowel sounds are normal       Palpations: Abdomen is soft  Musculoskeletal:         General: Normal range of motion  Cervical back: Normal range of motion and neck supple  Skin:     General: Skin is warm and dry  Capillary Refill: Capillary refill takes less than 2 seconds  Neurological:      General: No focal deficit present  Mental Status: He is alert and oriented to person, place, and time  Psychiatric:         Mood and Affect: Mood normal          Behavior: Behavior normal          Thought Content:  Thought content normal          Judgment: Judgment normal           HESHAM Wolfe

## 2023-03-17 NOTE — ASSESSMENT & PLAN NOTE
Stable  Cont meds  Cont f/u with endo    Lab Results   Component Value Date    HGBA1C 7 3 (H) 01/16/2023

## 2023-03-29 ENCOUNTER — TELEPHONE (OUTPATIENT)
Dept: CARDIOLOGY CLINIC | Facility: CLINIC | Age: 73
End: 2023-03-29

## 2023-03-29 DIAGNOSIS — I25.118 CORONARY ARTERY DISEASE OF NATIVE ARTERY OF NATIVE HEART WITH STABLE ANGINA PECTORIS (HCC): ICD-10-CM

## 2023-03-29 RX ORDER — ROSUVASTATIN CALCIUM 10 MG/1
10 TABLET, COATED ORAL DAILY
Qty: 90 TABLET | Refills: 3 | Status: SHIPPED | OUTPATIENT
Start: 2023-03-29

## 2023-04-05 LAB
LEFT EYE DIABETIC RETINOPATHY: NORMAL
RIGHT EYE DIABETIC RETINOPATHY: NORMAL

## 2023-04-25 ENCOUNTER — TELEPHONE (OUTPATIENT)
Dept: PODIATRY | Facility: CLINIC | Age: 73
End: 2023-04-25

## 2023-04-25 NOTE — TELEPHONE ENCOUNTER
Caller: Alverto Headings    Doctor: Dr Elizabeth Jack    Reason for call: asking for sooner appt/checked chart to see what issue was/RX/ingrown/scheduled for 4/27    Call back#: NA

## 2023-04-26 DIAGNOSIS — N40.1 BENIGN PROSTATIC HYPERPLASIA WITH URINARY FREQUENCY: ICD-10-CM

## 2023-04-26 DIAGNOSIS — R35.0 BENIGN PROSTATIC HYPERPLASIA WITH URINARY FREQUENCY: ICD-10-CM

## 2023-04-26 RX ORDER — FINASTERIDE 5 MG/1
5 TABLET, FILM COATED ORAL DAILY
Qty: 30 TABLET | Refills: 11 | Status: SHIPPED | OUTPATIENT
Start: 2023-04-26

## 2023-04-27 ENCOUNTER — OFFICE VISIT (OUTPATIENT)
Dept: PODIATRY | Facility: CLINIC | Age: 73
End: 2023-04-27

## 2023-04-27 VITALS
DIASTOLIC BLOOD PRESSURE: 85 MMHG | SYSTOLIC BLOOD PRESSURE: 120 MMHG | HEART RATE: 60 BPM | HEIGHT: 74 IN | BODY MASS INDEX: 27.46 KG/M2 | WEIGHT: 214 LBS

## 2023-04-27 DIAGNOSIS — T14.8XXA BLOOD BLISTER: Primary | ICD-10-CM

## 2023-04-27 DIAGNOSIS — M20.41 HAMMERTOE OF RIGHT FOOT: ICD-10-CM

## 2023-04-27 DIAGNOSIS — E11.42 DIABETIC POLYNEUROPATHY ASSOCIATED WITH TYPE 2 DIABETES MELLITUS (HCC): ICD-10-CM

## 2023-04-27 NOTE — PROGRESS NOTES
Assessment/Plan:      Diagnoses and all orders for this visit:    Blood blister    Hammertoe of right foot    Diabetic polyneuropathy associated with type 2 diabetes mellitus ([address] )      blister has healed and is dry  No sign of infection    RIght 2nd hammertoe stable  NO preulcer or pain  Monitor for now  IF this becomes an issue we could consider surgery to fix the hammertoe but at the moment this is not necessary    4th hammertoe with varus rotation tends to rub on the 3rd toe  No blister or infection today  Continue to use toe spacer  High risk for foot infection, continue at risk foot care 9 weeks    -DM foot risk is high (previous DM ulcer/infection and deformity)  Discussed DM risk to lower extremities, proper shoe gear, and daily monitoring of feet    -Discussed weight loss and suitable exercise regiment  -Reviewed most recent PCP visit on 3/16/2023  -Educated on A1C and the risks of poorly controlled Diabetes  Reviewed recent A1C:  Lab Results   Component Value Date    HGBA1C 7 3 (H) 01/16/2023    HGBA1C 7 4 (H) 07/20/2020    HGBA1C 9 5 (H) 07/29/2019     Subjective:     Patient ID: Bear Howell is a 74 y o  male  Patient called a few weeks ago with an ingrown toenail and pain  HE was placed on an antibiotic and it feels better  There is a dried blood blister to the tip of the great toe today  Review of Systems    Constitutional: Negative  Respiratory: Negative for cough and shortness of breath  Gastrointestinal: Negative for diarrhea, nausea and vomiting  Musculoskeletal: hammertoe   Skin: dry blood blister  Neurological: Negative for weakness, numbness and headaches  Objective:     Physical Exam  Vitals reviewed  Constitutional:       Appearance: He is not ill-appearing or diaphoretic  Cardiovascular:      Pulses: no weak pulses          Dorsalis pedis pulses are 2+ on the right side and 2+ on the left side          Posterior tibial pulses are 2+ on the right side and 2+ on the left side  Musculoskeletal:         General: Deformity present  Right lower leg: Edema present  Left lower leg: Edema present  Right foot: Decreased range of motion  Deformity present  No prominent metatarsal heads  Left foot: Decreased range of motion  Deformity present  No prominent metatarsal heads  Feet:      Right foot:      Protective Sensation: 10 sites tested  5 sites sensed  Skin integrity: No ulcer or callus  Toenail Condition: Right toenails are abnormally thick  Fungal disease present  Left foot:      Protective Sensation: 10 sites tested  5 sites sensed  Skin integrity: No ulcer or callus  Toenail Condition: Left toenails are abnormally thick  Fungal disease present  Skin:     Capillary Refill: Capillary refill takes less than 2 seconds  Findings: Bruising present  No rash  Neurological:      Mental Status: He is alert and oriented to person, place, and time  Sensory: Sensory deficit present  Gait: Gait normal    Psychiatric:         Mood and Affect: Mood normal        Diabetic Foot Exam    Patient's shoes and socks removed  Right Foot/Ankle   Right Foot Inspection  Skin Exam: skin intact  No callus, no pre-ulcer, no ulcer and no callus  Toe Exam: swelling, erythema and right toe deformity  Sensory   Vibration: absent  Proprioception: diminished  Monofilament testing: diminished    Vascular  Capillary refills: < 3 seconds  The right DP pulse is 2+  The right PT pulse is 2+  Right Toe  - Comprehensive Exam  Ecchymosis: none  Arch: pes planus  Hammertoes: second toe, third toe, fourth toe and fifth toe  Swelling: dorsum         Left Foot/Ankle  Left Foot Inspection  Skin Exam: skin intact and pre-ulcer (dry blister tip of hallux, no drainageor SOI)  No ulcer and no callus  Toe Exam: swelling and left toe deformity       Sensory   Vibration: absent  Proprioception: diminished  Monofilament testing: diminished    Vascular  Capillary refills: < 3 seconds  The left DP pulse is 2+  The left PT pulse is 2+       Left Toe  - Comprehensive Exam  Ecchymosis: toe 1  Arch: pes planus  Hammertoes: second toe, fifth toe, fourth toe and third toe  Swelling: dorsum           Assign Risk Category  Deformity present  Loss of protective sensation  No weak pulses  Risk: 2

## 2023-05-22 ENCOUNTER — LAB (OUTPATIENT)
Dept: LAB | Facility: AMBULARY SURGERY CENTER | Age: 73
End: 2023-05-22

## 2023-05-22 DIAGNOSIS — I10 ESSENTIAL HYPERTENSION: ICD-10-CM

## 2023-05-22 DIAGNOSIS — E11.65 TYPE 2 DIABETES MELLITUS WITH HYPERGLYCEMIA, WITHOUT LONG-TERM CURRENT USE OF INSULIN (HCC): ICD-10-CM

## 2023-05-22 LAB
ANION GAP SERPL CALCULATED.3IONS-SCNC: 1 MMOL/L (ref 4–13)
BUN SERPL-MCNC: 23 MG/DL (ref 5–25)
CALCIUM SERPL-MCNC: 9.3 MG/DL (ref 8.3–10.1)
CHLORIDE SERPL-SCNC: 111 MMOL/L (ref 96–108)
CO2 SERPL-SCNC: 25 MMOL/L (ref 21–32)
CREAT SERPL-MCNC: 1.04 MG/DL (ref 0.6–1.3)
EST. AVERAGE GLUCOSE BLD GHB EST-MCNC: 154 MG/DL
GFR SERPL CREATININE-BSD FRML MDRD: 69 ML/MIN/1.73SQ M
GLUCOSE P FAST SERPL-MCNC: 178 MG/DL (ref 65–99)
HBA1C MFR BLD: 7 %
POTASSIUM SERPL-SCNC: 4 MMOL/L (ref 3.5–5.3)
SODIUM SERPL-SCNC: 137 MMOL/L (ref 135–147)

## 2023-05-23 ENCOUNTER — OFFICE VISIT (OUTPATIENT)
Dept: UROLOGY | Facility: CLINIC | Age: 73
End: 2023-05-23

## 2023-05-23 VITALS
BODY MASS INDEX: 27.28 KG/M2 | WEIGHT: 212.6 LBS | HEIGHT: 74 IN | OXYGEN SATURATION: 95 % | DIASTOLIC BLOOD PRESSURE: 78 MMHG | HEART RATE: 75 BPM | SYSTOLIC BLOOD PRESSURE: 128 MMHG | RESPIRATION RATE: 16 BRPM

## 2023-05-23 DIAGNOSIS — N43.3 RIGHT HYDROCELE: Primary | ICD-10-CM

## 2023-05-23 NOTE — PROGRESS NOTES
UROLOGY PROGRESS NOTE   Patient Identifiers: Kee Hester (MRN [id number])  Date of Service: 5/23/2023    Subjective:   41-year-old man with history of right-sided hydrocele  He had a redo hydrocelectomy in November  He had quite a bit of swelling and induration  This is finally subsided although there is still some residual   He is fairly comfortable and satisfied at this point  He also has BPH and will continue taking tamsulosin and finasteride      Reason for visit: Right hydrocele repair follow-up    Objective:     VITALS:    Vitals:    05/23/23 1036   BP: 128/78   Pulse: 75   Resp: 16   SpO2: 95%     AUA SYMPTOM SCORE    Flowsheet Row Most Recent Value   AUA SYMPTOM SCORE    How often have you had a sensation of not emptying your bladder completely after you finished urinating? 0 (P)     How often have you had to urinate again less than two hours after you finished urinating? 0 (P)     How often have you found you stopped and started again several times when you urinate? 0 (P)     How often have you found it difficult to postpone urination? 1 (P)     How often have you had a weak urinary stream? 1 (P)     How often have you had to push or strain to begin urination? 1 (P)     How many times did you most typically get up to urinate from the time you went to bed at night until the time you got up in the morning? 1 (P)     Quality of Life: If you were to spend the rest of your life with your urinary condition just the way it is now, how would you feel about that? 1 (P)     AUA SYMPTOM SCORE 4 (P)             LABS:  Lab Results   Component Value Date    HGB 14 5 03/22/2021    HCT 42 1 03/22/2021    WBC 8 24 03/22/2021     03/22/2021   ]    Lab Results   Component Value Date    K 4 0 05/22/2023     (H) 05/22/2023    CO2 25 05/22/2023    BUN 23 05/22/2023    CREATININE 1 04 05/22/2023    CALCIUM 9 3 05/22/2023   ]        INPATIENT MEDS:    Current Outpatient Medications:   •  acetaminophen "(TYLENOL) 500 mg tablet, Take 500 mg by mouth daily at bedtime, Disp: , Rfl:   •  ascorbic acid (VITAMIN C) 250 mg tablet, Take 500 mg by mouth 2 (two) times a day, Disp: , Rfl:   •  finasteride (PROSCAR) 5 mg tablet, Take 1 tablet (5 mg total) by mouth daily, Disp: 30 tablet, Rfl: 11  •  furosemide (LASIX) 20 mg tablet, TAKE ONE TABLET BY MOUTH EVERY DAY AS NEEDED FOR EDEMA, Disp: 30 tablet, Rfl: 5  •  Ginger, Zingiber officinalis, (Ginger Root) 500 MG CAPS, , Disp: , Rfl:   •  Jardiance 25 MG TABS, Take 1 tablet (25 mg total) by mouth daily, Disp: 90 tablet, Rfl: 1  •  metFORMIN (GLUCOPHAGE) 500 mg tablet, TAKE TWO TABLETS BY MOUTH TWICE A DAY, Disp: 360 tablet, Rfl: 1  •  metoprolol succinate (TOPROL-XL) 50 mg 24 hr tablet, Take 0 5 tablets (25 mg total) by mouth daily, Disp: 90 tablet, Rfl: 3  •  niacin 500 mg tablet, Take 500 mg by mouth 4 (four) times a day, Disp: , Rfl:   •  NON FORMULARY,  , Disp: , Rfl:   •  Omega-3 Fatty Acids (FISH OIL BURP-LESS PO), Take by mouth, Disp: , Rfl:   •  PROBIOTIC PRODUCT PO, Take 111 mg by mouth daily, Disp: , Rfl:   •  repaglinide (PRANDIN) 0 5 mg tablet, TAKE ONE TABLET BY MOUTH EVERY DAY BEFORE DINNER, Disp: 90 tablet, Rfl: 1  •  rosuvastatin (CRESTOR) 10 MG tablet, Take 1 tablet (10 mg total) by mouth daily, Disp: 90 tablet, Rfl: 3  •  sitaGLIPtin (JANUVIA) 100 mg tablet, Take 1 tablet (100 mg total) by mouth daily, Disp: 90 tablet, Rfl: 3  •  tamsulosin (FLOMAX) 0 4 mg, TAKE ONE CAPSULE BY MOUTH EVERY DAY WITH DINNER, Disp: 30 capsule, Rfl: 6  •  Tart Cherry (Tart Cherry Ultra) 1200 MG CAPS, , Disp: , Rfl:   •  Turmeric 500 MG CAPS, , Disp: , Rfl:   •  Wheat Dextrin (BENEFIBER DRINK MIX PO), Take by mouth, Disp: , Rfl:       Physical Exam:   /78 (BP Location: Left arm, Patient Position: Sitting, Cuff Size: Large)   Pulse 75   Resp 16   Ht 6' 2\" (1 88 m)   Wt 96 4 kg (212 lb 9 6 oz)   SpO2 95%   BMI 27 30 kg/m²   GEN: no acute distress    RESP: breathing " comfortably with no accessory muscle use    ABD: soft, non-tender, non-distended      EXT: no significant peripheral edema   (Male): Penis circumcised, phallus normal, meatus patent  Testicles descended into scrotum bilaterally the right scrotum still has a small amount of induration and firmness    RADIOLOGY:   None    Assessment:   #1  Redo right hydrocelectomy  2   BPH    Plan:   -I would like him to get a follow-up ultrasound now and I will see him for his annual exam in 6 months  -  -  -

## 2023-05-25 ENCOUNTER — OFFICE VISIT (OUTPATIENT)
Dept: ENDOCRINOLOGY | Facility: CLINIC | Age: 73
End: 2023-05-25

## 2023-05-25 VITALS
BODY MASS INDEX: 27.34 KG/M2 | HEIGHT: 74 IN | HEART RATE: 66 BPM | DIASTOLIC BLOOD PRESSURE: 64 MMHG | SYSTOLIC BLOOD PRESSURE: 110 MMHG | OXYGEN SATURATION: 97 % | WEIGHT: 213 LBS

## 2023-05-25 DIAGNOSIS — E11.65 TYPE 2 DIABETES MELLITUS WITH HYPERGLYCEMIA, WITHOUT LONG-TERM CURRENT USE OF INSULIN (HCC): Primary | ICD-10-CM

## 2023-05-25 DIAGNOSIS — I10 ESSENTIAL HYPERTENSION: ICD-10-CM

## 2023-05-25 DIAGNOSIS — E78.2 MIXED HYPERLIPIDEMIA: ICD-10-CM

## 2023-05-25 NOTE — PROGRESS NOTES
Patient Progress Note      CC: DM      Referring Provider  Sandip Vargas Md  [id number] [facility],Third Floor,  [address] [facility]      History of Present Illness:   Meri Jean Baptiste is a 74 y o  male with a history of type 2 diabetes without long term use of insulin  Diabetes course has been stable  Complications of DM: CAD  Denies recent illness or hospitalizations  Denies recent severe hypoglycemic or severe hyperglycemic episodes  Denies any issues with his current regimen  Home glucose monitoring: are performed sporadically  Home blood glucose readings: 140-170s mg/dl (checking at variable times)     Current regimen: metformin 1000 mg BID, Januvia 100 mg daily, Jardiance 25 mg daily, Prandin 0 5 mg before dinner  Not using GLP1 agonist instead of DPP4 inhibitor due SE / injection site reaction  compliant most of the time, denies any side effects from medications  Hypoglycemic episodes: No, never  H/o of hypoglycemia causing hospitalization or Intervention such as glucagon injection or ambulance call :  No  Hypoglycemia symptoms: never   Treatment of hypoglycemia: discussed treatment     Medic alert tag: recommended: Yes     Diabetes education: Yes  Diet: 3 meals per day, 1-2 snacks per day  Timing of meals is predictable  Diabetic diet compliance:  compliant most of the time  Activity: Daily activity is predictable: Yes  He tries to walk or swim everyday  Ophthamology: April 2023  Podiatry: foot exam UTD, April 2023     Not on ACE inhibitor/ARB  Hyperlipidemia: on statin - tolerating well, no myalgias  compliant most of the time, denies any side effects from medications    Thyroid disorders: No  History of pancreatitis: No    Patient Active Problem List   Diagnosis   • Aortic stenosis   • Coronary artery disease of native artery of native heart with stable angina pectoris ([facility] )   • DM (diabetes mellitus), type 2 ([facility] )   • Essential hypertension   • Hypertriglyceridemia   • S/P CABG (coronary artery bypass graft)   • S/P AVR   • PVC (premature ventricular contraction)   • Bilateral carotid artery stenosis   • Mixed hyperlipidemia   • Postoperative atrial fibrillation (HCC)   • Benign prostatic hyperplasia with urinary frequency   • Stenosis of right subclavian artery (HCC)   • Hydrocele in adult   • IBS (irritable bowel syndrome)   • Lower urinary tract symptoms due to benign prostatic hyperplasia   • H/O diabetic foot ulcer   • Hammer toes of both feet      Past Medical History:   Diagnosis Date   • Acute systolic heart failure ([facility] ) 02/23/2021   • Athlete's foot    • Bilateral impacted cerumen 03/09/2021   • Diabetes mellitus ([facility] )    • Other hydrocele 06/11/2020   • TMJ (dislocation of temporomandibular joint)    • Yeast infection       Past Surgical History:   Procedure Laterality Date   • CARDIAC SURGERY     • CARDIAC VALVE REPLACEMENT     • CATARACT EXTRACTION     • COLONOSCOPY     • CORONARY ARTERY BYPASS GRAFT     • EYE SURGERY  03/2020    Cataract right eye   • EYE SURGERY Left 12/03/2020    left eye cataract    • HYDROCELE EXCISION / REPAIR     • DE EXCISION HYDROCELE UNILATERAL Right 04/08/2022    Procedure: HYDROCELECTOMY;  Surgeon: Allison Camarillo MD;  Location: [facility] MAIN OR;  Service: Urology   • DE EXCISION HYDROCELE UNILATERAL Right 11/16/2022    Procedure: HYDROCELECTOMY;  Surgeon: Allison Camarillo MD;  Location: [facility] MAIN OR;  Service: Urology   • TONSILLECTOMY        Family History   Problem Relation Age of Onset   • Heart disease Mother    • Cancer Mother    • Heart disease Father    • Hypertension Father    • Mitral valve prolapse Sister    • Diabetes type II Maternal Grandmother    • Colon cancer Neg Hx      Social History     Tobacco Use   • Smoking status: Never   • Smokeless tobacco: Never   Substance Use Topics   • Alcohol use:  Yes     Alcohol/week: 4 0 standard drinks of alcohol     Types: 2 Glasses of wine, 1 Cans of beer, 1 Shots of liquor per week Comment: 2-3 glass of wine or  beer a week     No Known Allergies      Current Outpatient Medications:   •  acetaminophen (TYLENOL) 500 mg tablet, Take 500 mg by mouth daily at bedtime, Disp: , Rfl:   •  ascorbic acid (VITAMIN C) 250 mg tablet, Take 500 mg by mouth 2 (two) times a day, Disp: , Rfl:   •  finasteride (PROSCAR) 5 mg tablet, Take 1 tablet (5 mg total) by mouth daily, Disp: 30 tablet, Rfl: 11  •  furosemide (LASIX) 20 mg tablet, TAKE ONE TABLET BY MOUTH EVERY DAY AS NEEDED FOR EDEMA, Disp: 30 tablet, Rfl: 5  •  Ginger, Zingiber officinalis, (Ginger Root) 500 MG CAPS, , Disp: , Rfl:   •  Jardiance 25 MG TABS, Take 1 tablet (25 mg total) by mouth daily, Disp: 90 tablet, Rfl: 1  •  metFORMIN (GLUCOPHAGE) 500 mg tablet, TAKE TWO TABLETS BY MOUTH TWICE A DAY, Disp: 360 tablet, Rfl: 1  •  metoprolol succinate (TOPROL-XL) 50 mg 24 hr tablet, Take 0 5 tablets (25 mg total) by mouth daily, Disp: 90 tablet, Rfl: 3  •  niacin 500 mg tablet, Take 500 mg by mouth 4 (four) times a day, Disp: , Rfl:   •  NON FORMULARY, 25 mcg Vitamin D, Disp: , Rfl:   •  Omega-3 Fatty Acids (FISH OIL BURP-LESS PO), Take by mouth, Disp: , Rfl:   •  PROBIOTIC PRODUCT PO, Take 111 mg by mouth daily, Disp: , Rfl:   •  repaglinide (PRANDIN) 0 5 mg tablet, TAKE ONE TABLET BY MOUTH EVERY DAY BEFORE DINNER, Disp: 90 tablet, Rfl: 1  •  rosuvastatin (CRESTOR) 10 MG tablet, Take 1 tablet (10 mg total) by mouth daily, Disp: 90 tablet, Rfl: 3  •  sitaGLIPtin (JANUVIA) 100 mg tablet, Take 1 tablet (100 mg total) by mouth daily, Disp: 90 tablet, Rfl: 3  •  tamsulosin (FLOMAX) 0 4 mg, TAKE ONE CAPSULE BY MOUTH EVERY DAY WITH DINNER, Disp: 30 capsule, Rfl: 6  •  Tart Cherry (Tart Cherry Ultra) 1200 MG CAPS, , Disp: , Rfl:   •  Turmeric 500 MG CAPS, , Disp: , Rfl:   •  Wheat Dextrin (BENEFIBER DRINK MIX PO), Take by mouth, Disp: , Rfl:   Review of Systems   Constitutional: Negative for activity change, appetite change, fatigue and unexpected weight "change  HENT: Negative for trouble swallowing  Eyes: Negative for visual disturbance  Respiratory: Negative for shortness of breath  Cardiovascular: Negative for chest pain and palpitations  Gastrointestinal: Negative for constipation and diarrhea  Endocrine: Negative for polydipsia and polyuria  Musculoskeletal: Negative  Skin: Negative for wound  Neurological: Positive for numbness  Psychiatric/Behavioral: Negative  Physical Exam:  Body mass index is 27 35 kg/m²  /64   Pulse 66   Ht 6' 2\" (1 88 m)   Wt 96 6 kg (213 lb)   SpO2 97%   BMI 27 35 kg/m²    Wt Readings from Last 3 Encounters:   05/25/23 96 6 kg (213 lb)   05/23/23 96 4 kg (212 lb 9 6 oz)   04/27/23 97 1 kg (214 lb)       Physical Exam  Vitals and nursing note reviewed  Constitutional:       Appearance: He is well-developed  HENT:      Head: Normocephalic  Eyes:      General: No scleral icterus  Pupils: Pupils are equal, round, and reactive to light  Neck:      Thyroid: No thyromegaly  Cardiovascular:      Rate and Rhythm: Normal rate and regular rhythm  Pulses:           Radial pulses are 2+ on the right side and 2+ on the left side  Heart sounds: No murmur heard  Pulmonary:      Effort: Pulmonary effort is normal  No respiratory distress  Breath sounds: Normal breath sounds  No wheezing  Musculoskeletal:      Cervical back: Neck supple  Skin:     General: Skin is warm and dry  Neurological:      Mental Status: He is alert  Patient's shoes and socks were not removed            Labs:   Component      Latest Ref Rng & Units 9/12/2022 1/16/2023 5/22/2023   Sodium      135 - 147 mmol/L 138 139 137   Potassium      3 5 - 5 3 mmol/L 4 4 4 3 4 0   Chloride      96 - 108 mmol/L 109 (H) 107 111 (H)   CO2      21 - 32 mmol/L 30 27 25   Anion Gap      4 - 13 mmol/L -1 (L) 5 1 (L)   BUN      5 - 25 mg/dL 21 28 (H) 23   Creatinine      0 60 - 1 30 mg/dL 1 08 1 02 1 04   GLUCOSE " FASTING      65 - 99 mg/dL 170 (H) 199 (H) 178 (H)   Calcium      8 3 - 10 1 mg/dL 9 3 9 6 9 3   eGFR      ml/min/1 73sq m 67 72 69   Cholesterol      See Comment mg/dL 117 118    Triglycerides      See Comment mg/dL 114 135    HDL      >=40 mg/dL 46 46    LDL Calculated      0 - 100 mg/dL 48 45    Non-HDL Cholesterol      mg/dl 71 72    EXT Creatinine Urine      mg/dL 24 0 54 9    MICROALBUM ,U,RANDOM      0 0 - 20 0 mg/L <5 0 6 1    MICROALBUMIN/CREATININE RATIO      0 - 30 mg/g creatinine <21 11    Hemoglobin A1C      Normal 3 8-5 6%; PreDiabetic 5 7-6 4%; Diabetic >=6 5%; Glycemic control for adults with diabetes <7 0% % 7 1 (H) 7 3 (H) 7 0 (H)   eAG, EST AVG Glucose      mg/dl 157 163 154       Plan:    Diagnoses and all orders for this visit:    Type 2 diabetes mellitus with hyperglycemia, without long-term current use of insulin ([facility])  HGA1C 7 0%  Improved  Treatment regimen: continue current treatment  Episodes of hypoglycemia can lead to permanent disability and death  Discussed risks/complications associated with uncontrolled diabetes  Advised to adhere to diabetic diet, and recommended staying active/exercising routinely as tolerated  Keep carbohydrates consistent to limit blood glucose fluctuations  Advised to call if blood sugars less than 70 mg/dl or over 300 mg/dl  Check blood glucose 1 time a day  Discussed symptoms and treatment of hypoglycemia  Recommended routine follow-up with podiatry and ophthalmology  Ordered blood work to complete prior to next visit  -     Hemoglobin A1C; Future  -     Basic metabolic panel; Future  -     Albumin / creatinine urine ratio; Future    Essential hypertension  Blood pressure adequately controlled, continue current treatment  -     Basic metabolic panel; Future    Mixed hyperlipidemia  LDL 45  Continue statin therapy   Managed by cardiology        Discussed with the patient diagnosis and treatment and all questions fully answered   He will call me if any problems arise  Counseled patient on diagnostic results, prognosis, risk and benefit of treatment options, instruction for management, importance of treatment compliance, risk factor reduction and impressions        Quin Gonzáles PA-C

## 2023-05-25 NOTE — PATIENT INSTRUCTIONS
Hypoglycemia instructions   Nany Crabtree  5/25/2023  [id number]    Low Blood Sugar    Steps to treat low blood sugar  1  Test blood sugar if you have symptoms of low blood sugar:   Low Blood Sugar Symptoms:  o Sweaty  o Dizzy  o Rapid heartbeat  o Shaky  o Bad mood  o Hungry      2  Treat blood sugar less than 70 with 15 grams of fast-acting carbohydrate:   Examples of 15 grams Fast-Acting Carbohydrate:  o 4 oz juice  o 4 oz regular soda  o 3-4 glucose tablets (chew)  o 3-4 hard candies (chew)          3  Wait 15 minutes and test your blood sugar again     4   If blood sugar is less than 100, repeat steps 2-3     5  When your blood sugar is 100 or more, eat a snack if it will be longer than one hour until your next meal  The snack should be 15 grams of carbohydrate and a protein:   Examples of snacks:  o ½ sandwich  o 6 crackers with cheese  o Piece of fruit with cheese or peanut butter  o 6 crackers with peanut butter

## 2023-06-01 ENCOUNTER — HOSPITAL ENCOUNTER (OUTPATIENT)
Dept: ULTRASOUND IMAGING | Facility: HOSPITAL | Age: 73
Discharge: HOME | End: 2023-06-01

## 2023-06-01 DIAGNOSIS — N43.3 RIGHT HYDROCELE: ICD-10-CM

## 2023-06-05 DIAGNOSIS — N40.1 BENIGN PROSTATIC HYPERPLASIA WITH URINARY FREQUENCY: ICD-10-CM

## 2023-06-05 DIAGNOSIS — R35.0 BENIGN PROSTATIC HYPERPLASIA WITH URINARY FREQUENCY: ICD-10-CM

## 2023-06-06 RX ORDER — TAMSULOSIN HYDROCHLORIDE 0.4 MG/1
CAPSULE ORAL
Qty: 30 CAPSULE | Refills: 6 | Status: SHIPPED | OUTPATIENT
Start: 2023-06-06

## 2023-07-05 DIAGNOSIS — E11.65 TYPE 2 DIABETES MELLITUS WITH HYPERGLYCEMIA, WITHOUT LONG-TERM CURRENT USE OF INSULIN (HCC): ICD-10-CM

## 2023-07-05 NOTE — TELEPHONE ENCOUNTER
Requested medication(s) are due for refill today: Yes  Patient has already received a courtesy refill: No  Other reason request has been forwarded to provider: Please authorize printed Rx since the pt needs it sent to [facility].  Thanks

## 2023-07-05 NOTE — TELEPHONE ENCOUNTER
Pharmacy faxed in refill request for Januvia. Called pt to confirm he is [place] this med w/ [place] Pt confirmed he is. Told him we can't transmit Rx's electronically out of the [place] and I would need to fax paper Rx to him. Mentioned if Rx can't be faxed we will then mail him a hard copy Rx and he will need to submit it to them. Pt understood.

## 2023-07-11 ENCOUNTER — OFFICE VISIT (OUTPATIENT)
Dept: FAMILY MEDICINE CLINIC | Facility: CLINIC | Age: 73
End: 2023-07-11
Payer: MEDICARE

## 2023-07-11 VITALS
TEMPERATURE: 96.8 F | RESPIRATION RATE: 16 BRPM | OXYGEN SATURATION: 95 % | DIASTOLIC BLOOD PRESSURE: 60 MMHG | BODY MASS INDEX: 27.07 KG/M2 | SYSTOLIC BLOOD PRESSURE: 102 MMHG | HEART RATE: 55 BPM | WEIGHT: 210.8 LBS

## 2023-07-11 DIAGNOSIS — I25.118 CORONARY ARTERY DISEASE OF NATIVE ARTERY OF NATIVE HEART WITH STABLE ANGINA PECTORIS (HCC): ICD-10-CM

## 2023-07-11 DIAGNOSIS — I48.91 POSTOPERATIVE ATRIAL FIBRILLATION (HCC): ICD-10-CM

## 2023-07-11 DIAGNOSIS — E11.65 TYPE 2 DIABETES MELLITUS WITH HYPERGLYCEMIA, WITHOUT LONG-TERM CURRENT USE OF INSULIN (HCC): Primary | ICD-10-CM

## 2023-07-11 DIAGNOSIS — E78.2 MIXED HYPERLIPIDEMIA: ICD-10-CM

## 2023-07-11 DIAGNOSIS — I10 ESSENTIAL HYPERTENSION: ICD-10-CM

## 2023-07-11 DIAGNOSIS — I97.89 POSTOPERATIVE ATRIAL FIBRILLATION (HCC): ICD-10-CM

## 2023-07-11 PROBLEM — M20.42 HAMMER TOES OF BOTH FEET: Status: RESOLVED | Noted: 2022-08-30 | Resolved: 2023-07-11

## 2023-07-11 PROBLEM — M20.41 HAMMER TOES OF BOTH FEET: Status: RESOLVED | Noted: 2022-08-30 | Resolved: 2023-07-11

## 2023-07-11 PROBLEM — N40.1 LOWER URINARY TRACT SYMPTOMS DUE TO BENIGN PROSTATIC HYPERPLASIA: Status: RESOLVED | Noted: 2022-03-15 | Resolved: 2023-07-11

## 2023-07-11 PROCEDURE — 99214 OFFICE O/P EST MOD 30 MIN: CPT | Performed by: NURSE PRACTITIONER

## 2023-07-11 NOTE — PROGRESS NOTES
FAMILY PRACTICE OFFICE VISIT       NAME: Jus Chavez  AGE: 74 y.o. SEX: male       : 1950        MRN: [id number]    DATE: 2023  TIME: 1:05 PM    Assessment and Plan   1. Type 2 diabetes mellitus with hyperglycemia, without long-term current use of insulin (HCC)  Assessment & Plan:  Stable  Cont meds  Cont f/u with endo    Lab Results   Component Value Date    HGBA1C 7.0 (H) 2023       Orders:  -     Ambulatory Referral to Podiatry; Future    2. Coronary artery disease of native artery of native heart with stable angina pectoris [facility])  Assessment & Plan:  Stable  Cont meds  Cont f/u with cardiology        3. Essential hypertension  Assessment & Plan:  Stable  Cont meds        4. Postoperative atrial fibrillation (HCC)  Assessment & Plan:  Rate controlled  Cont current meds        5. Mixed hyperlipidemia  Assessment & Plan:  Stable  Cont meds                   Chief Complaint     Chief Complaint   Patient presents with   • Follow-up     Patient is here for his Type 2 DM. Having issues with feet seeing podiatry       History of Present Illness   France Persaud is a 74y.o.-year-old male who is here for f/u to chronic medical conditions  Diabetes well controlled  Swelling in legs not getting any better  Has wraps for his legs  Declines wearing compression stockings    Second surgery for hydrocele finally worked per patient    Saw dr Ane Felty gi, needs to take fiber  Trying to swim more, use to swim a lot, now swimming less    Feeling well overall      Review of Systems   Review of Systems   Constitutional: Negative for fatigue and fever. HENT: Negative for congestion, postnasal drip and rhinorrhea. Eyes: Negative for photophobia and visual disturbance. Respiratory: Negative for cough, shortness of breath and wheezing. Cardiovascular: Negative for chest pain and palpitations. Gastrointestinal: Negative for constipation, diarrhea, nausea and vomiting.    Genitourinary: Negative for dysuria and frequency. Musculoskeletal: Negative for arthralgias and myalgias. Skin: Negative for rash. Neurological: Negative for dizziness, light-headedness and headaches. Hematological: Negative for adenopathy. Psychiatric/Behavioral: Negative for dysphoric mood and sleep disturbance. The patient is not nervous/anxious.         Active Problem List     Patient Active Problem List   Diagnosis   • Aortic stenosis   • Coronary artery disease of native artery of native heart with stable angina pectoris ([address])   • DM (diabetes mellitus), type 2 ([address])   • Essential hypertension   • Hypertriglyceridemia   • S/P CABG (coronary artery bypass graft)   • S/P AVR   • PVC (premature ventricular contraction)   • Bilateral carotid artery stenosis   • Mixed hyperlipidemia   • Postoperative atrial fibrillation (HCC)   • Benign prostatic hyperplasia with urinary frequency   • Stenosis of right subclavian artery (HCC)   • IBS (irritable bowel syndrome)   • H/O diabetic foot ulcer         Past Medical History:  Past Medical History:   Diagnosis Date   • Acute systolic heart failure ([address]) 02/23/2021   • Athlete's foot    • Bilateral impacted cerumen 03/09/2021   • Diabetes mellitus ([address])    • Other hydrocele 06/11/2020   • TMJ (dislocation of temporomandibular joint)    • Yeast infection        Past Surgical History:  Past Surgical History:   Procedure Laterality Date   • CARDIAC SURGERY     • CARDIAC VALVE REPLACEMENT     • CATARACT EXTRACTION     • COLONOSCOPY     • CORONARY ARTERY BYPASS GRAFT     • EYE SURGERY  03/2020    Cataract right eye   • EYE SURGERY Left 12/03/2020    left eye cataract    • HYDROCELE EXCISION / REPAIR     • PA EXCISION HYDROCELE UNILATERAL Right 04/08/2022    Procedure: HYDROCELECTOMY;  Surgeon: Yony Ravi MD;  Location: [facility] MAIN OR;  Service: Urology   • PA EXCISION HYDROCELE UNILATERAL Right 11/16/2022    Procedure: HYDROCELECTOMY;  Surgeon: Yony Ravi MD;  Location: [facility] MAIN OR;  Service: Urology   • TONSILLECTOMY         Family History:  Family History   Problem Relation Age of Onset   • Heart disease Mother    • Cancer Mother    • Heart disease Father    • Hypertension Father    • Mitral valve prolapse Sister    • Diabetes type II Maternal Grandmother    • Colon cancer Neg Hx        Social History:  Social History     Socioeconomic History   • Marital status: /Civil Union     Spouse name: Not on file   • Number of children: Not on file   • Years of education: Not on file   • Highest education level: Not on file   Occupational History   • Not on file   Tobacco Use   • Smoking status: Never   • Smokeless tobacco: Never   Vaping Use   • Vaping Use: Never used   Substance and Sexual Activity   • Alcohol use: Yes     Alcohol/week: 4.0 standard drinks of alcohol     Types: 2 Glasses of wine, 1 Cans of beer, 1 Shots of liquor per week     Comment: 2-3 glass of wine or  beer a week   • Drug use: Never   • Sexual activity: Not Currently     Partners: Female     Birth control/protection: None   Other Topics Concern   • Not on file   Social History Narrative   • Not on file     Social Determinants of Health     Financial Resource Strain: Low Risk  (3/9/2023)    Overall Financial Resource Strain (CARDIA)    • Difficulty of Paying Living Expenses: Not very hard   Food Insecurity: Not on file   Transportation Needs: No Transportation Needs (3/9/2023)    PRAPARE - Transportation    • Lack of Transportation (Medical): No    • Lack of Transportation (Non-Medical):  No   Physical Activity: Not on file   Stress: Not on file   Social Connections: Not on file   Intimate Partner Violence: Not on file   Housing Stability: Not on file       Objective     Vitals:    07/11/23 0857   BP: 102/60   Pulse: 55   Resp: 16   Temp: (!) 96.8 °F (36 °C)   SpO2: 95%     Wt Readings from Last 3 Encounters:   07/11/23 95.6 kg (210 lb 12.8 oz)   05/25/23 96.6 kg (213 lb)   05/23/23 96.4 kg (212 lb 9.6 oz) Physical Exam  Vitals and nursing note reviewed. Constitutional:       Appearance: Normal appearance. HENT:      Head: Normocephalic and atraumatic. Right Ear: Tympanic membrane, ear canal and external ear normal.      Left Ear: Tympanic membrane, ear canal and external ear normal.      Nose: Nose normal.      Mouth/Throat:      Mouth: Mucous membranes are moist.   Eyes:      Conjunctiva/sclera: Conjunctivae normal.   Cardiovascular:      Rate and Rhythm: Normal rate and regular rhythm. Heart sounds: Normal heart sounds. Pulmonary:      Effort: Pulmonary effort is normal.      Breath sounds: Normal breath sounds. Abdominal:      General: Bowel sounds are normal.      Palpations: Abdomen is soft. Musculoskeletal:         General: Normal range of motion. Cervical back: Normal range of motion and neck supple. Skin:     General: Skin is warm. Capillary Refill: Capillary refill takes less than 2 seconds. Neurological:      General: No focal deficit present. Mental Status: He is alert and oriented to person, place, and time. Psychiatric:         Mood and Affect: Mood normal.         Behavior: Behavior normal.         Thought Content:  Thought content normal.         Judgment: Judgment normal.         Pertinent Laboratory/Diagnostic Studies:  Lab Results   Component Value Date    BUN 23 05/22/2023    CREATININE 1.04 05/22/2023    CALCIUM 9.3 05/22/2023    K 4.0 05/22/2023    CO2 25 05/22/2023     (H) 05/22/2023     Lab Results   Component Value Date    ALT 26 03/22/2021    AST 14 03/22/2021    ALKPHOS 62 03/22/2021       Lab Results   Component Value Date    WBC 8.24 03/22/2021    HGB 14.5 03/22/2021    HCT 42.1 03/22/2021    MCV 89 03/22/2021     03/22/2021       No results found for: "TSH"    No results found for: "CHOL"  Lab Results   Component Value Date    TRIG 135 01/16/2023     Lab Results   Component Value Date    HDL 46 01/16/2023     Lab Results Component Value Date    LDLCALC 45 01/16/2023     Lab Results   Component Value Date    HGBA1C 7.0 (H) 05/22/2023       Results for orders placed or performed in visit on 05/22/23   Hemoglobin A1C   Result Value Ref Range    Hemoglobin A1C 7.0 (H) Normal 3.8-5.6%; PreDiabetic 5.7-6.4%;  Diabetic >=6.5%; Glycemic control for adults with diabetes <7.0% %     mg/dl   Basic metabolic panel   Result Value Ref Range    Sodium 137 135 - 147 mmol/L    Potassium 4.0 3.5 - 5.3 mmol/L    Chloride 111 (H) 96 - 108 mmol/L    CO2 25 21 - 32 mmol/L    ANION GAP 1 (L) 4 - 13 mmol/L    BUN 23 5 - 25 mg/dL    Creatinine 1.04 0.60 - 1.30 mg/dL    Glucose, Fasting 178 (H) 65 - 99 mg/dL    Calcium 9.3 8.3 - 10.1 mg/dL    eGFR 69 ml/min/1.73sq m       Orders Placed This Encounter   Procedures   • Ambulatory Referral to Podiatry       ALLERGIES:  No Known Allergies    Current Medications     Current Outpatient Medications   Medication Sig Dispense Refill   • acetaminophen (TYLENOL) 500 mg tablet Take 500 mg by mouth daily at bedtime     • ascorbic acid (VITAMIN C) 250 mg tablet Take 500 mg by mouth 2 (two) times a day     • finasteride (PROSCAR) 5 mg tablet Take 1 tablet (5 mg total) by mouth daily 30 tablet 11   • furosemide (LASIX) 20 mg tablet TAKE ONE TABLET BY MOUTH EVERY DAY AS NEEDED FOR EDEMA 30 tablet 5   • Ginger, Zingiber officinalis, (Ginger Root) 500 MG CAPS      • Jardiance 25 MG TABS Take 1 tablet (25 mg total) by mouth daily 90 tablet 1   • metFORMIN (GLUCOPHAGE) 500 mg tablet TAKE TWO TABLETS BY MOUTH TWICE A  tablet 1   • metoprolol succinate (TOPROL-XL) 50 mg 24 hr tablet Take 0.5 tablets (25 mg total) by mouth daily 90 tablet 3   • niacin 500 mg tablet Take 500 mg by mouth 4 (four) times a day     • NON FORMULARY 25 mcg Vitamin D     • Omega-3 Fatty Acids (FISH OIL BURP-LESS PO) Take by mouth     • PROBIOTIC PRODUCT PO Take 111 mg by mouth daily     • repaglinide (PRANDIN) 0.5 mg tablet TAKE ONE TABLET BY MOUTH EVERY DAY BEFORE DINNER 90 tablet 1   • rosuvastatin (CRESTOR) 10 MG tablet Take 1 tablet (10 mg total) by mouth daily 90 tablet 3   • sitaGLIPtin (JANUVIA) 100 mg tablet Take 1 tablet (100 mg total) by mouth daily 90 tablet 3   • tamsulosin (FLOMAX) 0.4 mg TAKE ONE CAPSULE BY MOUTH EVERY DAY WITH DINNER 30 capsule 6   • Tart Cherry (Tart Cherry Ultra) 1200 MG CAPS      • Turmeric 500 MG CAPS      • Wheat Dextrin (BENEFIBER DRINK MIX PO) Take by mouth       No current facility-administered medications for this visit.          Health Maintenance     Health Maintenance   Topic Date Due   • Pneumococcal Vaccine: 65+ Years (2 - PCV) 10/27/2022   • Influenza Vaccine (1) 09/01/2023   • COVID-19 Vaccine (5 - Pfizer series) 10/11/2023 (Originally 2/28/2023)   • Hepatitis C Screening  08/15/2024 (Originally 1950)   • HEMOGLOBIN A1C  11/22/2023   • Kidney Health Evaluation: Albumin/Creatinine Ratio  01/16/2024   • Fall Risk  03/16/2024   • Depression Screening  03/16/2024   • Medicare Annual Wellness Visit (AWV)  03/16/2024   • BMI: Followup Plan  03/17/2024   • Diabetic Foot Exam  04/27/2024   • Kidney Health Evaluation: GFR  05/22/2024   • BMI: Adult  07/11/2024   • DM Eye Exam  04/05/2025   • Colorectal Cancer Screening  03/02/2027   • HIB Vaccine  Aged Out   • IPV Vaccine  Aged Out   • Hepatitis A Vaccine  Aged Out   • Meningococcal ACWY Vaccine  Aged Out   • HPV Vaccine  Aged Out     Immunization History   Administered Date(s) Administered   • COVID-19 PFIZER VACCINE 0.3 ML IM 03/07/2021, 03/26/2021, 10/07/2021   • COVID-19 Pfizer Vac BIVALENT Rubio-sucrose 12 Yr+ IM (BOOSTER ONLY) 10/31/2022   • INFLUENZA 10/31/2022   • Influenza, high dose seasonal 0.7 mL 10/27/2021   • Pneumococcal Polysaccharide PPV23 10/27/2021          HESHAM Corrigan

## 2023-07-11 NOTE — ASSESSMENT & PLAN NOTE
Stable  Cont meds  Cont f/u with endo    Lab Results   Component Value Date    HGBA1C 7.0 (H) 05/22/2023

## 2023-07-18 DIAGNOSIS — E11.65 TYPE 2 DIABETES MELLITUS WITH HYPERGLYCEMIA, WITHOUT LONG-TERM CURRENT USE OF INSULIN (HCC): ICD-10-CM

## 2023-08-01 ENCOUNTER — TELEPHONE (OUTPATIENT)
Dept: UROLOGY | Facility: CLINIC | Age: 73
End: 2023-08-01

## 2023-08-01 NOTE — TELEPHONE ENCOUNTER
Spoke with pt, 6 refills already sent to [facility] for pt from Dr. Tiara [place]. No further action required.

## 2023-08-11 ENCOUNTER — TELEPHONE (OUTPATIENT)
Dept: CARDIOLOGY CLINIC | Facility: CLINIC | Age: 73
End: 2023-08-11

## 2023-08-11 ENCOUNTER — APPOINTMENT (EMERGENCY)
Dept: RADIOLOGY | Facility: HOSPITAL | Age: 73
End: 2023-08-11
Payer: MEDICARE

## 2023-08-11 ENCOUNTER — HOSPITAL ENCOUNTER (EMERGENCY)
Facility: HOSPITAL | Age: 73
Discharge: HOME | End: 2023-08-11
Attending: EMERGENCY MEDICINE | Admitting: EMERGENCY MEDICINE
Payer: MEDICARE

## 2023-08-11 VITALS
HEART RATE: 61 BPM | RESPIRATION RATE: 18 BRPM | OXYGEN SATURATION: 97 % | DIASTOLIC BLOOD PRESSURE: 70 MMHG | TEMPERATURE: 97.2 F | SYSTOLIC BLOOD PRESSURE: 139 MMHG

## 2023-08-11 DIAGNOSIS — I10 ESSENTIAL HYPERTENSION: ICD-10-CM

## 2023-08-11 DIAGNOSIS — R42 DIZZINESS: Primary | ICD-10-CM

## 2023-08-11 DIAGNOSIS — I48.91 POSTOPERATIVE ATRIAL FIBRILLATION (HCC): ICD-10-CM

## 2023-08-11 DIAGNOSIS — R51.9 NONINTRACTABLE HEADACHE, UNSPECIFIED CHRONICITY PATTERN, UNSPECIFIED HEADACHE TYPE: Primary | ICD-10-CM

## 2023-08-11 DIAGNOSIS — R51.9 HEADACHE: ICD-10-CM

## 2023-08-11 DIAGNOSIS — I10 HYPERTENSION: ICD-10-CM

## 2023-08-11 DIAGNOSIS — I97.89 POSTOPERATIVE ATRIAL FIBRILLATION (HCC): ICD-10-CM

## 2023-08-11 LAB
2HR DELTA HS TROPONIN: 0 NG/L
ALBUMIN SERPL BCP-MCNC: 4.7 G/DL (ref 3.5–5)
ALP SERPL-CCNC: 68 U/L (ref 34–104)
ALT SERPL W P-5'-P-CCNC: 14 U/L (ref 7–52)
ANION GAP SERPL CALCULATED.3IONS-SCNC: 8 MMOL/L
AST SERPL W P-5'-P-CCNC: 15 U/L (ref 13–39)
BASOPHILS # BLD AUTO: 0.07 THOUSANDS/ÂΜL (ref 0–0.1)
BASOPHILS NFR BLD AUTO: 1 % (ref 0–1)
BILIRUB SERPL-MCNC: 0.38 MG/DL (ref 0.2–1)
BUN SERPL-MCNC: 29 MG/DL (ref 5–25)
CALCIUM SERPL-MCNC: 10.1 MG/DL (ref 8.4–10.2)
CARDIAC TROPONIN I PNL SERPL HS: 8 NG/L
CARDIAC TROPONIN I PNL SERPL HS: 8 NG/L
CHLORIDE SERPL-SCNC: 103 MMOL/L (ref 96–108)
CO2 SERPL-SCNC: 27 MMOL/L (ref 21–32)
CREAT SERPL-MCNC: 1.08 MG/DL (ref 0.6–1.3)
EOSINOPHIL # BLD AUTO: 0.17 THOUSAND/ÂΜL (ref 0–0.61)
EOSINOPHIL NFR BLD AUTO: 2 % (ref 0–6)
ERYTHROCYTE [DISTWIDTH] IN BLOOD BY AUTOMATED COUNT: 11.8 % (ref 11.6–15.1)
GFR SERPL CREATININE-BSD FRML MDRD: 66 ML/MIN/1.73SQ M
GLUCOSE SERPL-MCNC: 149 MG/DL (ref 65–140)
HCT VFR BLD AUTO: 44.6 % (ref 36.5–49.3)
HGB BLD-MCNC: 15 G/DL (ref 12–17)
IMM GRANULOCYTES # BLD AUTO: 0.02 THOUSAND/UL (ref 0–0.2)
IMM GRANULOCYTES NFR BLD AUTO: 0 % (ref 0–2)
LYMPHOCYTES # BLD AUTO: 2.3 THOUSANDS/ÂΜL (ref 0.6–4.47)
LYMPHOCYTES NFR BLD AUTO: 30 % (ref 14–44)
MCH RBC QN AUTO: 30.5 PG (ref 26.8–34.3)
MCHC RBC AUTO-ENTMCNC: 33.6 G/DL (ref 31.4–37.4)
MCV RBC AUTO: 91 FL (ref 82–98)
MONOCYTES # BLD AUTO: 0.86 THOUSAND/ÂΜL (ref 0.17–1.22)
MONOCYTES NFR BLD AUTO: 11 % (ref 4–12)
NEUTROPHILS # BLD AUTO: 4.35 THOUSANDS/ÂΜL (ref 1.85–7.62)
NEUTS SEG NFR BLD AUTO: 56 % (ref 43–75)
NRBC BLD AUTO-RTO: 0 /100 WBCS
PLATELET # BLD AUTO: 175 THOUSANDS/UL (ref 149–390)
PMV BLD AUTO: 11.1 FL (ref 8.9–12.7)
POTASSIUM SERPL-SCNC: 4.7 MMOL/L (ref 3.5–5.3)
PROT SERPL-MCNC: 7.9 G/DL (ref 6.4–8.4)
RBC # BLD AUTO: 4.91 MILLION/UL (ref 3.88–5.62)
SODIUM SERPL-SCNC: 138 MMOL/L (ref 135–147)
WBC # BLD AUTO: 7.77 THOUSAND/UL (ref 4.31–10.16)

## 2023-08-11 PROCEDURE — 84484 ASSAY OF TROPONIN QUANT: CPT | Performed by: EMERGENCY MEDICINE

## 2023-08-11 PROCEDURE — 93005 ELECTROCARDIOGRAM TRACING: CPT

## 2023-08-11 PROCEDURE — 71045 X-RAY EXAM CHEST 1 VIEW: CPT

## 2023-08-11 PROCEDURE — 96375 TX/PRO/DX INJ NEW DRUG ADDON: CPT

## 2023-08-11 PROCEDURE — 99284 EMERGENCY DEPT VISIT MOD MDM: CPT

## 2023-08-11 PROCEDURE — 99285 EMERGENCY DEPT VISIT HI MDM: CPT | Performed by: EMERGENCY MEDICINE

## 2023-08-11 PROCEDURE — 96374 THER/PROPH/DIAG INJ IV PUSH: CPT

## 2023-08-11 PROCEDURE — 85025 COMPLETE CBC W/AUTO DIFF WBC: CPT | Performed by: EMERGENCY MEDICINE

## 2023-08-11 PROCEDURE — 36415 COLL VENOUS BLD VENIPUNCTURE: CPT

## 2023-08-11 PROCEDURE — 80053 COMPREHEN METABOLIC PANEL: CPT | Performed by: EMERGENCY MEDICINE

## 2023-08-11 RX ORDER — METOCLOPRAMIDE HYDROCHLORIDE 5 MG/ML
10 INJECTION INTRAMUSCULAR; INTRAVENOUS ONCE
Status: COMPLETED | OUTPATIENT
Start: 2023-08-11 | End: 2023-08-11

## 2023-08-11 RX ORDER — HYDRALAZINE HYDROCHLORIDE 20 MG/ML
10 INJECTION INTRAMUSCULAR; INTRAVENOUS ONCE
Status: COMPLETED | OUTPATIENT
Start: 2023-08-11 | End: 2023-08-11

## 2023-08-11 RX ORDER — KETOROLAC TROMETHAMINE 30 MG/ML
15 INJECTION, SOLUTION INTRAMUSCULAR; INTRAVENOUS ONCE
Status: COMPLETED | OUTPATIENT
Start: 2023-08-11 | End: 2023-08-11

## 2023-08-11 RX ORDER — DIPHENHYDRAMINE HYDROCHLORIDE 50 MG/ML
25 INJECTION INTRAMUSCULAR; INTRAVENOUS ONCE
Status: COMPLETED | OUTPATIENT
Start: 2023-08-11 | End: 2023-08-11

## 2023-08-11 RX ADMIN — METOCLOPRAMIDE 10 MG: 5 INJECTION, SOLUTION INTRAMUSCULAR; INTRAVENOUS at 20:09

## 2023-08-11 RX ADMIN — HYDRALAZINE HYDROCHLORIDE 10 MG: 20 INJECTION, SOLUTION INTRAMUSCULAR; INTRAVENOUS at 21:47

## 2023-08-11 RX ADMIN — DIPHENHYDRAMINE HYDROCHLORIDE 25 MG: 50 INJECTION, SOLUTION INTRAMUSCULAR; INTRAVENOUS at 20:04

## 2023-08-11 RX ADMIN — KETOROLAC TROMETHAMINE 15 MG: 30 INJECTION, SOLUTION INTRAMUSCULAR; INTRAVENOUS at 20:05

## 2023-08-11 NOTE — TELEPHONE ENCOUNTER
Called the patient , he stated Periodic episodes of lightheadedness/ headache and feels off balance as well. Patient stated this has been going on since 11:15 am today. Farzana Romero Has been drinking water and it has not been helping as it usually does . Took tylenol about 20 min ago and has helped the headache slightly, He stated he does not drink as much water, last BP was 155/97 Pulse was running in the 50s.  Wants to know what he should do , please advise

## 2023-08-11 NOTE — TELEPHONE ENCOUNTER
Tried to call patient. No response. Called wife's number. Left message. It appears that he is being referred to the ER already by his PCP but I left a message with the wife indicating that I recommend he do so if not already.

## 2023-08-11 NOTE — TELEPHONE ENCOUNTER
Patient left a message,  He has been lightheaded for approximately 2 hours and feels off balance as well as has a headache. Usually when he drinks water his symptoms resolve, which is not happening at this time. His BP immediately following the even was 137/80, it now went up to 154/94.   Please advise

## 2023-08-11 NOTE — ED PROVIDER NOTES
History  Chief Complaint   Patient presents with   • Dizziness     Reports dizziness/headache that started around 11am. Hx cardiac surgery. Denies cp/sob     Lam Mora is a 74 y.o. male who presents for evaluation with two complaints. The first is of dizziness/lightheadedness that occurred this morning. He reports he was sitting doing some reading when he began to feel lightheaded/dizzy. This has happened to him off and on since he had cabg and aortic valve surgery 4 years ago. Usually he is able to drink some water and he feels better. He did this today and again felt better but he then developed an unusual headache that has been persistent. He reports he had some mild improvement after taking some acetaminophen. However he took his blood pressure several times after the incident and his blood pressure was elevated with systolics in the 440F to 239X which is unusual for him (he is on metoprolol and furosemide chronically - but not for hypertension). History provided by:  Patient and medical records   used: No        Prior to Admission Medications   Prescriptions Last Dose Informant Patient Reported? Taking?    Norma, Zingiber officinalis, (Norma Root) 500 MG CAPS Not Taking Self Yes No   Patient not taking: Reported on 2023   Jardiance 25 MG TABS 2023 at 0800 Self No Yes   Sig: Take 1 tablet (25 mg total) by mouth daily   NON FORMULARY 2023 at 1200 Self Yes Yes   Si mcg Vitamin D   Omega-3 Fatty Acids (FISH OIL BURP-LESS PO) 2023 at 1200 Self Yes Yes   Sig: Take by mouth   PROBIOTIC PRODUCT PO 2023 at 0800 Self Yes Yes   Sig: Take 111 mg by mouth daily   Tart Cherry (Tart Cherry Ultra) 1200 MG CAPS 2023 at 1200 Self Yes Yes   Turmeric 500 MG CAPS 2023 at 1200 Self Yes Yes   Wheat Dextrin (BENEFIBER DRINK MIX PO) Past Week Self Yes Yes   Sig: Take by mouth   acetaminophen (TYLENOL) 500 mg tablet 2023 Self Yes Yes   Sig: Take 500 mg by mouth daily at bedtime   ascorbic acid (VITAMIN C) 250 mg tablet 8/11/2023 at 1300 Self Yes Yes   Sig: Take 500 mg by mouth 2 (two) times a day   finasteride (PROSCAR) 5 mg tablet 8/10/2023 at 2300 Self No Yes   Sig: Take 1 tablet (5 mg total) by mouth daily   furosemide (LASIX) 20 mg tablet 8/11/2023 at 1300 Self No Yes   Sig: TAKE ONE TABLET BY MOUTH EVERY DAY AS NEEDED FOR EDEMA   metFORMIN (GLUCOPHAGE) 500 mg tablet 8/11/2023 at 0800  No Yes   Sig: Take 2 tablets (1,000 mg total) by mouth 2 (two) times a day   metoprolol succinate (TOPROL-XL) 50 mg 24 hr tablet 8/10/2023 at 2300 Self No Yes   Sig: Take 0.5 tablets (25 mg total) by mouth daily   niacin 500 mg tablet 8/11/2023 at 1200 Self Yes Yes   Sig: Take 500 mg by mouth 4 (four) times a day   repaglinide (PRANDIN) 0.5 mg tablet 8/10/2023 Self No Yes   Sig: TAKE ONE TABLET BY MOUTH EVERY DAY BEFORE DINNER   rosuvastatin (CRESTOR) 10 MG tablet 8/10/2023 at 1800 Self No Yes   Sig: Take 1 tablet (10 mg total) by mouth daily   sitaGLIPtin (JANUVIA) 100 mg tablet 8/11/2023 at 0800  No Yes   Sig: Take 1 tablet (100 mg total) by mouth daily   tamsulosin (FLOMAX) 0.4 mg 8/10/2023 at 1800  No Yes   Sig: TAKE ONE CAPSULE BY MOUTH EVERY DAY WITH DINNER      Facility-Administered Medications: None       Past Medical History:   Diagnosis Date   • Acute systolic heart failure ([address]) 02/23/2021   • Athlete's foot    • Bilateral impacted cerumen 03/09/2021   • Diabetes mellitus ([address])    • Other hydrocele 06/11/2020   • TMJ (dislocation of temporomandibular joint)    • Yeast infection        Past Surgical History:   Procedure Laterality Date   • CARDIAC SURGERY     • CARDIAC VALVE REPLACEMENT     • CATARACT EXTRACTION     • COLONOSCOPY     • CORONARY ARTERY BYPASS GRAFT     • EYE SURGERY  03/2020    Cataract right eye   • EYE SURGERY Left 12/03/2020    left eye cataract    • HYDROCELE EXCISION / REPAIR     • MO EXCISION HYDROCELE UNILATERAL Right 04/08/2022    Procedure: HYDROCELECTOMY;  Surgeon: Willy Lizarraga MD;  Location: [facility];  Service: Urology   • [place] EXCISION HYDROCELE UNILATERAL Right 11/16/2022    Procedure: HYDROCELECTOMY;  Surgeon: Willy Lizarraga MD;  Location: [facility];  Service: Urology   • TONSILLECTOMY         Family History   Problem Relation Age of Onset   • Heart disease Mother    • Cancer Mother    • Heart disease Father    • Hypertension Father    • Mitral valve prolapse Sister    • Diabetes type II Maternal Grandmother    • Colon cancer Neg Hx      I have reviewed and agree with the history as documented. E-Cigarette/Vaping   • E-Cigarette Use Never User      E-Cigarette/Vaping Substances   • Nicotine No    • THC No    • CBD No    • Flavoring No    • Other No    • Unknown No      Social History     Tobacco Use   • Smoking status: Never   • Smokeless tobacco: Never   Vaping Use   • Vaping Use: Never used   Substance Use Topics   • Alcohol use: Yes     Alcohol/week: 4.0 standard drinks of alcohol     Types: 2 Glasses of wine, 1 Cans of beer, 1 Shots of liquor per week     Comment: 2-3 glass of wine or  beer a week   • Drug use: Never       Review of Systems   Constitutional: Negative for chills, diaphoresis and fever. Respiratory: Negative for shortness of breath. Cardiovascular: Negative for chest pain and palpitations. Gastrointestinal: Negative for diarrhea, nausea and vomiting. Genitourinary: Negative for dysuria and frequency. Skin: Negative for rash. Neurological: Positive for dizziness, light-headedness and headaches. All other systems reviewed and are negative. Physical Exam  Physical Exam  Vitals and nursing note reviewed. Constitutional:       General: He is in acute distress (mild). Appearance: He is well-developed. HENT:      Head: Normocephalic and atraumatic. Eyes:      Extraocular Movements: Extraocular movements intact. Pupils: Pupils are equal, round, and reactive to light.    Neck: Vascular: No JVD. Cardiovascular:      Rate and Rhythm: Normal rate and regular rhythm. Heart sounds: Murmur heard. No friction rub. No gallop. Pulmonary:      Effort: Pulmonary effort is normal. No respiratory distress. Breath sounds: Normal breath sounds. No wheezing or rales. Chest:      Chest wall: No tenderness. Musculoskeletal:         General: No tenderness. Normal range of motion. Cervical back: Normal range of motion. Right lower leg: Edema present. Left lower leg: Edema present. Skin:     General: Skin is warm and dry. Neurological:      General: No focal deficit present. Mental Status: He is alert and oriented to person, place, and time. GCS: GCS eye subscore is 4. GCS verbal subscore is 5. GCS motor subscore is 6. Cranial Nerves: Cranial nerves 2-12 are intact. No cranial nerve deficit. Motor: No weakness or tremor. Coordination: Coordination normal.      Gait: Gait is intact. Psychiatric:         Behavior: Behavior normal.         Thought Content:  Thought content normal.         Judgment: Judgment normal.         Vital Signs  ED Triage Vitals   Temperature Pulse Respirations Blood Pressure SpO2   08/11/23 1730 08/11/23 1727 08/11/23 1727 08/11/23 1727 08/11/23 1727   (!) 97.2 °F (36.2 °C) 60 16 154/89 98 %      Temp Source Heart Rate Source Patient Position - Orthostatic VS BP Location FiO2 (%)   08/11/23 1730 08/11/23 1727 08/11/23 1727 08/11/23 1727 --   Oral Monitor Sitting Right arm       Pain Score       08/11/23 2005       5           Vitals:    08/11/23 2000 08/11/23 2030 08/11/23 2100 08/11/23 2147   BP: (!) 181/78 167/77 (!) 179/81 168/82   Pulse: 60 60 61    Patient Position - Orthostatic VS: Lying Lying Lying          Visual Acuity      ED Medications  Medications   ketorolac (TORADOL) injection 15 mg (15 mg Intravenous Given 8/11/23 2005)   diphenhydrAMINE (BENADRYL) injection 25 mg (25 mg Intravenous Given 8/11/23 2004)   metoclopramide (REGLAN) injection 10 mg (10 mg Intravenous Given 8/11/23 2009)   hydrALAZINE (APRESOLINE) injection 10 mg (10 mg Intravenous Given 8/11/23 2147)       Diagnostic Studies  Results Reviewed     Procedure Component Value Units Date/Time    HS Troponin I 2hr [[id number]]  (Normal) Collected: 08/11/23 2010    Lab Status: Final result Specimen: Blood from Arm, Right Updated: 08/11/23 2045     hs TnI 2hr 8 ng/L      Delta 2hr hsTnI 0 ng/L     HS Troponin I 4hr [[id number]]     Lab Status: No result Specimen: Blood     HS Troponin 0hr (reflex protocol) [[id number]]  (Normal) Collected: 08/11/23 1800    Lab Status: Final result Specimen: Blood from Arm, Right Updated: 08/11/23 1854     hs TnI 0hr 8 ng/L     Comprehensive metabolic panel [[id number]]  (Abnormal) Collected: 08/11/23 1800    Lab Status: Final result Specimen: Blood from Arm, Right Updated: 08/11/23 1848     Sodium 138 mmol/L      Potassium 4.7 mmol/L      Chloride 103 mmol/L      CO2 27 mmol/L      ANION GAP 8 mmol/L      BUN 29 mg/dL      Creatinine 1.08 mg/dL      Glucose 149 mg/dL      Calcium 10.1 mg/dL      AST 15 U/L      ALT 14 U/L      Alkaline Phosphatase 68 U/L      Total Protein 7.9 g/dL      Albumin 4.7 g/dL      Total Bilirubin 0.38 mg/dL      eGFR 66 ml/min/1.73sq m     Narrative:      [facility] Disease (CKD):   •  Stage 1 with normal or high GFR (GFR > 90 mL/min/1.73 square meters)  •  Stage 2 Mild CKD (GFR = 60-89 mL/min/1.73 square meters)  •  Stage 3A Moderate CKD (GFR = 45-59 mL/min/1.73 square meters)  •  Stage 3B Moderate CKD (GFR = 30-44 mL/min/1.73 square meters)  •  Stage 4 Severe CKD (GFR = 15-29 mL/min/1.73 square meters)  •  Stage 5 End Stage CKD (GFR <15 mL/min/1.73 square meters)  Note: GFR calculation is accurate only with a steady state creatinine    CBC and differential [[id number]] Collected: 08/11/23 1800    Lab Status: Final result Specimen: Blood from Arm, Right Updated: 08/11/23 1825     WBC 7.77 Thousand/uL      RBC 4.91 Million/uL      Hemoglobin 15.0 g/dL      Hematocrit 44.6 %      MCV 91 fL      MCH 30.5 pg      MCHC 33.6 g/dL      RDW 11.8 %      MPV 11.1 fL      Platelets 686 Thousands/uL      nRBC 0 /100 WBCs      Neutrophils Relative 56 %      Immat GRANS % 0 %      Lymphocytes Relative 30 %      Monocytes Relative 11 %      Eosinophils Relative 2 %      Basophils Relative 1 %      Neutrophils Absolute 4.35 Thousands/µL      Immature Grans Absolute 0.02 Thousand/uL      Lymphocytes Absolute 2.30 Thousands/µL      Monocytes Absolute 0.86 Thousand/µL      Eosinophils Absolute 0.17 Thousand/µL      Basophils Absolute 0.07 Thousands/µL                  XR chest 1 view portable    (Results Pending)              Procedures  ECG 12 Lead Documentation Only    Date/Time: 8/11/2023 7:22 PM    Performed by: Eladio Pierce MD  Authorized by: Eladio Pierce MD    Indications / Diagnosis:  Dizziness  ECG reviewed by me, the ED Provider: yes    Patient location:  ED  Previous ECG:     Previous ECG:  Compared to current    Comparison ECG info:  03/09/2022    Similarity:  No change  Interpretation:     Interpretation: abnormal    Rate:     ECG rate:  64    ECG rate assessment: normal    Rhythm:     Rhythm: sinus rhythm and A-V block    QRS:     QRS axis:  Left    QRS intervals:  Normal  Conduction:     Conduction: abnormal      Abnormal conduction: bifascicular block    ST segments:     ST segments:  Normal  T waves:     T waves: normal               ED Course                                             Medical Decision Making  Background: 74 y.o. male presents with lightheadedness - resolved, headache, hypertension    Differential DX includes but is not limited to: orthostatic hypotension, anemia, arrhythmia, doubt ACS; chronic hypertension    Plan: cbc, cmp, ekg, troponin, migraine cocktail, hydralazine if needed       Amount and/or Complexity of Data Reviewed  Labs: ordered. Radiology: ordered. Risk  Prescription drug management. Disposition  Final diagnoses:   Dizziness   Hypertension   Headache     Time reflects when diagnosis was documented in both MDM as applicable and the Disposition within this note     Time User Action Codes Description Comment    8/11/2023  9:56 PM [place] Papa Add [R42] Dizziness     8/11/2023  9:56 PM [place] Papa Add [I10] Hypertension     8/11/2023  9:56 PM [place] Papa Add [R51.9] Headache       ED Disposition     ED Disposition   Discharge    Condition   Stable    Date/Time   Fri Aug 11, 2023  9:56 PM    Comment   Cecilia Monteiro discharge to home/self care. Follow-up Information     Follow up With Specialties Details Why Contact Info    Yehuda Salmeron, [address] [facility], Internal Medicine, Nurse Practitioner Schedule an appointment as soon as possible for a visit in 1 week  [address]  [phone]            Patient's Medications   Discharge Prescriptions    No medications on file       No discharge procedures on file.     PDMP Review     None          ED Provider  Electronically Signed by           Joseph Becker MD  08/11/23 0814

## 2023-08-13 LAB
ATRIAL RATE: 64 BPM
P AXIS: 87 DEGREES
PR INTERVAL: 216 MS
QRS AXIS: -67 DEGREES
QRSD INTERVAL: 134 MS
QT INTERVAL: 480 MS
QTC INTERVAL: 495 MS
T WAVE AXIS: 101 DEGREES
VENTRICULAR RATE: 64 BPM

## 2023-08-13 PROCEDURE — 93010 ELECTROCARDIOGRAM REPORT: CPT | Performed by: INTERNAL MEDICINE

## 2023-08-14 ENCOUNTER — OFFICE VISIT (OUTPATIENT)
Dept: FAMILY MEDICINE CLINIC | Facility: CLINIC | Age: 73
End: 2023-08-14
Payer: MEDICARE

## 2023-08-14 VITALS
RESPIRATION RATE: 16 BRPM | DIASTOLIC BLOOD PRESSURE: 78 MMHG | BODY MASS INDEX: 27.07 KG/M2 | HEART RATE: 58 BPM | WEIGHT: 210.8 LBS | TEMPERATURE: 94.5 F | OXYGEN SATURATION: 98 % | SYSTOLIC BLOOD PRESSURE: 130 MMHG

## 2023-08-14 DIAGNOSIS — I10 ESSENTIAL HYPERTENSION: ICD-10-CM

## 2023-08-14 DIAGNOSIS — R42 DIZZINESS: ICD-10-CM

## 2023-08-14 DIAGNOSIS — R51.9 NONINTRACTABLE HEADACHE, UNSPECIFIED CHRONICITY PATTERN, UNSPECIFIED HEADACHE TYPE: Primary | ICD-10-CM

## 2023-08-14 PROBLEM — E11.42 DIABETIC PERIPHERAL NEUROPATHY ASSOCIATED WITH TYPE 2 DIABETES MELLITUS (HCC): Status: ACTIVE | Noted: 2023-07-18

## 2023-08-14 PROCEDURE — 99214 OFFICE O/P EST MOD 30 MIN: CPT | Performed by: NURSE PRACTITIONER

## 2023-08-14 RX ORDER — NIACIN 500 MG
TABLET ORAL
COMMUNITY

## 2023-08-14 NOTE — PROGRESS NOTES
FAMILY PRACTICE OFFICE VISIT       NAME: Jus Chavez  AGE: 74 y.o. SEX: male       : 1950        MRN: [id number]    DATE: 2023  TIME: 2:11 PM    Assessment and Plan   1. Nonintractable headache, unspecified chronicity pattern, unspecified headache type  Assessment & Plan:  Drink 64 oz of water daily      Orders:  -     MRI brain w wo contrast; Future; Expected date: 2023    2. Dizziness  -     MRI brain w wo contrast; Future; Expected date: 2023    3. Essential hypertension  Assessment & Plan:  Cont current meds                   Chief Complaint     Chief Complaint   Patient presents with   • Hypertension     ER follow up        History of Present Illness   Gabby Bravo is a 74y.o.-year-old male who is here for f/u to ED visit  For dizziness, htn and headache  Got a headache for the first time with the dizziness  Had not been feeling well the whole day that day  Was given toradol, benadryl and reglan in er for ha  Did not take ha away, reduced it some  Then given hydralazine 10mg and bp decreased and ha almost resolved  Then was d/c'd home  April Banter was 5 or 6 when he went to er  Now it is a 2 or 3 and or less  Normally does not get ha's per patient  No allergy/cold or congestion symptoms      Review of Systems   Review of Systems   Constitutional: Negative for fatigue and fever. Eyes: Negative for photophobia and visual disturbance. Respiratory: Negative for cough, shortness of breath and wheezing. Cardiovascular: Negative for chest pain and palpitations. Gastrointestinal: Negative for constipation, diarrhea and nausea. Musculoskeletal: Negative for arthralgias and myalgias. Skin: Negative for rash. Neurological: Positive for dizziness and headaches. Negative for tremors, syncope, weakness, light-headedness and numbness. Hematological: Negative for adenopathy. Psychiatric/Behavioral: Negative for dysphoric mood and sleep disturbance.  The patient is not nervous/anxious.         Active Problem List     Patient Active Problem List   Diagnosis   • Aortic stenosis   • Coronary artery disease of native artery of native heart with stable angina pectoris ([address])   • DM (diabetes mellitus), type 2 ([address])   • Essential hypertension   • Hypertriglyceridemia   • S/P CABG (coronary artery bypass graft)   • S/P AVR   • PVC (premature ventricular contraction)   • Bilateral carotid artery stenosis   • Mixed hyperlipidemia   • Postoperative atrial fibrillation (HCC)   • Benign prostatic hyperplasia with urinary frequency   • Stenosis of right subclavian artery (HCC)   • IBS (irritable bowel syndrome)   • H/O diabetic foot ulcer   • Diabetic peripheral neuropathy associated with type 2 diabetes mellitus ([address])   • Nonintractable headache   • Dizziness         Past Medical History:  Past Medical History:   Diagnosis Date   • Acute systolic heart failure ([address]) 02/23/2021   • Athlete's foot    • Bilateral impacted cerumen 03/09/2021   • Diabetes mellitus ([address])    • Other hydrocele 06/11/2020   • TMJ (dislocation of temporomandibular joint)    • Yeast infection        Past Surgical History:  Past Surgical History:   Procedure Laterality Date   • CARDIAC SURGERY     • CARDIAC VALVE REPLACEMENT     • CATARACT EXTRACTION     • COLONOSCOPY     • CORONARY ARTERY BYPASS GRAFT     • EYE SURGERY  03/2020    Cataract right eye   • EYE SURGERY Left 12/03/2020    left eye cataract    • HYDROCELE EXCISION / REPAIR     • MA EXCISION HYDROCELE UNILATERAL Right 04/08/2022    Procedure: HYDROCELECTOMY;  Surgeon: Mandy Villatoro MD;  Location: [facility] MAIN OR;  Service: Urology   • MA EXCISION HYDROCELE UNILATERAL Right 11/16/2022    Procedure: HYDROCELECTOMY;  Surgeon: Mandy Villatoro MD;  Location: [facility] MAIN OR;  Service: Urology   • TONSILLECTOMY         Family History:  Family History   Problem Relation Age of Onset   • Heart disease Mother    • Cancer Mother    • Heart disease Father    • Hypertension Father    • Mitral valve prolapse Sister    • Diabetes type II Maternal Grandmother    • Colon cancer Neg Hx        Social History:  Social History     Socioeconomic History   • Marital status: /Civil Union     Spouse name: Not on file   • Number of children: Not on file   • Years of education: Not on file   • Highest education level: Not on file   Occupational History   • Not on file   Tobacco Use   • Smoking status: Never   • Smokeless tobacco: Never   Vaping Use   • Vaping Use: Never used   Substance and Sexual Activity   • Alcohol use: Yes     Alcohol/week: 4.0 standard drinks of alcohol     Types: 2 Glasses of wine, 1 Cans of beer, 1 Shots of liquor per week     Comment: 2-3 glass of wine or  beer a week   • Drug use: Never   • Sexual activity: Not Currently     Partners: Female     Birth control/protection: None   Other Topics Concern   • Not on file   Social History Narrative   • Not on file     Social Determinants of Health     Financial Resource Strain: Low Risk  (3/9/2023)    Overall Financial Resource Strain (CARDIA)    • Difficulty of Paying Living Expenses: Not very hard   Food Insecurity: Not on file   Transportation Needs: No Transportation Needs (3/9/2023)    PRAPARE - Transportation    • Lack of Transportation (Medical): No    • Lack of Transportation (Non-Medical): No   Physical Activity: Not on file   Stress: Not on file   Social Connections: Not on file   Intimate Partner Violence: Not on file   Housing Stability: Not on file       Objective     Vitals:    08/14/23 1310   BP: 130/78   Pulse: 58   Resp: 16   Temp: (!) 94.5 °F (34.7 °C)   SpO2: 98%     Wt Readings from Last 3 Encounters:   08/14/23 95.6 kg (210 lb 12.8 oz)   07/11/23 95.6 kg (210 lb 12.8 oz)   05/25/23 96.6 kg (213 lb)       Physical Exam  Vitals and nursing note reviewed. Constitutional:       Appearance: Normal appearance. HENT:      Head: Normocephalic and atraumatic.    Eyes:      Conjunctiva/sclera: Conjunctivae normal.   Cardiovascular:      Rate and Rhythm: Normal rate and regular rhythm. Heart sounds: Normal heart sounds. Pulmonary:      Effort: Pulmonary effort is normal.      Breath sounds: Normal breath sounds. Musculoskeletal:         General: Normal range of motion. Cervical back: Normal range of motion and neck supple. Skin:     General: Skin is warm and dry. Neurological:      Mental Status: He is alert and oriented to person, place, and time. Psychiatric:         Mood and Affect: Mood normal.         Behavior: Behavior normal.         Thought Content:  Thought content normal.         Judgment: Judgment normal.         Pertinent Laboratory/Diagnostic Studies:  Lab Results   Component Value Date    BUN 29 (H) 08/11/2023    CREATININE 1.08 08/11/2023    CALCIUM 10.1 08/11/2023    K 4.7 08/11/2023    CO2 27 08/11/2023     08/11/2023     Lab Results   Component Value Date    ALT 14 08/11/2023    AST 15 08/11/2023    ALKPHOS 68 08/11/2023       Lab Results   Component Value Date    WBC 7.77 08/11/2023    HGB 15.0 08/11/2023    HCT 44.6 08/11/2023    MCV 91 08/11/2023     08/11/2023       No results found for: "TSH"    No results found for: "CHOL"  Lab Results   Component Value Date    TRIG 135 01/16/2023     Lab Results   Component Value Date    HDL 46 01/16/2023     Lab Results   Component Value Date    LDLCALC 45 01/16/2023     Lab Results   Component Value Date    HGBA1C 7.0 (H) 05/22/2023       Results for orders placed or performed during the hospital encounter of 08/11/23   CBC and differential   Result Value Ref Range    WBC 7.77 4.31 - 10.16 Thousand/uL    RBC 4.91 3.88 - 5.62 Million/uL    Hemoglobin 15.0 12.0 - 17.0 g/dL    Hematocrit 44.6 36.5 - 49.3 %    MCV 91 82 - 98 fL    MCH 30.5 26.8 - 34.3 pg    MCHC 33.6 31.4 - 37.4 g/dL    RDW 11.8 11.6 - 15.1 %    MPV 11.1 8.9 - 12.7 fL    Platelets 203 513 - 066 Thousands/uL    nRBC 0 /100 WBCs    Neutrophils Relative 56 43 - 75 %    Immat GRANS % 0 0 - 2 %    Lymphocytes Relative 30 14 - 44 %    Monocytes Relative 11 4 - 12 %    Eosinophils Relative 2 0 - 6 %    Basophils Relative 1 0 - 1 %    Neutrophils Absolute 4.35 1.85 - 7.62 Thousands/µL    Immature Grans Absolute 0.02 0.00 - 0.20 Thousand/uL    Lymphocytes Absolute 2.30 0.60 - 4.47 Thousands/µL    Monocytes Absolute 0.86 0.17 - 1.22 Thousand/µL    Eosinophils Absolute 0.17 0.00 - 0.61 Thousand/µL    Basophils Absolute 0.07 0.00 - 0.10 Thousands/µL   Comprehensive metabolic panel   Result Value Ref Range    Sodium 138 135 - 147 mmol/L    Potassium 4.7 3.5 - 5.3 mmol/L    Chloride 103 96 - 108 mmol/L    CO2 27 21 - 32 mmol/L    ANION GAP 8 mmol/L    BUN 29 (H) 5 - 25 mg/dL    Creatinine 1.08 0.60 - 1.30 mg/dL    Glucose 149 (H) 65 - 140 mg/dL    Calcium 10.1 8.4 - 10.2 mg/dL    AST 15 13 - 39 U/L    ALT 14 7 - 52 U/L    Alkaline Phosphatase 68 34 - 104 U/L    Total Protein 7.9 6.4 - 8.4 g/dL    Albumin 4.7 3.5 - 5.0 g/dL    Total Bilirubin 0.38 0.20 - 1.00 mg/dL    eGFR 66 ml/min/1.73sq m   HS Troponin 0hr (reflex protocol)   Result Value Ref Range    hs TnI 0hr 8 "Refer to ACS Flowchart"- see link ng/L   HS Troponin I 2hr   Result Value Ref Range    hs TnI 2hr 8 "Refer to ACS Flowchart"- see link ng/L    Delta 2hr hsTnI 0 <20 ng/L   ECG 12 lead   Result Value Ref Range    Ventricular Rate 64 BPM    Atrial Rate 64 BPM    MO Interval 216 ms    QRSD Interval 134 ms    QT Interval 480 ms    QTC Interval 495 ms    P Axis 87 degrees    QRS Axis -67 degrees    T Wave Axis 101 degrees       Orders Placed This Encounter   Procedures   • MRI brain w wo contrast       ALLERGIES:  No Known Allergies    Current Medications     Current Outpatient Medications   Medication Sig Dispense Refill   • acetaminophen (TYLENOL) 500 mg tablet Take 500 mg by mouth daily at bedtime     • ascorbic acid (VITAMIN C) 250 mg tablet Take 500 mg by mouth 2 (two) times a day     • Aspirin 81 MG CAPS Take 1 capsule every day by oral route. • Cholecalciferol 125 MCG (5000 UT) TABS Take by oral route. • finasteride (PROSCAR) 5 mg tablet Take 1 tablet (5 mg total) by mouth daily 30 tablet 11   • furosemide (LASIX) 20 mg tablet TAKE ONE TABLET BY MOUTH EVERY DAY AS NEEDED FOR EDEMA 30 tablet 5   • Jardiance 25 MG TABS Take 1 tablet (25 mg total) by mouth daily 90 tablet 1   • metFORMIN (GLUCOPHAGE) 500 mg tablet Take 2 tablets (1,000 mg total) by mouth 2 (two) times a day 360 tablet 3   • metoprolol succinate (TOPROL-XL) 50 mg 24 hr tablet Take 0.5 tablets (25 mg total) by mouth daily 90 tablet 3   • niacin 500 mg tablet Take 500 mg by mouth 4 (four) times a day     • niacin 500 mg tablet Take by oral route. • NON FORMULARY 25 mcg Vitamin D     • Omega-3 Fatty Acids (FISH OIL BURP-LESS PO) Take by mouth     • PROBIOTIC PRODUCT PO Take 111 mg by mouth daily     • repaglinide (PRANDIN) 0.5 mg tablet TAKE ONE TABLET BY MOUTH EVERY DAY BEFORE DINNER 90 tablet 1   • rosuvastatin (CRESTOR) 10 MG tablet Take 1 tablet (10 mg total) by mouth daily 90 tablet 3   • sitaGLIPtin (JANUVIA) 100 mg tablet Take 1 tablet (100 mg total) by mouth daily 90 tablet 3   • tamsulosin (FLOMAX) 0.4 mg TAKE ONE CAPSULE BY MOUTH EVERY DAY WITH DINNER 30 capsule 6   • Tart Cherry (Tart Cherry Ultra) 1200 MG CAPS      • Turmeric 500 MG CAPS      • Wheat Dextrin (BENEFIBER DRINK MIX PO) Take by mouth     • Ginger, Zingiber officinalis, (Ginger Root) 500 MG CAPS  (Patient not taking: Reported on 8/11/2023)       No current facility-administered medications for this visit.          Health Maintenance     Health Maintenance   Topic Date Due   • Pneumococcal Vaccine: 65+ Years (2 - PCV) 10/27/2022   • Influenza Vaccine (1) 09/01/2023   • COVID-19 Vaccine (5 - Pfizer series) 10/11/2023 (Originally 2/28/2023)   • Hepatitis C Screening  08/15/2024 (Originally 1950)   • HEMOGLOBIN A1C  11/22/2023   • Kidney Health Evaluation: Albumin/Creatinine Ratio  01/16/2024   • Fall Risk  03/16/2024   • Depression Screening  03/16/2024   • Medicare Annual Wellness Visit (AWV)  03/16/2024   • BMI: Followup Plan  03/17/2024   • Diabetic Foot Exam  04/27/2024   • Kidney Health Evaluation: GFR  08/11/2024   • BMI: Adult  08/14/2024   • DM Eye Exam  04/05/2025   • Colorectal Cancer Screening  03/02/2027   • HIB Vaccine  Aged Out   • IPV Vaccine  Aged Out   • Hepatitis A Vaccine  Aged Out   • Meningococcal ACWY Vaccine  Aged Out   • HPV Vaccine  Aged Out     Immunization History   Administered Date(s) Administered   • COVID-19 PFIZER VACCINE 0.3 ML IM 03/07/2021, 03/26/2021, 10/07/2021   • COVID-19 Pfizer Vac BIVALENT Rubio-sucrose 12 Yr+ IM (BOOSTER ONLY) 10/31/2022   • INFLUENZA 10/31/2022   • Influenza, high dose seasonal 0.7 mL 10/27/2021   • Pneumococcal Polysaccharide PPV23 10/27/2021          HESHAM Lucia

## 2023-08-16 DIAGNOSIS — E11.9 TYPE 2 DIABETES MELLITUS WITHOUT COMPLICATION, WITHOUT LONG-TERM CURRENT USE OF INSULIN (HCC): ICD-10-CM

## 2023-08-16 RX ORDER — EMPAGLIFLOZIN 25 MG/1
25 TABLET, FILM COATED ORAL DAILY
Qty: 90 TABLET | Refills: 1 | Status: SHIPPED | OUTPATIENT
Start: 2023-08-16

## 2023-08-25 ENCOUNTER — HOSPITAL ENCOUNTER (OUTPATIENT)
Dept: RADIOLOGY | Facility: HOSPITAL | Age: 73
Discharge: HOME | End: 2023-08-25
Payer: MEDICARE

## 2023-08-25 DIAGNOSIS — R42 DIZZINESS: ICD-10-CM

## 2023-08-25 DIAGNOSIS — R51.9 NONINTRACTABLE HEADACHE, UNSPECIFIED CHRONICITY PATTERN, UNSPECIFIED HEADACHE TYPE: ICD-10-CM

## 2023-08-25 PROCEDURE — G1004 CDSM NDSC: HCPCS

## 2023-08-25 PROCEDURE — 70553 MRI BRAIN STEM W/O & W/DYE: CPT

## 2023-08-25 PROCEDURE — A9585 GADOBUTROL INJECTION: HCPCS | Performed by: NURSE PRACTITIONER

## 2023-08-25 RX ORDER — GADOBUTROL 604.72 MG/ML
9 INJECTION INTRAVENOUS
Status: COMPLETED | OUTPATIENT
Start: 2023-08-25 | End: 2023-08-25

## 2023-08-25 RX ADMIN — GADOBUTROL 9 ML: 604.72 INJECTION INTRAVENOUS at 09:22

## 2023-09-12 DIAGNOSIS — E11.65 TYPE 2 DIABETES MELLITUS WITH HYPERGLYCEMIA, WITHOUT LONG-TERM CURRENT USE OF INSULIN (HCC): ICD-10-CM

## 2023-09-12 RX ORDER — REPAGLINIDE 0.5 MG/1
TABLET ORAL
Qty: 90 TABLET | Refills: 1 | Status: SHIPPED | OUTPATIENT
Start: 2023-09-12

## 2023-09-20 DIAGNOSIS — E11.65 TYPE 2 DIABETES MELLITUS WITH HYPERGLYCEMIA, WITHOUT LONG-TERM CURRENT USE OF INSULIN (HCC): ICD-10-CM

## 2023-09-27 ENCOUNTER — TELEPHONE (OUTPATIENT)
Dept: ENDOCRINOLOGY | Facility: CLINIC | Age: 73
End: 2023-09-27

## 2023-09-27 ENCOUNTER — APPOINTMENT (OUTPATIENT)
Dept: LAB | Facility: AMBULARY SURGERY CENTER | Age: 73
End: 2023-09-27
Payer: MEDICARE

## 2023-09-27 DIAGNOSIS — E11.65 TYPE 2 DIABETES MELLITUS WITH HYPERGLYCEMIA, WITHOUT LONG-TERM CURRENT USE OF INSULIN (HCC): ICD-10-CM

## 2023-09-27 DIAGNOSIS — I10 ESSENTIAL HYPERTENSION: ICD-10-CM

## 2023-09-27 LAB
ANION GAP SERPL CALCULATED.3IONS-SCNC: 3 MMOL/L
BUN SERPL-MCNC: 26 MG/DL (ref 5–25)
CALCIUM SERPL-MCNC: 9.4 MG/DL (ref 8.4–10.2)
CHLORIDE SERPL-SCNC: 106 MMOL/L (ref 96–108)
CO2 SERPL-SCNC: 29 MMOL/L (ref 21–32)
CREAT SERPL-MCNC: 1.04 MG/DL (ref 0.6–1.3)
CREAT UR-MCNC: 40.5 MG/DL
EST. AVERAGE GLUCOSE BLD GHB EST-MCNC: 186 MG/DL
GFR SERPL CREATININE-BSD FRML MDRD: 69 ML/MIN/1.73SQ M
GLUCOSE P FAST SERPL-MCNC: 178 MG/DL (ref 65–99)
HBA1C MFR BLD: 8.1 %
MICROALBUMIN UR-MCNC: <7 MG/L
MICROALBUMIN/CREAT 24H UR: <17 MG/G CREATININE (ref 0–30)
POTASSIUM SERPL-SCNC: 4.2 MMOL/L (ref 3.5–5.3)
SODIUM SERPL-SCNC: 138 MMOL/L (ref 135–147)

## 2023-09-27 PROCEDURE — 82043 UR ALBUMIN QUANTITATIVE: CPT

## 2023-09-27 PROCEDURE — 36415 COLL VENOUS BLD VENIPUNCTURE: CPT

## 2023-09-27 PROCEDURE — 82570 ASSAY OF URINE CREATININE: CPT

## 2023-09-27 PROCEDURE — 83036 HEMOGLOBIN GLYCOSYLATED A1C: CPT

## 2023-09-27 PROCEDURE — 80048 BASIC METABOLIC PNL TOTAL CA: CPT

## 2023-09-27 NOTE — TELEPHONE ENCOUNTER
Patient requested last office note be sent to [facility] for his Diabetic shoes. I advised any forms that need to be filled out should be faxed back to us.   Fax sent and confirmation received

## 2023-10-04 ENCOUNTER — OFFICE VISIT (OUTPATIENT)
Dept: CARDIOLOGY CLINIC | Facility: CLINIC | Age: 73
End: 2023-10-04
Payer: MEDICARE

## 2023-10-04 VITALS
SYSTOLIC BLOOD PRESSURE: 120 MMHG | BODY MASS INDEX: 29.96 KG/M2 | HEIGHT: 71 IN | DIASTOLIC BLOOD PRESSURE: 70 MMHG | HEART RATE: 68 BPM | OXYGEN SATURATION: 97 % | WEIGHT: 214 LBS

## 2023-10-04 DIAGNOSIS — I10 ESSENTIAL HYPERTENSION: ICD-10-CM

## 2023-10-04 DIAGNOSIS — I25.118 CORONARY ARTERY DISEASE OF NATIVE ARTERY OF NATIVE HEART WITH STABLE ANGINA PECTORIS (HCC): Primary | ICD-10-CM

## 2023-10-04 DIAGNOSIS — Z95.1 S/P CABG (CORONARY ARTERY BYPASS GRAFT): ICD-10-CM

## 2023-10-04 DIAGNOSIS — Z95.2 S/P AVR: ICD-10-CM

## 2023-10-04 DIAGNOSIS — E78.2 MIXED HYPERLIPIDEMIA: ICD-10-CM

## 2023-10-04 DIAGNOSIS — R42 DIZZINESS: ICD-10-CM

## 2023-10-04 DIAGNOSIS — I35.0 NONRHEUMATIC AORTIC VALVE STENOSIS: ICD-10-CM

## 2023-10-04 PROCEDURE — 93000 ELECTROCARDIOGRAM COMPLETE: CPT | Performed by: INTERNAL MEDICINE

## 2023-10-04 PROCEDURE — 99214 OFFICE O/P EST MOD 30 MIN: CPT | Performed by: INTERNAL MEDICINE

## 2023-10-04 RX ORDER — METOPROLOL SUCCINATE 25 MG/1
25 TABLET, EXTENDED RELEASE ORAL DAILY
Qty: 90 TABLET | Refills: 3 | Status: SHIPPED | OUTPATIENT
Start: 2023-10-04

## 2023-10-04 NOTE — PROGRESS NOTES
Cardiology Follow Up    Carmen Medina  [id number]  1950  [facility] [address] [place] [address] Annalise  [id number] [phone]      1. Coronary artery disease of native artery of native heart with stable angina pectoris (HCC)  POCT ECG    metoprolol succinate (TOPROL-XL) 25 mg 24 hr tablet      2. Nonrheumatic aortic valve stenosis  POCT ECG      3. S/P CABG (coronary artery bypass graft)  POCT ECG      4. S/P AVR  POCT ECG      5. Mixed hyperlipidemia  POCT ECG      6. Dizziness  POCT ECG      7. Essential hypertension  POCT ECG          Discussion/Summary:    - CAD: no chest pain. No recent ischemic testing has been needed. We contemplated it last time, but he's not had any new symptoms. Lipid panel in [place] well controlled. Prior intolerance ot higher dose statins    - AS: Status post AVR. Stable valve function on last echo. - Carotid stenosis: bilateral. < 50%. Continue current med Rx.    - HTN: We gradually lowered the metoprolol. It made no difference in dizziness. BP stable with 25mg dose currently    - PAF: No recurrence since surgery.    - Edema -more prominent. Advised to use compression stockings. He is trying to hydrate, but we need to be careful about too much fluid as well. Now taking lasix daily. Seems more R sided than left. No increase in diuretics. Elevation, stockings. Continue 20mg daily lasix. Previous History:  75-year-old man. He has a history of coronary artery disease diagnosed in July of 2019 when he was having chest discomfort. He had single-vessel bypass with LIMA to the LAD and also at that time had aortic stenosis and underwent a 25 mm [place] bioprosthetic aortic valve replacement (Ray)    Preoperative testing showed that he had carotid artery stenosis bilaterally which has been monitored serially with ultrasound most recently with < 50% stenosis bilaterally but some R subclavian disease as well.     Had atrial fibrillation which lasted < 24 hours after surgery. He was initially on amiodarone and warfarin for brief period of time after the surgery, but this was stopped. He has had some episodes of palpitations. He had a 1 week monitor with his prior cardiologist, no atrial fibrillation was found. He then had a 48 hour Holter monitor in 10/2020 which showed only PVCs. No other significant arrhythmias. He has had myalgias with several statins in the past.  He now been on 10 mg of Crestor and is tolerating it. His LDL is in the 40s. He is also taking niacin. He has been on this for 15 years, has not had any side effects with it. I discussed stopping it, but he prefers to continue. Over-the-counter fish oil. Lovaza was too expensive. LE edema has become more prominent      Interval History:  He returns for follow-up. He was in the emergency room since the last time I saw him with symptoms of dizziness. There was no clear cardiac etiology found. His blood pressure was a little bit elevated. Patient says that when he gets lightheaded or dizzy normally with taking extra water, his symptoms improved. That was not the case this time so he went to the ER. Subsequent MRI did not show any significant abnormalities. Symptoms currently controlled. He is having more lower extremity edema. He is taking the Lasix daily at this point, but still having the swelling. Denies any PND, orthopnea. His echo done in April showed stable LV function and normal function of his bioprosthetic valve. He has not had any further episodes of chest pain.     Patient Active Problem List   Diagnosis   • Aortic stenosis   • Coronary artery disease of native artery of native heart with stable angina pectoris ([address])   • DM (diabetes mellitus), type 2 ([address])   • Essential hypertension   • Hypertriglyceridemia   • S/P CABG (coronary artery bypass graft)   • S/P AVR   • PVC (premature ventricular contraction)   • Bilateral carotid artery stenosis   • Mixed hyperlipidemia   • Postoperative atrial fibrillation (HCC)   • Benign prostatic hyperplasia with urinary frequency   • Stenosis of right subclavian artery (HCC)   • IBS (irritable bowel syndrome)   • H/O diabetic foot ulcer   • Diabetic peripheral neuropathy associated with type 2 diabetes mellitus ([address])   • Nonintractable headache   • Dizziness     Past Medical History:   Diagnosis Date   • Acute systolic heart failure ([address]) 02/23/2021   • Athlete's foot    • Bilateral impacted cerumen 03/09/2021   • Diabetes mellitus ([address])    • Other hydrocele 06/11/2020   • TMJ (dislocation of temporomandibular joint)    • Yeast infection      Social History     Tobacco Use   • Smoking status: Never   • Smokeless tobacco: Never   Vaping Use   • Vaping Use: Never used   Substance Use Topics   • Alcohol use:  Yes     Alcohol/week: 4.0 standard drinks of alcohol     Types: 2 Glasses of wine, 1 Cans of beer, 1 Shots of liquor per week     Comment: 2-3 glass of wine or  beer a week   • Drug use: Never      Family History   Problem Relation Age of Onset   • Heart disease Mother    • Cancer Mother    • Heart disease Father    • Hypertension Father    • Mitral valve prolapse Sister    • Diabetes type II Maternal Grandmother    • Colon cancer Neg Hx      Past Surgical History:   Procedure Laterality Date   • CARDIAC SURGERY     • CARDIAC VALVE REPLACEMENT     • CATARACT EXTRACTION     • COLONOSCOPY     • CORONARY ARTERY BYPASS GRAFT     • EYE SURGERY  03/2020    Cataract right eye   • EYE SURGERY Left 12/03/2020    left eye cataract    • HYDROCELE EXCISION / REPAIR     • MT EXCISION HYDROCELE UNILATERAL Right 04/08/2022    Procedure: HYDROCELECTOMY;  Surgeon: Sandra Nieves MD;  [facility];  Service: Urology   • MT EXCISION HYDROCELE UNILATERAL Right 11/16/2022    Procedure: HYDROCELECTOMY;  Surgeon: Sandra Nieves MD;  [facility];  Service: Urology   • TONSILLECTOMY Current Outpatient Medications:   •  acetaminophen (TYLENOL) 500 mg tablet, Take 500 mg by mouth daily at bedtime, Disp: , Rfl:   •  ascorbic acid (VITAMIN C) 250 mg tablet, Take 500 mg by mouth 2 (two) times a day, Disp: , Rfl:   •  Aspirin 81 MG CAPS, Take 1 capsule every day by oral route., Disp: , Rfl:   •  finasteride (PROSCAR) 5 mg tablet, Take 1 tablet (5 mg total) by mouth daily, Disp: 30 tablet, Rfl: 11  •  furosemide (LASIX) 20 mg tablet, TAKE ONE TABLET BY MOUTH EVERY DAY AS NEEDED FOR EDEMA, Disp: 30 tablet, Rfl: 5  •  Ginger, Zingiber officinalis, (Ginger Root) 500 MG CAPS, , Disp: , Rfl:   •  Jardiance 25 MG TABS, Take 1 tablet (25 mg total) by mouth daily, Disp: 90 tablet, Rfl: 1  •  metFORMIN (GLUCOPHAGE) 500 mg tablet, Take 2 tablets (1,000 mg total) by mouth 2 (two) times a day, Disp: 360 tablet, Rfl: 3  •  metoprolol succinate (TOPROL-XL) 25 mg 24 hr tablet, Take 1 tablet (25 mg total) by mouth daily, Disp: 90 tablet, Rfl: 3  •  niacin 500 mg tablet, Take 500 mg by mouth 4 (four) times a day, Disp: , Rfl:   •  NON FORMULARY, 25 mcg Vitamin D, Disp: , Rfl:   •  Omega-3 Fatty Acids (FISH OIL BURP-LESS PO), Take by mouth, Disp: , Rfl:   •  PROBIOTIC PRODUCT PO, Take 111 mg by mouth daily, Disp: , Rfl:   •  repaglinide (PRANDIN) 0.5 mg tablet, TAKE ONE TABLET BY MOUTH EVERY DAY BEFORE DINNER, Disp: 90 tablet, Rfl: 1  •  rosuvastatin (CRESTOR) 10 MG tablet, Take 1 tablet (10 mg total) by mouth daily, Disp: 90 tablet, Rfl: 3  •  sitaGLIPtin (JANUVIA) 100 mg tablet, Take 1 tablet (100 mg total) by mouth daily, Disp: 90 tablet, Rfl: 3  •  tamsulosin (FLOMAX) 0.4 mg, TAKE ONE CAPSULE BY MOUTH EVERY DAY WITH DINNER, Disp: 30 capsule, Rfl: 6  •  Tart Cherry (Tart Cherry Ultra) 1200 MG CAPS, , Disp: , Rfl:   •  Turmeric 500 MG CAPS, , Disp: , Rfl:   •  Wheat Dextrin (BENEFIBER DRINK MIX PO), Take by mouth, Disp: , Rfl:   •  Cholecalciferol 125 MCG (5000 UT) TABS, Take by oral route.  (Patient not taking: Reported on 10/4/2023), Disp: , Rfl:   •  niacin 500 mg tablet, Take by oral route., Disp: , Rfl:   No Known Allergies    Vitals:    10/04/23 0913   BP: 120/70   BP Location: Left arm   Patient Position: Sitting   Cuff Size: Standard   Pulse: 68   SpO2: 97%   Weight: 97.1 kg (214 lb)   Height: 5' 11" (1.803 m)     Vitals:    10/04/23 0913   Weight: 97.1 kg (214 lb)      Height: 5' 11" (180.3 cm)   Body mass index is 29.85 kg/m². Physical Exam:  GEN: Nichole Lane appears well, alert and oriented x 3, pleasant and cooperative   HEENT: pupils equal, round, and reactive to light; extraocular muscles intact  NECK: supple, no carotid bruits   HEART: regular rhythm, bio AVR sound   LUNGS: clear to auscultation bilaterally; no wheezes, rales, or rhonchi   ABDOMEN: normal bowel sounds, soft, no tenderness, no distention  EXTREMITIES: 1-2+ edema  NEURO: no focal findings   SKIN: normal without suspicious lesions on exposed skin    ROS:  Positive for fatigue, sleepiness. No snoring, gets refreshed sleep. No chest pain, shortness of breath.   Except as noted in HPI, is otherwise reviewed in detail and a 12 point ROS is negative in detail  ROS reviewed and is unchanged    Labs:  Lab Results   Component Value Date    K 4.2 09/27/2023     09/27/2023    CREATININE 1.04 09/27/2023    BUN 26 (H) 09/27/2023    CO2 29 09/27/2023    ALT 14 08/11/2023    AST 15 08/11/2023    GLUF 178 (H) 09/27/2023    HGBA1C 8.1 (H) 09/27/2023    WBC 7.77 08/11/2023    HGB 15.0 08/11/2023    HCT 44.6 08/11/2023     08/11/2023     No results found for: "CHOL"  Lab Results   Component Value Date    HDL 46 01/16/2023    HDL 46 09/12/2022    HDL 46 03/22/2021     Lab Results   Component Value Date    LDLCALC 45 01/16/2023    LDLCALC 48 09/12/2022    LDLCALC 39 03/22/2021     Lab Results   Component Value Date    TRIG 135 01/16/2023    TRIG 114 09/12/2022    TRIG 179 (H) 03/22/2021     Testing:  Echo 4/12/23:  Dann Mcbride: Left ventricular cavity size is normal. Wall thickness is mildly increased. There is mild concentric hypertrophy. The left ventricular ejection fraction is 60%. Systolic function is normal. Wall motion is normal. Diastolic function is moderately abnormal, consistent with grade II (pseudonormal) relaxation. •  Right Ventricle: Right ventricular cavity size is mildly dilated. Systolic function is mildly reduced. Abnormal tricuspid annular plane systolic excursion (TAPSE) < 1.7 cm. •  Left Atrium: The atrium is mildly dilated. 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 •  Aortic Valve: There is a bioprosthetic valve. The prosthetic valve appears to be functioning normally. There is no evidence of paravalvular regurgitation. There is no evidence of transvalvular regurgitation. The gradient recorded across the prosthetic aortic valve is within the expected range. The aortic valve mean gradient is 13 mmHg. The dimensionless velocity index is 0.50. The aortic valve area is 1.80 cm2. •  Mitral Valve: There is moderate annular calcification. There is mild regurgitation. •  Tricuspid Valve: There is mild regurgitation. •  Pulmonic Valve: There is mild regurgitation. •  Aorta: The aortic root is normal in size. The ascending aorta is mildly dilated at 4.1 cm. Echo 8/2021:  LEFT VENTRICLE:  Systolic function was normal. Ejection fraction was estimated to be 60 %. Wall thickness was mildly increased. There was mild concentric hypertrophy. Doppler parameters were consistent with abnormal left ventricular relaxation (grade 1 diastolic dysfunction). Doppler parameters were consistent with elevated mean left atrial filling pressure.     RIGHT VENTRICLE:  The ventricle was mildly to moderately dilated. Systolic function was normal.     LEFT ATRIUM:  The atrium was mildly dilated.     ATRIAL SEPTUM:  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.  No definite shunt noted, but poor subcostal views.     MITRAL VALVE:  There was mild annular calcification. There was trace regurgitation.     AORTIC VALVE:  A bioprosthesis was present. It exhibited normal function. Valve peak gradient was 30 mmHg. Valve mean gradient was 16 mmHg.     TRICUSPID VALVE:  There was mild regurgitation.     PULMONIC VALVE:  There was mild regurgitation.     AORTA:  There was mild dilatation of the ascending aorta at 4cm. Holter: The patient was in sinus rhythm throughout the recording.     Minimum HR: 59  Average HR: 74  Maximum HR: 102     Premature ventricular contractions: 2954 (2%)  Ventricular runs: 0     Supraventricular contractions: 34  Supraventricular runs: 1 lasting 4 beats     Longest RR: 1.5 sec     Arrhythmias: none     Diary submitted: No        Impression     Abnormal Holter monitor  Frequent PVCs without ventricular runs  Rare PACs    EKG:  Sinus rhythm, 68 BPM. LAFB.  LVH

## 2023-10-05 ENCOUNTER — OFFICE VISIT (OUTPATIENT)
Dept: ENDOCRINOLOGY | Facility: CLINIC | Age: 73
End: 2023-10-05
Payer: MEDICARE

## 2023-10-05 VITALS
HEART RATE: 67 BPM | OXYGEN SATURATION: 95 % | SYSTOLIC BLOOD PRESSURE: 138 MMHG | WEIGHT: 213 LBS | BODY MASS INDEX: 29.71 KG/M2 | DIASTOLIC BLOOD PRESSURE: 66 MMHG

## 2023-10-05 DIAGNOSIS — Z95.1 S/P CABG (CORONARY ARTERY BYPASS GRAFT): ICD-10-CM

## 2023-10-05 DIAGNOSIS — I25.118 CORONARY ARTERY DISEASE OF NATIVE ARTERY OF NATIVE HEART WITH STABLE ANGINA PECTORIS (HCC): ICD-10-CM

## 2023-10-05 DIAGNOSIS — M20.42 HAMMER TOES OF BOTH FEET: ICD-10-CM

## 2023-10-05 DIAGNOSIS — E11.42 DIABETIC POLYNEUROPATHY ASSOCIATED WITH TYPE 2 DIABETES MELLITUS (HCC): ICD-10-CM

## 2023-10-05 DIAGNOSIS — I10 ESSENTIAL HYPERTENSION: ICD-10-CM

## 2023-10-05 DIAGNOSIS — M20.41 HAMMER TOES OF BOTH FEET: ICD-10-CM

## 2023-10-05 DIAGNOSIS — Z86.31 H/O DIABETIC FOOT ULCER: ICD-10-CM

## 2023-10-05 DIAGNOSIS — E53.8 VITAMIN B 12 DEFICIENCY: ICD-10-CM

## 2023-10-05 DIAGNOSIS — E11.65 TYPE 2 DIABETES MELLITUS WITH HYPERGLYCEMIA, WITHOUT LONG-TERM CURRENT USE OF INSULIN (HCC): Primary | ICD-10-CM

## 2023-10-05 PROCEDURE — 99214 OFFICE O/P EST MOD 30 MIN: CPT | Performed by: INTERNAL MEDICINE

## 2023-10-05 NOTE — PROGRESS NOTES
Sruthi England 74 y.o. male MRN: [id number]    Encounter: [id number]      Assessment/Plan     Assessment: This is a 74y.o.-year-old male with diabetes with hyperglycemia. Plan:    Diagnoses and all orders for this visit:    Type 2 diabetes mellitus with hyperglycemia, without long-term current use of insulin ([address])    Lab Results   Component Value Date    HGBA1C 8.1 (H) 09/27/2023   · A1c is 8.1%, uncontrolled. Goal for A1c is 7%. · Slightly blood sugars are elevated after dinner however patient has not been checking blood sugar 2 hours after dinner. He checks blood sugars twice daily before breakfast and before dinner and his blood sugars are usually in 140 to 150 mg per DL range. · Continue current management, natalie to check blood sugar at bedtime and send log in 2 to 3 weeks, based on blood sugars we will have to increase the dose of Prandin to 1 mg. · Educated about hypoglycemia symptoms and treatment. Discussed the importance of exercise routine      -     Basic metabolic panel; Future  -     Hemoglobin A1C; Future    Essential hypertension  BP Readings from Last 3 Encounters:   10/05/23 138/66   10/04/23 120/70   08/14/23 130/78   Pressure is usually well controlled. Continue current management as per PCP  S/P CABG (coronary artery bypass graft)  Managed by cardiology, patient had appointment with cardiology yesterday. Coronary artery disease of native artery of native heart with stable angina pectoris (HCC)  Stable. Continue Jardiance 25 mg daily  Hammer toes of both feet  Currently followed by podiatry  Diabetic polyneuropathy associated with type 2 diabetes mellitus ([address])  Followed by podiatry  H/O diabetic foot ulcer  Resolved  Discussed t the importance of foot care  Vitamin B 12 deficiency  Patient has been complaining dizziness off-and-on, discussed to check for vitamin B12 level as patient is on chronic metformin therapy.   -     Vitamin B12; Future        CC: Diabetes    History of Present Illness     HPI:    Aubrey Boswell 52-year-old male with medical history of type 2 diabetes without long-term insulin use with complications such as CAD is here for follow-up. Denies any recent illnesses or hospitalizations. Denies recent severe hyperglycemic or hypoglycemic episodes. He checks blood sugars twice daily and his blood sugars are averaging in 140 mg per DL range  Current regimen includes metformin 1000 mg twice a day, Januvia 100 mg daily, Jardiance 25 mg daily, Prandin 0.5 mg with dinner. He is not using glucagon like peptide as he had a site reaction,    He follows with ophthalmology regularly, last appointment is in April 2023, no retinopathy  Follows with podiatry regularly    He Is currently taking statins, tolerating it well. He takes metoprolol XL 25 mg daily for hypertension  He also takes aspirin 81 mg daily            Lab Results   Component Value Date    HGBA1C 8.1 (H) 09/27/2023          Review of Systems   Constitutional: Negative for activity change, diaphoresis, fatigue, fever and unexpected weight change. HENT: Negative. Eyes: Negative for visual disturbance. Respiratory: Negative for cough, chest tightness and shortness of breath. Cardiovascular: Negative for chest pain, palpitations and leg swelling. Gastrointestinal: Negative for abdominal pain, blood in stool, constipation, diarrhea, nausea and vomiting. Endocrine: Negative for cold intolerance, heat intolerance, polydipsia, polyphagia and polyuria. Genitourinary: Negative for dysuria, enuresis, frequency and urgency. Musculoskeletal: Negative for arthralgias and myalgias. Skin: Negative for pallor, rash and wound. Allergic/Immunologic: Negative. Neurological: Negative for dizziness, tremors, weakness and numbness. Hematological: Negative. Psychiatric/Behavioral: Negative.         Historical Information   Past Medical History:   Diagnosis Date   • Acute systolic heart failure ([address]) 02/23/2021   • Athlete's foot    • Bilateral impacted cerumen 03/09/2021   • Diabetes mellitus ([address])    • Other hydrocele 06/11/2020   • TMJ (dislocation of temporomandibular joint)    • Yeast infection      Past Surgical History:   Procedure Laterality Date   • CARDIAC SURGERY     • CARDIAC VALVE REPLACEMENT     • CATARACT EXTRACTION     • COLONOSCOPY     • CORONARY ARTERY BYPASS GRAFT     • EYE SURGERY  03/2020    Cataract right eye   • EYE SURGERY Left 12/03/2020    left eye cataract    • HYDROCELE EXCISION / REPAIR     • [place] EXCISION HYDROCELE UNILATERAL Right 04/08/2022    Procedure: HYDROCELECTOMY;  Surgeon: Dorie Huerta MD;  [facility];  Service: Urology   • WA EXCISION HYDROCELE UNILATERAL Right 11/16/2022    Procedure: HYDROCELECTOMY;  Surgeon: Dorie Huerta MD;  [facility];  Service: Urology   • TONSILLECTOMY       Social History   Social History     Substance and Sexual Activity   Alcohol Use Yes   • Alcohol/week: 6.0 standard drinks of alcohol   • Types: 3 Glasses of wine, 1 Cans of beer, 2 Shots of liquor per week    Comment: 2-3 glass of wine or  beer a week     Social History     Substance and Sexual Activity   Drug Use Never     Social History     Tobacco Use   Smoking Status Never   Smokeless Tobacco Never     Family History:   Family History   Problem Relation Age of Onset   • Heart disease Mother    • Cancer Mother    • Heart disease Father    • Hypertension Father    • Mitral valve prolapse Sister    • Diabetes type II Maternal Grandmother    • Colon cancer Neg Hx        Meds/Allergies   Current Outpatient Medications   Medication Sig Dispense Refill   • acetaminophen (TYLENOL) 500 mg tablet Take 500 mg by mouth daily at bedtime     • ascorbic acid (VITAMIN C) 250 mg tablet Take 500 mg by mouth 2 (two) times a day     • Aspirin 81 MG CAPS Take 1 capsule every day by oral route.      • finasteride (PROSCAR) 5 mg tablet Take 1 tablet (5 mg total) by mouth daily 30 tablet 11   • furosemide (LASIX) 20 mg tablet TAKE ONE TABLET BY MOUTH EVERY DAY AS NEEDED FOR EDEMA 30 tablet 5   • Ginger, Zingiber officinalis, (Ginger Root) 500 MG CAPS      • Jardiance 25 MG TABS Take 1 tablet (25 mg total) by mouth daily 90 tablet 1   • metFORMIN (GLUCOPHAGE) 500 mg tablet Take 2 tablets (1,000 mg total) by mouth 2 (two) times a day 360 tablet 3   • metoprolol succinate (TOPROL-XL) 25 mg 24 hr tablet Take 1 tablet (25 mg total) by mouth daily 90 tablet 3   • niacin 500 mg tablet Take 500 mg by mouth 4 (four) times a day     • niacin 500 mg tablet Take by oral route. • NON FORMULARY 25 mcg Vitamin D     • Omega-3 Fatty Acids (FISH OIL BURP-LESS PO) Take by mouth     • PROBIOTIC PRODUCT PO Take 111 mg by mouth daily     • repaglinide (PRANDIN) 0.5 mg tablet TAKE ONE TABLET BY MOUTH EVERY DAY BEFORE DINNER 90 tablet 1   • rosuvastatin (CRESTOR) 10 MG tablet Take 1 tablet (10 mg total) by mouth daily 90 tablet 3   • sitaGLIPtin (JANUVIA) 100 mg tablet Take 1 tablet (100 mg total) by mouth daily 90 tablet 3   • tamsulosin (FLOMAX) 0.4 mg TAKE ONE CAPSULE BY MOUTH EVERY DAY WITH DINNER 30 capsule 6   • Tart Cherry (Tart Cherry Ultra) 1200 MG CAPS      • Turmeric 500 MG CAPS      • Wheat Dextrin (BENEFIBER DRINK MIX PO) Take by mouth     • Cholecalciferol 125 MCG (5000 UT) TABS Take by oral route. (Patient not taking: Reported on 10/4/2023)       No current facility-administered medications for this visit. No Known Allergies    Objective   Vitals: Blood pressure 138/66, pulse 67, weight 96.6 kg (213 lb), SpO2 95 %. Physical Exam  Vitals reviewed. Constitutional:       General: He is not in acute distress. Appearance: Normal appearance. He is well-developed. HENT:      Head: Normocephalic and atraumatic. Eyes:      Pupils: Pupils are equal, round, and reactive to light. Neck:      Thyroid: No thyromegaly.    Cardiovascular:      Rate and Rhythm: Normal rate and regular rhythm. Heart sounds: Normal heart sounds. Pulmonary:      Effort: Pulmonary effort is normal. No respiratory distress. Breath sounds: Normal breath sounds. Musculoskeletal:         General: No deformity. Normal range of motion. Cervical back: Normal range of motion and neck supple. Skin:     General: Skin is warm and dry. Findings: No erythema. Neurological:      Mental Status: He is alert and oriented to person, place, and time. The history was obtained from the review of the chart, patient. Lab Results:   Lab Results   Component Value Date/Time    Hemoglobin A1C 8.1 (H) 09/27/2023 07:55 AM    Hemoglobin A1C 7.0 (H) 05/22/2023 08:28 AM    Hemoglobin A1C 7.3 (H) 01/16/2023 07:50 AM    WBC 7.77 08/11/2023 06:00 PM    Hemoglobin 15.0 08/11/2023 06:00 PM    Hematocrit 44.6 08/11/2023 06:00 PM    MCV 91 08/11/2023 06:00 PM    Platelets 392 07/12/4671 06:00 PM    BUN 26 (H) 09/27/2023 07:55 AM    BUN 29 (H) 08/11/2023 06:00 PM    BUN 23 05/22/2023 08:28 AM    Potassium 4.2 09/27/2023 07:55 AM    Potassium 4.7 08/11/2023 06:00 PM    Potassium 4.0 05/22/2023 08:28 AM    Chloride 106 09/27/2023 07:55 AM    Chloride 103 08/11/2023 06:00 PM    Chloride 111 (H) 05/22/2023 08:28 AM    CO2 29 09/27/2023 07:55 AM    CO2 27 08/11/2023 06:00 PM    CO2 25 05/22/2023 08:28 AM    Creatinine 1.04 09/27/2023 07:55 AM    Creatinine 1.08 08/11/2023 06:00 PM    Creatinine 1.04 05/22/2023 08:28 AM    AST 15 08/11/2023 06:00 PM    ALT 14 08/11/2023 06:00 PM    Total Protein 7.9 08/11/2023 06:00 PM    Albumin 4.7 08/11/2023 06:00 PM    HDL, Direct 46 01/16/2023 07:50 AM    Triglycerides 135 01/16/2023 07:50 AM           Imaging Studies: I have personally reviewed pertinent reports. Portions of the record may have been created with voice recognition software. Occasional wrong word or "sound a like" substitutions may have occurred due to the inherent limitations of voice recognition software.  Read the chart carefully and recognize, using context, where substitutions have occurred.

## 2023-10-05 NOTE — PATIENT INSTRUCTIONS
Hypoglycemia instructions   Venita Nissen  10/5/2023  [id number]    Low Blood Sugar    Steps to treat low blood sugar. 1. Test blood sugar if you have symptoms of low blood sugar:   Low Blood Sugar Symptoms:  o Sweaty  o Dizzy  o Rapid heartbeat  o Shaky    o Bad mood  o Hungry      2. Treat blood sugar less than 70 with 15 grams of fast-acting carbohydrate:   Examples of 15 grams Fast-Acting Carbohydrate:  o 4 oz juice  o 4 oz regular soda  o 3-4 glucose tablets (chew)  o 3-4 hard candies (chew)              3.   Wait 15 minutes and test your blood sugar again           4.  If blood sugar is less than 100, repeat steps 2-3.      5. When your blood sugar is 100 or more, eat a snack if it will be longer than one hour until your next meal. The snack should be 15 grams of carbohydrate and a protein:   Examples of snacks:  o ½ sandwich  o 6 crackers with cheese  o Piece of fruit with cheese or peanut butter  o 6 crackers with peanut butter

## 2023-10-10 ENCOUNTER — APPOINTMENT (OUTPATIENT)
Dept: LAB | Facility: AMBULARY SURGERY CENTER | Age: 73
End: 2023-10-10
Payer: MEDICARE

## 2023-10-10 ENCOUNTER — TELEPHONE (OUTPATIENT)
Dept: ENDOCRINOLOGY | Facility: CLINIC | Age: 73
End: 2023-10-10

## 2023-10-10 DIAGNOSIS — E11.65 TYPE 2 DIABETES MELLITUS WITH HYPERGLYCEMIA, WITHOUT LONG-TERM CURRENT USE OF INSULIN (HCC): ICD-10-CM

## 2023-10-10 DIAGNOSIS — E53.8 VITAMIN B 12 DEFICIENCY: ICD-10-CM

## 2023-10-10 LAB — VIT B12 SERPL-MCNC: 116 PG/ML (ref 180–914)

## 2023-10-10 PROCEDURE — 82607 VITAMIN B-12: CPT

## 2023-10-10 PROCEDURE — 36415 COLL VENOUS BLD VENIPUNCTURE: CPT

## 2023-10-10 NOTE — TELEPHONE ENCOUNTER
Pt states [employer] sent a request for more information to be sent to them. Please reach out to [employer].

## 2023-10-12 NOTE — TELEPHONE ENCOUNTER
Spoke to patient he is aware of the Vit b12 he ordered it.   Also reviewed that paperwork was faxed to [employer] 10/6

## 2023-10-14 ENCOUNTER — IMMUNIZATIONS (OUTPATIENT)
Dept: FAMILY MEDICINE CLINIC | Facility: CLINIC | Age: 73
End: 2023-10-14
Payer: MEDICARE

## 2023-10-14 DIAGNOSIS — Z23 ENCOUNTER FOR IMMUNIZATION: Primary | ICD-10-CM

## 2023-10-14 PROCEDURE — G0008 ADMIN INFLUENZA VIRUS VAC: HCPCS

## 2023-10-14 PROCEDURE — 90662 IIV NO PRSV INCREASED AG IM: CPT

## 2023-10-30 ENCOUNTER — TELEPHONE (OUTPATIENT)
Dept: ENDOCRINOLOGY | Facility: CLINIC | Age: 73
End: 2023-10-30

## 2023-11-01 ENCOUNTER — TELEPHONE (OUTPATIENT)
Dept: ENDOCRINOLOGY | Facility: CLINIC | Age: 73
End: 2023-11-01

## 2023-11-01 NOTE — TELEPHONE ENCOUNTER
Spoke to [employer] from [facility] to inform them that we have completed and resent the fax yesterday.  Refax while on the line with her and she did receive verified

## 2023-11-06 ENCOUNTER — TELEPHONE (OUTPATIENT)
Dept: ENDOCRINOLOGY | Facility: CLINIC | Age: 73
End: 2023-11-06

## 2023-11-08 NOTE — TELEPHONE ENCOUNTER
Reviewed blood sugar log, blood sugars are in 150 to 220 mg per DL range    Please inform patient that he should continue current regimen Januvia, metformin, Jardiance    Continue Prandin 0.5 mg before dinner, he should keep carbohydrate consistent with meals  Avoid sweets or snacks with teo Freire

## 2023-11-17 ENCOUNTER — OFFICE VISIT (OUTPATIENT)
Dept: FAMILY MEDICINE CLINIC | Facility: CLINIC | Age: 73
End: 2023-11-17
Payer: MEDICARE

## 2023-11-17 VITALS
OXYGEN SATURATION: 95 % | TEMPERATURE: 96.4 F | WEIGHT: 213.6 LBS | SYSTOLIC BLOOD PRESSURE: 110 MMHG | DIASTOLIC BLOOD PRESSURE: 66 MMHG | RESPIRATION RATE: 16 BRPM | HEIGHT: 71 IN | HEART RATE: 65 BPM | BODY MASS INDEX: 29.9 KG/M2

## 2023-11-17 DIAGNOSIS — E11.65 TYPE 2 DIABETES MELLITUS WITH HYPERGLYCEMIA, WITHOUT LONG-TERM CURRENT USE OF INSULIN (HCC): Primary | ICD-10-CM

## 2023-11-17 DIAGNOSIS — E78.2 MIXED HYPERLIPIDEMIA: ICD-10-CM

## 2023-11-17 DIAGNOSIS — I10 ESSENTIAL HYPERTENSION: ICD-10-CM

## 2023-11-17 DIAGNOSIS — E78.1 HYPERTRIGLYCERIDEMIA: ICD-10-CM

## 2023-11-17 DIAGNOSIS — N40.1 BENIGN PROSTATIC HYPERPLASIA WITH URINARY FREQUENCY: ICD-10-CM

## 2023-11-17 DIAGNOSIS — I25.118 CORONARY ARTERY DISEASE OF NATIVE ARTERY OF NATIVE HEART WITH STABLE ANGINA PECTORIS (HCC): ICD-10-CM

## 2023-11-17 DIAGNOSIS — Z23 ENCOUNTER FOR IMMUNIZATION: ICD-10-CM

## 2023-11-17 DIAGNOSIS — I48.91 POSTOPERATIVE ATRIAL FIBRILLATION (HCC): ICD-10-CM

## 2023-11-17 DIAGNOSIS — I97.89 POSTOPERATIVE ATRIAL FIBRILLATION (HCC): ICD-10-CM

## 2023-11-17 DIAGNOSIS — E11.42 DIABETIC PERIPHERAL NEUROPATHY ASSOCIATED WITH TYPE 2 DIABETES MELLITUS (HCC): ICD-10-CM

## 2023-11-17 DIAGNOSIS — R35.0 BENIGN PROSTATIC HYPERPLASIA WITH URINARY FREQUENCY: ICD-10-CM

## 2023-11-17 PROBLEM — I87.2 PERIPHERAL VENOUS INSUFFICIENCY: Status: ACTIVE | Noted: 2023-09-26

## 2023-11-17 PROBLEM — M25.376 INSTABILITY OF FOOT JOINT: Status: ACTIVE | Noted: 2023-09-26

## 2023-11-17 PROCEDURE — 99214 OFFICE O/P EST MOD 30 MIN: CPT | Performed by: NURSE PRACTITIONER

## 2023-11-17 PROCEDURE — G0009 ADMIN PNEUMOCOCCAL VACCINE: HCPCS

## 2023-11-17 PROCEDURE — 90677 PCV20 VACCINE IM: CPT

## 2023-11-17 NOTE — PROGRESS NOTES
FAMILY PRACTICE OFFICE VISIT       NAME: Jus Chavez  AGE: 74 y.o. SEX: male       : 1950        MRN: [id number]    DATE: 2023  TIME: 1:26 PM    Assessment and Plan   1. Type 2 diabetes mellitus with hyperglycemia, without long-term current use of insulin (HCC)  Assessment & Plan:  Cont f/u with endo    Lab Results   Component Value Date    HGBA1C 8.1 (H) 2023       Orders:  -     Hemoglobin A1C; Future; Expected date: 2024  -     Comprehensive metabolic panel; Future; Expected date: 2024  -     Albumin / creatinine urine ratio; Future; Expected date: 2024  -     Lipid Panel with Direct LDL reflex; Future; Expected date: 2024    2. Diabetic peripheral neuropathy associated with type 2 diabetes mellitus (HCC)  Assessment & Plan:  Stable    Lab Results   Component Value Date    HGBA1C 8.1 (H) 2023         3. Coronary artery disease of native artery of native heart with stable angina pectoris ([address])  Assessment & Plan:  Stable  Cont f/uw ith cardiology  Cont meds        4. Essential hypertension  Assessment & Plan:  Stable  Cont meds      Orders:  -     Hemoglobin A1C; Future; Expected date: 2024  -     Comprehensive metabolic panel; Future; Expected date: 2024  -     Albumin / creatinine urine ratio; Future; Expected date: 2024  -     Lipid Panel with Direct LDL reflex; Future; Expected date: 2024    5. Postoperative atrial fibrillation (HCC)  Assessment & Plan:  Stable        6. Benign prostatic hyperplasia with urinary frequency  Assessment & Plan:  Stable  Cont f/u with urology        7. Hypertriglyceridemia    8. Mixed hyperlipidemia  Assessment & Plan:  Stable  Cont meds      Orders:  -     Hemoglobin A1C; Future; Expected date: 2024  -     Comprehensive metabolic panel; Future; Expected date: 2024  -     Albumin / creatinine urine ratio; Future; Expected date: 2024  -     Lipid Panel with Direct LDL reflex;  Future; Expected date: 03/16/2024    9. Encounter for immunization  -     Pneumococcal Conjugate Vaccine 20-valent (Pcv20)         Patient Instructions   Type 2 Diabetes Management for Adults   AMBULATORY CARE:   Type 2 diabetes  is a disease that affects how your body uses glucose (sugar). Either your body cannot make enough insulin, or it cannot use the insulin correctly. It is important to keep diabetes controlled to prevent damage to your heart, blood vessels, and other organs. Management will help you feel well and enjoy your daily activities. Your diabetes care team providers can help you make a plan to fit diabetes care into your schedule. Your plan can change over time to fit your needs and your family's needs. Have someone call your local emergency number (911 in the [address]) if:   You cannot be woken. You have signs of diabetic ketoacidosis:     confusion, fatigue    vomiting    rapid heartbeat    fruity smelling breath    extreme thirst    dry mouth and skin    You have any of the following signs of a heart attack:      Squeezing, pressure, or pain in your chest    You may  also have any of the following:     Discomfort or pain in your back, neck, jaw, stomach, or arm    Shortness of breath    Nausea or vomiting    Lightheadedness or a sudden cold sweat    You have any of the following signs of a stroke:      Numbness or drooping on one side of your face     Weakness in an arm or leg    Confusion or difficulty speaking    Dizziness, a severe headache, or vision loss    Call your doctor or diabetes care team provider if:   You have a sore or wound that will not heal.    You have a change in the amount you urinate. Your blood sugar levels are higher than your target goals. You often have lower blood sugar levels than your target goals. Your skin is red, dry, warm, or swollen. You have trouble coping with diabetes, or you feel anxious or depressed.     You have trouble following any part of your care plan, such as your meal plan. You have questions or concerns about your condition or care. What you need to know about high blood sugar levels:  High blood sugar levels may not cause any symptoms. You may feel more thirsty or urinate more often than usual. Over time, high blood sugar levels can damage your nerves, blood vessels, tissues, and organs. The following can increase your blood sugar levels:  Large meals or large amounts of carbohydrates at one time    Less physical activity    Stress    Illness    A lower dose of diabetes medicine or insulin, or a late dose    What you need to know about low blood sugar levels:  Symptoms include feeling shaky, dizzy, irritable, or confused. You can prevent symptoms by keeping your blood sugar levels from going too low. Treat a low blood sugar level right away:      Drink 4 ounces of juice or have 1 tube of glucose gel. Check your blood sugar level again 10 to 15 minutes later. When the level goes back to normal, eat a meal or snack to prevent another decrease. Keep glucose gel, raisins, or hard candy with you at all times to treat a low blood sugar level. Your blood sugar level can get too low if you take diabetes medicine or insulin and do not eat enough food. If you use insulin, check your blood sugar level before you exercise. If your blood sugar level is below 100 mg/dL, eat 4 crackers or 2 ounces of raisins, or drink 4 ounces of juice. Check your level every 30 minutes if you exercise longer than 1 hour. You may need a snack during or after exercise. What you can do to manage your blood sugar levels:   Check your blood sugar levels as directed and as needed. Several items are available to use to check your levels. You may need to check by testing a drop of blood in a glucose monitor. You may instead be given a continuous glucose monitoring (CGM) device. The device is worn at all times.  The CGM checks your blood sugar level every 5 minutes. It sends results to an electronic device such as a smart phone. A CGM can be used with or without an insulin pump. You and your diabetes care team providers will decide on the best method for you. The goal for blood sugar levels before meals  is between 80 and 130 mg/dL and 2 hours after eating  is lower than 180 mg/dL. Make healthy food choices. Work with a dietitian to create a meal plan that works for you and your schedule. A dietitian can help you learn how to eat the right amount of carbohydrates (sugar and starchy foods) during your meals and snacks. Examples of carbohydrates are breads, cereals, rice, pasta, fruit, low-fat dairy, and sweets. Carbohydrates can raise your blood sugar level if you eat too many at one time. Eat high-fiber foods as directed. Fiber helps improve blood sugar levels. Fiber also lowers your risk for heart disease and other problems diabetes can cause. Examples of high-fiber foods include vegetables, whole-grain bread, and beans such as schreiber beans. Your dietitian can tell you how much fiber to have each day. Get regular physical activity. Physical activity can help you get to your target blood sugar level goal and manage your weight. Get at least 150 minutes of moderate to vigorous aerobic physical activity each week. Resistance training, such as lifting weights, should be done 3 times each week. Do not miss more than 2 days of physical activity in a row. Do not sit longer than 30 minutes at a time. Your healthcare provider can help you create an activity plan. The plan can include the best activities for you and can help you build your strength and endurance. Maintain a healthy weight. Ask your team what a healthy weight is for you. A healthy weight can help you control diabetes and prevent heart disease. Ask your team to help you create a weight-loss plan, if needed.  Even a loss of 3% to 7% of your excess body weight can help make a difference in managing diabetes. Your team will help you set a weight-loss goal, such as 10 to 15 pounds, or 5% of your extra weight. Together you and your team can set manageable weight-loss goals. Take your diabetes medicine or insulin as directed. You may need diabetes medicine, insulin, or both to help control your blood sugar levels. Your healthcare provider will teach you how and when to take your diabetes medicine or insulin. You will also be taught about side effects oral diabetes medicine can cause. Insulin may be injected or given through a pump or pen. You and your providers will decide on the best method for you: An insulin pump  is an implanted device that gives your insulin 24 hours a day. An insulin pump prevents the need for multiple insulin injections in a day. An insulin pen  is a device prefilled with the right amount of insulin. You and your family members will be taught how to draw up and give insulin  if this is the best method for you. Your providers will also teach you how to dispose of needles and syringes. You will learn how much insulin you need  and when to give it. You will be taught when not to give insulin. You will also be taught what to do if your blood sugar level drops too low. This may happen if you take insulin and do not eat the right amount of carbohydrates. More ways to manage type 2 diabetes:   Wear medical alert identification. Wear medical alert jewelry or carry a card that says you have diabetes. Ask your provider where to get these items. Do not smoke. Nicotine and other chemicals in cigarettes and cigars can cause lung and blood vessel damage. It also makes it more difficult to manage your diabetes. Ask your provider for information if you currently smoke and need help to quit. Do not use e-cigarettes or smokeless tobacco in place of cigarettes or to help you quit. They still contain nicotine.     Check your feet each day for cuts, scratches, calluses, or other wounds. Look for redness and swelling, and feel for warmth. Wear shoes that fit well. Check your shoes for rocks or other objects that can hurt your feet. Do not walk barefoot or wear shoes without socks. Wear cotton socks to help keep your feet dry. Ask about vaccines you may need. You have a higher risk for serious illness if you get the flu, pneumonia, COVID-19, or hepatitis. Ask your provider if you should get vaccines to prevent these or other diseases, and when to get the vaccines. Talk to your provider if you become stressed about diabetes care. Sometimes being able to fit diabetes care into your life can cause increased stress. The stress can cause you not to take care of yourself properly. Your provider can help by offering tips about self-care. A mental health provider can listen and offer help with self-care issues. Other types of counseling can help you make nutrition or physical activity changes. Have your A1c checked as directed. Your provider may check your A1c every 3 months, or 2 times each year if your diabetes is controlled. An A1c test shows the average amount of sugar in your blood over the past 2 to 3 months. Your provider will tell you what your A1c level should be. Have screening tests as directed. Your provider may recommend screening for complications of diabetes and other conditions that may develop. Some screenings may begin right away and some may happen within the first 5 years of diagnosis:    Examples of diabetes complications  include kidney problems, high cholesterol, high blood pressure, blood vessel problems, eye problems, and sleep apnea. You may be screened for a low vitamin B level  if you take oral diabetes medicine for a long time. You may be screened for polycystic ovarian syndrome (PCOS)  if you are of childbearing age. Follow up with your doctor or diabetes care team providers as directed:   You may need to have blood tests done before your follow-up visit. The test results will show if changes need to be made in your treatment or self-care. Talk to your provider if you cannot afford your medicine. Write down your questions so you remember to ask them during your visits. © Copyright Loletta Gosselin 2023 Information is for End User's use only and may not be sold, redistributed or otherwise used for commercial purposes. The above information is an  only. It is not intended as medical advice for individual conditions or treatments. Talk to your doctor, nurse or pharmacist before following any medical regimen to see if it is safe and effective for you. Basic Carbohydrate Counting   AMBULATORY CARE:   Carbohydrate counting  is a way to plan your meals by counting the amount of carbohydrate in foods. Carbohydrates are the sugars, starches, and fiber found in fruit, grains, vegetables, and milk products. Carbohydrates increase your blood sugar levels. Carbohydrate counting can help you eat the right amount of carbohydrate to keep your blood sugar levels under control. What you need to know about planning meals using carbohydrate counting:  A dietitian or healthcare provider will help you develop a healthy meal plan that works best for you. You will be taught how much carbohydrate to eat or drink for each meal and snack. Your meal plan will be based on your age, weight, usual food intake, and physical activity level. If you have diabetes, it will also include your blood sugar levels and diabetes medicine. Once you know how much carbohydrate you should eat, you can decide what type of food you want to eat. You will need to know what foods contain carbohydrate and how much they contain. Keep track of the amount of carbohydrate in meals and snacks in order to follow your meal plan. Do not avoid carbohydrates or skip meals.  Your blood sugar may fall too low if you do not eat enough carbohydrate or you skip meals.    Foods that contain carbohydrate:   Breads:  Each serving of food listed below contains about 15 g of carbohydrate . 1 slice of bread (1 ounce) or 1 flour or corn tortilla (6 inch)    ½ of a hamburger bun or ¼ of a large bagel (about 1 ounce)    1 pancake (about 4 inches across and ¼ inch thick)    Cereals and grains:  Serving sizes of ready-to-eat cereals vary. Look at the serving size and the total carbohydrate amount listed on the food label. Each serving of food listed below contains about 15 g of carbohydrate . ¾ cup of dry, unsweetened, ready-to-eat cereal or ¼ cup of low-fat granola     ½ cup of oatmeal or other cooked cereal     ? cup of cooked rice or pasta    Starchy vegetables and beans:  Each serving of food listed below contains about 15 g of carbohydrate . ½ cup of corn, green peas, sweet potatoes, or mashed potatoes    ¼ of a large baked potato    ½ cup of beans, lentils, and peas (garbanzo, schreiber, kidney, white, split, black-eyed)    Crackers and snacks:  Each serving of food listed below contains about 15 g of carbohydrate . 3 juan david cracker squares or 8 animal crackers     6 saltine-type crackers    3 cups of popcorn or ¾ ounce of pretzels, potato chips, or tortilla chips    Fruit:  Each serving of food listed below contains about 15 g of carbohydrate . 1 small (4 ounce) piece of fresh fruit or ¾ to 1 cup of fresh fruit    ½ cup of canned or frozen fruit, packed in natural juice    ½ cup (4 ounces) of unsweetened fruit juice    2 tablespoons of dried fruit    Desserts or sugary foods:  Each serving of food listed below contains about 15 g of carbohydrate .     2-inch square unfrosted cake or brownie     2 small cookies    ½ cup of ice cream, frozen yogurt, or nondairy frozen yogurt    ¼ cup of sherbet or sorbet    1 tablespoon of regular syrup, jam, or jelly    2 tablespoons of light syrup    Milk and yogurt:  Foods from the milk group contain about 12 g of carbohydrate per serving. 1 cup of fat-free or low-fat milk    1 cup of soy milk    ? cup of fat-free, yogurt sweetened with artificial sweetener    Non-starchy vegetables:  Each serving contains about 5 g of carbohydrate . Three servings of non-starch vegetables count as 1 carbohydrate serving. ½ cup of cooked vegetables or 1 cup of raw vegetables. This includes beets, broccoli, cabbage, cauliflower, cucumber, mushrooms, tomatoes, and zucchini    ½ cup of vegetable juice    How to use carbohydrate counting to plan meals:   Count carbohydrate amounts using serving sizes:      Pasta dinner example: You plan to have pasta, tossed salad, and an 8-ounce glass of milk. Your healthcare provider tells you that you may have 4 carbohydrate servings for dinner. One carbohydrate serving of pasta is ? cup. One cup of pasta will equal 3 carbohydrate servings. An 8-ounce glass of milk will count as 1 carbohydrate serving. These amounts of food would equal 4 carbohydrate servings. One cup of tossed salad does not count toward your carbohydrate servings. Count carbohydrate amounts using food labels:  Find the total amount of carbohydrate in a packaged food by reading the food label. Food labels tell you the serving size of the food and the total carbohydrate amount in each serving. Find the serving size on the food label and then decide how many servings you will eat. Multiply the number of servings you plan to eat by the carbohydrate amount per serving. Granola bar snack example: Your meal plan allows you to have 2 carbohydrate servings (30 grams) of carbohydrate for a snack. You plan to eat 1 package of granola bars, which contains 2 bars. According to the food label, the serving size of food in this package is 1 bar. Each serving (1 bar) contains 25 grams of carbohydrate. The total amount of carbohydrate in this package of granola bars would be 50 g. Based on your meal plan, you should eat only 1 bar.       Follow up with your doctor as directed:  Write down your questions so you remember to ask them during your visits. © Copyright Jose Raul Jett 2023 Information is for End User's use only and may not be sold, redistributed or otherwise used for commercial purposes. The above information is an  only. It is not intended as medical advice for individual conditions or treatments. Talk to your doctor, nurse or pharmacist before following any medical regimen to see if it is safe and effective for you. Foot Care for People with Diabetes   AMBULATORY CARE:   What you need to know about foot care:  Long-term high blood sugar levels can damage the blood vessels and nerves in your legs and feet. This damage makes it hard to feel pressure, pain, temperature, and touch. You may not be able to feel a cut or sore, or shoes that are too tight. Foot care is needed to prevent serious problems, such as an infection or amputation. Diabetes may cause your toes to become crooked or curved under. These changes may affect the way you walk and can lead to increased pressure on your foot. The pressure can decrease blood flow to your feet. Lack of blood flow increases your risk for a foot ulcer. Call your care team provider if:   Your feet become numb, weak, or hard to move. You have pus draining from a sore on your foot. You have a wound on your foot that gets bigger, deeper, or does not heal.    You see blisters, cuts, scratches, calluses, or sores on your foot. You have a fever, and your feet become red, warm, and swollen. Your toenails become thick, curled, or yellow. You find it hard to check your feet because your vision is poor. You have questions or concerns about your condition or care. How to care for your feet:   Check your feet each day. Look at your whole foot, including the bottom, and between and under your toes. Check for wounds, corns, and calluses. Use a mirror to see the bottom of your feet.  The skin on your feet may be shiny, tight, or darker than normal. Your feet may also be cold and pale. Feel your feet by running your hands along the tops, bottoms, sides, and between your toes. Redness, swelling, and warmth are signs of blood flow problems that can lead to a foot ulcer. Do not try to remove corns or calluses yourself. Do not ignore small problems, such as dry skin or small wounds. These can become life-threatening over time without proper care. Wash your feet each day with soap and warm water. Do not use hot water, because this can injure your foot. Dry your feet gently with a towel after you wash them. Dry between and under your toes. Apply lotion or a moisturizer on your dry feet. Ask your care team provider what lotions are best to use. Do not put lotion or moisturizer between your toes. Moisture between your toes could lead to skin breakdown. Cut your toenails correctly. File or cut your toenails straight across. Use a soft brush to clean around your toenails. If your toenails are very thick, you may need to have a care team provider or specialist cut them. Protect your feet. Do not walk barefoot or wear your shoes without socks. Check your shoes for rocks or other objects that can hurt your feet. Wear cotton socks to help keep your feet dry. Wear socks without toe seams, or wear them with the seams inside out. Change your socks each day. Do not wear socks that are dirty or damp. Wear shoes that fit well. Wear shoes that do not rub against any area of your feet. Your shoes should be ½ to ¾ inch (1 to 2 centimeters) longer than your feet. Your shoes should also have extra space around the widest part of your feet. Walking or athletic shoes with laces or straps that adjust are best. Ask your care team provider for help to choose shoes that fit you best. Ask your provider if you need to wear an insert, orthotic, or bandage on your feet. Go to your follow-up visits.   Your care team provider will do a foot exam at least 1 time each year. You may need a foot exam more often if you have nerve damage, foot deformities, or ulcers. Your provider will check for nerve damage and how well you can feel your feet. Your provider will check your shoes to see if they fit well. Do not smoke. Smoking can damage your blood vessels and put you at increased risk for foot ulcers. Ask your care team provider for information if you currently smoke and need help to quit. E-cigarettes or smokeless tobacco still contain nicotine. Talk to your care team provider before you use these products. Follow up with your diabetes care team provider or foot specialist as directed: You will need to have your feet checked at least 1 time each year. You may need a foot exam more often if you have nerve damage, foot deformities, or ulcers. Write down your questions so you remember to ask them during your visits. © Copyright Elisha Goltz 2023 Information is for End User's use only and may not be sold, redistributed or otherwise used for commercial purposes. The above information is an  only. It is not intended as medical advice for individual conditions or treatments. Talk to your doctor, nurse or pharmacist before following any medical regimen to see if it is safe and effective for you. Chief Complaint     Chief Complaint   Patient presents with    Follow-up     Patient being seen for 4 month follow up        History of Present Illness   Rickie Santana is a 74y.o.-year-old male who is here for f/u to chronic medical conditions  Doing well  Had covid and flu vaccine  Following with endo for his diabetes  Hgba1c went up  Now going back to exercising more  Continues to have foot issues with ankle swelling  Trying compression stockings  Starting to wear them some  Taking benefiber for rectal soreness      Review of Systems   Review of Systems   Constitutional:  Negative for fatigue and fever.    HENT: Negative for congestion, postnasal drip and rhinorrhea. Eyes:  Negative for photophobia and visual disturbance. Respiratory:  Negative for cough, shortness of breath and wheezing. Cardiovascular:  Negative for chest pain and palpitations. Gastrointestinal:  Negative for constipation, diarrhea, nausea and vomiting. Genitourinary:  Negative for dysuria and frequency. Musculoskeletal:  Negative for arthralgias and myalgias. Skin:  Negative for rash. Neurological:  Negative for dizziness, light-headedness and headaches. Hematological:  Negative for adenopathy. Psychiatric/Behavioral:  Negative for dysphoric mood and self-injury. The patient is not hyperactive.         Active Problem List     Patient Active Problem List   Diagnosis    Aortic stenosis    Coronary artery disease of native artery of native heart with stable angina pectoris (HCC)    DM (diabetes mellitus), type 2 ([address])    Essential hypertension    Hypertriglyceridemia    S/P CABG (coronary artery bypass graft)    S/P AVR    PVC (premature ventricular contraction)    Bilateral carotid artery stenosis    Mixed hyperlipidemia    Postoperative atrial fibrillation (HCC)    Benign prostatic hyperplasia with urinary frequency    Stenosis of right subclavian artery (HCC)    IBS (irritable bowel syndrome)    H/O diabetic foot ulcer    Diabetic peripheral neuropathy associated with type 2 diabetes mellitus (HCC)    Nonintractable headache    Dizziness    Instability of foot joint    Peripheral venous insufficiency         Past Medical History:  Past Medical History:   Diagnosis Date    Acute systolic heart failure ([address]) 02/23/2021    Athlete's foot     Bilateral impacted cerumen 03/09/2021    Coronary artery disease 07/30/2019    bypass and valve replacement    Diabetes mellitus ([address])     Heart murmur     Other hydrocele 06/11/2020    TMJ (dislocation of temporomandibular joint)     Yeast infection        Past Surgical History:  Past Surgical History:   Procedure Laterality Date    CARDIAC SURGERY      CARDIAC VALVE REPLACEMENT      CATARACT EXTRACTION      COLONOSCOPY      CORONARY ARTERY BYPASS GRAFT      EYE SURGERY  03/2020    Cataract right eye    EYE SURGERY Left 12/03/2020    left eye cataract     HYDROCELE EXCISION / REPAIR      OH EXCISION HYDROCELE UNILATERAL Right 04/08/2022    Procedure: HYDROCELECTOMY;  Surgeon: Catie Valencia MD;  Location: [facility];  Service: Urology    OH EXCISION HYDROCELE UNILATERAL Right 11/16/2022    Procedure: HYDROCELECTOMY;  Surgeon: Catie Valencia MD;  Location: [facility];  Service: Urology    TONSILLECTOMY         Family History:  Family History   Problem Relation Age of Onset    Heart disease Mother     Cancer Mother         skin melonoma/mastectomy    Heart disease Father     Hypertension Father     Mitral valve prolapse Sister     Diabetes type II Maternal Grandmother     Colon cancer Neg Hx        Social History:  Social History     Socioeconomic History    Marital status: /Civil Union     Spouse name: Not on file    Number of children: Not on file    Years of education: Not on file    Highest education level: Not on file   Occupational History    Not on file   Tobacco Use    Smoking status: Never    Smokeless tobacco: Never   Vaping Use    Vaping Use: Never used   Substance and Sexual Activity    Alcohol use:  Yes     Alcohol/week: 4.0 standard drinks of alcohol     Types: 2 Glasses of wine, 1 Cans of beer, 1 Shots of liquor per week     Comment: 2-3 glass of wine or  beer a week    Drug use: Never    Sexual activity: Not Currently     Partners: Female     Birth control/protection: None   Other Topics Concern    Not on file   Social History Narrative    Not on file     Social Determinants of Health     Financial Resource Strain: Low Risk  (3/9/2023)    Overall Financial Resource Strain (CARDIA)     Difficulty of Paying Living Expenses: Not very hard   Food Insecurity: Not on file Transportation Needs: No Transportation Needs (3/9/2023)    PRAPARE - Transportation     Lack of Transportation (Medical): No     Lack of Transportation (Non-Medical): No   Physical Activity: Not on file   Stress: Not on file   Social Connections: Not on file   Intimate Partner Violence: Not on file   Housing Stability: Not on file       Objective     Vitals:    11/17/23 1003   BP: 110/66   Pulse: 65   Resp: 16   Temp: (!) 96.4 °F (35.8 °C)   SpO2: 95%     Wt Readings from Last 3 Encounters:   11/17/23 96.9 kg (213 lb 9.6 oz)   10/05/23 96.6 kg (213 lb)   10/04/23 97.1 kg (214 lb)       Physical Exam  Vitals and nursing note reviewed. Constitutional:       Appearance: Normal appearance. HENT:      Head: Normocephalic and atraumatic. Right Ear: Tympanic membrane, ear canal and external ear normal.      Left Ear: Tympanic membrane, ear canal and external ear normal.      Nose: Nose normal.      Mouth/Throat:      Mouth: Mucous membranes are moist.      Pharynx: Oropharynx is clear. Eyes:      Conjunctiva/sclera: Conjunctivae normal.      Pupils: Pupils are equal, round, and reactive to light. Cardiovascular:      Rate and Rhythm: Normal rate and regular rhythm. Heart sounds: Normal heart sounds. Pulmonary:      Effort: Pulmonary effort is normal.      Breath sounds: Normal breath sounds. Abdominal:      General: Bowel sounds are normal.   Musculoskeletal:         General: Normal range of motion. Cervical back: Normal range of motion and neck supple. Skin:     General: Skin is warm and dry. Capillary Refill: Capillary refill takes less than 2 seconds. Neurological:      General: No focal deficit present. Mental Status: He is alert and oriented to person, place, and time. Psychiatric:         Mood and Affect: Mood normal.         Behavior: Behavior normal.         Thought Content:  Thought content normal.         Judgment: Judgment normal.         Pertinent Laboratory/Diagnostic Studies:  Lab Results   Component Value Date    BUN 26 (H) 09/27/2023    CREATININE 1.04 09/27/2023    CALCIUM 9.4 09/27/2023    K 4.2 09/27/2023    CO2 29 09/27/2023     09/27/2023     Lab Results   Component Value Date    ALT 14 08/11/2023    AST 15 08/11/2023    ALKPHOS 68 08/11/2023       Lab Results   Component Value Date    WBC 7.77 08/11/2023    HGB 15.0 08/11/2023    HCT 44.6 08/11/2023    MCV 91 08/11/2023     08/11/2023       No results found for: "TSH"    No results found for: "CHOL"  Lab Results   Component Value Date    TRIG 135 01/16/2023     Lab Results   Component Value Date    HDL 46 01/16/2023     Lab Results   Component Value Date    LDLCALC 45 01/16/2023     Lab Results   Component Value Date    HGBA1C 8.1 (H) 09/27/2023       Results for orders placed or performed in visit on 10/10/23   Vitamin B12   Result Value Ref Range    Vitamin B-12 116 (L) 180 - 914 pg/mL       Orders Placed This Encounter   Procedures    Pneumococcal Conjugate Vaccine 20-valent (Pcv20)    Hemoglobin A1C    Comprehensive metabolic panel    Albumin / creatinine urine ratio    Lipid Panel with Direct LDL reflex       ALLERGIES:  No Known Allergies    Current Medications     Current Outpatient Medications   Medication Sig Dispense Refill    acetaminophen (TYLENOL) 500 mg tablet Take 500 mg by mouth daily at bedtime      ascorbic acid (VITAMIN C) 250 mg tablet Take 500 mg by mouth 2 (two) times a day      Aspirin 81 MG CAPS Take 1 capsule every day by oral route.       Cyanocobalamin (VITAMIN B-12 PO) Take 1 tablet by mouth 3 (three) times a day      finasteride (PROSCAR) 5 mg tablet Take 1 tablet (5 mg total) by mouth daily 30 tablet 11    furosemide (LASIX) 20 mg tablet TAKE ONE TABLET BY MOUTH EVERY DAY AS NEEDED FOR EDEMA (Patient taking differently: Take 20 mg by mouth daily) 30 tablet 5    Ginger, Zingiber officinalis, (Ginger Root) 500 MG CAPS       Jardiance 25 MG TABS Take 1 tablet (25 mg total) by mouth daily 90 tablet 1    metFORMIN (GLUCOPHAGE) 500 mg tablet Take 2 tablets (1,000 mg total) by mouth 2 (two) times a day 360 tablet 3    metoprolol succinate (TOPROL-XL) 25 mg 24 hr tablet Take 1 tablet (25 mg total) by mouth daily 90 tablet 3    niacin 500 mg tablet Take 500 mg by mouth 4 (four) times a day      NON FORMULARY 25 mcg Vitamin D      Omega-3 Fatty Acids (FISH OIL BURP-LESS PO) Take by mouth      PROBIOTIC PRODUCT PO Take 111 mg by mouth daily      repaglinide (PRANDIN) 0.5 mg tablet TAKE ONE TABLET BY MOUTH EVERY DAY BEFORE DINNER 90 tablet 1    rosuvastatin (CRESTOR) 10 MG tablet Take 1 tablet (10 mg total) by mouth daily 90 tablet 3    sitaGLIPtin (JANUVIA) 100 mg tablet Take 1 tablet (100 mg total) by mouth daily 90 tablet 3    tamsulosin (FLOMAX) 0.4 mg TAKE ONE CAPSULE BY MOUTH EVERY DAY WITH DINNER 30 capsule 6    Tart Cherry (Tart Cherry Ultra) 1200 MG CAPS       Turmeric 500 MG CAPS       Wheat Dextrin (BENEFIBER DRINK MIX PO) Take by mouth      Cholecalciferol 125 MCG (5000 UT) TABS Take by oral route. (Patient not taking: Reported on 10/4/2023)       No current facility-administered medications for this visit.          Health Maintenance     Health Maintenance   Topic Date Due    COVID-19 Vaccine (5 - Pfizer series) 02/28/2023    BMI: Followup Plan  03/17/2024    Hepatitis C Screening  08/15/2024 (Originally 1950)    Fall Risk  03/16/2024    Medicare Annual Wellness Visit (AWV)  03/16/2024    HEMOGLOBIN A1C  03/27/2024    Diabetic Foot Exam  04/27/2024    Kidney Health Evaluation: GFR  09/27/2024    Kidney Health Evaluation: Albumin/Creatinine Ratio  09/27/2024    Depression Screening  11/17/2024    BMI: Adult  11/17/2024    DM Eye Exam  04/05/2025    Colorectal Cancer Screening  03/02/2027    Pneumococcal Vaccine: 65+ Years  Completed    Influenza Vaccine  Completed    HIB Vaccine  Aged Out    IPV Vaccine  Aged Out    Hepatitis A Vaccine  Aged Out    Meningococcal ACWY Vaccine  Aged Out    HPV Vaccine  Aged Out     Immunization History   Administered Date(s) Administered    COVID-19 PFIZER VACCINE 0.3 ML IM 03/07/2021, 03/26/2021, 10/07/2021    COVID-19 Pfizer Vac BIVALENT Rubio-sucrose 12 Yr+ IM 10/31/2022    INFLUENZA 10/31/2022    Influenza, high dose seasonal 0.7 mL 10/27/2021, 10/14/2023    Pneumococcal Conjugate Vaccine 20-valent (Pcv20), Polysace 11/17/2023    Pneumococcal Polysaccharide PPV23 10/27/2021       Depression Screening and Follow-up Plan: Patient was screened for depression during today's encounter. They screened negative with a PHQ-2 score of 0.         HESHAM Santos

## 2023-11-17 NOTE — PATIENT INSTRUCTIONS
Type 2 Diabetes Management for Adults   AMBULATORY CARE:   Type 2 diabetes  is a disease that affects how your body uses glucose (sugar). Either your body cannot make enough insulin, or it cannot use the insulin correctly. It is important to keep diabetes controlled to prevent damage to your heart, blood vessels, and other organs. Management will help you feel well and enjoy your daily activities. Your diabetes care team providers can help you make a plan to fit diabetes care into your schedule. Your plan can change over time to fit your needs and your family's needs. Have someone call your local emergency number (911 in the [address]) if:   You cannot be woken. You have signs of diabetic ketoacidosis:     confusion, fatigue    vomiting    rapid heartbeat    fruity smelling breath    extreme thirst    dry mouth and skin    You have any of the following signs of a heart attack:      Squeezing, pressure, or pain in your chest    You may  also have any of the following:     Discomfort or pain in your back, neck, jaw, stomach, or arm    Shortness of breath    Nausea or vomiting    Lightheadedness or a sudden cold sweat    You have any of the following signs of a stroke:      Numbness or drooping on one side of your face     Weakness in an arm or leg    Confusion or difficulty speaking    Dizziness, a severe headache, or vision loss    Call your doctor or diabetes care team provider if:   You have a sore or wound that will not heal.    You have a change in the amount you urinate. Your blood sugar levels are higher than your target goals. You often have lower blood sugar levels than your target goals. Your skin is red, dry, warm, or swollen. You have trouble coping with diabetes, or you feel anxious or depressed. You have trouble following any part of your care plan, such as your meal plan. You have questions or concerns about your condition or care.     What you need to know about high blood sugar levels:  High blood sugar levels may not cause any symptoms. You may feel more thirsty or urinate more often than usual. Over time, high blood sugar levels can damage your nerves, blood vessels, tissues, and organs. The following can increase your blood sugar levels:  Large meals or large amounts of carbohydrates at one time    Less physical activity    Stress    Illness    A lower dose of diabetes medicine or insulin, or a late dose    What you need to know about low blood sugar levels:  Symptoms include feeling shaky, dizzy, irritable, or confused. You can prevent symptoms by keeping your blood sugar levels from going too low. Treat a low blood sugar level right away:      Drink 4 ounces of juice or have 1 tube of glucose gel. Check your blood sugar level again 10 to 15 minutes later. When the level goes back to normal, eat a meal or snack to prevent another decrease. Keep glucose gel, raisins, or hard candy with you at all times to treat a low blood sugar level. Your blood sugar level can get too low if you take diabetes medicine or insulin and do not eat enough food. If you use insulin, check your blood sugar level before you exercise. If your blood sugar level is below 100 mg/dL, eat 4 crackers or 2 ounces of raisins, or drink 4 ounces of juice. Check your level every 30 minutes if you exercise longer than 1 hour. You may need a snack during or after exercise. What you can do to manage your blood sugar levels:   Check your blood sugar levels as directed and as needed. Several items are available to use to check your levels. You may need to check by testing a drop of blood in a glucose monitor. You may instead be given a continuous glucose monitoring (CGM) device. The device is worn at all times. The CGM checks your blood sugar level every 5 minutes. It sends results to an electronic device such as a smart phone. A CGM can be used with or without an insulin pump.  You and your diabetes care team providers will decide on the best method for you. The goal for blood sugar levels before meals  is between 80 and 130 mg/dL and 2 hours after eating  is lower than 180 mg/dL. Make healthy food choices. Work with a dietitian to create a meal plan that works for you and your schedule. A dietitian can help you learn how to eat the right amount of carbohydrates (sugar and starchy foods) during your meals and snacks. Examples of carbohydrates are breads, cereals, rice, pasta, fruit, low-fat dairy, and sweets. Carbohydrates can raise your blood sugar level if you eat too many at one time. Eat high-fiber foods as directed. Fiber helps improve blood sugar levels. Fiber also lowers your risk for heart disease and other problems diabetes can cause. Examples of high-fiber foods include vegetables, whole-grain bread, and beans such as schreiber beans. Your dietitian can tell you how much fiber to have each day. Get regular physical activity. Physical activity can help you get to your target blood sugar level goal and manage your weight. Get at least 150 minutes of moderate to vigorous aerobic physical activity each week. Resistance training, such as lifting weights, should be done 3 times each week. Do not miss more than 2 days of physical activity in a row. Do not sit longer than 30 minutes at a time. Your healthcare provider can help you create an activity plan. The plan can include the best activities for you and can help you build your strength and endurance. Maintain a healthy weight. Ask your team what a healthy weight is for you. A healthy weight can help you control diabetes and prevent heart disease. Ask your team to help you create a weight-loss plan, if needed. Even a loss of 3% to 7% of your excess body weight can help make a difference in managing diabetes. Your team will help you set a weight-loss goal, such as 10 to 15 pounds, or 5% of your extra weight. Together you and your team can set manageable weight-loss goals. Take your diabetes medicine or insulin as directed. You may need diabetes medicine, insulin, or both to help control your blood sugar levels. Your healthcare provider will teach you how and when to take your diabetes medicine or insulin. You will also be taught about side effects oral diabetes medicine can cause. Insulin may be injected or given through a pump or pen. You and your providers will decide on the best method for you: An insulin pump  is an implanted device that gives your insulin 24 hours a day. An insulin pump prevents the need for multiple insulin injections in a day. An insulin pen  is a device prefilled with the right amount of insulin. You and your family members will be taught how to draw up and give insulin  if this is the best method for you. Your providers will also teach you how to dispose of needles and syringes. You will learn how much insulin you need  and when to give it. You will be taught when not to give insulin. You will also be taught what to do if your blood sugar level drops too low. This may happen if you take insulin and do not eat the right amount of carbohydrates. More ways to manage type 2 diabetes:   Wear medical alert identification. Wear medical alert jewelry or carry a card that says you have diabetes. Ask your provider where to get these items. Do not smoke. Nicotine and other chemicals in cigarettes and cigars can cause lung and blood vessel damage. It also makes it more difficult to manage your diabetes. Ask your provider for information if you currently smoke and need help to quit. Do not use e-cigarettes or smokeless tobacco in place of cigarettes or to help you quit. They still contain nicotine. Check your feet each day for cuts, scratches, calluses, or other wounds. Look for redness and swelling, and feel for warmth. Wear shoes that fit well.  Check your shoes for rocks or other objects that can hurt your feet. Do not walk barefoot or wear shoes without socks. Wear cotton socks to help keep your feet dry. Ask about vaccines you may need. You have a higher risk for serious illness if you get the flu, pneumonia, COVID-19, or hepatitis. Ask your provider if you should get vaccines to prevent these or other diseases, and when to get the vaccines. Talk to your provider if you become stressed about diabetes care. Sometimes being able to fit diabetes care into your life can cause increased stress. The stress can cause you not to take care of yourself properly. Your provider can help by offering tips about self-care. A mental health provider can listen and offer help with self-care issues. Other types of counseling can help you make nutrition or physical activity changes. Have your A1c checked as directed. Your provider may check your A1c every 3 months, or 2 times each year if your diabetes is controlled. An A1c test shows the average amount of sugar in your blood over the past 2 to 3 months. Your provider will tell you what your A1c level should be. Have screening tests as directed. Your provider may recommend screening for complications of diabetes and other conditions that may develop. Some screenings may begin right away and some may happen within the first 5 years of diagnosis:    Examples of diabetes complications  include kidney problems, high cholesterol, high blood pressure, blood vessel problems, eye problems, and sleep apnea. You may be screened for a low vitamin B level  if you take oral diabetes medicine for a long time. You may be screened for polycystic ovarian syndrome (PCOS)  if you are of childbearing age. Follow up with your doctor or diabetes care team providers as directed: You may need to have blood tests done before your follow-up visit. The test results will show if changes need to be made in your treatment or self-care. Talk to your provider if you cannot afford your medicine. Write down your questions so you remember to ask them during your visits. © Copyright [facility] 2023 Information is for End User's use only and may not be sold, redistributed or otherwise used for commercial purposes. The above information is an  only. It is not intended as medical advice for individual conditions or treatments. Talk to your doctor, nurse or pharmacist before following any medical regimen to see if it is safe and effective for you. Basic Carbohydrate Counting   AMBULATORY CARE:   Carbohydrate counting  is a way to plan your meals by counting the amount of carbohydrate in foods. Carbohydrates are the sugars, starches, and fiber found in fruit, grains, vegetables, and milk products. Carbohydrates increase your blood sugar levels. Carbohydrate counting can help you eat the right amount of carbohydrate to keep your blood sugar levels under control. What you need to know about planning meals using carbohydrate counting:  A dietitian or healthcare provider will help you develop a healthy meal plan that works best for you. You will be taught how much carbohydrate to eat or drink for each meal and snack. Your meal plan will be based on your age, weight, usual food intake, and physical activity level. If you have diabetes, it will also include your blood sugar levels and diabetes medicine. Once you know how much carbohydrate you should eat, you can decide what type of food you want to eat. You will need to know what foods contain carbohydrate and how much they contain. Keep track of the amount of carbohydrate in meals and snacks in order to follow your meal plan. Do not avoid carbohydrates or skip meals. Your blood sugar may fall too low if you do not eat enough carbohydrate or you skip meals. Foods that contain carbohydrate:   Breads:  Each serving of food listed below contains about 15 g of carbohydrate .     1 slice of bread (1 ounce) or 1 flour or corn tortilla (6 inch)    ½ of a hamburger bun or ¼ of a large bagel (about 1 ounce)    1 pancake (about 4 inches across and ¼ inch thick)    Cereals and grains:  Serving sizes of ready-to-eat cereals vary. Look at the serving size and the total carbohydrate amount listed on the food label. Each serving of food listed below contains about 15 g of carbohydrate . ¾ cup of dry, unsweetened, ready-to-eat cereal or ¼ cup of low-fat granola     ½ cup of oatmeal or other cooked cereal     ? cup of cooked rice or pasta    Starchy vegetables and beans:  Each serving of food listed below contains about 15 g of carbohydrate . ½ cup of corn, green peas, sweet potatoes, or mashed potatoes    ¼ of a large baked potato    ½ cup of beans, lentils, and peas (garbanzo, schreiber, kidney, white, split, black-eyed)    Crackers and snacks:  Each serving of food listed below contains about 15 g of carbohydrate . 3 juan david cracker squares or 8 animal crackers     6 saltine-type crackers    3 cups of popcorn or ¾ ounce of pretzels, potato chips, or tortilla chips    Fruit:  Each serving of food listed below contains about 15 g of carbohydrate . 1 small (4 ounce) piece of fresh fruit or ¾ to 1 cup of fresh fruit    ½ cup of canned or frozen fruit, packed in natural juice    ½ cup (4 ounces) of unsweetened fruit juice    2 tablespoons of dried fruit    Desserts or sugary foods:  Each serving of food listed below contains about 15 g of carbohydrate . 2-inch square unfrosted cake or brownie     2 small cookies    ½ cup of ice cream, frozen yogurt, or nondairy frozen yogurt    ¼ cup of sherbet or sorbet    1 tablespoon of regular syrup, jam, or jelly    2 tablespoons of light syrup    Milk and yogurt:  Foods from the milk group contain about 12 g of carbohydrate per serving. 1 cup of fat-free or low-fat milk    1 cup of soy milk    ?  cup of fat-free, yogurt sweetened with artificial sweetener    Non-starchy vegetables:  Each serving contains about 5 g of carbohydrate . Three servings of non-starch vegetables count as 1 carbohydrate serving. ½ cup of cooked vegetables or 1 cup of raw vegetables. This includes beets, broccoli, cabbage, cauliflower, cucumber, mushrooms, tomatoes, and zucchini    ½ cup of vegetable juice    How to use carbohydrate counting to plan meals:   Count carbohydrate amounts using serving sizes:      Pasta dinner example: You plan to have pasta, tossed salad, and an 8-ounce glass of milk. Your healthcare provider tells you that you may have 4 carbohydrate servings for dinner. One carbohydrate serving of pasta is ? cup. One cup of pasta will equal 3 carbohydrate servings. An 8-ounce glass of milk will count as 1 carbohydrate serving. These amounts of food would equal 4 carbohydrate servings. One cup of tossed salad does not count toward your carbohydrate servings. Count carbohydrate amounts using food labels:  Find the total amount of carbohydrate in a packaged food by reading the food label. Food labels tell you the serving size of the food and the total carbohydrate amount in each serving. Find the serving size on the food label and then decide how many servings you will eat. Multiply the number of servings you plan to eat by the carbohydrate amount per serving. Granola bar snack example: Your meal plan allows you to have 2 carbohydrate servings (30 grams) of carbohydrate for a snack. You plan to eat 1 package of granola bars, which contains 2 bars. According to the food label, the serving size of food in this package is 1 bar. Each serving (1 bar) contains 25 grams of carbohydrate. The total amount of carbohydrate in this package of granola bars would be 50 g. Based on your meal plan, you should eat only 1 bar. Follow up with your doctor as directed:  Write down your questions so you remember to ask them during your visits.   © Copyright [employer] 2023 Information is for End User's use only and may not be sold, redistributed or otherwise used for commercial purposes. The above information is an  only. It is not intended as medical advice for individual conditions or treatments. Talk to your doctor, nurse or pharmacist before following any medical regimen to see if it is safe and effective for you. Foot Care for People with Diabetes   AMBULATORY CARE:   What you need to know about foot care:  Long-term high blood sugar levels can damage the blood vessels and nerves in your legs and feet. This damage makes it hard to feel pressure, pain, temperature, and touch. You may not be able to feel a cut or sore, or shoes that are too tight. Foot care is needed to prevent serious problems, such as an infection or amputation. Diabetes may cause your toes to become crooked or curved under. These changes may affect the way you walk and can lead to increased pressure on your foot. The pressure can decrease blood flow to your feet. Lack of blood flow increases your risk for a foot ulcer. Call your care team provider if:   Your feet become numb, weak, or hard to move. You have pus draining from a sore on your foot. You have a wound on your foot that gets bigger, deeper, or does not heal.    You see blisters, cuts, scratches, calluses, or sores on your foot. You have a fever, and your feet become red, warm, and swollen. Your toenails become thick, curled, or yellow. You find it hard to check your feet because your vision is poor. You have questions or concerns about your condition or care. How to care for your feet:   Check your feet each day. Look at your whole foot, including the bottom, and between and under your toes. Check for wounds, corns, and calluses. Use a mirror to see the bottom of your feet. The skin on your feet may be shiny, tight, or darker than normal. Your feet may also be cold and pale.  Feel your feet by running your hands along the tops, bottoms, sides, and between your toes. Redness, swelling, and warmth are signs of blood flow problems that can lead to a foot ulcer. Do not try to remove corns or calluses yourself. Do not ignore small problems, such as dry skin or small wounds. These can become life-threatening over time without proper care. Wash your feet each day with soap and warm water. Do not use hot water, because this can injure your foot. Dry your feet gently with a towel after you wash them. Dry between and under your toes. Apply lotion or a moisturizer on your dry feet. Ask your care team provider what lotions are best to use. Do not put lotion or moisturizer between your toes. Moisture between your toes could lead to skin breakdown. Cut your toenails correctly. File or cut your toenails straight across. Use a soft brush to clean around your toenails. If your toenails are very thick, you may need to have a care team provider or specialist cut them. Protect your feet. Do not walk barefoot or wear your shoes without socks. Check your shoes for rocks or other objects that can hurt your feet. Wear cotton socks to help keep your feet dry. Wear socks without toe seams, or wear them with the seams inside out. Change your socks each day. Do not wear socks that are dirty or damp. Wear shoes that fit well. Wear shoes that do not rub against any area of your feet. Your shoes should be ½ to ¾ inch (1 to 2 centimeters) longer than your feet. Your shoes should also have extra space around the widest part of your feet. Walking or athletic shoes with laces or straps that adjust are best. Ask your care team provider for help to choose shoes that fit you best. Ask your provider if you need to wear an insert, orthotic, or bandage on your feet. Go to your follow-up visits. Your care team provider will do a foot exam at least 1 time each year.  You may need a foot exam more often if you have nerve damage, foot deformities, or ulcers. Your provider will check for nerve damage and how well you can feel your feet. Your provider will check your shoes to see if they fit well. Do not smoke. Smoking can damage your blood vessels and put you at increased risk for foot ulcers. Ask your care team provider for information if you currently smoke and need help to quit. E-cigarettes or smokeless tobacco still contain nicotine. Talk to your care team provider before you use these products. Follow up with your diabetes care team provider or foot specialist as directed: You will need to have your feet checked at least 1 time each year. You may need a foot exam more often if you have nerve damage, foot deformities, or ulcers. Write down your questions so you remember to ask them during your visits. © Copyright [employer] 2023 Information is for End User's use only and may not be sold, redistributed or otherwise used for commercial purposes. The above information is an  only. It is not intended as medical advice for individual conditions or treatments. Talk to your doctor, nurse or pharmacist before following any medical regimen to see if it is safe and effective for you.

## 2023-11-27 ENCOUNTER — OFFICE VISIT (OUTPATIENT)
Dept: UROLOGY | Facility: CLINIC | Age: 73
End: 2023-11-27
Payer: MEDICARE

## 2023-11-27 VITALS
HEIGHT: 71 IN | HEART RATE: 64 BPM | OXYGEN SATURATION: 96 % | SYSTOLIC BLOOD PRESSURE: 112 MMHG | DIASTOLIC BLOOD PRESSURE: 70 MMHG | BODY MASS INDEX: 30.38 KG/M2 | WEIGHT: 217 LBS

## 2023-11-27 DIAGNOSIS — N43.3 RIGHT HYDROCELE: Primary | ICD-10-CM

## 2023-11-27 PROCEDURE — 99213 OFFICE O/P EST LOW 20 MIN: CPT | Performed by: PHYSICIAN ASSISTANT

## 2023-11-27 NOTE — PROGRESS NOTES
UROLOGY PROGRESS NOTE   Patient Identifiers: Sarthak Sanon (MRN [id number])  Date of Service: 11/27/2023    Subjective:   49-year-old man history of BPH and right-sided hydrocele. He had a right redo hydrocele surgery and last November. He still has some residual induration but has vastly improved. No pain no voiding complaints. He has been maintained on tamsulosin and finasteride. His PSA 0.4.     Reason for visit: Right hydrocele surgery follow-up    Objective:     VITALS:    Vitals:    11/27/23 1132   BP: 112/70   Pulse: 64   SpO2: 96%     AUA SYMPTOM SCORE      Flowsheet Row Most Recent Value   AUA SYMPTOM SCORE    How often have you had a sensation of not emptying your bladder completely after you finished urinating? 0 (P)     How often have you had to urinate again less than two hours after you finished urinating? 1 (P)     How often have you found you stopped and started again several times when you urinate? 0 (P)     How often have you found it difficult to postpone urination? 2 (P)     How often have you had a weak urinary stream? 1 (P)     How often have you had to push or strain to begin urination? 0 (P)     How many times did you most typically get up to urinate from the time you went to bed at night until the time you got up in the morning? 2 (P)     Quality of Life: If you were to spend the rest of your life with your urinary condition just the way it is now, how would you feel about that? 2 (P)     AUA SYMPTOM SCORE 6 (P)               LABS:  Lab Results   Component Value Date    HGB 15.0 08/11/2023    HCT 44.6 08/11/2023    WBC 7.77 08/11/2023     08/11/2023   ]    Lab Results   Component Value Date    K 4.2 09/27/2023     09/27/2023    CO2 29 09/27/2023    BUN 26 (H) 09/27/2023    CREATININE 1.04 09/27/2023    CALCIUM 9.4 09/27/2023   ]        INPATIENT MEDS:    Current Outpatient Medications:     acetaminophen (TYLENOL) 500 mg tablet, Take 500 mg by mouth daily at bedtime, Disp: , Rfl:     ascorbic acid (VITAMIN C) 250 mg tablet, Take 500 mg by mouth 2 (two) times a day, Disp: , Rfl:     Aspirin 81 MG CAPS, Take 1 capsule every day by oral route., Disp: , Rfl:     Cyanocobalamin (VITAMIN B-12 PO), Take 1 tablet by mouth 3 (three) times a day, Disp: , Rfl:     finasteride (PROSCAR) 5 mg tablet, Take 1 tablet (5 mg total) by mouth daily, Disp: 30 tablet, Rfl: 11    furosemide (LASIX) 20 mg tablet, TAKE ONE TABLET BY MOUTH EVERY DAY AS NEEDED FOR EDEMA, Disp: 30 tablet, Rfl: 5    Ginger, Zingiber officinalis, (Ginger Root) 500 MG CAPS, , Disp: , Rfl:     Jardiance 25 MG TABS, Take 1 tablet (25 mg total) by mouth daily, Disp: 90 tablet, Rfl: 1    metFORMIN (GLUCOPHAGE) 500 mg tablet, Take 2 tablets (1,000 mg total) by mouth 2 (two) times a day, Disp: 360 tablet, Rfl: 3    metoprolol succinate (TOPROL-XL) 25 mg 24 hr tablet, Take 1 tablet (25 mg total) by mouth daily, Disp: 90 tablet, Rfl: 3    niacin 500 mg tablet, Take 500 mg by mouth 4 (four) times a day, Disp: , Rfl:     NON FORMULARY, 25 mcg Vitamin D, Disp: , Rfl:     Omega-3 Fatty Acids (FISH OIL BURP-LESS PO), Take by mouth, Disp: , Rfl:     PROBIOTIC PRODUCT PO, Take 111 mg by mouth daily, Disp: , Rfl:     repaglinide (PRANDIN) 0.5 mg tablet, TAKE ONE TABLET BY MOUTH EVERY DAY BEFORE DINNER, Disp: 90 tablet, Rfl: 1    rosuvastatin (CRESTOR) 10 MG tablet, Take 1 tablet (10 mg total) by mouth daily, Disp: 90 tablet, Rfl: 3    sitaGLIPtin (JANUVIA) 100 mg tablet, Take 1 tablet (100 mg total) by mouth daily, Disp: 90 tablet, Rfl: 3    tamsulosin (FLOMAX) 0.4 mg, TAKE ONE CAPSULE BY MOUTH EVERY DAY WITH DINNER, Disp: 30 capsule, Rfl: 6    Tart Cherry (Tart Cherry Ultra) 1200 MG CAPS, , Disp: , Rfl:     Turmeric 500 MG CAPS, , Disp: , Rfl:     Wheat Dextrin (BENEFIBER DRINK MIX PO), Take by mouth, Disp: , Rfl:     Cholecalciferol 125 MCG (5000 UT) TABS, Take by oral route.  (Patient not taking: Reported on 10/4/2023), Disp: , Rfl: Physical Exam:   /70 (BP Location: Left arm, Patient Position: Sitting, Cuff Size: Standard)   Pulse 64   Ht 5' 11" (1.803 m)   Wt 98.4 kg (217 lb)   SpO2 96%   BMI 30.27 kg/m²   GEN: no acute distress    RESP: breathing comfortably with no accessory muscle use    ABD: soft, non-tender, non-distended   INCISION:    EXT: no significant peripheral edema   (Male): Penis circumcised, phallus normal, meatus patent. Testicles descended into scrotum bilaterally. Some residual induration on the right. No inguinal hernias bilaterally. RADIOLOGY:   IMPRESSION:     New 5.8 x 3.5 x 6.1 cm right-sided extratesticular mass without color flow. No signs of torsion orchitis or epididymitis. Assessment:   #1. Right hydrocelectomy  #2.   BPH    Plan:   -Follow-up in 6 months reassess symptoms and for annual BPH follow-up and exam  -  -  -

## 2023-12-05 NOTE — TELEPHONE ENCOUNTER
Cut metoprolol in half  If taking lasix, stop this until blood pressure improves  Make sure adequately hydrated 
Pt called stating he was seen at his PCP today and had some lightheadedness with a headache and his BP was 98/60 so they advised him to make you aware  No other complaints noted at this time  Please advise 
Spoke with patient and he is agreeable to 0 5 tablet, is not taking lasix at this time but does admit to not hydrating enough so he will work on this  He states he has a BP cuff at home so he will make changes, monitor BP and call us with an update or further issues 
,

## 2023-12-26 DIAGNOSIS — N40.1 BENIGN PROSTATIC HYPERPLASIA WITH URINARY FREQUENCY: ICD-10-CM

## 2023-12-26 DIAGNOSIS — R35.0 BENIGN PROSTATIC HYPERPLASIA WITH URINARY FREQUENCY: ICD-10-CM

## 2023-12-30 RX ORDER — TAMSULOSIN HYDROCHLORIDE 0.4 MG/1
CAPSULE ORAL
Qty: 30 CAPSULE | Refills: 6 | Status: SHIPPED | OUTPATIENT
Start: 2023-12-30

## 2024-01-22 ENCOUNTER — TELEPHONE (OUTPATIENT)
Dept: ENDOCRINOLOGY | Facility: CLINIC | Age: 74
End: 2024-01-22

## 2024-01-22 NOTE — TELEPHONE ENCOUNTER
Patient called he has enough januvia to last until Thursday due to cost he will be going through [facility].  So he will be out for a few weeks until comes in.  He is taking Metformin and Jardiance.  He wanted to know if he will be ok until the medication comes.  Please advise

## 2024-01-22 NOTE — TELEPHONE ENCOUNTER
Patient notified    Ruslan Caldwell is a 43yoM who underwent open right hemicolectomy on 5/29/23 who represents with what appears to be a small bowel obstruction.     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

## 2024-02-05 ENCOUNTER — TELEPHONE (OUTPATIENT)
Age: 74
End: 2024-02-05

## 2024-02-07 ENCOUNTER — APPOINTMENT (OUTPATIENT)
Dept: LAB | Facility: AMBULARY SURGERY CENTER | Age: 74
End: 2024-02-07
Payer: MEDICARE

## 2024-02-07 LAB
ANION GAP SERPL CALCULATED.3IONS-SCNC: 9 MMOL/L
BUN SERPL-MCNC: 28 MG/DL (ref 5–25)
CALCIUM SERPL-MCNC: 9.4 MG/DL (ref 8.4–10.2)
CHLORIDE SERPL-SCNC: 103 MMOL/L (ref 96–108)
CO2 SERPL-SCNC: 26 MMOL/L (ref 21–32)
CREAT SERPL-MCNC: 1.04 MG/DL (ref 0.6–1.3)
EST. AVERAGE GLUCOSE BLD GHB EST-MCNC: 197 MG/DL
GFR SERPL CREATININE-BSD FRML MDRD: 69 ML/MIN/1.73SQ M
GLUCOSE P FAST SERPL-MCNC: 197 MG/DL (ref 65–99)
HBA1C MFR BLD: 8.5 %
POTASSIUM SERPL-SCNC: 3.9 MMOL/L (ref 3.5–5.3)
SODIUM SERPL-SCNC: 138 MMOL/L (ref 135–147)

## 2024-02-08 ENCOUNTER — TELEPHONE (OUTPATIENT)
Dept: ENDOCRINOLOGY | Facility: CLINIC | Age: 74
End: 2024-02-08

## 2024-02-08 DIAGNOSIS — E53.8 VITAMIN B 12 DEFICIENCY: Primary | ICD-10-CM

## 2024-02-08 NOTE — TELEPHONE ENCOUNTER
Patient called stating he got his blood work done but there were no labs for Vitamin B-12 and wasn't sure if he needed that done before his next appointment

## 2024-02-13 ENCOUNTER — TELEMEDICINE (OUTPATIENT)
Dept: ENDOCRINOLOGY | Facility: CLINIC | Age: 74
End: 2024-02-13
Payer: MEDICARE

## 2024-02-13 DIAGNOSIS — Z86.31 H/O DIABETIC FOOT ULCER: ICD-10-CM

## 2024-02-13 DIAGNOSIS — E53.8 VITAMIN B 12 DEFICIENCY: ICD-10-CM

## 2024-02-13 DIAGNOSIS — Z95.1 S/P CABG (CORONARY ARTERY BYPASS GRAFT): ICD-10-CM

## 2024-02-13 DIAGNOSIS — E11.42 DIABETIC POLYNEUROPATHY ASSOCIATED WITH TYPE 2 DIABETES MELLITUS (HCC): ICD-10-CM

## 2024-02-13 DIAGNOSIS — I10 ESSENTIAL HYPERTENSION: ICD-10-CM

## 2024-02-13 DIAGNOSIS — E11.65 TYPE 2 DIABETES MELLITUS WITH HYPERGLYCEMIA, WITHOUT LONG-TERM CURRENT USE OF INSULIN (HCC): Primary | ICD-10-CM

## 2024-02-13 PROCEDURE — 99214 OFFICE O/P EST MOD 30 MIN: CPT | Performed by: INTERNAL MEDICINE

## 2024-02-13 RX ORDER — REPAGLINIDE 0.5 MG/1
TABLET ORAL
Qty: 360 TABLET | Refills: 1 | Status: SHIPPED | OUTPATIENT
Start: 2024-02-13

## 2024-02-13 NOTE — PROGRESS NOTES
Jus Chavez 73 y.o. male MRN: [id number]    Encounter: [id number]      Assessment/Plan     Assessment:  This is a 73 y.o.-year-old male with diabetes with hyperglycemia.    Plan:    Diagnoses and all orders for this visit:    Type 2 diabetes mellitus with hyperglycemia, without long-term current use of insulin ([facility])    Lab Results   Component Value Date    HGBA1C 8.5 (H) 02/07/2024   Hemoglobin A1c is uncontrolled  Restart Januvia 100 mg daily, increase Prandin to 0.5 mg with breakfast and lunch and take 1 mg with dinner  Continue metformin, 1000 mg twice a day  Continue Jardiance 25 mg daily  Discussed to check blood sugar twice daily , goal for blood sugar is 100 to 180  mg per DL and keep log and send it in 1 month  Discussed hypoglycemia symptoms and treatment  -     repaglinide (PRANDIN) 0.5 mg tablet; Take one tablet with breakfast, lunch and 2 tablets with dinner  -     sitaGLIPtin (JANUVIA) 100 mg tablet; Take 1 tablet (100 mg total) by mouth daily  -     Hemoglobin A1C; Future  -     Basic metabolic panel; Future    Essential hypertension  Blood pressure is usually well-controlled.  Continue current management as per PCP  S/P CABG (coronary artery bypass graft)  Managed by cardiology, currently on Jardiance  H/O diabetic foot ulcer  Discussed to follow-up with podiatry  Diabetic polyneuropathy associated with type 2 diabetes mellitus (HCC)  Followed by podiatry  Vitamin B 12 deficiency  Reduce  vitamin B12 supplementation, 1000 mcg 3 times daily  -     Vitamin B12; Future         CC: Diabetes    History of Present Illness     HPI:    Jus Chavez  is 73 yr old woman with Type 2 DM, Hypertension, Hyperlipidemia is here for follow up .  Diabetes course is stable   Current medications -metformin 1000 mg twice a day, Jardiance 25 mg daily, Prandin 0.5 mg with dinner.  Patient has not been taking Januvia for the last 2 weeks as he did not have supply for Januvia.  He checks blood sugars once or twice  daily and his blood sugars usually ranging in 180 to 250 mg per DL range usually blood sugars are elevated during the day.  He denies excessive thirst, excessive urination, unhealing wound, shortness of breath or chest pain  He complains of tingling and numbness in his extremities, was started on vitamin B12 supplementation as vitamin B12 was low currently taking vitamin B12 1000 mcg daily     Review of Systems   Constitutional:  Negative for activity change, diaphoresis, fatigue, fever and unexpected weight change.   HENT: Negative.     Eyes:  Negative for visual disturbance.   Respiratory:  Negative for cough, chest tightness and shortness of breath.    Cardiovascular:  Negative for chest pain, palpitations and leg swelling.   Gastrointestinal:  Negative for abdominal pain, blood in stool, constipation, diarrhea, nausea and vomiting.   Endocrine: Negative for cold intolerance, heat intolerance, polydipsia, polyphagia and polyuria.   Genitourinary:  Negative for dysuria, enuresis, frequency and urgency.   Musculoskeletal:  Negative for arthralgias and myalgias.   Skin:  Negative for pallor, rash and wound.   Allergic/Immunologic: Negative.    Neurological:  Negative for dizziness, tremors, weakness and numbness.   Hematological: Negative.    Psychiatric/Behavioral: Negative.         Historical Information   Past Medical History:   Diagnosis Date    Acute systolic heart failure (HCC) 02/23/2021    Athlete's foot     Bilateral impacted cerumen 03/09/2021    Coronary artery disease 07/30/2019    bypass and valve replacement    Diabetes mellitus (HCC)     Heart murmur     Other hydrocele 06/11/2020    TMJ (dislocation of temporomandibular joint)     Yeast infection      Past Surgical History:   Procedure Laterality Date    CARDIAC SURGERY      CARDIAC VALVE REPLACEMENT      CATARACT EXTRACTION      COLONOSCOPY      CORONARY ARTERY BYPASS GRAFT      EYE SURGERY  03/2020    Cataract right eye    EYE SURGERY Left  12/03/2020    left eye cataract     HYDROCELE EXCISION / REPAIR      CA EXCISION HYDROCELE UNILATERAL Right 04/08/2022    Procedure: HYDROCELECTOMY;  Surgeon: Montrell Mancuso MD;  Location: [facility];  Service: Urology    CA EXCISION HYDROCELE UNILATERAL Right 11/16/2022    Procedure: HYDROCELECTOMY;  Surgeon: Montrell Mancuso MD;  Location: [facility];  Service: Urology    TONSILLECTOMY       Social History   Social History     Substance and Sexual Activity   Alcohol Use Yes    Alcohol/week: 4.0 standard drinks of alcohol    Types: 2 Glasses of wine, 1 Cans of beer, 1 Shots of liquor per week    Comment: 2-3 glass of wine or  beer a week     Social History     Substance and Sexual Activity   Drug Use Never     Social History     Tobacco Use   Smoking Status Never   Smokeless Tobacco Never     Family History:   Family History   Problem Relation Age of Onset    Heart disease Mother     Cancer Mother         skin melonoma/mastectomy    Heart disease Father     Hypertension Father     Mitral valve prolapse Sister     Diabetes type II Maternal Grandmother     Colon cancer Neg Hx        Meds/Allergies   Current Outpatient Medications   Medication Sig Dispense Refill    repaglinide (PRANDIN) 0.5 mg tablet Take one tablet with breakfast, lunch and 2 tablets with dinner 360 tablet 1    sitaGLIPtin (JANUVIA) 100 mg tablet Take 1 tablet (100 mg total) by mouth daily      acetaminophen (TYLENOL) 500 mg tablet Take 500 mg by mouth daily at bedtime      ascorbic acid (VITAMIN C) 250 mg tablet Take 500 mg by mouth 2 (two) times a day      Aspirin 81 MG CAPS Take 1 capsule every day by oral route.      Cholecalciferol 125 MCG (5000 UT) TABS Take by oral route. (Patient not taking: Reported on 10/4/2023)      Cyanocobalamin (VITAMIN B-12 PO) Take 1 tablet by mouth 3 (three) times a day      finasteride (PROSCAR) 5 mg tablet Take 1 tablet (5 mg total) by mouth daily 30 tablet 11    furosemide (LASIX) 20 mg tablet  TAKE ONE TABLET BY MOUTH EVERY DAY AS NEEDED FOR EDEMA 30 tablet 5    Ginger, Zingiber officinalis, (Ginger Root) 500 MG CAPS       Jardiance 25 MG TABS Take 1 tablet (25 mg total) by mouth daily 90 tablet 1    metFORMIN (GLUCOPHAGE) 500 mg tablet Take 2 tablets (1,000 mg total) by mouth 2 (two) times a day 360 tablet 3    metoprolol succinate (TOPROL-XL) 25 mg 24 hr tablet Take 1 tablet (25 mg total) by mouth daily 90 tablet 3    niacin 500 mg tablet Take 500 mg by mouth 4 (four) times a day      NON FORMULARY 25 mcg Vitamin D      Omega-3 Fatty Acids (FISH OIL BURP-LESS PO) Take by mouth      PROBIOTIC PRODUCT PO Take 111 mg by mouth daily      rosuvastatin (CRESTOR) 10 MG tablet Take 1 tablet (10 mg total) by mouth daily 90 tablet 3    tamsulosin (FLOMAX) 0.4 mg TAKE ONE CAPSULE BY MOUTH EVERY DAY WITH DINNER 30 capsule 6    Tart Cherry (Tart Cherry Ultra) 1200 MG CAPS       Turmeric 500 MG CAPS       Wheat Dextrin (BENEFIBER DRINK MIX PO) Take by mouth       No current facility-administered medications for this visit.     No Known Allergies    Objective   Vitals: There were no vitals taken for this visit.    Physical Exam  Constitutional:       General: He is not in acute distress.     Appearance: Normal appearance. He is not ill-appearing.   HENT:      Head: Normocephalic and atraumatic.      Nose: Nose normal.   Eyes:      Extraocular Movements: Extraocular movements intact.      Conjunctiva/sclera: Conjunctivae normal.   Pulmonary:      Effort: No respiratory distress.   Musculoskeletal:      Cervical back: Normal range of motion.   Neurological:      General: No focal deficit present.      Mental Status: He is alert and oriented to person, place, and time.   Psychiatric:         Mood and Affect: Mood normal.         Behavior: Behavior normal.         The history was obtained from the review of the chart, patient.    Lab Results:   Lab Results   Component Value Date/Time    Hemoglobin A1C 8.5 (H) 02/07/2024  "07:48 AM    Hemoglobin A1C 8.1 (H) 09/27/2023 07:55 AM    Hemoglobin A1C 7.0 (H) 05/22/2023 08:28 AM    WBC 7.77 08/11/2023 06:00 PM    Hemoglobin 15.0 08/11/2023 06:00 PM    Hematocrit 44.6 08/11/2023 06:00 PM    MCV 91 08/11/2023 06:00 PM    Platelets 175 08/11/2023 06:00 PM    BUN 28 (H) 02/07/2024 07:48 AM    BUN 26 (H) 09/27/2023 07:55 AM    BUN 29 (H) 08/11/2023 06:00 PM    Potassium 3.9 02/07/2024 07:48 AM    Potassium 4.2 09/27/2023 07:55 AM    Potassium 4.7 08/11/2023 06:00 PM    Chloride 103 02/07/2024 07:48 AM    Chloride 106 09/27/2023 07:55 AM    Chloride 103 08/11/2023 06:00 PM    CO2 26 02/07/2024 07:48 AM    CO2 29 09/27/2023 07:55 AM    CO2 27 08/11/2023 06:00 PM    Creatinine 1.04 02/07/2024 07:48 AM    Creatinine 1.04 09/27/2023 07:55 AM    Creatinine 1.08 08/11/2023 06:00 PM    AST 15 08/11/2023 06:00 PM    ALT 14 08/11/2023 06:00 PM    Total Protein 7.9 08/11/2023 06:00 PM    Albumin 4.7 08/11/2023 06:00 PM           Imaging Studies: I have personally reviewed pertinent reports.      Portions of the record may have been created with voice recognition software. Occasional wrong word or \"sound a like\" substitutions may have occurred due to the inherent limitations of voice recognition software. Read the chart carefully and recognize, using context, where substitutions have occurred.    "

## 2024-02-19 NOTE — PROGRESS NOTES
Virtual Regular Visit    Verification of patient location:    Patient is located at Home in the following state in which I hold an active license [place]      Assessment/Plan:    Diagnoses and all orders for this visit:    Type 2 diabetes mellitus with hyperglycemia, without long-term current use of insulin ([facility])    Lab Results   Component Value Date    HGBA1C 8.5 (H) 02/07/2024   A1c is 8.5%.  Blood sugars are elevated in 200 to 250 mg per DL range  Increase Prandin to 0.5 mg, 1 tablet with breakfast, 1 tablet with lunch and take 2 tablets with dinner  Start Januvia 100 mg daily  Discussed to check blood sugar twice daily and goal for blood sugar is 80 to 180 mg per DL.  Educated about hypoglycemia symptoms and treatment    -     repaglinide (PRANDIN) 0.5 mg tablet; Take one tablet with breakfast, lunch and 2 tablets with dinner  -     sitaGLIPtin (JANUVIA) 100 mg tablet; Take 1 tablet (100 mg total) by mouth daily  -     Hemoglobin A1C; Future  -     Basic metabolic panel; Future    Essential hypertension  Blood Pressure is well-controlled, continue current management  S/P CABG (coronary artery bypass graft)  Patient is status post CABG, currently followed by cardiology    H/O diabetic foot ulcer  Follow with podiatry  Diabetic polyneuropathy associated with type 2 diabetes mellitus (HCC)  Follow-up with podiatry, discussed the importance of footcare  Vitamin B 12 deficiency  Take Vitamin B12 supplementation 1000 mcg every other day  -     Vitamin B12; Future         Problem List Items Addressed This Visit          Endocrine    DM (diabetes mellitus), type 2 ([facility]) - Primary    Relevant Medications    repaglinide (PRANDIN) 0.5 mg tablet    sitaGLIPtin (JANUVIA) 100 mg tablet    Other Relevant Orders    Hemoglobin A1C    Basic metabolic panel       Cardiovascular and Mediastinum    Essential hypertension       Other    S/P CABG (coronary artery bypass graft)    H/O diabetic foot ulcer     Other Visit Diagnoses        Diabetic polyneuropathy associated with type 2 diabetes mellitus (HCC)        Relevant Medications    repaglinide (PRANDIN) 0.5 mg tablet    sitaGLIPtin (JANUVIA) 100 mg tablet    Vitamin B 12 deficiency        Relevant Orders    Vitamin B12                 Reason for visit is No chief complaint on file.       Encounter provider Aaron Bailon MD    Provider located at [facility] [place]  [address] [place] [id number]      Recent Visits  Date Type Provider Dept   02/13/24 Telemedicine Aaron Bailon MD [facility] [place]   Showing recent visits within past 7 days and meeting all other requirements  Future Appointments  No visits were found meeting these conditions.  Showing future appointments within next 150 days and meeting all other requirements       The patient was identified by name and date of birth. Jus Chavez was informed that this is a telemedicine visit and that the visit is being conducted through the pbsi platform. He agrees to proceed..  My office door was closed. No one else was in the room.  He acknowledged consent and understanding of privacy and security of the video platform. The patient has agreed to participate and understands they can discontinue the visit at any time.    Patient is aware this is a billable service.     Subjective  Jus Chavez is a 73 y.o. male   with Type 2 DM, Hypertension, Hyperlipidemia is here for follow up .  Diabetes course is stable     Current medication include metformin 1000 mg twice a day, Jardiance 25 mg daily, Prandin 0.5 mg with dinner.  Patient has not been taking Januvia for last 2 weeks as he did not have supply for Januvia.  He checks blood sugars once or twice daily and blood sugars are usually in 180 to 250 mg per DL range.  He denies excessive thirst, excessive urination, unhealing wound, shortness of breath or chest pain.  As per  wife he sometimes eat candies which can elevate blood sugars.  He was found to have low vitamin B12 and currently taking vitamin B12 1000 mcg daily      Lab Results   Component Value Date    HGBA1C 8.5 (H) 02/07/2024     Lab Results   Component Value Date    CREATININE 1.04 02/07/2024                  Past Medical History:   Diagnosis Date    Acute systolic heart failure (HCC) 02/23/2021    Athlete's foot     Bilateral impacted cerumen 03/09/2021    Coronary artery disease 07/30/2019    bypass and valve replacement    Diabetes mellitus (HCC)     Heart murmur     Other hydrocele 06/11/2020    TMJ (dislocation of temporomandibular joint)     Yeast infection        Past Surgical History:   Procedure Laterality Date    CARDIAC SURGERY      CARDIAC VALVE REPLACEMENT      CATARACT EXTRACTION      COLONOSCOPY      CORONARY ARTERY BYPASS GRAFT      EYE SURGERY  03/2020    Cataract right eye    EYE SURGERY Left 12/03/2020    left eye cataract     HYDROCELE EXCISION / REPAIR      MI EXCISION HYDROCELE UNILATERAL Right 04/08/2022    Procedure: HYDROCELECTOMY;  Surgeon: Montrell Mancuso MD;  Location: [facility];  Service: Urology    MI EXCISION HYDROCELE UNILATERAL Right 11/16/2022    Procedure: HYDROCELECTOMY;  Surgeon: Montrell Mancuso MD;  Location: [facility];  Service: Urology    TONSILLECTOMY         Current Outpatient Medications   Medication Sig Dispense Refill    repaglinide (PRANDIN) 0.5 mg tablet Take one tablet with breakfast, lunch and 2 tablets with dinner 360 tablet 1    sitaGLIPtin (JANUVIA) 100 mg tablet Take 1 tablet (100 mg total) by mouth daily      acetaminophen (TYLENOL) 500 mg tablet Take 500 mg by mouth daily at bedtime      ascorbic acid (VITAMIN C) 250 mg tablet Take 500 mg by mouth 2 (two) times a day      Aspirin 81 MG CAPS Take 1 capsule every day by oral route.      Cholecalciferol 125 MCG (5000 UT) TABS Take by oral route. (Patient not taking: Reported on 10/4/2023)       Cyanocobalamin (VITAMIN B-12 PO) Take 1 tablet by mouth 3 (three) times a day      finasteride (PROSCAR) 5 mg tablet Take 1 tablet (5 mg total) by mouth daily 30 tablet 11    furosemide (LASIX) 20 mg tablet TAKE ONE TABLET BY MOUTH EVERY DAY AS NEEDED FOR EDEMA 30 tablet 5    Ginger, Zingiber officinalis, (Ginger Root) 500 MG CAPS       Jardiance 25 MG TABS Take 1 tablet (25 mg total) by mouth daily 90 tablet 1    metFORMIN (GLUCOPHAGE) 500 mg tablet Take 2 tablets (1,000 mg total) by mouth 2 (two) times a day 360 tablet 3    metoprolol succinate (TOPROL-XL) 25 mg 24 hr tablet Take 1 tablet (25 mg total) by mouth daily 90 tablet 3    niacin 500 mg tablet Take 500 mg by mouth 4 (four) times a day      NON FORMULARY 25 mcg Vitamin D      Omega-3 Fatty Acids (FISH OIL BURP-LESS PO) Take by mouth      PROBIOTIC PRODUCT PO Take 111 mg by mouth daily      rosuvastatin (CRESTOR) 10 MG tablet Take 1 tablet (10 mg total) by mouth daily 90 tablet 3    tamsulosin (FLOMAX) 0.4 mg TAKE ONE CAPSULE BY MOUTH EVERY DAY WITH DINNER 30 capsule 6    Tart Cherry (Tart Cherry Ultra) 1200 MG CAPS       Turmeric 500 MG CAPS       Wheat Dextrin (BENEFIBER DRINK MIX PO) Take by mouth       No current facility-administered medications for this visit.        No Known Allergies    Review of Systems   Constitutional:  Negative for activity change, diaphoresis, fatigue, fever and unexpected weight change.   HENT: Negative.     Eyes:  Negative for visual disturbance.   Respiratory:  Negative for cough, chest tightness and shortness of breath.    Cardiovascular:  Negative for chest pain, palpitations and leg swelling.   Gastrointestinal:  Negative for abdominal pain, blood in stool, constipation, diarrhea, nausea and vomiting.   Endocrine: Negative for cold intolerance, heat intolerance, polydipsia, polyphagia and polyuria.   Genitourinary:  Negative for dysuria, enuresis, frequency and urgency.   Musculoskeletal:  Negative for arthralgias and  myalgias.   Skin:  Negative for pallor, rash and wound.   Allergic/Immunologic: Negative.    Neurological:  Negative for dizziness, tremors, weakness and numbness.   Hematological: Negative.    Psychiatric/Behavioral: Negative.         Video Exam    There were no vitals filed for this visit.    Physical Exam  Constitutional:       General: He is not in acute distress.     Appearance: Normal appearance. He is not ill-appearing.   HENT:      Head: Normocephalic and atraumatic.      Nose: Nose normal.   Eyes:      Extraocular Movements: Extraocular movements intact.      Conjunctiva/sclera: Conjunctivae normal.   Pulmonary:      Effort: No respiratory distress.   Musculoskeletal:      Cervical back: Normal range of motion.   Neurological:      General: No focal deficit present.      Mental Status: He is alert and oriented to person, place, and time.   Psychiatric:         Mood and Affect: Mood normal.         Behavior: Behavior normal.          Visit Time  Total Visit Duration: 20 minutes

## 2024-03-07 DIAGNOSIS — E11.65 TYPE 2 DIABETES MELLITUS WITH HYPERGLYCEMIA, WITHOUT LONG-TERM CURRENT USE OF INSULIN (HCC): ICD-10-CM

## 2024-03-07 RX ORDER — REPAGLINIDE 0.5 MG/1
TABLET ORAL
Qty: 90 TABLET | Refills: 1 | Status: SHIPPED | OUTPATIENT
Start: 2024-03-07

## 2024-03-10 ENCOUNTER — RA CDI HCC (OUTPATIENT)
Dept: OTHER | Facility: HOSPITAL | Age: 74
End: 2024-03-10

## 2024-03-16 DIAGNOSIS — I25.118 CORONARY ARTERY DISEASE OF NATIVE ARTERY OF NATIVE HEART WITH STABLE ANGINA PECTORIS (HCC): ICD-10-CM

## 2024-03-18 ENCOUNTER — OFFICE VISIT (OUTPATIENT)
Dept: FAMILY MEDICINE CLINIC | Facility: CLINIC | Age: 74
End: 2024-03-18
Payer: MEDICARE

## 2024-03-18 VITALS
RESPIRATION RATE: 17 BRPM | OXYGEN SATURATION: 94 % | HEART RATE: 66 BPM | HEIGHT: 74 IN | DIASTOLIC BLOOD PRESSURE: 60 MMHG | BODY MASS INDEX: 27.59 KG/M2 | TEMPERATURE: 95.8 F | SYSTOLIC BLOOD PRESSURE: 120 MMHG | WEIGHT: 215 LBS

## 2024-03-18 DIAGNOSIS — I77.1 STENOSIS OF RIGHT SUBCLAVIAN ARTERY (HCC): ICD-10-CM

## 2024-03-18 DIAGNOSIS — E11.65 TYPE 2 DIABETES MELLITUS WITH HYPERGLYCEMIA, WITHOUT LONG-TERM CURRENT USE OF INSULIN (HCC): Primary | ICD-10-CM

## 2024-03-18 DIAGNOSIS — Z00.00 MEDICARE ANNUAL WELLNESS VISIT, SUBSEQUENT: ICD-10-CM

## 2024-03-18 DIAGNOSIS — I48.91 POSTOPERATIVE ATRIAL FIBRILLATION (HCC): ICD-10-CM

## 2024-03-18 DIAGNOSIS — E78.2 MIXED HYPERLIPIDEMIA: ICD-10-CM

## 2024-03-18 DIAGNOSIS — I10 ESSENTIAL HYPERTENSION: ICD-10-CM

## 2024-03-18 DIAGNOSIS — I97.89 POSTOPERATIVE ATRIAL FIBRILLATION (HCC): ICD-10-CM

## 2024-03-18 DIAGNOSIS — K58.1 IRRITABLE BOWEL SYNDROME WITH CONSTIPATION: ICD-10-CM

## 2024-03-18 DIAGNOSIS — E11.42 DIABETIC PERIPHERAL NEUROPATHY ASSOCIATED WITH TYPE 2 DIABETES MELLITUS (HCC): ICD-10-CM

## 2024-03-18 DIAGNOSIS — I25.118 CORONARY ARTERY DISEASE OF NATIVE ARTERY OF NATIVE HEART WITH STABLE ANGINA PECTORIS (HCC): ICD-10-CM

## 2024-03-18 PROCEDURE — 99214 OFFICE O/P EST MOD 30 MIN: CPT | Performed by: NURSE PRACTITIONER

## 2024-03-18 PROCEDURE — G0439 PPPS, SUBSEQ VISIT: HCPCS | Performed by: NURSE PRACTITIONER

## 2024-03-18 RX ORDER — ROSUVASTATIN CALCIUM 10 MG/1
10 TABLET, COATED ORAL DAILY
Qty: 90 TABLET | Refills: 3 | Status: SHIPPED | OUTPATIENT
Start: 2024-03-18

## 2024-03-18 RX ORDER — CHLORAL HYDRATE 500 MG
CAPSULE ORAL 4 TIMES DAILY
COMMUNITY

## 2024-03-18 NOTE — PATIENT INSTRUCTIONS
Medicare Preventive Visit Patient Instructions  Thank you for completing your Welcome to Medicare Visit or Medicare Annual Wellness Visit today. Your next wellness visit will be due in one year (3/19/2025).  The screening/preventive services that you may require over the next 5-10 years are detailed below. Some tests may not apply to you based off risk factors and/or age. Screening tests ordered at today's visit but not completed yet may show as past due. Also, please note that scanned in results may not display below.  Preventive Screenings:  Service Recommendations Previous Testing/Comments   Colorectal Cancer Screening  Colonoscopy    Fecal Occult Blood Test (FOBT)/Fecal Immunochemical Test (FIT)  Fecal DNA/Cologuard Test  Flexible Sigmoidoscopy Age: 45-75 years old   Colonoscopy: every 10 years (May be performed more frequently if at higher risk)  OR  FOBT/FIT: every 1 year  OR  Cologuard: every 3 years  OR  Sigmoidoscopy: every 5 years  Screening may be recommended earlier than age 45 if at higher risk for colorectal cancer. Also, an individualized decision between you and your healthcare provider will decide whether screening between the ages of 76-85 would be appropriate. Colonoscopy: 03/03/2022  FOBT/FIT: Not on file  Cologuard: Not on file  Sigmoidoscopy: Not on file    Screening Current     Prostate Cancer Screening Individualized decision between patient and health care provider in men between ages of 55-69   Medicare will cover every 12 months beginning on the day after your 50th birthday PSA: 0.3 ng/mL     Screening Not Indicated     Hepatitis C Screening Once for adults born between 1945 and 1965  More frequently in patients at high risk for Hepatitis C Hep C Antibody: Not on file    Screening Current   Diabetes Screening 1-2 times per year if you're at risk for diabetes or have pre-diabetes Fasting glucose: 197 mg/dL (2/7/2024)  A1C: 8.5 % (2/7/2024)  Screening Not Indicated  History Diabetes    Cholesterol Screening Once every 5 years if you don't have a lipid disorder. May order more often based on risk factors. Lipid panel: 01/16/2023  Screening Not Indicated  History Lipid Disorder      Other Preventive Screenings Covered by Medicare:  Abdominal Aortic Aneurysm (AAA) Screening: covered once if your at risk. You're considered to be at risk if you have a family history of AAA or a male between the age of 65-75 who smoking at least 100 cigarettes in your lifetime.  Lung Cancer Screening: covers low dose CT scan once per year if you meet all of the following conditions: (1) Age 55-77; (2) No signs or symptoms of lung cancer; (3) Current smoker or have quit smoking within the last 15 years; (4) You have a tobacco smoking history of at least 20 pack years (packs per day x number of years you smoked); (5) You get a written order from a healthcare provider.  Glaucoma Screening: covered annually if you're considered high risk: (1) You have diabetes OR (2) Family history of glaucoma OR (3)  aged 50 and older OR (4)  American aged 65 and older  Osteoporosis Screening: covered every 2 years if you meet one of the following conditions: (1) Have a vertebral abnormality; (2) On glucocorticoid therapy for more than 3 months; (3) Have primary hyperparathyroidism; (4) On osteoporosis medications and need to assess response to drug therapy.  HIV Screening: covered annually if you're between the age of 15-65. Also covered annually if you are younger than 15 and older than 65 with risk factors for HIV infection. For pregnant patients, it is covered up to 3 times per pregnancy.    Immunizations:  Immunization Recommendations   Influenza Vaccine Annual influenza vaccination during flu season is recommended for all persons aged >= 6 months who do not have contraindications   Pneumococcal Vaccine   * Pneumococcal conjugate vaccine = PCV13 (Prevnar 13), PCV15 (Vaxneuvance), PCV20 (Prevnar 20)  *  Pneumococcal polysaccharide vaccine = PPSV23 (Pneumovax) Adults 19-65 yo with certain risk factors or if 65+ yo  If never received any pneumonia vaccine: recommend Prevnar 20 (PCV20)  Give PCV20 if previously received 1 dose of PCV13 or PPSV23   Hepatitis B Vaccine 3 dose series if at intermediate or high risk (ex: diabetes, end stage renal disease, liver disease)   Respiratory syncytial virus (RSV) Vaccine - COVERED BY MEDICARE PART D  * RSVPreF3 (Arexvy) CDC recommends that adults 60 years of age and older may receive a single dose of RSV vaccine using shared clinical decision-making (SCDM)   Tetanus (Td) Vaccine - COST NOT COVERED BY MEDICARE PART B Following completion of primary series, a booster dose should be given every 10 years to maintain immunity against tetanus. Td may also be given as tetanus wound prophylaxis.   Tdap Vaccine - COST NOT COVERED BY MEDICARE PART B Recommended at least once for all adults. For pregnant patients, recommended with each pregnancy.   Shingles Vaccine (Shingrix) - COST NOT COVERED BY MEDICARE PART B  2 shot series recommended in those 19 years and older who have or will have weakened immune systems or those 50 years and older     Health Maintenance Due:      Topic Date Due   • Hepatitis C Screening  08/15/2024 (Originally 1950)   • Colorectal Cancer Screening  03/02/2027     Immunizations Due:      Topic Date Due   • COVID-19 Vaccine (6 - 2023-24 season) 01/09/2024     Advance Directives   What are advance directives?  Advance directives are legal documents that state your wishes and plans for medical care. These plans are made ahead of time in case you lose your ability to make decisions for yourself. Advance directives can apply to any medical decision, such as the treatments you want, and if you want to donate organs.   What are the types of advance directives?  There are many types of advance directives, and each state has rules about how to use them. You may choose a  combination of any of the following:  Living will:  This is a written record of the treatment you want. You can also choose which treatments you do not want, which to limit, and which to stop at a certain time. This includes surgery, medicine, IV fluid, and tube feedings.   Durable power of  for healthcare (DPAHC):  This is a written record that states who you want to make healthcare choices for you when you are unable to make them for yourself. This person, called a proxy, is usually a family member or a friend. You may choose more than 1 proxy.  Do not resuscitate (DNR) order:  A DNR order is used in case your heart stops beating or you stop breathing. It is a request not to have certain forms of treatment, such as CPR. A DNR order may be included in other types of advance directives.  Medical directive:  This covers the care that you want if you are in a coma, near death, or unable to make decisions for yourself. You can list the treatments you want for each condition. Treatment may include pain medicine, surgery, blood transfusions, dialysis, IV or tube feedings, and a ventilator (breathing machine).  Values history:  This document has questions about your views, beliefs, and how you feel and think about life. This information can help others choose the care that you would choose.  Why are advance directives important?  An advance directive helps you control your care. Although spoken wishes may be used, it is better to have your wishes written down. Spoken wishes can be misunderstood, or not followed. Treatments may be given even if you do not want them. An advance directive may make it easier for your family to make difficult choices about your care.   Weight Management   Why it is important to manage your weight:  Being overweight increases your risk of health conditions such as heart disease, high blood pressure, type 2 diabetes, and certain types of cancer. It can also increase your risk for  osteoarthritis, sleep apnea, and other respiratory problems. Aim for a slow, steady weight loss. Even a small amount of weight loss can lower your risk of health problems.  How to lose weight safely:  A safe and healthy way to lose weight is to eat fewer calories and get regular exercise. You can lose up about 1 pound a week by decreasing the number of calories you eat by 500 calories each day.   Healthy meal plan for weight management:  A healthy meal plan includes a variety of foods, contains fewer calories, and helps you stay healthy. A healthy meal plan includes the following:  Eat whole-grain foods more often.  A healthy meal plan should contain fiber. Fiber is the part of grains, fruits, and vegetables that is not broken down by your body. Whole-grain foods are healthy and provide extra fiber in your diet. Some examples of whole-grain foods are whole-wheat breads and pastas, oatmeal, brown rice, and bulgur.  Eat a variety of vegetables every day.  Include dark, leafy greens such as spinach, kale, eduardo greens, and mustard greens. Eat yellow and orange vegetables such as carrots, sweet potatoes, and winter squash.   Eat a variety of fruits every day.  Choose fresh or canned fruit (canned in its own juice or light syrup) instead of juice. Fruit juice has very little or no fiber.  Eat low-fat dairy foods.  Drink fat-free (skim) milk or 1% milk. Eat fat-free yogurt and low-fat cottage cheese. Try low-fat cheeses such as mozzarella and other reduced-fat cheeses.  Choose meat and other protein foods that are low in fat.  Choose beans or other legumes such as split peas or lentils. Choose fish, skinless poultry (chicken or turkey), or lean cuts of red meat (beef or pork). Before you cook meat or poultry, cut off any visible fat.   Use less fat and oil.  Try baking foods instead of frying them. Add less fat, such as margarine, sour cream, regular salad dressing and mayonnaise to foods. Eat fewer high-fat foods. Some  examples of high-fat foods include french fries, doughnuts, ice cream, and cakes.  Eat fewer sweets.  Limit foods and drinks that are high in sugar. This includes candy, cookies, regular soda, and sweetened drinks.  Exercise:  Exercise at least 30 minutes per day on most days of the week. Some examples of exercise include walking, biking, dancing, and swimming. You can also fit in more physical activity by taking the stairs instead of the elevator or parking farther away from stores. Ask your healthcare provider about the best exercise plan for you.      © Copyright [employer] 2018 Information is for End User's use only and may not be sold, redistributed or otherwise used for commercial purposes. All illustrations and images included in CareNotes® are the copyrighted property of [employer] or [employer]    Type 2 Diabetes Management for Adults   AMBULATORY CARE:   Type 2 diabetes  is a disease that affects how your body uses glucose (sugar). Either your body cannot make enough insulin, or it cannot use the insulin correctly. It is important to keep diabetes controlled to prevent damage to your heart, blood vessels, and other organs. Management will help you feel well and enjoy your daily activities. Your diabetes care team providers can help you make a plan to fit diabetes care into your schedule. Your plan can change over time to fit your needs and your family's needs.       Have someone call your local emergency number (911 in the [place]) if:   You cannot be woken.    You have signs of diabetic ketoacidosis:     confusion, fatigue    vomiting    rapid heartbeat    fruity smelling breath    extreme thirst    dry mouth and skin    You have any of the following signs of a heart attack:      Squeezing, pressure, or pain in your chest    You may  also have any of the following:     Discomfort or pain in your back, neck, jaw, stomach, or arm    Shortness of breath    Nausea or vomiting    Lightheadedness or  a sudden cold sweat    You have any of the following signs of a stroke:      Numbness or drooping on one side of your face     Weakness in an arm or leg    Confusion or difficulty speaking    Dizziness, a severe headache, or vision loss    Call your doctor or diabetes care team provider if:   You have a sore or wound that will not heal.    You have a change in the amount you urinate.    Your blood sugar levels are higher than your target goals.    You often have lower blood sugar levels than your target goals.    Your skin is red, dry, warm, or swollen.    You have trouble coping with diabetes, or you feel anxious or depressed.    You have trouble following any part of your care plan, such as your meal plan.    You have questions or concerns about your condition or care.    What you need to know about high blood sugar levels:  High blood sugar levels may not cause any symptoms. You may feel more thirsty or urinate more often than usual. Over time, high blood sugar levels can damage your nerves, blood vessels, tissues, and organs. The following can increase your blood sugar levels:  Large meals or large amounts of carbohydrates at one time    Less physical activity    Stress    Illness    A lower dose of diabetes medicine or insulin, or a late dose    What you need to know about low blood sugar levels:  Symptoms include feeling shaky, dizzy, irritable, or confused. You can prevent symptoms by keeping your blood sugar levels from going too low.  Treat a low blood sugar level right away:      Drink 4 ounces of juice or have 1 tube of glucose gel.    Check your blood sugar level again 10 to 15 minutes later.    When the level goes back to normal, eat a meal or snack to prevent another decrease.       Keep glucose gel, raisins, or hard candy with you at all times to treat a low blood sugar level.     Your blood sugar level can get too low if you take diabetes medicine or insulin and do not eat enough food.     If you use  insulin, check your blood sugar level before you exercise.      If your blood sugar level is below 100 mg/dL, eat 4 crackers or 2 ounces of raisins, or drink 4 ounces of juice.    Check your level every 30 minutes if you exercise longer than 1 hour.    You may need a snack during or after exercise.    What you can do to manage your blood sugar levels:   Check your blood sugar levels as directed and as needed.  Several items are available to use to check your levels. You may need to check by testing a drop of blood in a glucose monitor. You may instead be given a continuous glucose monitoring (CGM) device. The device is worn at all times. The CGM checks your blood sugar level every 5 minutes. It sends results to an electronic device such as a smart phone. A CGM can be used with or without an insulin pump. You and your diabetes care team providers will decide on the best method for you. The goal for blood sugar levels before meals  is between 80 and 130 mg/dL and 2 hours after eating  is lower than 180 mg/dL.            Make healthy food choices.  Work with a dietitian to create a meal plan that works for you and your schedule. A dietitian can help you learn how to eat the right amount of carbohydrates (sugar and starchy foods) during your meals and snacks. Examples of carbohydrates are breads, cereals, rice, pasta, fruit, low-fat dairy, and sweets. Carbohydrates can raise your blood sugar level if you eat too many at one time.         Eat high-fiber foods as directed.  Fiber helps improve blood sugar levels. Fiber also lowers your risk for heart disease and other problems diabetes can cause. Examples of high-fiber foods include vegetables, whole-grain bread, and beans such as schreiber beans. Your dietitian can tell you how much fiber to have each day.         Get regular physical activity.  Physical activity can help you get to your target blood sugar level goal and manage your weight. Get at least 150 minutes of  moderate to vigorous aerobic physical activity each week. Resistance training, such as lifting weights, should be done 3 times each week. Do not miss more than 2 days of physical activity in a row. Do not sit longer than 30 minutes at a time. Your healthcare provider can help you create an activity plan. The plan can include the best activities for you and can help you build your strength and endurance.            Maintain a healthy weight.  Ask your team what a healthy weight is for you. A healthy weight can help you control diabetes and prevent heart disease. Ask your team to help you create a weight-loss plan, if needed. Even a loss of 3% to 7% of your excess body weight can help make a difference in managing diabetes. Your team will help you set a weight-loss goal, such as 10 to 15 pounds, or 5% of your extra weight. Together you and your team can set manageable weight-loss goals.    Take your diabetes medicine or insulin as directed.  You may need diabetes medicine, insulin, or both to help control your blood sugar levels. Your healthcare provider will teach you how and when to take your diabetes medicine or insulin. You will also be taught about side effects oral diabetes medicine can cause. Insulin may be injected or given through a pump or pen. You and your providers will decide on the best method for you:    An insulin pump  is an implanted device that gives your insulin 24 hours a day. An insulin pump prevents the need for multiple insulin injections in a day.         An insulin pen  is a device prefilled with the right amount of insulin.         You and your family members will be taught how to draw up and give insulin  if this is the best method for you. Your providers will also teach you how to dispose of needles and syringes.    You will learn how much insulin you need  and when to give it. You will be taught when not to give insulin. You will also be taught what to do if your blood sugar level drops  too low. This may happen if you take insulin and do not eat the right amount of carbohydrates.    More ways to manage type 2 diabetes:   Wear medical alert identification.  Wear medical alert jewelry or carry a card that says you have diabetes. Ask your provider where to get these items.         Do not smoke.  Nicotine and other chemicals in cigarettes and cigars can cause lung and blood vessel damage. It also makes it more difficult to manage your diabetes. Ask your provider for information if you currently smoke and need help to quit. Do not use e-cigarettes or smokeless tobacco in place of cigarettes or to help you quit. They still contain nicotine.    Check your feet each day for cuts, scratches, calluses, or other wounds.  Look for redness and swelling, and feel for warmth. Wear shoes that fit well. Check your shoes for rocks or other objects that can hurt your feet. Do not walk barefoot or wear shoes without socks. Wear cotton socks to help keep your feet dry.         Ask about vaccines you may need.  You have a higher risk for serious illness if you get the flu, pneumonia, COVID-19, or hepatitis. Ask your provider if you should get vaccines to prevent these or other diseases, and when to get the vaccines.    Talk to your provider if you become stressed about diabetes care.  Sometimes being able to fit diabetes care into your life can cause increased stress. The stress can cause you not to take care of yourself properly. Your provider can help by offering tips about self-care. A mental health provider can listen and offer help with self-care issues. Other types of counseling can help you make nutrition or physical activity changes.    Have your A1c checked as directed.  Your provider may check your A1c every 3 months, or 2 times each year if your diabetes is controlled. An A1c test shows the average amount of sugar in your blood over the past 2 to 3 months. Your provider will tell you what your A1c level  should be.    Have screening tests as directed.  Your provider may recommend screening for complications of diabetes and other conditions that may develop. Some screenings may begin right away and some may happen within the first 5 years of diagnosis:    Examples of diabetes complications  include kidney problems, high cholesterol, high blood pressure, blood vessel problems, eye problems, and sleep apnea.    You may be screened for a low vitamin B level  if you take oral diabetes medicine for a long time.    You may be screened for polycystic ovarian syndrome (PCOS)  if you are of childbearing age.    Follow up with your doctor or diabetes care team providers as directed:  You may need to have blood tests done before your follow-up visit. The test results will show if changes need to be made in your treatment or self-care. Talk to your provider if you cannot afford your medicine. Write down your questions so you remember to ask them during your visits.  © Copyright [employer] 2023 Information is for End User's use only and may not be sold, redistributed or otherwise used for commercial purposes.  The above information is an  only. It is not intended as medical advice for individual conditions or treatments. Talk to your doctor, nurse or pharmacist before following any medical regimen to see if it is safe and effective for you.

## 2024-03-18 NOTE — PROGRESS NOTES
Assessment and Plan:     Problem List Items Addressed This Visit          Cardiovascular and Mediastinum    Stenosis of right subclavian artery (HCC)    Postoperative atrial fibrillation (HCC)    Relevant Orders    CBC and differential    Lipid Panel with Direct LDL reflex    ALBUMIN, RANDOM URINE W/CREATININE    TSH, 3rd generation with Free T4 reflex    Essential hypertension    Relevant Orders    CBC and differential    Lipid Panel with Direct LDL reflex    ALBUMIN, RANDOM URINE W/CREATININE    TSH, 3rd generation with Free T4 reflex    Coronary artery disease of native artery of native heart with stable angina pectoris (HCC)    Relevant Orders    CBC and differential    Lipid Panel with Direct LDL reflex    ALBUMIN, RANDOM URINE W/CREATININE    TSH, 3rd generation with Free T4 reflex       Digestive    IBS (irritable bowel syndrome)    Relevant Orders    CBC and differential    Lipid Panel with Direct LDL reflex    ALBUMIN, RANDOM URINE W/CREATININE    TSH, 3rd generation with Free T4 reflex       Endocrine    DM (diabetes mellitus), type 2 (HCC) - Primary    Relevant Orders    CBC and differential    Lipid Panel with Direct LDL reflex    ALBUMIN, RANDOM URINE W/CREATININE    TSH, 3rd generation with Free T4 reflex    Albumin / creatinine urine ratio    Diabetic peripheral neuropathy associated with type 2 diabetes mellitus (HCC)       Other    Mixed hyperlipidemia    Relevant Orders    CBC and differential    Lipid Panel with Direct LDL reflex    ALBUMIN, RANDOM URINE W/CREATININE    TSH, 3rd generation with Free T4 reflex    Medicare annual wellness visit, subsequent       Depression Screening and Follow-up Plan: Patient was screened for depression during today's encounter. They screened negative with a PHQ-2 score of 0.      Preventive health issues were discussed with patient, and age appropriate screening tests were ordered as noted in patient's After Visit Summary.  Personalized health advice and  appropriate referrals for health education or preventive services given if needed, as noted in patient's After Visit Summary.     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[employer]  Started with [employer] as well  Working with [facility] for his diabetes management  Following with podiatry for tinea pedis  Using cream to try to help  To see Dr Castillo on Wednesday for gerd         Patient Care Team:  HESHAM Matias as PCP - General (Family Medicine)  Montrell Mancuso MD as Consulting Physician (Urology)  Aaron Bailon MD as Consulting Physician (Endocrinology)  Kendall Miller MD as Consulting Physician (Cardiology)  Magalys Calix MD as Consulting Physician (Ophthalmology)  Demian Owen DPM as Consulting Physician (Podiatry)  Jonelle Bunch DC as Consulting Physician (Chiropractic Medicine)     Review of Systems:     Review of Systems   Constitutional:  Positive for fatigue. Negative for fever.   HENT:  Negative for congestion, postnasal drip and rhinorrhea.    Eyes:  Negative for photophobia and visual disturbance.   Respiratory:  Negative for cough, shortness of breath and wheezing.    Cardiovascular:  Negative for chest pain and palpitations.   Gastrointestinal:  Negative for constipation, diarrhea, nausea and vomiting.   Genitourinary:  Negative for dysuria and frequency.   Musculoskeletal:  Negative for arthralgias and myalgias.   Skin:  Negative for rash.   Neurological:  Negative for dizziness, light-headedness and headaches.   Hematological:  Negative for adenopathy.   Psychiatric/Behavioral:  Negative for dysphoric mood and sleep disturbance. The patient is not nervous/anxious.         Problem List:     Patient Active Problem List   Diagnosis    Aortic stenosis    Coronary artery disease of native artery of native heart with stable angina  pectoris (HCC)    DM (diabetes mellitus), type 2 ([facility])    Essential hypertension    Hypertriglyceridemia    S/P CABG (coronary artery bypass graft)    S/P AVR    PVC (premature ventricular contraction)    Bilateral carotid artery stenosis    Mixed hyperlipidemia    Postoperative atrial fibrillation ([facility])    Benign prostatic hyperplasia with urinary frequency    Stenosis of right subclavian artery ([facility])    IBS (irritable bowel syndrome)    Medicare annual wellness visit, subsequent    H/O diabetic foot ulcer    Diabetic peripheral neuropathy associated with type 2 diabetes mellitus ([facility])    Nonintractable headache    Dizziness    Instability of foot joint    Peripheral venous insufficiency      Past Medical and Surgical History:     Past Medical History:   Diagnosis Date    Acute systolic heart failure (HCC) 02/23/2021    Athlete's foot     Bilateral impacted cerumen 03/09/2021    Coronary artery disease 07/30/2019    bypass and valve replacement    Diabetes mellitus ([facility])     Heart murmur     Other hydrocele 06/11/2020    TMJ (dislocation of temporomandibular joint)     Yeast infection      Past Surgical History:   Procedure Laterality Date    CARDIAC SURGERY      CARDIAC VALVE REPLACEMENT      CATARACT EXTRACTION      COLONOSCOPY      CORONARY ARTERY BYPASS GRAFT      EYE SURGERY  03/2020    Cataract right eye    EYE SURGERY Left 12/03/2020    left eye cataract     HYDROCELE EXCISION / REPAIR      WA EXCISION HYDROCELE UNILATERAL Right 04/08/2022    Procedure: HYDROCELECTOMY;  Surgeon: Montrell Mancuso MD;  [facility] MAIN OR;  Service: Urology    WA EXCISION HYDROCELE UNILATERAL Right 11/16/2022    Procedure: HYDROCELECTOMY;  Surgeon: Montrell Mancuso MD;  Location: [facility] MAIN OR;  Service: Urology    TONSILLECTOMY        Family History:     Family History   Problem Relation Age of Onset    Heart disease Mother     Cancer Mother         skin melonoma/mastectomy    Heart disease Father      Hypertension Father     Mitral valve prolapse Sister     Diabetes type II Maternal Grandmother     Colon cancer Neg Hx       Social History:     Social History     Socioeconomic History    Marital status: /Civil Union     Spouse name: None    Number of children: None    Years of education: None    Highest education level: None   Occupational History    None   Tobacco Use    Smoking status: Never    Smokeless tobacco: Never   Vaping Use    Vaping status: Never Used   Substance and Sexual Activity    Alcohol use: Yes     Alcohol/week: 4.0 standard drinks of alcohol     Types: 2 Glasses of wine, 1 Cans of beer, 1 Shots of liquor per week     Comment: 2-3 glass of wine or  beer a week    Drug use: Never    Sexual activity: Not Currently     Partners: Female     Birth control/protection: None   Other Topics Concern    None   Social History Narrative    None     Social Determinants of Health     Financial Resource Strain: Low Risk  (3/11/2024)    Overall Financial Resource Strain (CARDIA)     Difficulty of Paying Living Expenses: Not very hard   Food Insecurity: No Food Insecurity (3/18/2024)    Hunger Vital Sign     Worried About Running Out of Food in the Last Year: Never true     Ran Out of Food in the Last Year: Never true   Transportation Needs: No Transportation Needs (3/18/2024)    PRAPARE - Transportation     Lack of Transportation (Medical): No     Lack of Transportation (Non-Medical): No   Physical Activity: Inactive (7/26/2019)    Received from [facility]    Exercise Vital Sign     Days of Exercise per Week: 0 days     Minutes of Exercise per Session: 0 min   Stress: Not on file   Social Connections: Not on file   Intimate Partner Violence: Not on file   Housing Stability: Unknown (3/18/2024)    Housing Stability Vital Sign     Unable to Pay for Housing in the Last Year: No     Number of Places Lived in the Last Year: Not on file     Unstable Housing in the Last Year: No       Medications and Allergies:     Current Outpatient Medications   Medication Sig Dispense Refill    acetaminophen (TYLENOL) 500 mg tablet Take 500 mg by mouth daily at bedtime      ascorbic acid (VITAMIN C) 250 mg tablet Take 500 mg by mouth 2 (two) times a day      Aspirin 81 MG CAPS Take 1 capsule every day by oral route.      ciclopirox (LOPROX) 0.77 % cream       Cyanocobalamin (VITAMIN B-12 PO) Take 1 tablet by mouth 3 (three) times a day      finasteride (PROSCAR) 5 mg tablet Take 1 tablet (5 mg total) by mouth daily 30 tablet 11    furosemide (LASIX) 20 mg tablet TAKE ONE TABLET BY MOUTH EVERY DAY AS NEEDED FOR EDEMA 30 tablet 5    Ginger, Zingiber officinalis, (Ginger Root) 500 MG CAPS       Jardiance 25 MG TABS Take 1 tablet (25 mg total) by mouth daily 90 tablet 1    metFORMIN (GLUCOPHAGE) 500 mg tablet Take 2 tablets (1,000 mg total) by mouth 2 (two) times a day 360 tablet 3    metoprolol succinate (TOPROL-XL) 25 mg 24 hr tablet Take 1 tablet (25 mg total) by mouth daily 90 tablet 3    niacin 500 mg tablet Take 500 mg by mouth 4 (four) times a day      NON FORMULARY 25 mcg Vitamin D      Omega-3 1000 MG CAPS 4 (four) times a day      PROBIOTIC PRODUCT PO Take 111 mg by mouth daily      repaglinide (PRANDIN) 0.5 mg tablet TAKE ONE TABLET BY MOUTH EVERY DAY BEFORE DINNER 90 tablet 1    sitaGLIPtin (JANUVIA) 100 mg tablet Take 1 tablet (100 mg total) by mouth daily      tamsulosin (FLOMAX) 0.4 mg TAKE ONE CAPSULE BY MOUTH EVERY DAY WITH DINNER 30 capsule 6    Tart Cherry (Tart Cherry Ultra) 1200 MG CAPS       Turmeric 500 MG CAPS       Wheat Dextrin (BENEFIBER DRINK MIX PO) Take by mouth      Cholecalciferol 125 MCG (5000 UT) TABS Take by oral route. (Patient not taking: Reported on 10/4/2023)      Omega-3 Fatty Acids (FISH OIL BURP-LESS PO) Take by mouth (Patient not taking: Reported on 3/18/2024)      rosuvastatin (CRESTOR) 10 MG tablet TAKE ONE TABLET BY MOUTH ONCE DAILY 90 tablet 3     No current  facility-administered medications for this visit.     No Known Allergies   Immunizations:     Immunization History   Administered Date(s) Administered    COVID-19 PFIZER VACCINE 0.3 ML IM 03/07/2021, 03/26/2021, 10/07/2021    COVID-19 Pfizer Vac BIVALENT Rubio-sucrose 12 Yr+ IM 10/31/2022    COVID-19 Pfizer mRNA vacc PF rubio-sucrose 12 yr and older (Comirnaty) 11/14/2023    INFLUENZA 10/31/2022    Influenza, high dose seasonal 0.7 mL 10/27/2021, 10/14/2023    Pneumococcal Conjugate Vaccine 20-valent (Pcv20), Polysace 11/17/2023    Pneumococcal Polysaccharide PPV23 10/27/2021    Zoster Vaccine Recombinant 03/21/2023, 05/22/2023      Health Maintenance:         Topic Date Due    Hepatitis C Screening  08/15/2024 (Originally 1950)    Colorectal Cancer Screening  03/02/2027         Topic Date Due    COVID-19 Vaccine (6 - 2023-24 season) 01/09/2024      Medicare Screening Tests and Risk Assessments:     Jus is here for his Subsequent Wellness visit. Last Medicare Wellness visit information reviewed, patient interviewed and updates made to the record today.      Health Risk Assessment:   Patient rates overall health as good. Patient feels that their physical health rating is same. Patient is satisfied with their life. Eyesight was rated as same. Hearing was rated as same. Patient feels that their emotional and mental health rating is slightly better. Patients states they are never, rarely angry. Patient states they are often unusually tired/fatigued. Pain experienced in the last 7 days has been some. Patient's pain rating has been 4/10. Patient states that he has experienced weight loss or gain in last 6 months.     Depression Screening:   PHQ-2 Score: 0      Fall Risk Screening:   In the past year, patient has experienced: no history of falling in past year      Home Safety:  Patient does not have trouble with stairs inside or outside of their home. Patient has working smoke alarms and has working carbon monoxide  detector. Home safety hazards include: none.     Nutrition:   Current diet is Diabetic.     Medications:   Patient is currently taking over-the-counter supplements. OTC medications include: see medication list. Patient is able to manage medications.     Activities of Daily Living (ADLs)/Instrumental Activities of Daily Living (IADLs):   Walk and transfer into and out of bed and chair?: Yes  Dress and groom yourself?: Yes    Bathe or shower yourself?: Yes    Feed yourself? Yes  Do your laundry/housekeeping?: Yes  Manage your money, pay your bills and track your expenses?: Yes  Make your own meals?: Yes    Do your own shopping?: Yes    Previous Hospitalizations:   Any hospitalizations or ED visits within the last 12 months?: Yes    How many hospitalizations have you had in the last year?: 1-2    Advance Care Planning:   Living will: Yes    Durable POA for healthcare: Yes    Advanced directive: Yes      Cognitive Screening:   Provider or family/friend/caregiver concerned regarding cognition?: No    PREVENTIVE SCREENINGS      Cardiovascular Screening:    General: Screening Not Indicated and History Lipid Disorder      Diabetes Screening:     General: Screening Not Indicated and History Diabetes      Colorectal Cancer Screening:     General: Screening Current      Prostate Cancer Screening:    General: Screening Not Indicated      Osteoporosis Screening:    General: Screening Not Indicated      Abdominal Aortic Aneurysm (AAA) Screening:    Risk factors include: age between 65-74 yo        General: Screening Not Indicated      Lung Cancer Screening:     General: Screening Not Indicated      Hepatitis C Screening:    General: Screening Current    Screening, Brief Intervention, and Referral to Treatment (SBIRT)    Screening  Typical number of drinks in a day: 1  Typical number of drinks in a week: 5  Interpretation: Low risk drinking behavior.    AUDIT-C Screenin) How often did you have a drink containing alcohol in  "the past year? 2 to 3 times a week  2) How many drinks did you have on a typical day when you were drinking in the past year? 1 to 2  3) How often did you have 6 or more drinks on one occasion in the past year? never    AUDIT-C Score: 3  Interpretation: Score 0-3 (male): Negative screen for alcohol misuse    Single Item Drug Screening:  How often have you used an illegal drug (including marijuana) or a prescription medication for non-medical reasons in the past year? never    Single Item Drug Screen Score: 0  Interpretation: Negative screen for possible drug use disorder    Brief Intervention  Alcohol & drug use screenings were reviewed. No concerns regarding substance use disorder identified.     Other Counseling Topics:   Car/seat belt/driving safety, skin self-exam, sunscreen and calcium and vitamin D intake and regular weightbearing exercise.     No results found.     Physical Exam:     /60 (BP Location: Left arm, Patient Position: Sitting, Cuff Size: Large)   Pulse 66   Temp (!) 95.8 °F (35.4 °C) (Tympanic)   Resp 17   Ht 6' 1.5\" (1.867 m)   Wt 97.5 kg (215 lb)   SpO2 94%   BMI 27.98 kg/m²     Physical Exam  Vitals and nursing note reviewed.   Constitutional:       Appearance: Normal appearance.   HENT:      Head: Normocephalic and atraumatic.      Right Ear: Tympanic membrane, ear canal and external ear normal.      Left Ear: Tympanic membrane, ear canal and external ear normal.      Nose: Nose normal.      Mouth/Throat:      Mouth: Mucous membranes are moist.   Eyes:      Conjunctiva/sclera: Conjunctivae normal.   Cardiovascular:      Rate and Rhythm: Normal rate and regular rhythm.      Heart sounds: Murmur heard.      Systolic murmur is present with a grade of 3/6.   Pulmonary:      Effort: Pulmonary effort is normal.      Breath sounds: Normal breath sounds.   Abdominal:      General: Bowel sounds are normal.   Musculoskeletal:         General: Normal range of motion.      Cervical back: " Normal range of motion and neck supple.      Right lower leg: Edema present.      Left lower leg: Edema present.   Skin:     General: Skin is warm.      Capillary Refill: Capillary refill takes less than 2 seconds.   Neurological:      General: No focal deficit present.      Mental Status: He is alert and oriented to person, place, and time.   Psychiatric:         Mood and Affect: Mood normal.         Behavior: Behavior normal.         Thought Content: Thought content normal.         Judgment: Judgment normal.          HESHAM Matias

## 2024-03-20 ENCOUNTER — OFFICE VISIT (OUTPATIENT)
Dept: GASTROENTEROLOGY | Facility: AMBULARY SURGERY CENTER | Age: 74
End: 2024-03-20
Payer: MEDICARE

## 2024-03-20 VITALS
SYSTOLIC BLOOD PRESSURE: 124 MMHG | HEART RATE: 68 BPM | BODY MASS INDEX: 27.77 KG/M2 | OXYGEN SATURATION: 96 % | HEIGHT: 74 IN | WEIGHT: 216.4 LBS | DIASTOLIC BLOOD PRESSURE: 64 MMHG

## 2024-03-20 DIAGNOSIS — K58.9 IRRITABLE BOWEL SYNDROME, UNSPECIFIED TYPE: ICD-10-CM

## 2024-03-20 DIAGNOSIS — K64.9 HEMORRHOIDS, UNSPECIFIED HEMORRHOID TYPE: ICD-10-CM

## 2024-03-20 DIAGNOSIS — K62.89 ANAL IRRITATION: Primary | ICD-10-CM

## 2024-03-20 PROCEDURE — 99204 OFFICE O/P NEW MOD 45 MIN: CPT | Performed by: INTERNAL MEDICINE

## 2024-03-20 NOTE — PATIENT INSTRUCTIONS
Constipation   WHAT YOU NEED TO KNOW:   Constipation means you have hard, dry bowel movements, or you go longer than usual between bowel movements.      Medicines:   Medicine  such as a laxative may help relax and loosen your intestines to help you have a bowel movement. These medications are safe and help your bowels to move regularly to improve your symptoms.    Take your medicine as directed.  Contact your healthcare provider if you think your medicine is not helping or if you have side effects. Tell him of her if you are allergic to any medicine. Keep a list of the medicines, vitamins, and herbs you take. Include the amounts, and when and why you take them. Bring the list or the pill bottles to follow-up visits. Carry your medicine list with you in case of an emergency.      Prevent constipation:   Drink liquids as directed.  You may need to drink extra liquids to help soften and move your bowels. Ask how much liquid to drink each day and which liquids are best for you.    Eat high-fiber foods.  This may help decrease constipation by adding bulk to your bowel movements. High-fiber foods include fruit, vegetables, whole-grain breads and cereals, and beans. Your healthcare provider or dietitian can help you create a high-fiber meal plan. Your provider may also recommend a fiber supplement if you cannot get enough fiber from food.         Exercise regularly.  Regular physical activity can help stimulate your intestines. Walking is a good exercise to prevent or relieve constipation. Ask which exercises are best for you.         Talk to your healthcare provider about your medicines.  Certain medicines, such as opioids, can cause constipation. Your provider may be able to make medicine changes. For example, he or she may change the kind of medicine, or change when you take it.    Follow up with your doctor as directed:  Write down your questions so you remember to ask them during your visits.   © Copyright [employer] 2021 Information is for End User's use only and may not be sold, redistributed or otherwise used for commercial purposes. All illustrations and images included in CareNotes® are the copyrighted property of [employer] or [employer]  The above information is an  only. It is not intended as medical advice for individual conditions or treatments. Talk to your doctor, nurse or pharmacist before following any medical regimen to see if it is safe and effective for you.      What is constipation?  Constipation happens when fecal material (stool) moves through the large bowel (colon) too slowly. The fluid portion of the stool is absorbed back into the body, so the stool becomes hard and dry. This makes it difficult to pass the stool.    What causes constipation?  Poor nutrition, inadequate sleep, limited exercise, anxiety, emotional stress and age may cause constipation. Certain disease also can cause constipation, and are usually associated with a sudden change in bowel habits, pain, weight loss, fatigue or bloody stools. Contact your doctor if you experience these symptoms. Some medications cause constipation - talk to your doctor if you think your medications are causing constipation.    Can eating more fiber help with constipation?  Yes. Fiber is the part of plant food that is not digested. There are two kinds of fiber: soluble and insoluble. Soluble fiber gives stool bulk. Foods that are good sources of soluble fiber include apples, bananas, barley, oats, and beans. Insoluble fiber helps speed up the transit of food in the digestive tract and helps prevent constipation. Good sources of insoluble fiber include whole grains, most vegetables, wheat bran, and legumes. Foods that have fiber contain both soluble and insoluble fibers. A good goal for dietary fiber is a total of about 20 to 30 grams each day.    GUIDELINES TO TREAT CONSTIPATION    Nutrition  Eat three meals each day. Do not  "skip meals.  Gradually increase the amount of high-fiber foods in your diet.  Choose more whole grain breads, cereals and rice.  Select more raw fruits and vegetables -- eat the peel, if appropriate.  Read food labels and look for the \"dietary fiber\" content of foods. Good sources have 2 grams of fiber or more.  Drink six to eight glasses of water each day.  Limit highly refined and processed foods.    Exercise and Sleep  Exercise regularly. Try to do weight-bearing exercise, such as walking, three or more times each week.  Go to sleep at a regular time each night. Make sure you get enough sleep.    Stress and Anxiety  Try to limit stress in your life.  Go for a short walk when you feel anxiety or stress increasing.    [facility] medical specialists have reviewed this information. It is for educational purposes only and is not intended to replace the advice of your doctor or other health care provider. We encourage you to discuss any questions or concerns you may have with your provider.    MEDICATIONS I RECOMMEND OVER THE COUNTER:    For your bowel habits, as we discussed during today's visit,  - I would recommend starting psyllium, fiber supplementation.  This can be done with use of Metamucil or Benefiber fiber, which can be purchased over-the-counter.  I would recommend starting slow with 1 tsp mixed into a large glass of water, once a day, and increasing to goal of 1 tbsp mixed into a large glass of water per day.  - this helps with both diarrhea and constipation, helping to bulk the stool, and should not cause constipation or diarrhea, but treat both  - the only side effect of this can be bloating, if this occurs, cut down on the dose, and increase your water intake    If no benefit with the benefiber, can add miralax to keep stools soft-    You have been recommended miralax (polyethylene glycol) for treatment of your CONSTIPATION.    Start 1 capful daily. Take this dose x 3 days. If your symptoms are " improved, great! Continue this dose daily.    If you have diarrhea (stools are loose, associated with accidents, or urgency) with this dose, cut down to 1/2 capful daily. Continue to titrate down until you find the dose for you. This might be 1 tbsp daily. Wait 3 days before making a change.    If you have continued constipation with 1 capful daily, you may need a higher dose. Start with 1.5 capfuls daily and titrate upward. Eg might need 1 capful twice daily. There is no maximum dose, whatever works for you. Again, wait 3 days before making a change.    - gas-x (simethicone) or BEANO over the counter for symptoms of bloating/flatulence/gas/belching

## 2024-03-20 NOTE — PROGRESS NOTES
[facility] - Outpatient Consultation  Jus Chavez 73 y.o. male MRN: [id number]  Encounter: [id number]      PCP: HESHAM Matias  Referring: No referring provider defined for this encounter.      ASSESSMENT AND PLAN:      1. Anal irritation  2. Hemorrhoids, unspecified hemorrhoid type  3. Irritable bowel syndrome, unspecified type  Chronic symptoms on going > 12 months with recent exacerbation, presents for medical management  - discussed / reassured regarding recent colonoscopy results  - recommend repeat colonoscopy in 2027 per previous subcentimeter tubular adenoma of the colon  Internal hemorrhoids noted on previous colonoscopy  - recommend optimization of bowel habits with avoidance of constipation to avoid anal irritation/leakage  Recommend continued psyllium powder fiber with addition of miralax  Consider repeat colonoscopy if symptoms not improved  ______________________________________________________________________    CC:  Chief Complaint   Patient presents with    Follow-up     New patient for Dr. Castillo.  Transferring care. Continues to have uncomfortable stomach feeling occasionally.        HPI:      Patient is a 73-year-old male referred for abdominal pain.  He has IBS, stenosis of the right subclavian artery, CAD s/p CABG and AVR, HLD, T2DM with peripheral neuropathy HbA1c 8.5% on Januvia, BPH. He is present with his wife at today's visit, who contributes to history.  He complains of persistent anal irritation, for which he uses Benefiber.  He takes 1 tablespoon mixed into a glass of water several times a day.  Uses Vaseline frequently to help with irritation.  Both of these are somewhat effective.  He denies any rectal bleeding.  Does note when he has constipation his irritation worsens.  He is concerned about his last colonoscopy findings.  He denies any significant abdominal pain, and reports left upper sided abdominal pain which occurs less than once a week  and does not affect his quality of life.    Colonoscopy 2022- subcentimeter tubular adenoma of the sigmoid colon; diverticulosis      Abdominal Pain  This is a chronic problem. The current episode started more than 1 year ago. The onset quality is sudden. The problem occurs every several days. The most recent episode lasted 6 hours. The problem has been gradually worsening. The pain is located in the LUQ. The pain is at a severity of 5/10. The quality of the pain is cramping. The abdominal pain radiates to the pelvis and perineum. Associated symptoms include diarrhea, flatus and frequency. Pertinent negatives include no anorexia, arthralgias, belching, constipation, dysuria, fever, headaches, hematochezia, hematuria, melena, myalgias, nausea, vomiting or weight loss. The pain is aggravated by bowel movement. The pain is relieved by Passing flatus. Prior diagnostic workup includes lower endoscopy.         I personally reviewed external reports.    REVIEW OF SYSTEMS:    CONSTITUTIONAL: Denies any fever, chills, rigors, and weight loss.  HEENT: No earache or tinnitus. Denies hearing loss or visual disturbances.  CARDIOVASCULAR: No chest pain or palpitations.   RESPIRATORY: Denies any cough, hemoptysis, shortness of breath or dyspnea on exertion.  GASTROINTESTINAL: As noted in the History of Present Illness.   GENITOURINARY: No problems with urination. Denies any hematuria or dysuria.  NEUROLOGIC: No dizziness or vertigo, denies headaches.   MUSCULOSKELETAL: Denies any muscle or joint pain.   SKIN: Denies skin rashes or itching.   ENDOCRINE: Denies excessive thirst. Denies intolerance to heat or cold.  PSYCHOSOCIAL: Denies depression or anxiety. Denies any recent memory loss.       Historical Information   Past Medical History:   Diagnosis Date    Acute systolic heart failure (HCC) 02/23/2021    Athlete's foot     Bilateral impacted cerumen 03/09/2021    Coronary artery disease 07/30/2019    bypass and valve replacement     Diabetes mellitus (HCC)     Heart murmur     Other hydrocele 06/11/2020    TMJ (dislocation of temporomandibular joint)     Yeast infection      Past Surgical History:   Procedure Laterality Date    CARDIAC SURGERY      CARDIAC VALVE REPLACEMENT      CATARACT EXTRACTION      COLONOSCOPY      CORONARY ARTERY BYPASS GRAFT      EYE SURGERY  03/2020    Cataract right eye    EYE SURGERY Left 12/03/2020    left eye cataract     HYDROCELE EXCISION / REPAIR      LA EXCISION HYDROCELE UNILATERAL Right 04/08/2022    Procedure: HYDROCELECTOMY;  Surgeon: Montrell Mancuso MD;  Location: [facility];  Service: Urology    LA EXCISION HYDROCELE UNILATERAL Right 11/16/2022    Procedure: HYDROCELECTOMY;  Surgeon: Montrell Mancuso MD;  Location: [facility];  Service: Urology    TONSILLECTOMY       Social History   Social History     Substance and Sexual Activity   Alcohol Use Yes    Alcohol/week: 4.0 standard drinks of alcohol    Types: 2 Glasses of wine, 1 Cans of beer, 1 Shots of liquor per week    Comment: 2-3 glass of wine or  beer a week     Social History     Substance and Sexual Activity   Drug Use Never     Social History     Tobacco Use   Smoking Status Never   Smokeless Tobacco Never     Family History   Problem Relation Age of Onset    Heart disease Mother     Cancer Mother         skin melonoma/mastectomy    Heart disease Father     Hypertension Father     Mitral valve prolapse Sister     Diabetes type II Maternal Grandmother     Colon cancer Neg Hx        Meds/Allergies       Current Outpatient Medications:     acetaminophen (TYLENOL) 500 mg tablet    ascorbic acid (VITAMIN C) 250 mg tablet    Aspirin 81 MG CAPS    Cholecalciferol 125 MCG (5000 UT) TABS    ciclopirox (LOPROX) 0.77 % cream    Cyanocobalamin (VITAMIN B-12 PO)    finasteride (PROSCAR) 5 mg tablet    furosemide (LASIX) 20 mg tablet    Norma, Zingiber officinalis, (Norma Root) 500 MG CAPS    Jardiance 25 MG TABS    metFORMIN (GLUCOPHAGE)  "500 mg tablet    metoprolol succinate (TOPROL-XL) 25 mg 24 hr tablet    niacin 500 mg tablet    NON FORMULARY    Omega-3 1000 MG CAPS    Omega-3 Fatty Acids (FISH OIL BURP-LESS PO)    PROBIOTIC PRODUCT PO    repaglinide (PRANDIN) 0.5 mg tablet    rosuvastatin (CRESTOR) 10 MG tablet    sitaGLIPtin (JANUVIA) 100 mg tablet    tamsulosin (FLOMAX) 0.4 mg    Tart Cherry (Tart Cherry Ultra) 1200 MG CAPS    Turmeric 500 MG CAPS    Wheat Dextrin (BENEFIBER DRINK MIX PO)    No Known Allergies        Objective     Blood pressure 124/64, pulse 68, height 6' 1.5\" (1.867 m), weight 98.2 kg (216 lb 6.4 oz), SpO2 96%. Body mass index is 28.16 kg/m².     PHYSICAL EXAM:      General Appearance:   Alert, cooperative, no distress   HEENT:   Normocephalic, atraumatic, anicteric.     Neck:  Supple, symmetrical, trachea midline   Lungs:   Clear to auscultation bilaterally; no rales, rhonchi or wheezing; respirations unlabored    Heart::   Regular rate and rhythm; no murmur, rub, or gallop.   Abdomen:   Soft, non-tender, non-distended; normal bowel sounds; no masses, no organomegaly    Genitalia:   Deferred    Rectal:   Deferred    Extremities:  No cyanosis, clubbing or edema    Pulses:  2+ and symmetric    Skin:  No jaundice, rashes, or lesions    Lymph nodes:  No palpable cervical lymphadenopathy        Lab Results:     Lab Results   Component Value Date    WBC 7.77 08/11/2023    HGB 15.0 08/11/2023    HCT 44.6 08/11/2023    MCV 91 08/11/2023     08/11/2023       Lab Results   Component Value Date    K 3.9 02/07/2024     02/07/2024    CO2 26 02/07/2024    BUN 28 (H) 02/07/2024    CREATININE 1.04 02/07/2024    GLUF 197 (H) 02/07/2024    CALCIUM 9.4 02/07/2024    AST 15 08/11/2023    ALT 14 08/11/2023    ALKPHOS 68 08/11/2023    EGFR 69 02/07/2024       Lab Results   Component Value Date    INR 2.3 (H) 08/06/2019    INR 2.1 (H) 08/05/2019    INR 1.7 (H) 08/04/2019       I personally reviewed relevant labs    Radiology Results: " "    Portions of the record may have been created with voice recognition software. Occasional wrong word or \"sound a like\" substitutions may have occurred due to the inherent limitations of voice recognition software. Read the chart carefully and recognize, using context, where substitutions have occurred.        "

## 2024-03-27 ENCOUNTER — OFFICE VISIT (OUTPATIENT)
Dept: UROLOGY | Facility: CLINIC | Age: 74
End: 2024-03-27
Payer: MEDICARE

## 2024-03-27 VITALS
HEART RATE: 71 BPM | WEIGHT: 215 LBS | DIASTOLIC BLOOD PRESSURE: 60 MMHG | BODY MASS INDEX: 27.59 KG/M2 | HEIGHT: 74 IN | SYSTOLIC BLOOD PRESSURE: 110 MMHG | OXYGEN SATURATION: 96 %

## 2024-03-27 DIAGNOSIS — N43.3 RIGHT HYDROCELE: Primary | ICD-10-CM

## 2024-03-27 LAB — POST-VOID RESIDUAL VOLUME, ML POC: 179 ML

## 2024-03-27 PROCEDURE — 99213 OFFICE O/P EST LOW 20 MIN: CPT | Performed by: PHYSICIAN ASSISTANT

## 2024-03-27 PROCEDURE — 51798 US URINE CAPACITY MEASURE: CPT | Performed by: PHYSICIAN ASSISTANT

## 2024-03-28 NOTE — PROGRESS NOTES
UROLOGY PROGRESS NOTE   Patient Identifiers: Jus Chavez (MRN [id number])  Date of Service: 3/28/2024    Subjective:   73-year-old man history of BPH and right-sided hydrocele.  He is on finasteride and tamsulosin.  He reports urinary frequency.  He has about 145 mL in his bladder and was unable to void.  Hemoglobin A1c is over 8.1.  Complains of urinary frequency and urgency which she believes is relatively new.    Reason for visit: BPH follow-up    Objective:     VITALS:    Vitals:    03/27/24 1520   BP: 110/60   Pulse: 71   SpO2: 96%           LABS:  Lab Results   Component Value Date    HGB 15.0 08/11/2023    HCT 44.6 08/11/2023    WBC 7.77 08/11/2023     08/11/2023   ]    Lab Results   Component Value Date    K 3.9 02/07/2024     02/07/2024    CO2 26 02/07/2024    BUN 28 (H) 02/07/2024    CREATININE 1.04 02/07/2024    CALCIUM 9.4 02/07/2024   ]        INPATIENT MEDS:    Current Outpatient Medications:     acetaminophen (TYLENOL) 500 mg tablet, Take 500 mg by mouth daily at bedtime, Disp: , Rfl:     ascorbic acid (VITAMIN C) 250 mg tablet, Take 500 mg by mouth 2 (two) times a day, Disp: , Rfl:     Aspirin 81 MG CAPS, Take 1 capsule every day by oral route., Disp: , Rfl:     Cholecalciferol 125 MCG (5000 UT) TABS, , Disp: , Rfl:     ciclopirox (LOPROX) 0.77 % cream, , Disp: , Rfl:     Cyanocobalamin (VITAMIN B-12 PO), Take 1 tablet by mouth 3 (three) times a day, Disp: , Rfl:     finasteride (PROSCAR) 5 mg tablet, Take 1 tablet (5 mg total) by mouth daily, Disp: 30 tablet, Rfl: 11    furosemide (LASIX) 20 mg tablet, TAKE ONE TABLET BY MOUTH EVERY DAY AS NEEDED FOR EDEMA, Disp: 30 tablet, Rfl: 5    Ginger, Zingiber officinalis, (Ginger Root) 500 MG CAPS, , Disp: , Rfl:     Jardiance 25 MG TABS, Take 1 tablet (25 mg total) by mouth daily, Disp: 90 tablet, Rfl: 1    metFORMIN (GLUCOPHAGE) 500 mg tablet, Take 2 tablets (1,000 mg total) by mouth 2 (two) times a day, Disp: 360 tablet, Rfl: 3     "metoprolol succinate (TOPROL-XL) 25 mg 24 hr tablet, Take 1 tablet (25 mg total) by mouth daily, Disp: 90 tablet, Rfl: 3    niacin 500 mg tablet, Take 500 mg by mouth 4 (four) times a day, Disp: , Rfl:     NON FORMULARY, 25 mcg Vitamin D, Disp: , Rfl:     Omega-3 1000 MG CAPS, 4 (four) times a day, Disp: , Rfl:     Omega-3 Fatty Acids (FISH OIL BURP-LESS PO), Take by mouth, Disp: , Rfl:     PROBIOTIC PRODUCT PO, Take 111 mg by mouth daily, Disp: , Rfl:     repaglinide (PRANDIN) 0.5 mg tablet, TAKE ONE TABLET BY MOUTH EVERY DAY BEFORE DINNER, Disp: 90 tablet, Rfl: 1    rosuvastatin (CRESTOR) 10 MG tablet, TAKE ONE TABLET BY MOUTH ONCE DAILY, Disp: 90 tablet, Rfl: 3    sitaGLIPtin (JANUVIA) 100 mg tablet, Take 1 tablet (100 mg total) by mouth daily, Disp: , Rfl:     tamsulosin (FLOMAX) 0.4 mg, TAKE ONE CAPSULE BY MOUTH EVERY DAY WITH DINNER, Disp: 30 capsule, Rfl: 6    Tart Cherry (Tart Cherry Ultra) 1200 MG CAPS, , Disp: , Rfl:     Turmeric 500 MG CAPS, , Disp: , Rfl:     Wheat Dextrin (BENEFIBER DRINK MIX PO), Take by mouth, Disp: , Rfl:       Physical Exam:   /60 (BP Location: Left arm, Patient Position: Sitting, Cuff Size: Standard)   Pulse 71   Ht 6' 1.5\" (1.867 m)   Wt 97.5 kg (215 lb)   SpO2 96%   BMI 27.98 kg/m²   GEN: no acute distress    RESP: breathing comfortably with no accessory muscle use    ABD: soft, non-tender, non-distended   INCISION:    EXT: no significant peripheral edema   (Male): Penis circumcised, phallus normal, meatus patent.  Testicles descended into scrotum bilaterally without masses nor tenderness.  No inguinal hernias bilaterally.  MELYSSA: Prostate is enlarged at 45 grams. The prostate is not boggy. The prostate is not tender.  No nodules noted      RADIOLOGY:   none     Assessment:   #1.  BPH  #2.  Right-sided hydrocele status postrepair  #3.  Poorly controlled diabetes    Plan:   -Follow-up scrotal ultrasound  -Will call with the results  -We did discuss cystoscopy and TRUS " in the future  -

## 2024-04-11 ENCOUNTER — HOSPITAL ENCOUNTER (OUTPATIENT)
Dept: ULTRASOUND IMAGING | Facility: HOSPITAL | Age: 74
Discharge: HOME | End: 2024-04-11
Payer: MEDICARE

## 2024-04-11 DIAGNOSIS — N43.3 RIGHT HYDROCELE: ICD-10-CM

## 2024-04-11 PROCEDURE — 76870 US EXAM SCROTUM: CPT

## 2024-04-16 DIAGNOSIS — N40.1 BENIGN PROSTATIC HYPERPLASIA WITH URINARY FREQUENCY: ICD-10-CM

## 2024-04-16 DIAGNOSIS — R35.0 BENIGN PROSTATIC HYPERPLASIA WITH URINARY FREQUENCY: ICD-10-CM

## 2024-04-16 NOTE — TELEPHONE ENCOUNTER
Reason for call:   [x] Refill   [] Prior Auth  [] Other:     Office:   [] PCP/Provider -   [x] Specialty/Provider -     Medication:     finasteride (PROSCAR) 5 mg tablet       Dose/Frequency: Take 1 tablet (5 mg total) by mouth daily,     Quantity: 30 tablet     Pharmacy: [id number] - [place] - [address] [phone]     Does the patient have enough for 3 days?   [x] Yes   [] No - Send as HP to POD

## 2024-04-17 RX ORDER — FINASTERIDE 5 MG/1
5 TABLET, FILM COATED ORAL DAILY
Qty: 30 TABLET | Refills: 5 | Status: SHIPPED | OUTPATIENT
Start: 2024-04-17

## 2024-05-01 PROBLEM — Z00.00 MEDICARE ANNUAL WELLNESS VISIT, SUBSEQUENT: Status: RESOLVED | Noted: 2021-11-07 | Resolved: 2024-05-01

## 2024-05-03 ENCOUNTER — OFFICE VISIT (OUTPATIENT)
Dept: CARDIOLOGY CLINIC | Facility: CLINIC | Age: 74
End: 2024-05-03
Payer: MEDICARE

## 2024-05-03 VITALS
HEIGHT: 74 IN | WEIGHT: 213 LBS | DIASTOLIC BLOOD PRESSURE: 68 MMHG | HEART RATE: 64 BPM | SYSTOLIC BLOOD PRESSURE: 114 MMHG | OXYGEN SATURATION: 96 % | BODY MASS INDEX: 27.34 KG/M2 | TEMPERATURE: 97.6 F

## 2024-05-03 DIAGNOSIS — I25.118 CORONARY ARTERY DISEASE OF NATIVE ARTERY OF NATIVE HEART WITH STABLE ANGINA PECTORIS (HCC): Primary | ICD-10-CM

## 2024-05-03 DIAGNOSIS — I10 ESSENTIAL HYPERTENSION: ICD-10-CM

## 2024-05-03 DIAGNOSIS — I49.3 PVC (PREMATURE VENTRICULAR CONTRACTION): ICD-10-CM

## 2024-05-03 DIAGNOSIS — Z95.2 S/P AVR: ICD-10-CM

## 2024-05-03 DIAGNOSIS — I77.1 STENOSIS OF RIGHT SUBCLAVIAN ARTERY (HCC): ICD-10-CM

## 2024-05-03 DIAGNOSIS — Z95.1 S/P CABG (CORONARY ARTERY BYPASS GRAFT): ICD-10-CM

## 2024-05-03 DIAGNOSIS — I65.23 BILATERAL CAROTID ARTERY STENOSIS: ICD-10-CM

## 2024-05-03 PROCEDURE — 99214 OFFICE O/P EST MOD 30 MIN: CPT | Performed by: INTERNAL MEDICINE

## 2024-05-03 NOTE — PROGRESS NOTES
Cardiology Follow Up    Jus Chavez  [id number]  1950  [facility] [address] [phone]      1. Coronary artery disease of native artery of native heart with stable angina pectoris (HCC)        2. Essential hypertension        3. PVC (premature ventricular contraction)        4. Bilateral carotid artery stenosis        5. Stenosis of right subclavian artery (HCC)        6. S/P AVR  Echo complete w/ contrast if indicated      7. S/P CABG (coronary artery bypass graft)            Discussion/Summary:    - CAD: no chest pain. See below regarding blood pressure. Has not needed an ischemic evaluation since his bypass surgery.    - AS: Normal valve function last echo. An echo is going to be repeated for his aortic root. He has more of an awareness of his heartbeat at night, but his valve sounds stable to me.    - Carotid stenosis: bilateral. < 50%. Continue current med Rx.    - HTN: Blood pressure is currently controlled if not on the lower side. He was asking about increasing his metoprolol again because has been feeling more of an awareness of the heartbeat. I want to avoid hypotension so recommend continuing the same dose. Continues to get his lightheaded episodes which I think may be related to the lower blood pressure with dehydration    - PAF: No recurrence since surgery.    - Edema -dependent lower extremity edema. Echo without significant heart failure. Continue Lasix, compression stockings.        Previous History:  74-year-old man.  He has a history of coronary artery disease diagnosed in July of 2019 when he was having chest discomfort.  He had single-vessel bypass with LIMA to the LAD and also at that time had aortic stenosis and underwent a 25 mm [place] bioprosthetic aortic valve replacement ([place])    Preoperative testing showed that he had carotid artery stenosis bilaterally which has been  monitored serially with ultrasound most recently with < 50% stenosis bilaterally but some R subclavian disease as well.    Had atrial fibrillation which lasted < 24 hours after surgery. He was initially on amiodarone and warfarin for brief period of time after the surgery, but this was stopped.    He has had some episodes of palpitations.  He had a 1 week monitor with his prior cardiologist, no atrial fibrillation was found.  He then had a 48 hour Holter monitor in 10/2020 which showed only PVCs.  No other significant arrhythmias.    He has had myalgias with several statins in the past.  He now been on 10 mg of Crestor and is tolerating it.  His LDL is in the 40s.  He is also taking niacin.  He has been on this for 15 years, has not had any side effects with it. I discussed stopping it, but he prefers to continue.  Over-the-counter fish oil.  Lovaza was too expensive.    LE edema has become more prominent      Interval History:    No major changes from a cardiac standpoint. His lower extremity edema has been stable. Today, he is wearing compression stockings. He does so maybe just a few times a week. He takes Lasix 20 mg daily. He continues to get occasional lightheaded episodes. He has improvement after drinking some fluid.    Down to 25 mg of metoprolol daily.    When in bed at night, he is complaining of occasional sensation of being able to feel his heartbeat more prominently.    Patient Active Problem List   Diagnosis    Aortic stenosis    Coronary artery disease of native artery of native heart with stable angina pectoris (HCC)    DM (diabetes mellitus), type 2 (HCC)    Essential hypertension    Hypertriglyceridemia    S/P CABG (coronary artery bypass graft)    S/P AVR    PVC (premature ventricular contraction)    Bilateral carotid artery stenosis    Mixed hyperlipidemia    Postoperative atrial fibrillation (HCC)    Benign prostatic hyperplasia with urinary frequency    Stenosis of right subclavian artery  (HCC)    IBS (irritable bowel syndrome)    H/O diabetic foot ulcer    Diabetic peripheral neuropathy associated with type 2 diabetes mellitus (HCC)    Nonintractable headache    Dizziness    Instability of foot joint    Peripheral venous insufficiency     Past Medical History:   Diagnosis Date    Acute systolic heart failure (HCC) 02/23/2021    Athlete's foot     Bilateral impacted cerumen 03/09/2021    Coronary artery disease 07/30/2019    bypass and valve replacement    Diabetes mellitus (HCC)     Heart murmur     Other hydrocele 06/11/2020    TMJ (dislocation of temporomandibular joint)     Yeast infection      Social History     Tobacco Use    Smoking status: Never    Smokeless tobacco: Never   Vaping Use    Vaping status: Never Used   Substance Use Topics    Alcohol use: Yes     Alcohol/week: 4.0 standard drinks of alcohol     Types: 2 Glasses of wine, 1 Cans of beer, 1 Shots of liquor per week     Comment: 2-3 glass of wine or  beer a week    Drug use: Never      Family History   Problem Relation Age of Onset    Heart disease Mother     Cancer Mother         skin melonoma/mastectomy    Heart disease Father     Hypertension Father     Mitral valve prolapse Sister     Diabetes type II Maternal Grandmother     Colon cancer Neg Hx      Past Surgical History:   Procedure Laterality Date    CARDIAC SURGERY      CARDIAC VALVE REPLACEMENT      CATARACT EXTRACTION      COLONOSCOPY      CORONARY ARTERY BYPASS GRAFT      EYE SURGERY  03/2020    Cataract right eye    EYE SURGERY Left 12/03/2020    left eye cataract     HYDROCELE EXCISION / REPAIR      NM EXCISION HYDROCELE UNILATERAL Right 04/08/2022    Procedure: HYDROCELECTOMY;  Surgeon: Montrell Mancuso MD;  Location[facility] MAIN OR;  Service: Urology    NM EXCISION HYDROCELE UNILATERAL Right 11/16/2022    Procedure: HYDROCELECTOMY;  Surgeon: Montrell Mancuso MD;  Location: [facility] MAIN OR;  Service: Urology    TONSILLECTOMY         Current Outpatient  Medications:     acetaminophen (TYLENOL) 500 mg tablet, Take 500 mg by mouth daily at bedtime, Disp: , Rfl:     ascorbic acid (VITAMIN C) 250 mg tablet, Take 500 mg by mouth 2 (two) times a day, Disp: , Rfl:     Aspirin 81 MG CAPS, Take 1 capsule every day by oral route., Disp: , Rfl:     Cholecalciferol 125 MCG (5000 UT) TABS, , Disp: , Rfl:     Cyanocobalamin (VITAMIN B-12 PO), Take 1 tablet by mouth 3 (three) times a day, Disp: , Rfl:     finasteride (PROSCAR) 5 mg tablet, Take 1 tablet (5 mg total) by mouth daily, Disp: 30 tablet, Rfl: 5    furosemide (LASIX) 20 mg tablet, TAKE ONE TABLET BY MOUTH EVERY DAY AS NEEDED FOR EDEMA, Disp: 30 tablet, Rfl: 5    Ginger, Zingiber officinalis, (Ginger Root) 500 MG CAPS, , Disp: , Rfl:     Jardiance 25 MG TABS, Take 1 tablet (25 mg total) by mouth daily, Disp: 90 tablet, Rfl: 1    metFORMIN (GLUCOPHAGE) 500 mg tablet, Take 2 tablets (1,000 mg total) by mouth 2 (two) times a day, Disp: 360 tablet, Rfl: 3    metoprolol succinate (TOPROL-XL) 25 mg 24 hr tablet, Take 1 tablet (25 mg total) by mouth daily, Disp: 90 tablet, Rfl: 3    niacin 500 mg tablet, Take 500 mg by mouth 4 (four) times a day, Disp: , Rfl:     NON FORMULARY, 25 mcg Vitamin D, Disp: , Rfl:     Omega-3 1000 MG CAPS, 4 (four) times a day, Disp: , Rfl:     Omega-3 Fatty Acids (FISH OIL BURP-LESS PO), Take by mouth, Disp: , Rfl:     PROBIOTIC PRODUCT PO, Take 111 mg by mouth daily, Disp: , Rfl:     repaglinide (PRANDIN) 0.5 mg tablet, TAKE ONE TABLET BY MOUTH EVERY DAY BEFORE DINNER, Disp: 90 tablet, Rfl: 1    rosuvastatin (CRESTOR) 10 MG tablet, TAKE ONE TABLET BY MOUTH ONCE DAILY, Disp: 90 tablet, Rfl: 3    sitaGLIPtin (JANUVIA) 100 mg tablet, Take 1 tablet (100 mg total) by mouth daily, Disp: , Rfl:     tamsulosin (FLOMAX) 0.4 mg, TAKE ONE CAPSULE BY MOUTH EVERY DAY WITH DINNER, Disp: 30 capsule, Rfl: 6    Tart Cherry (Tart Cherry Ultra) 1200 MG CAPS, , Disp: , Rfl:     Turmeric 500 MG CAPS, , Disp: , Rfl:      "Wheat Dextrin (BENEFIBER DRINK MIX PO), Take by mouth, Disp: , Rfl:     ciclopirox (LOPROX) 0.77 % cream, , Disp: , Rfl:   No Known Allergies    Vitals:    05/03/24 1414   BP: 114/68   BP Location: Left arm   Patient Position: Sitting   Cuff Size: Standard   Pulse: 64   Temp: 97.6 °F (36.4 °C)   TempSrc: Tympanic   SpO2: 96%   Weight: 96.6 kg (213 lb)   Height: 6' 1.5\" (1.867 m)     Vitals:    05/03/24 1414   Weight: 96.6 kg (213 lb)      Height: 6' 1.5\" (186.7 cm)   Body mass index is 27.72 kg/m².    Physical Exam:  GEN: Jus Chavez appears well, alert and oriented x 3, pleasant and cooperative   HEENT: pupils equal, round, and reactive to light; extraocular muscles intact  NECK: supple, no carotid bruits   HEART: regular rhythm, normal S1 and S2, no murmurs, clicks, gallops or rubs   LUNGS: clear to auscultation bilaterally; no wheezes, rales, or rhonchi   ABDOMEN: normal bowel sounds, soft, no tenderness, no distention  EXTREMITIES: 1+ edema. Compression stockings.  NEURO: no focal findings   SKIN: normal without suspicious lesions on exposed skin    ROS:  Positive for fatigue, sleepiness. No snoring, gets refreshed sleep. No chest pain, shortness of breath.  Except as noted in HPI, is otherwise reviewed in detail and a 12 point ROS is negative in detail  ROS reviewed and is unchanged    Labs:  Lab Results   Component Value Date    K 3.9 02/07/2024     02/07/2024    CREATININE 1.04 02/07/2024    BUN 28 (H) 02/07/2024    CO2 26 02/07/2024    ALT 14 08/11/2023    AST 15 08/11/2023    TSH 3.00 07/20/2020    INR 2.3 (H) 08/06/2019    GLUF 197 (H) 02/07/2024    HGBA1C 8.5 (H) 02/07/2024    WBC 7.77 08/11/2023    HGB 15.0 08/11/2023    HCT 44.6 08/11/2023     08/11/2023     No results found for: \"CHOL\"  Lab Results   Component Value Date    HDL 46 01/16/2023    HDL 46 09/12/2022    HDL 46 03/22/2021     Lab Results   Component Value Date    LDLCALC 45 01/16/2023    LDLCALC 48 09/12/2022    LDLCALC 39 " 03/22/2021     Lab Results   Component Value Date    TRIG 135 01/16/2023    TRIG 114 09/12/2022    TRIG 179 (H) 03/22/2021     Testing:  Echo 4/12/23:  Left Ventricle: Left ventricular cavity size is normal. Wall thickness is mildly increased. There is mild concentric hypertrophy. The left ventricular ejection fraction is 60%. Systolic function is normal. Wall motion is normal. Diastolic function is moderately abnormal, consistent with grade II (pseudonormal) relaxation.    Right Ventricle: Right ventricular cavity size is mildly dilated. Systolic function is mildly reduced. Abnormal tricuspid annular plane systolic excursion (TAPSE) < 1.7 cm.    Left Atrium: The atrium is mildly dilated.    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    Aortic Valve: There is a bioprosthetic valve. The prosthetic valve appears to be functioning normally. There is no evidence of paravalvular regurgitation. There is no evidence of transvalvular regurgitation. The gradient recorded across the prosthetic aortic valve is within the expected range. The aortic valve mean gradient is 13 mmHg. The dimensionless velocity index is 0.50. The aortic valve area is 1.80 cm2.    Mitral Valve: There is moderate annular calcification. There is mild regurgitation.    Tricuspid Valve: There is mild regurgitation.    Pulmonic Valve: There is mild regurgitation.    Aorta: The aortic root is normal in size. The ascending aorta is mildly dilated at 4.1 cm.      Echo 8/2021:  LEFT VENTRICLE:  Systolic function was normal. Ejection fraction was estimated to be 60 %.  Wall thickness was mildly increased.  There was mild concentric hypertrophy.  Doppler parameters were consistent with abnormal left ventricular relaxation (grade 1 diastolic dysfunction).  Doppler parameters were consistent with elevated mean left atrial filling pressure.     RIGHT VENTRICLE:  The ventricle was mildly to moderately dilated.  Systolic function was normal.     LEFT  ATRIUM:  The atrium was mildly dilated.     ATRIAL SEPTUM:  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. No definite shunt noted, but poor subcostal views.     MITRAL VALVE:  There was mild annular calcification.  There was trace regurgitation.     AORTIC VALVE:  A bioprosthesis was present. It exhibited normal function.  Valve peak gradient was 30 mmHg.  Valve mean gradient was 16 mmHg.     TRICUSPID VALVE:  There was mild regurgitation.     PULMONIC VALVE:  There was mild regurgitation.     AORTA:  There was mild dilatation of the ascending aorta at 4cm.    Holter:  The patient was in sinus rhythm throughout the recording.     Minimum HR: 59  Average HR: 74  Maximum HR: 102     Premature ventricular contractions: 2954 (2%)  Ventricular runs: 0     Supraventricular contractions: 34  Supraventricular runs: 1 lasting 4 beats     Longest RR: 1.5 sec     Arrhythmias: none     Diary submitted: No        Impression     Abnormal Holter monitor  Frequent PVCs without ventricular runs  Rare PACs

## 2024-06-05 ENCOUNTER — HOSPITAL ENCOUNTER (OUTPATIENT)
Dept: NON INVASIVE DIAGNOSTICS | Facility: CLINIC | Age: 74
Discharge: HOME | End: 2024-06-05
Payer: MEDICARE

## 2024-06-05 VITALS
DIASTOLIC BLOOD PRESSURE: 68 MMHG | BODY MASS INDEX: 27.34 KG/M2 | HEIGHT: 74 IN | WEIGHT: 213 LBS | SYSTOLIC BLOOD PRESSURE: 114 MMHG | HEART RATE: 64 BPM

## 2024-06-05 DIAGNOSIS — Z95.2 S/P AVR: ICD-10-CM

## 2024-06-05 LAB
AORTIC ROOT: 3.1 CM
AORTIC VALVE MEAN VELOCITY: 16.4 M/S
APICAL FOUR CHAMBER EJECTION FRACTION: 54 %
ASCENDING AORTA: 3.3 CM
AV AREA BY CONTINUOUS VTI: 1.5 CM2
AV AREA PEAK VELOCITY: 1.5 CM2
AV LVOT MEAN GRADIENT: 3 MMHG
AV LVOT PEAK GRADIENT: 6 MMHG
AV MEAN GRADIENT: 12 MMHG
AV PEAK GRADIENT: 20 MMHG
AV VALVE AREA: 1.54 CM2
AV VELOCITY RATIO: 0.54
BSA FOR ECHO PROCEDURE: 2.22 M2
DOP CALC AO PEAK VEL: 2.23 M/S
DOP CALC AO VTI: 51.28 CM
DOP CALC LVOT AREA: 2.83 CM2
DOP CALC LVOT CARDIAC INDEX: 2.15 L/MIN/M2
DOP CALC LVOT CARDIAC OUTPUT: 4.77 L/MIN
DOP CALC LVOT DIAMETER: 1.9 CM
DOP CALC LVOT PEAK VEL VTI: 27.95 CM
DOP CALC LVOT PEAK VEL: 1.21 M/S
DOP CALC LVOT STROKE INDEX: 36 ML/M2
DOP CALC LVOT STROKE VOLUME: 79.21
E WAVE DECELERATION TIME: 292 MS
E/A RATIO: 0.88
FRACTIONAL SHORTENING: 28 (ref 28–44)
INTERVENTRICULAR SEPTUM IN DIASTOLE (PARASTERNAL SHORT AXIS VIEW): 1.4 CM
INTERVENTRICULAR SEPTUM: 1.4 CM (ref 0.6–1.1)
LAAS-AP2: 20.8 CM2
LAAS-AP4: 20.1 CM2
LEFT ATRIUM AREA SYSTOLE SINGLE PLANE A4C: 20.4 CM2
LEFT ATRIUM SIZE: 5.2 CM
LEFT ATRIUM VOLUME (MOD BIPLANE): 61 ML
LEFT ATRIUM VOLUME INDEX (MOD BIPLANE): 27.5 ML/M2
LEFT INTERNAL DIMENSION IN SYSTOLE: 2.9 CM (ref 2.1–4)
LEFT VENTRICULAR INTERNAL DIMENSION IN DIASTOLE: 4 CM (ref 3.5–6)
LEFT VENTRICULAR POSTERIOR WALL IN END DIASTOLE: 1.3 CM
LEFT VENTRICULAR STROKE VOLUME: 38 ML
LVSV (TEICH): 38 ML
MV E'TISSUE VEL-SEP: 7 CM/S
MV PEAK A VEL: 1.13 M/S
MV PEAK E VEL: 99 CM/S
MV STENOSIS PRESSURE HALF TIME: 85 MS
MV VALVE AREA P 1/2 METHOD: 2.59
RIGHT ATRIAL 2D VOLUME: 49 ML
RIGHT ATRIUM AREA SYSTOLE A4C: 18.8 CM2
RIGHT VENTRICLE ID DIMENSION: 4.7 CM
SL CV LEFT ATRIUM LENGTH A2C: 5.6 CM
SL CV LV EF: 65
SL CV PED ECHO LEFT VENTRICLE DIASTOLIC VOLUME (MOD BIPLANE) 2D: 70 ML
SL CV PED ECHO LEFT VENTRICLE SYSTOLIC VOLUME (MOD BIPLANE) 2D: 32 ML
TR MAX PG: 24 MMHG
TR PEAK VELOCITY: 2.5 M/S
TRICUSPID ANNULAR PLANE SYSTOLIC EXCURSION: 1.4 CM
TRICUSPID VALVE PEAK REGURGITATION VELOCITY: 2.45 M/S

## 2024-06-05 PROCEDURE — 93306 TTE W/DOPPLER COMPLETE: CPT | Performed by: INTERNAL MEDICINE

## 2024-06-05 PROCEDURE — 93306 TTE W/DOPPLER COMPLETE: CPT

## 2024-06-12 DIAGNOSIS — R60.9 EDEMA, UNSPECIFIED TYPE: ICD-10-CM

## 2024-06-12 RX ORDER — FUROSEMIDE 20 MG/1
20 TABLET ORAL DAILY PRN
Qty: 90 TABLET | Refills: 1 | Status: SHIPPED | OUTPATIENT
Start: 2024-06-12 | End: 2024-12-09

## 2024-06-12 NOTE — TELEPHONE ENCOUNTER
Reason for call:   [x] Refill   [] Prior Auth  [] Other:     Office:   [] PCP/Provider -   [x] Specialty/Provider - Cardiology / Kendall Miller MD     Medication: furosemide (LASIX) 20 mg tablet     Dose/Frequency: TAKE ONE TABLET BY MOUTH EVERY DAY AS NEEDED FOR EDEMA     Quantity: 90 + 1 refill    Pharmacy: [id number] [place] - [address] [place] [address]     Does the patient have enough for 3 days?   [] Yes   [x] No - Send as HP to POD

## 2024-07-15 DIAGNOSIS — E11.65 TYPE 2 DIABETES MELLITUS WITH HYPERGLYCEMIA, WITHOUT LONG-TERM CURRENT USE OF INSULIN (HCC): ICD-10-CM

## 2024-07-17 ENCOUNTER — TELEPHONE (OUTPATIENT)
Age: 74
End: 2024-07-17

## 2024-07-17 NOTE — TELEPHONE ENCOUNTER
Patient asking when he should go for labs. Made aware expected date was in June. Also, made patient a follow up appointment.

## 2024-07-18 ENCOUNTER — LAB (OUTPATIENT)
Dept: LAB | Facility: AMBULARY SURGERY CENTER | Age: 74
End: 2024-07-18
Payer: MEDICARE

## 2024-07-18 DIAGNOSIS — E53.8 VITAMIN B 12 DEFICIENCY: ICD-10-CM

## 2024-07-18 DIAGNOSIS — E11.65 TYPE 2 DIABETES MELLITUS WITH HYPERGLYCEMIA, WITHOUT LONG-TERM CURRENT USE OF INSULIN (HCC): ICD-10-CM

## 2024-07-18 DIAGNOSIS — E78.2 MIXED HYPERLIPIDEMIA: ICD-10-CM

## 2024-07-18 DIAGNOSIS — K58.1 IRRITABLE BOWEL SYNDROME WITH CONSTIPATION: ICD-10-CM

## 2024-07-18 DIAGNOSIS — I48.91 POSTOPERATIVE ATRIAL FIBRILLATION (HCC): ICD-10-CM

## 2024-07-18 DIAGNOSIS — I10 ESSENTIAL HYPERTENSION: ICD-10-CM

## 2024-07-18 DIAGNOSIS — I97.89 POSTOPERATIVE ATRIAL FIBRILLATION (HCC): ICD-10-CM

## 2024-07-18 DIAGNOSIS — I25.118 CORONARY ARTERY DISEASE OF NATIVE ARTERY OF NATIVE HEART WITH STABLE ANGINA PECTORIS (HCC): ICD-10-CM

## 2024-07-18 LAB
ALBUMIN SERPL BCG-MCNC: 4.1 G/DL (ref 3.5–5)
ALP SERPL-CCNC: 47 U/L (ref 34–104)
ALT SERPL W P-5'-P-CCNC: 19 U/L (ref 7–52)
ANION GAP SERPL CALCULATED.3IONS-SCNC: 10 MMOL/L (ref 4–13)
AST SERPL W P-5'-P-CCNC: 16 U/L (ref 13–39)
BASOPHILS # BLD AUTO: 0.06 THOUSANDS/ÂΜL (ref 0–0.1)
BASOPHILS NFR BLD AUTO: 1 % (ref 0–1)
BILIRUB SERPL-MCNC: 0.52 MG/DL (ref 0.2–1)
BUN SERPL-MCNC: 27 MG/DL (ref 5–25)
CALCIUM SERPL-MCNC: 9.5 MG/DL (ref 8.4–10.2)
CHLORIDE SERPL-SCNC: 106 MMOL/L (ref 96–108)
CHOLEST SERPL-MCNC: 110 MG/DL
CO2 SERPL-SCNC: 24 MMOL/L (ref 21–32)
CREAT SERPL-MCNC: 1 MG/DL (ref 0.6–1.3)
CREAT UR-MCNC: 47 MG/DL
EOSINOPHIL # BLD AUTO: 0.18 THOUSAND/ÂΜL (ref 0–0.61)
EOSINOPHIL NFR BLD AUTO: 3 % (ref 0–6)
ERYTHROCYTE [DISTWIDTH] IN BLOOD BY AUTOMATED COUNT: 11.9 % (ref 11.6–15.1)
EST. AVERAGE GLUCOSE BLD GHB EST-MCNC: 177 MG/DL
GFR SERPL CREATININE-BSD FRML MDRD: 72 ML/MIN/1.73SQ M
GLUCOSE P FAST SERPL-MCNC: 160 MG/DL (ref 65–99)
HBA1C MFR BLD: 7.8 %
HCT VFR BLD AUTO: 42.1 % (ref 36.5–49.3)
HDLC SERPL-MCNC: 45 MG/DL
HGB BLD-MCNC: 14.1 G/DL (ref 12–17)
IMM GRANULOCYTES # BLD AUTO: 0.01 THOUSAND/UL (ref 0–0.2)
IMM GRANULOCYTES NFR BLD AUTO: 0 % (ref 0–2)
LDLC SERPL CALC-MCNC: 43 MG/DL (ref 0–100)
LYMPHOCYTES # BLD AUTO: 2.17 THOUSANDS/ÂΜL (ref 0.6–4.47)
LYMPHOCYTES NFR BLD AUTO: 34 % (ref 14–44)
MCH RBC QN AUTO: 30.9 PG (ref 26.8–34.3)
MCHC RBC AUTO-ENTMCNC: 33.5 G/DL (ref 31.4–37.4)
MCV RBC AUTO: 92 FL (ref 82–98)
MICROALBUMIN UR-MCNC: <7 MG/L
MONOCYTES # BLD AUTO: 0.84 THOUSAND/ÂΜL (ref 0.17–1.22)
MONOCYTES NFR BLD AUTO: 13 % (ref 4–12)
NEUTROPHILS # BLD AUTO: 3.1 THOUSANDS/ÂΜL (ref 1.85–7.62)
NEUTS SEG NFR BLD AUTO: 49 % (ref 43–75)
NRBC BLD AUTO-RTO: 0 /100 WBCS
PLATELET # BLD AUTO: 142 THOUSANDS/UL (ref 149–390)
PMV BLD AUTO: 11.7 FL (ref 8.9–12.7)
POTASSIUM SERPL-SCNC: 3.9 MMOL/L (ref 3.5–5.3)
PROT SERPL-MCNC: 6.6 G/DL (ref 6.4–8.4)
RBC # BLD AUTO: 4.57 MILLION/UL (ref 3.88–5.62)
SODIUM SERPL-SCNC: 140 MMOL/L (ref 135–147)
TRIGL SERPL-MCNC: 111 MG/DL
TSH SERPL DL<=0.05 MIU/L-ACNC: 3.38 UIU/ML (ref 0.45–4.5)
VIT B12 SERPL-MCNC: 180 PG/ML (ref 180–914)
WBC # BLD AUTO: 6.36 THOUSAND/UL (ref 4.31–10.16)

## 2024-07-18 PROCEDURE — 36415 COLL VENOUS BLD VENIPUNCTURE: CPT

## 2024-07-18 PROCEDURE — 83036 HEMOGLOBIN GLYCOSYLATED A1C: CPT

## 2024-07-18 PROCEDURE — 85025 COMPLETE CBC W/AUTO DIFF WBC: CPT

## 2024-07-18 PROCEDURE — 82570 ASSAY OF URINE CREATININE: CPT

## 2024-07-18 PROCEDURE — 80061 LIPID PANEL: CPT

## 2024-07-18 PROCEDURE — 80053 COMPREHEN METABOLIC PANEL: CPT

## 2024-07-18 PROCEDURE — 82043 UR ALBUMIN QUANTITATIVE: CPT

## 2024-07-18 PROCEDURE — 84443 ASSAY THYROID STIM HORMONE: CPT

## 2024-07-18 PROCEDURE — 82607 VITAMIN B-12: CPT

## 2024-07-18 NOTE — RESULT ENCOUNTER NOTE
Hi     Your vitamin B12 is low, 180,  Please make sure you take vitamin B12 supplementation 1000 mcg daily,'  Please follow-up as scheduled

## 2024-07-22 ENCOUNTER — OFFICE VISIT (OUTPATIENT)
Dept: FAMILY MEDICINE CLINIC | Facility: CLINIC | Age: 74
End: 2024-07-22
Payer: MEDICARE

## 2024-07-22 VITALS
WEIGHT: 215 LBS | BODY MASS INDEX: 28.49 KG/M2 | SYSTOLIC BLOOD PRESSURE: 112 MMHG | DIASTOLIC BLOOD PRESSURE: 70 MMHG | OXYGEN SATURATION: 95 % | RESPIRATION RATE: 16 BRPM | HEART RATE: 54 BPM | TEMPERATURE: 97 F | HEIGHT: 73 IN

## 2024-07-22 DIAGNOSIS — E11.42 DIABETIC PERIPHERAL NEUROPATHY ASSOCIATED WITH TYPE 2 DIABETES MELLITUS (HCC): ICD-10-CM

## 2024-07-22 DIAGNOSIS — I48.91 POSTOPERATIVE ATRIAL FIBRILLATION (HCC): ICD-10-CM

## 2024-07-22 DIAGNOSIS — I25.118 CORONARY ARTERY DISEASE OF NATIVE ARTERY OF NATIVE HEART WITH STABLE ANGINA PECTORIS (HCC): Primary | ICD-10-CM

## 2024-07-22 DIAGNOSIS — I97.89 POSTOPERATIVE ATRIAL FIBRILLATION (HCC): ICD-10-CM

## 2024-07-22 DIAGNOSIS — E11.65 TYPE 2 DIABETES MELLITUS WITH HYPERGLYCEMIA, WITHOUT LONG-TERM CURRENT USE OF INSULIN (HCC): ICD-10-CM

## 2024-07-22 DIAGNOSIS — I10 ESSENTIAL HYPERTENSION: ICD-10-CM

## 2024-07-22 DIAGNOSIS — E78.2 MIXED HYPERLIPIDEMIA: ICD-10-CM

## 2024-07-22 PROCEDURE — 99214 OFFICE O/P EST MOD 30 MIN: CPT | Performed by: NURSE PRACTITIONER

## 2024-07-22 PROCEDURE — G2211 COMPLEX E/M VISIT ADD ON: HCPCS | Performed by: NURSE PRACTITIONER

## 2024-07-22 NOTE — PATIENT INSTRUCTIONS
"Patient Education     Type 2 diabetes   The Basics   Written by the doctors and editors at [facility]   What is type 2 diabetes? -- This is a disorder that disrupts the way the body uses sugar. It is sometimes called type 2 diabetes mellitus.  All of the cells in the body need sugar to work normally. Sugar gets into the cells with the help of a hormone called insulin. Insulin is made by the pancreas, an organ in the belly. If there is not enough insulin, or if cells in the body don't respond normally to insulin, sugar builds up in the blood. That is what happens to people with diabetes.  There are 2 different types of diabetes:   In type 1 diabetes, the pancreas makes little or no insulin.   In type 2 diabetes, the pancreas still makes some insulin, but the cells in the body stop responding normally. Eventually, the pancreas cannot make enough insulin to keep up.  Having excess body weight or obesity increases a person's risk of developing type 2 diabetes. But people without excess body weight can get diabetes, too.  What are the symptoms of type 2 diabetes? -- Type 2 diabetes usually causes no symptoms. When symptoms do happen, they include:   Needing to urinate often   Intense thirst   Blurry vision  Can diabetes lead to other health problems? -- Yes. Type 2 diabetes might not make you feel sick. But if it is not managed, it can lead to serious problems over time, such as:   Heart attacks   Strokes   Kidney disease   Vision problems (or even blindness)   Pain or loss of feeling in the hands and feet   Needing to have fingers, toes, or other body parts removed (amputated)  How do I know if I have type 2 diabetes? -- Your doctor or nurse can do a blood test. There are 2 tests that can be used for this. Both involve measuring the amount of sugar in your blood, called your \"blood sugar\" or \"blood glucose\":   One of the tests measures your blood sugar at the time the blood sample is taken. This test is done in the " "morning. You can't eat or drink anything except water for at least 8 hours before the test.   The other test shows what your average blood sugar has been for the past 2 to 3 months. This blood test is called \"hemoglobin A1C\" or just \"A1C.\" It can be checked at any time of the day, even if you have recently eaten.  How is type 2 diabetes treated? -- The goals of treatment are to manage your blood sugar and lower the risk of future problems that can happen in people with diabetes.  Treatment might include:   Lifestyle changes - This is an important part of managing diabetes. It includes eating healthy foods and getting plenty of physical activity.   Medicines - There are a few medicines that help lower blood sugar. Some people need to take pills that help the body make more insulin or that help insulin do its job. Others need insulin shots.  Depending on what medicines you take, you might need to check your blood sugar regularly at home. But not everyone with type 2 diabetes needs to do this. Your doctor or nurse will tell you if you should be checking your blood sugar, and when and how to do this.  Sometimes, people with type 2 diabetes also need medicines to help prevent problems caused by the disease. For instance, medicines used to lower blood pressure can reduce the chances of a heart attack or stroke.   General medical care - It's also important to take care of other areas of your health. This includes watching your blood pressure and cholesterol levels. You should also get certain vaccines, such as vaccines to protect against the flu and coronavirus disease 2019 (\"COVID-19\"). Some people also need a vaccine to prevent pneumonia.  Can type 2 diabetes be prevented? -- Yes. To lower your chances of getting type 2 diabetes, the most important thing you can do is eat a healthy diet and get plenty of physical activity. This can help you lose weight if you are overweight. But eating well and being active are also good " for your overall health. Even gentle activity, like walking, has benefits.  If you smoke, quitting can also lower your risk of type 2 diabetes. Quitting smoking can be difficult, but your doctor or nurse can help.  All topics are updated as new evidence becomes available and our peer review process is complete.  This topic retrieved from [email or web address] on: Apr 24, 2024.  Topic 53707 Version 23.0  Release: 32.3.2 - C32.113  © 2024 UpToDate, Inc. and/or its affiliates. All rights reserved.  Consumer Information Use and Disclaimer   Disclaimer: This generalized information is a limited summary of diagnosis, treatment, and/or medication information. It is not meant to be comprehensive and should be used as a tool to help the user understand and/or assess potential diagnostic and treatment options. It does NOT include all information about conditions, treatments, medications, side effects, or risks that may apply to a specific patient. It is not intended to be medical advice or a substitute for the medical advice, diagnosis, or treatment of a health care provider based on the health care provider's examination and assessment of a patient's specific and unique circumstances. Patients must speak with a health care provider for complete information about their health, medical questions, and treatment options, including any risks or benefits regarding use of medications. This information does not endorse any treatments or medications as safe, effective, or approved for treating a specific patient. UpToDate, Inc. and its affiliates disclaim any warranty or liability relating to this information or the use thereof.The use of this information is governed by the Terms of Use, available at https://www.wolterskluwer.com/en/know/clinical-effectiveness-terms. 2024© UpToDate, Inc. and its affiliates and/or licensors. All rights reserved.  Copyright   © 2024 UpToDate, Inc. and/or its affiliates. All rights reserved.    Patient Education    "  Foot care for people with diabetes   The Basics   Written by the doctors and editors at [facility]   Why is foot care important if I have diabetes? -- Diabetes can cause nerve damage if your blood sugar is high for a long time. The medical term for this is \"diabetic neuropathy.\"  If you have problems with the nerves in your feet, you might not be able to feel pain in your foot. Normally, people feel pain when they get a cut or a blister on their foot. The pain tells them that they need to treat their cut so it can heal. But people with nerve damage might not feel any pain when their feet get hurt. They might not even know that they have a cut, so they might not treat it. Problems that aren't treated right away can get much worse. For example, an untreated cut can get infected and turn into an open sore.  High blood sugar can also damage blood vessels and decrease blood flow to your feet. This can weaken your skin and make wounds take longer to heal. You are also more likely to get an infection if you have high blood sugar.  How do I take care of my feet? -- Taking good care of your feet can help prevent foot problems. You should:   Wash your feet every day with soap and warm water. Pat your feet dry, and be sure to dry the skin between your toes.   Keep your feet moisturized. Put lotion on the tops and bottoms of your feet, but not between your toes.   Check your feet every day (figure 1). Look for cuts, blisters, redness, or swelling. Use a mirror, or ask someone to help you check the bottoms of your feet. Check all parts of the foot, especially between the toes. Look for broken skin, ulcers, blisters, or redness.   Trim your toenails straight across when needed (figure 2). Do not cut the corners of your toenails. File rough edges. Do not cut your cuticles. Ask for help if you cannot see well or have problems reaching your feet.   Ask your doctor or nurse to check your feet at each visit. Take your shoes and socks " off for these checks.   See a foot care provider (such as a podiatrist) if you have an ingrown toenail, corn, or callus. Do not try to remove corns and calluses yourself.  How do I protect my feet from injury? -- There are several ways to protect your feet. You can:   Wear shoes and socks at all times, even at home. Do not walk barefoot. Wear swim shoes if you go to the beach or a swimming pool.   Choose shoes that fit well. They should not be not too tight or too loose. Your shoes should have plenty of room for your toes (figure 3). Your doctor might give you a prescription for special shoes. Check to see if they are covered by your insurance.   Check your shoes each time before you put them on to make sure that the lining is smooth. Also check to make sure that there is nothing inside the shoes before putting them on.   Do not wear shoes that expose any part of the foot, like sandals, thongs, or clogs.   Wear cotton socks that fit loosely. Do not wear shoes without socks.   Protect your feet from heat and cold. Test bath water before putting your feet in it to make sure that it is not too hot. Do not walk barefoot on hot ground. Take extra care when going outside in the cold and wear warm socks.  What else should I know? -- You can lower your risk for foot problems by keeping your blood sugar levels as close to your goal as possible. Other things you can do include:   Move your ankles and toes often to help with blood flow. You can wear a support stocking to help with swelling.   Walk often. Regular walking helps blood flow.   If you smoke, try to quit. Your doctor or nurse can help. Smoking causes poor blood flow to your feet and can damage your nerves.  When should I call the doctor? -- Call your doctor or nurse for advice if you have:   A fever of 100.4°F (38°C) or higher, chills, or a wound that will not heal   Swelling, redness, warmth around a wound, a foul smell coming from a wound, or yellowish, greenish,  or bloody discharge   Sores or blisters on your feet that hurt more or less than you would expect   Numbness or tingling in your foot or leg   Corns, calluses, blisters, or new sores on your foot   Very dry, scaly, or cracked skin on your feet   Changes in the way your foot joints or arch look  All topics are updated as new evidence becomes available and our peer review process is complete.  This topic retrieved from [employer] on: Mar 13, 2024.  Topic 733531 Version 2.0  Release: 32.2.4 - C32.71  © 2024 UpToDate, Inc. and/or its affiliates. All rights reserved.  figure 1: Foot check for people with diabetes     People with diabetes should check both of their feet every day. It is important to check your feet all over, including in between your toes. If you can't see the bottom of your foot, use a mirror or ask another person to check for you. Let your doctor or nurse know if you find any:  Redness   Cuts or cracks in the skin   Blisters   Swelling   Graphic 98995 Version 3.0  figure 2: Trim your toenails     Trim your toenails straight across and smooth them with a nail file.  Graphic 84318 Version 2.0  figure 3: Correct shoe shape     Choose shoes that fit the right way and are not too tight or too loose. Your shoes should have plenty of room for your toes.  Graphic 13485 Version 2.0  Consumer Information Use and Disclaimer   Disclaimer: This generalized information is a limited summary of diagnosis, treatment, and/or medication information. It is not meant to be comprehensive and should be used as a tool to help the user understand and/or assess potential diagnostic and treatment options. It does NOT include all information about conditions, treatments, medications, side effects, or risks that may apply to a specific patient. It is not intended to be medical advice or a substitute for the medical advice, diagnosis, or treatment of a health care provider based on the health care provider's examination and assessment  "of a patient's specific and unique circumstances. Patients must speak with a health care provider for complete information about their health, medical questions, and treatment options, including any risks or benefits regarding use of medications. This information does not endorse any treatments or medications as safe, effective, or approved for treating a specific patient. UpToDate, Inc. and its affiliates disclaim any warranty or liability relating to this information or the use thereof.The use of this information is governed by the Terms of Use, available at https://www.readeo.com/en/know/clinical-effectiveness-terms. 2024© UpToDate, Inc. and its affiliates and/or licensors. All rights reserved.  Copyright   © 2024 UpToDate, Inc. and/or its affiliates. All rights reserved.    Patient Education     Low blood sugar in people with diabetes   The Basics   Written by the doctors and editors at [employer]   What is low blood sugar? -- This is when the level of sugar in a person's blood gets too low. It is also called \"hypoglycemia.\"  Low blood sugar can cause symptoms ranging from sweating and feeling hungry to passing out.  Low blood sugar can happen in people with diabetes who take certain medicines, including insulin, other medicines given as shots, and some types of pills.  When can people with diabetes get low blood sugar? -- People with diabetes can get low blood sugar when they:   Take too much medicine, including insulin, other medicines given as shots, or certain diabetes pills   Do not eat enough food   Exercise too much without eating a snack or reducing their insulin dose   Wait too long between meals   Drink too much alcohol or drink alcohol on an empty stomach  What are the symptoms of low blood sugar? -- The symptoms can be different from person to person, and can change over time. During the early stages of low blood sugar, a person can:   Sweat or tremble   Feel hungry   Feel worried  People who " "have early symptoms should check their blood sugar level to see if it is low and needs to be treated. If low blood sugar levels are not treated, severe symptoms can occur. These can include:   Trouble walking or feeling weak   Trouble seeing clearly   Being confused or acting in a strange way   Passing out or having a seizure  Some people do not get symptoms during the early stages of low blood sugar. Doctors sometimes call this \"hypoglycemia unawareness.\" People with hypoglycemia unawareness are more likely to have severe symptoms, because they might not know that they have low blood sugar until they have severe symptoms. Hypoglycemia unawareness is more likely in people who:   Have had type 1 diabetes for more than 5 to 10 years   Have frequent episodes of low blood sugar   Use insulin to keep their blood sugar level tightly managed   Are tired   Drink a lot of alcohol   Take certain medicines for high blood pressure or diabetes  How is low blood sugar treated? -- It can be treated with:   Quick sources of sugar - People can eat or drink quick sources of sugar (table 1). Foods that have fat, such as chocolate or cheese, do not treat low blood sugar as quickly. You and a family member should carry a quick source of sugar at all times.   A dose of glucagon - Glucagon is a hormone that can quickly raise blood sugar levels and stop severe symptoms. It comes as a shot (figure 1) or a nose spray. If your doctor recommends that you carry glucagon with you, they will tell you when and how to use it. If possible, it's also a good idea to have a family member, friend, or roommate learn how to give you glucagon. That way, they can give it to you if you can't do it yourself.  After treating low blood sugar, it is very important to recheck your blood sugar level to make sure that it rises and stays in the normal range. Once your blood sugar is normal, eat a small snack that contains protein, fat, and carbohydrate. This can " help keep your blood sugar stable.  What should I do after treatment? -- After treatment for low blood sugar, most people can get back to their usual routine. But your doctor or nurse might recommend that you check your blood sugar level more often during the next 2 to 3 days.  If your low blood sugar was treated with glucagon, call your doctor or nurse. They might change the dose of your diabetes medicine.  How can I prevent low blood sugar? -- The best way is to:   Check your blood sugar levels often - Your doctor or nurse will tell you how and when to check your blood sugar levels at home. They will also tell you what your blood sugar levels should be, and when to treat low blood sugar.   Learn the symptoms of low blood sugar, and be ready to treat it in the early stages. Treating low blood sugar early can prevent severe symptoms.  When should I go to a hospital or call for an ambulance? -- A family member or friend should take you to a hospital or call for an ambulance (in the [place] and [place], call 9-1-1) if you:   Are still confused 15 minutes after being treated with a dose of glucagon   Have passed out, and there is no glucagon nearby   Still have low blood sugar after treatment  If you have low blood sugar, do not try to drive yourself to the hospital. Driving with low blood sugar can be dangerous.  All topics are updated as new evidence becomes available and our peer review process is complete.  This topic retrieved from [employer] on: Apr 11, 2024.  Topic 98663 Version 22.0  Release: 32.3.2 - C32.100  © 2024 UpToDate, Inc. and/or its affiliates. All rights reserved.  table 1: Quick sources of sugar to treat low blood sugar  3 or 4 glucose tablets   ½ cup of juice or regular soda (not sugar-free)   2 tablespoons of raisins   4 or 5 saltine crackers   1 tablespoon of sugar   1 tablespoon of honey or corn syrup   6 to 8 hard candies   These sources of sugar act quickly to treat low blood sugar levels. People with  "diabetes who use insulin or certain other diabetes medicines should carry at least 1 of these items at all times.  Graphic 91555 Version 4.0  figure 1: Glucagon autoinjector     Some people carry glucagon in the form of an autoinjector \"pen.\" This makes it easy to give a dose into the upper arm, thigh, or belly.  Graphic 269181 Version 2.0  Consumer Information Use and Disclaimer   Disclaimer: This generalized information is a limited summary of diagnosis, treatment, and/or medication information. It is not meant to be comprehensive and should be used as a tool to help the user understand and/or assess potential diagnostic and treatment options. It does NOT include all information about conditions, treatments, medications, side effects, or risks that may apply to a specific patient. It is not intended to be medical advice or a substitute for the medical advice, diagnosis, or treatment of a health care provider based on the health care provider's examination and assessment of a patient's specific and unique circumstances. Patients must speak with a health care provider for complete information about their health, medical questions, and treatment options, including any risks or benefits regarding use of medications. This information does not endorse any treatments or medications as safe, effective, or approved for treating a specific patient. UpToDate, Inc. and its affiliates disclaim any warranty or liability relating to this information or the use thereof.The use of this information is governed by the Terms of Use, available at https://www.[employer] Tal[employer] Group[employer]Kukunuuwer.com/en/know/clinical-effectiveness-terms. 2024© UpToDate, Inc. and its affiliates and/or licensors. All rights reserved.  Copyright   © 2024 UpToDate, Inc. and/or its affiliates. All rights reserved.    "

## 2024-07-22 NOTE — PROGRESS NOTES
Diabetic Foot Exam    Patient's shoes and socks removed.    Right Foot/Ankle   Right Foot Inspection  Skin Exam: skin normal and skin intact. No dry skin, no warmth, no callus, no erythema, no maceration, no abnormal color, no pre-ulcer, no ulcer and no callus.     Sensory   Monofilament testing: intact    Vascular  The right DP pulse is 2+. The right PT pulse is 2+.     Left Foot/Ankle  Left Foot Inspection  Skin Exam: skin normal and skin intact. No dry skin, no warmth, no erythema, no maceration, normal color, no pre-ulcer, no ulcer and no callus.     Sensory   Monofilament testing: intact    Vascular  The left DP pulse is 2+. The left PT pulse is 2+.     Assign Risk Category  No deformity present

## 2024-07-22 NOTE — PROGRESS NOTES
FAMILY PRACTICE OFFICE VISIT       NAME: Jus Chavez  AGE: 74 y.o. SEX: male       : 1950        MRN: [id number]    DATE: 2024  TIME: 4:52 PM    Assessment and Plan   1. Coronary artery disease of native artery of native heart with stable angina pectoris (HCC)  Assessment & Plan:  Stable  Cont meds and f/u with cardiology  Orders:  -     Hemoglobin A1C; Future; Expected date: 2024  -     Comprehensive metabolic panel; Future; Expected date: 2024  -     CBC and differential; Future; Expected date: 2024  2. Type 2 diabetes mellitus with hyperglycemia, without long-term current use of insulin (HCC)  Assessment & Plan:  F/u with endocrinology  Cont meds    Lab Results   Component Value Date    HGBA1C 7.8 (H) 2024     Orders:  -     Hemoglobin A1C; Future; Expected date: 2024  -     Comprehensive metabolic panel; Future; Expected date: 2024  -     CBC and differential; Future; Expected date: 2024  3. Mixed hyperlipidemia  Assessment & Plan:  Cont current meds    Orders:  -     Hemoglobin A1C; Future; Expected date: 2024  -     Comprehensive metabolic panel; Future; Expected date: 2024  -     CBC and differential; Future; Expected date: 2024  4. Diabetic peripheral neuropathy associated with type 2 diabetes mellitus (HCC)  Assessment & Plan:  Stable  Cont to try to keep diabetes under control    Lab Results   Component Value Date    HGBA1C 7.8 (H) 2024     Orders:  -     Hemoglobin A1C; Future; Expected date: 2024  -     Comprehensive metabolic panel; Future; Expected date: 2024  -     CBC and differential; Future; Expected date: 2024  5. Essential hypertension  Assessment & Plan:  Stable  Cont meds    Orders:  -     Hemoglobin A1C; Future; Expected date: 2024  -     Comprehensive metabolic panel; Future; Expected date: 2024  -     CBC and differential; Future; Expected date: 2024  6. Postoperative  "atrial fibrillation (HCC)  Assessment & Plan:  Rate controlled   Cont meds         Patient Instructions   Patient Education     Type 2 diabetes   The Basics   Written by the doctors and editors at [facility]   What is type 2 diabetes? -- This is a disorder that disrupts the way the body uses sugar. It is sometimes called type 2 diabetes mellitus.  All of the cells in the body need sugar to work normally. Sugar gets into the cells with the help of a hormone called insulin. Insulin is made by the pancreas, an organ in the belly. If there is not enough insulin, or if cells in the body don't respond normally to insulin, sugar builds up in the blood. That is what happens to people with diabetes.  There are 2 different types of diabetes:   In type 1 diabetes, the pancreas makes little or no insulin.   In type 2 diabetes, the pancreas still makes some insulin, but the cells in the body stop responding normally. Eventually, the pancreas cannot make enough insulin to keep up.  Having excess body weight or obesity increases a person's risk of developing type 2 diabetes. But people without excess body weight can get diabetes, too.  What are the symptoms of type 2 diabetes? -- Type 2 diabetes usually causes no symptoms. When symptoms do happen, they include:   Needing to urinate often   Intense thirst   Blurry vision  Can diabetes lead to other health problems? -- Yes. Type 2 diabetes might not make you feel sick. But if it is not managed, it can lead to serious problems over time, such as:   Heart attacks   Strokes   Kidney disease   Vision problems (or even blindness)   Pain or loss of feeling in the hands and feet   Needing to have fingers, toes, or other body parts removed (amputated)  How do I know if I have type 2 diabetes? -- Your doctor or nurse can do a blood test. There are 2 tests that can be used for this. Both involve measuring the amount of sugar in your blood, called your \"blood sugar\" or \"blood glucose\":   One of " "the tests measures your blood sugar at the time the blood sample is taken. This test is done in the morning. You can't eat or drink anything except water for at least 8 hours before the test.   The other test shows what your average blood sugar has been for the past 2 to 3 months. This blood test is called \"hemoglobin A1C\" or just \"A1C.\" It can be checked at any time of the day, even if you have recently eaten.  How is type 2 diabetes treated? -- The goals of treatment are to manage your blood sugar and lower the risk of future problems that can happen in people with diabetes.  Treatment might include:   Lifestyle changes - This is an important part of managing diabetes. It includes eating healthy foods and getting plenty of physical activity.   Medicines - There are a few medicines that help lower blood sugar. Some people need to take pills that help the body make more insulin or that help insulin do its job. Others need insulin shots.  Depending on what medicines you take, you might need to check your blood sugar regularly at home. But not everyone with type 2 diabetes needs to do this. Your doctor or nurse will tell you if you should be checking your blood sugar, and when and how to do this.  Sometimes, people with type 2 diabetes also need medicines to help prevent problems caused by the disease. For instance, medicines used to lower blood pressure can reduce the chances of a heart attack or stroke.   General medical care - It's also important to take care of other areas of your health. This includes watching your blood pressure and cholesterol levels. You should also get certain vaccines, such as vaccines to protect against the flu and coronavirus disease 2019 (\"COVID-19\"). Some people also need a vaccine to prevent pneumonia.  Can type 2 diabetes be prevented? -- Yes. To lower your chances of getting type 2 diabetes, the most important thing you can do is eat a healthy diet and get plenty of physical activity. " This can help you lose weight if you are overweight. But eating well and being active are also good for your overall health. Even gentle activity, like walking, has benefits.  If you smoke, quitting can also lower your risk of type 2 diabetes. Quitting smoking can be difficult, but your doctor or nurse can help.  All topics are updated as new evidence becomes available and our peer review process is complete.  This topic retrieved from [email or web address] on: Apr 24, 2024.  Topic 04017 Version 23.0  Release: 32.3.2 - C32.113  © 2024 UpToDate, Inc. and/or its affiliates. All rights reserved.  Consumer Information Use and Disclaimer   Disclaimer: This generalized information is a limited summary of diagnosis, treatment, and/or medication information. It is not meant to be comprehensive and should be used as a tool to help the user understand and/or assess potential diagnostic and treatment options. It does NOT include all information about conditions, treatments, medications, side effects, or risks that may apply to a specific patient. It is not intended to be medical advice or a substitute for the medical advice, diagnosis, or treatment of a health care provider based on the health care provider's examination and assessment of a patient's specific and unique circumstances. Patients must speak with a health care provider for complete information about their health, medical questions, and treatment options, including any risks or benefits regarding use of medications. This information does not endorse any treatments or medications as safe, effective, or approved for treating a specific patient. UpToDate, Inc. and its affiliates disclaim any warranty or liability relating to this information or the use thereof.The use of this information is governed by the Terms of Use, available at https://www.wolterskluwer.com/en/know/clinical-effectiveness-terms. 2024© UpToDate, Inc. and its affiliates and/or licensors. All rights  "reserved.  Copyright   © 2024 UpToDate, Inc. and/or its affiliates. All rights reserved.    Patient Education     Foot care for people with diabetes   The Basics   Written by the doctors and editors at [employer]   Why is foot care important if I have diabetes? -- Diabetes can cause nerve damage if your blood sugar is high for a long time. The medical term for this is \"diabetic neuropathy.\"  If you have problems with the nerves in your feet, you might not be able to feel pain in your foot. Normally, people feel pain when they get a cut or a blister on their foot. The pain tells them that they need to treat their cut so it can heal. But people with nerve damage might not feel any pain when their feet get hurt. They might not even know that they have a cut, so they might not treat it. Problems that aren't treated right away can get much worse. For example, an untreated cut can get infected and turn into an open sore.  High blood sugar can also damage blood vessels and decrease blood flow to your feet. This can weaken your skin and make wounds take longer to heal. You are also more likely to get an infection if you have high blood sugar.  How do I take care of my feet? -- Taking good care of your feet can help prevent foot problems. You should:   Wash your feet every day with soap and warm water. Pat your feet dry, and be sure to dry the skin between your toes.   Keep your feet moisturized. Put lotion on the tops and bottoms of your feet, but not between your toes.   Check your feet every day (figure 1). Look for cuts, blisters, redness, or swelling. Use a mirror, or ask someone to help you check the bottoms of your feet. Check all parts of the foot, especially between the toes. Look for broken skin, ulcers, blisters, or redness.   Trim your toenails straight across when needed (figure 2). Do not cut the corners of your toenails. File rough edges. Do not cut your cuticles. Ask for help if you cannot see well or have " problems reaching your feet.   Ask your doctor or nurse to check your feet at each visit. Take your shoes and socks off for these checks.   See a foot care provider (such as a podiatrist) if you have an ingrown toenail, corn, or callus. Do not try to remove corns and calluses yourself.  How do I protect my feet from injury? -- There are several ways to protect your feet. You can:   Wear shoes and socks at all times, even at home. Do not walk barefoot. Wear swim shoes if you go to the beach or a swimming pool.   Choose shoes that fit well. They should not be not too tight or too loose. Your shoes should have plenty of room for your toes (figure 3). Your doctor might give you a prescription for special shoes. Check to see if they are covered by your insurance.   Check your shoes each time before you put them on to make sure that the lining is smooth. Also check to make sure that there is nothing inside the shoes before putting them on.   Do not wear shoes that expose any part of the foot, like sandals, thongs, or clogs.   Wear cotton socks that fit loosely. Do not wear shoes without socks.   Protect your feet from heat and cold. Test bath water before putting your feet in it to make sure that it is not too hot. Do not walk barefoot on hot ground. Take extra care when going outside in the cold and wear warm socks.  What else should I know? -- You can lower your risk for foot problems by keeping your blood sugar levels as close to your goal as possible. Other things you can do include:   Move your ankles and toes often to help with blood flow. You can wear a support stocking to help with swelling.   Walk often. Regular walking helps blood flow.   If you smoke, try to quit. Your doctor or nurse can help. Smoking causes poor blood flow to your feet and can damage your nerves.  When should I call the doctor? -- Call your doctor or nurse for advice if you have:   A fever of 100.4°F (38°C) or higher, chills, or a wound that  will not heal   Swelling, redness, warmth around a wound, a foul smell coming from a wound, or yellowish, greenish, or bloody discharge   Sores or blisters on your feet that hurt more or less than you would expect   Numbness or tingling in your foot or leg   Corns, calluses, blisters, or new sores on your foot   Very dry, scaly, or cracked skin on your feet   Changes in the way your foot joints or arch look  All topics are updated as new evidence becomes available and our peer review process is complete.  This topic retrieved from [employer] on: Mar 13, 2024.  Topic 507701 Version 2.0  Release: 32.2.4 - C32.71  © 2024 UpToDate, Inc. and/or its affiliates. All rights reserved.  figure 1: Foot check for people with diabetes     People with diabetes should check both of their feet every day. It is important to check your feet all over, including in between your toes. If you can't see the bottom of your foot, use a mirror or ask another person to check for you. Let your doctor or nurse know if you find any:  Redness   Cuts or cracks in the skin   Blisters   Swelling   Graphic 74923 Version 3.0  figure 2: Trim your toenails     Trim your toenails straight across and smooth them with a nail file.  Graphic 71938 Version 2.0  figure 3: Correct shoe shape     Choose shoes that fit the right way and are not too tight or too loose. Your shoes should have plenty of room for your toes.  Graphic 05684 Version 2.0  Consumer Information Use and Disclaimer   Disclaimer: This generalized information is a limited summary of diagnosis, treatment, and/or medication information. It is not meant to be comprehensive and should be used as a tool to help the user understand and/or assess potential diagnostic and treatment options. It does NOT include all information about conditions, treatments, medications, side effects, or risks that may apply to a specific patient. It is not intended to be medical advice or a substitute for the medical  "advice, diagnosis, or treatment of a health care provider based on the health care provider's examination and assessment of a patient's specific and unique circumstances. Patients must speak with a health care provider for complete information about their health, medical questions, and treatment options, including any risks or benefits regarding use of medications. This information does not endorse any treatments or medications as safe, effective, or approved for treating a specific patient. UpToDate, Inc. and its affiliates disclaim any warranty or liability relating to this information or the use thereof.The use of this information is governed by the Terms of Use, available at https://www.[employer]/en/know/clinical-effectiveness-terms. 2024© UpToDate, Inc. and its affiliates and/or licensors. All rights reserved.  Copyright   © 2024 UpToDate, Inc. and/or its affiliates. All rights reserved.    Patient Education     Low blood sugar in people with diabetes   The Basics   Written by the doctors and editors at [employer]   What is low blood sugar? -- This is when the level of sugar in a person's blood gets too low. It is also called \"hypoglycemia.\"  Low blood sugar can cause symptoms ranging from sweating and feeling hungry to passing out.  Low blood sugar can happen in people with diabetes who take certain medicines, including insulin, other medicines given as shots, and some types of pills.  When can people with diabetes get low blood sugar? -- People with diabetes can get low blood sugar when they:   Take too much medicine, including insulin, other medicines given as shots, or certain diabetes pills   Do not eat enough food   Exercise too much without eating a snack or reducing their insulin dose   Wait too long between meals   Drink too much alcohol or drink alcohol on an empty stomach  What are the symptoms of low blood sugar? -- The symptoms can be different from person to person, and can change over time. " "During the early stages of low blood sugar, a person can:   Sweat or tremble   Feel hungry   Feel worried  People who have early symptoms should check their blood sugar level to see if it is low and needs to be treated. If low blood sugar levels are not treated, severe symptoms can occur. These can include:   Trouble walking or feeling weak   Trouble seeing clearly   Being confused or acting in a strange way   Passing out or having a seizure  Some people do not get symptoms during the early stages of low blood sugar. Doctors sometimes call this \"hypoglycemia unawareness.\" People with hypoglycemia unawareness are more likely to have severe symptoms, because they might not know that they have low blood sugar until they have severe symptoms. Hypoglycemia unawareness is more likely in people who:   Have had type 1 diabetes for more than 5 to 10 years   Have frequent episodes of low blood sugar   Use insulin to keep their blood sugar level tightly managed   Are tired   Drink a lot of alcohol   Take certain medicines for high blood pressure or diabetes  How is low blood sugar treated? -- It can be treated with:   Quick sources of sugar - People can eat or drink quick sources of sugar (table 1). Foods that have fat, such as chocolate or cheese, do not treat low blood sugar as quickly. You and a family member should carry a quick source of sugar at all times.   A dose of glucagon - Glucagon is a hormone that can quickly raise blood sugar levels and stop severe symptoms. It comes as a shot (figure 1) or a nose spray. If your doctor recommends that you carry glucagon with you, they will tell you when and how to use it. If possible, it's also a good idea to have a family member, friend, or roommate learn how to give you glucagon. That way, they can give it to you if you can't do it yourself.  After treating low blood sugar, it is very important to recheck your blood sugar level to make sure that it rises and stays in the " normal range. Once your blood sugar is normal, eat a small snack that contains protein, fat, and carbohydrate. This can help keep your blood sugar stable.  What should I do after treatment? -- After treatment for low blood sugar, most people can get back to their usual routine. But your doctor or nurse might recommend that you check your blood sugar level more often during the next 2 to 3 days.  If your low blood sugar was treated with glucagon, call your doctor or nurse. They might change the dose of your diabetes medicine.  How can I prevent low blood sugar? -- The best way is to:   Check your blood sugar levels often - Your doctor or nurse will tell you how and when to check your blood sugar levels at home. They will also tell you what your blood sugar levels should be, and when to treat low blood sugar.   Learn the symptoms of low blood sugar, and be ready to treat it in the early stages. Treating low blood sugar early can prevent severe symptoms.  When should I go to a hospital or call for an ambulance? -- A family member or friend should take you to a hospital or call for an ambulance (in the [place] and [place], call 9-1-1) if you:   Are still confused 15 minutes after being treated with a dose of glucagon   Have passed out, and there is no glucagon nearby   Still have low blood sugar after treatment  If you have low blood sugar, do not try to drive yourself to the hospital. Driving with low blood sugar can be dangerous.  All topics are updated as new evidence becomes available and our peer review process is complete.  This topic retrieved from [employer] on: Apr 11, 2024.  Topic 64527 Version 22.0  Release: 32.3.2 - C32.100  © 2024 UpToDate, Inc. and/or its affiliates. All rights reserved.  table 1: Quick sources of sugar to treat low blood sugar  3 or 4 glucose tablets   ½ cup of juice or regular soda (not sugar-free)   2 tablespoons of raisins   4 or 5 saltine crackers   1 tablespoon of sugar   1 tablespoon of  "honey or corn syrup   6 to 8 hard candies   These sources of sugar act quickly to treat low blood sugar levels. People with diabetes who use insulin or certain other diabetes medicines should carry at least 1 of these items at all times.  Graphic 97030 Version 4.0  figure 1: Glucagon autoinjector     Some people carry glucagon in the form of an autoinjector \"pen.\" This makes it easy to give a dose into the upper arm, thigh, or belly.  Graphic 534236 Version 2.0  Consumer Information Use and Disclaimer   Disclaimer: This generalized information is a limited summary of diagnosis, treatment, and/or medication information. It is not meant to be comprehensive and should be used as a tool to help the user understand and/or assess potential diagnostic and treatment options. It does NOT include all information about conditions, treatments, medications, side effects, or risks that may apply to a specific patient. It is not intended to be medical advice or a substitute for the medical advice, diagnosis, or treatment of a health care provider based on the health care provider's examination and assessment of a patient's specific and unique circumstances. Patients must speak with a health care provider for complete information about their health, medical questions, and treatment options, including any risks or benefits regarding use of medications. This information does not endorse any treatments or medications as safe, effective, or approved for treating a specific patient. UpToDate, Inc. and its affiliates disclaim any warranty or liability relating to this information or the use thereof.The use of this information is governed by the Terms of Use, available at https://www.wolterskluwer.com/en/know/clinical-effectiveness-terms. 2024© UpToDate, Inc. and its affiliates and/or licensors. All rights reserved.  Copyright   © 2024 UpToDate, Inc. and/or its affiliates. All rights reserved.           Chief Complaint     Chief Complaint "   Patient presents with    Follow-up     Pt is being seen for a 4 month follow up.       History of Present Illness   Jus Chavez is a 74 y.o.-year-old male who is here for f/u to chronic medical conditions  Hgba1c went from 8.5 down to 7.8, did well  Having glass of wine at night daily  Will cut back on that per patient  Getting come retinopathy per patient from eye doctor  Needs to cut back on sweets  Vit b12 is low, needs to take higher dose, will start to take it at home      Review of Systems   Review of Systems   Constitutional:  Negative for fatigue and fever.   HENT:  Negative for congestion, postnasal drip and rhinorrhea.    Eyes:  Negative for photophobia and visual disturbance.   Respiratory:  Negative for cough, shortness of breath and wheezing.    Cardiovascular:  Negative for chest pain and palpitations.   Gastrointestinal:  Negative for constipation, diarrhea, nausea and vomiting.   Genitourinary:  Negative for dysuria and frequency.   Musculoskeletal:  Negative for arthralgias and myalgias.   Skin:  Negative for rash.   Neurological:  Negative for dizziness, light-headedness and headaches.   Hematological:  Negative for adenopathy.   Psychiatric/Behavioral:  Negative for dysphoric mood and sleep disturbance. The patient is not nervous/anxious.        Active Problem List     Patient Active Problem List   Diagnosis    Aortic stenosis    Coronary artery disease of native artery of native heart with stable angina pectoris (HCC)    DM (diabetes mellitus), type 2 (HCC)    Essential hypertension    Hypertriglyceridemia    S/P CABG (coronary artery bypass graft)    S/P AVR    PVC (premature ventricular contraction)    Bilateral carotid artery stenosis    Mixed hyperlipidemia    Postoperative atrial fibrillation (HCC)    Benign prostatic hyperplasia with urinary frequency    Stenosis of right subclavian artery (HCC)    IBS (irritable bowel syndrome)    H/O diabetic foot ulcer    Diabetic peripheral  neuropathy associated with type 2 diabetes mellitus (HCC)    Dizziness    Instability of foot joint    Peripheral venous insufficiency         Past Medical History:  Past Medical History:   Diagnosis Date    Acute systolic heart failure (HCC) 02/23/2021    Athlete's foot     Bilateral impacted cerumen 03/09/2021    Coronary artery disease 07/30/2019    bypass and valve replacement    Diabetes mellitus (HCC)     Heart murmur     Other hydrocele 06/11/2020    TMJ (dislocation of temporomandibular joint)     Yeast infection        Past Surgical History:  Past Surgical History:   Procedure Laterality Date    CARDIAC SURGERY      CARDIAC VALVE REPLACEMENT      CATARACT EXTRACTION      COLONOSCOPY      CORONARY ARTERY BYPASS GRAFT      EYE SURGERY  03/2020    Cataract right eye    EYE SURGERY Left 12/03/2020    left eye cataract     HYDROCELE EXCISION / REPAIR      ND EXCISION HYDROCELE UNILATERAL Right 04/08/2022    Procedure: HYDROCELECTOMY;  Surgeon: Montrell Mancuso MD;  Location: [facility];  Service: Urology    ND EXCISION HYDROCELE UNILATERAL Right 11/16/2022    Procedure: HYDROCELECTOMY;  Surgeon: Montrell Mancuso MD;  Location: [facility];  Service: Urology    TONSILLECTOMY         Family History:  Family History   Problem Relation Age of Onset    Heart disease Mother     Cancer Mother         skin melonoma/mastectomy    Heart disease Father     Hypertension Father     Mitral valve prolapse Sister     Diabetes type II Maternal Grandmother     Colon cancer Neg Hx        Social History:  Social History     Socioeconomic History    Marital status: /Civil Union     Spouse name: Not on file    Number of children: Not on file    Years of education: Not on file    Highest education level: Not on file   Occupational History    Not on file   Tobacco Use    Smoking status: Never    Smokeless tobacco: Never   Vaping Use    Vaping status: Never Used   Substance and Sexual Activity    Alcohol use: Yes      Alcohol/week: 4.0 standard drinks of alcohol     Types: 2 Glasses of wine, 1 Cans of beer, 1 Shots of liquor per week     Comment: 2-3 glass of wine or  beer a week    Drug use: Never    Sexual activity: Not Currently     Partners: Female     Birth control/protection: None   Other Topics Concern    Not on file   Social History Narrative    Not on file     Social Determinants of Health     Financial Resource Strain: Low Risk  (3/11/2024)    Overall Financial Resource Strain (CARDIA)     Difficulty of Paying Living Expenses: Not very hard   Food Insecurity: No Food Insecurity (3/18/2024)    Hunger Vital Sign     Worried About Running Out of Food in the Last Year: Never true     Ran Out of Food in the Last Year: Never true   Transportation Needs: No Transportation Needs (3/18/2024)    PRAPARE - Transportation     Lack of Transportation (Medical): No     Lack of Transportation (Non-Medical): No   Physical Activity: Inactive (7/26/2019)    [facility]    Exercise Vital Sign     Days of Exercise per Week: 0 days     Minutes of Exercise per Session: 0 min   Stress: Not on file   Social Connections: Not on file   Intimate Partner Violence: Not on file   Housing Stability: Unknown (3/18/2024)    Housing Stability Vital Sign     Unable to Pay for Housing in the Last Year: No     Number of Times Moved in the Last Year: Not on file     Homeless in the Last Year: No       Objective     Vitals:    07/22/24 0956   BP: 112/70   Pulse: (!) 54   Resp: 16   Temp: (!) 97 °F (36.1 °C)   SpO2: 95%     Wt Readings from Last 3 Encounters:   07/22/24 97.5 kg (215 lb)   06/05/24 96.6 kg (213 lb)   05/03/24 96.6 kg (213 lb)       Physical Exam  Vitals and nursing note reviewed.   Constitutional:       Appearance: Normal appearance.   HENT:      Head: Normocephalic and atraumatic.      Right Ear: Tympanic membrane, ear canal and external ear normal.       "Left Ear: Tympanic membrane, ear canal and external ear normal.      Nose: Nose normal.      Mouth/Throat:      Mouth: Mucous membranes are moist.   Eyes:      Conjunctiva/sclera: Conjunctivae normal.   Cardiovascular:      Rate and Rhythm: Normal rate and regular rhythm.      Pulses: no weak pulses.           Dorsalis pedis pulses are 2+ on the right side and 2+ on the left side.      Heart sounds: Normal heart sounds.   Pulmonary:      Effort: Pulmonary effort is normal.      Breath sounds: Normal breath sounds.   Abdominal:      General: Bowel sounds are normal.      Palpations: Abdomen is soft.   Musculoskeletal:         General: Normal range of motion.      Cervical back: Normal range of motion and neck supple.   Feet:      Right foot:      Skin integrity: No ulcer, skin breakdown, erythema, warmth, callus or dry skin.      Left foot:      Skin integrity: No ulcer, skin breakdown, erythema, warmth, callus or dry skin.   Skin:     General: Skin is warm and dry.      Capillary Refill: Capillary refill takes less than 2 seconds.   Neurological:      General: No focal deficit present.      Mental Status: He is alert and oriented to person, place, and time.   Psychiatric:         Mood and Affect: Mood normal.         Behavior: Behavior normal.         Thought Content: Thought content normal.         Judgment: Judgment normal.         Pertinent Laboratory/Diagnostic Studies:  Lab Results   Component Value Date    BUN 27 (H) 07/18/2024    CREATININE 1.00 07/18/2024    CALCIUM 9.5 07/18/2024    K 3.9 07/18/2024    CO2 24 07/18/2024     07/18/2024     Lab Results   Component Value Date    ALT 19 07/18/2024    AST 16 07/18/2024    ALKPHOS 47 07/18/2024       Lab Results   Component Value Date    WBC 6.36 07/18/2024    HGB 14.1 07/18/2024    HCT 42.1 07/18/2024    MCV 92 07/18/2024     (L) 07/18/2024       Lab Results   Component Value Date    TSH 3.00 07/20/2020       No results found for: \"CHOL\"  Lab " Results   Component Value Date    TRIG 111 07/18/2024     Lab Results   Component Value Date    HDL 45 07/18/2024     Lab Results   Component Value Date    LDLCALC 43 07/18/2024     Lab Results   Component Value Date    HGBA1C 7.8 (H) 07/18/2024       Results for orders placed or performed in visit on 07/18/24   Hemoglobin A1C   Result Value Ref Range    Hemoglobin A1C 7.8 (H) Normal 4.0-5.6%; PreDiabetic 5.7-6.4%; Diabetic >=6.5%; Glycemic control for adults with diabetes <7.0% %     mg/dl   Comprehensive metabolic panel   Result Value Ref Range    Sodium 140 135 - 147 mmol/L    Potassium 3.9 3.5 - 5.3 mmol/L    Chloride 106 96 - 108 mmol/L    CO2 24 21 - 32 mmol/L    ANION GAP 10 4 - 13 mmol/L    BUN 27 (H) 5 - 25 mg/dL    Creatinine 1.00 0.60 - 1.30 mg/dL    Glucose, Fasting 160 (H) 65 - 99 mg/dL    Calcium 9.5 8.4 - 10.2 mg/dL    AST 16 13 - 39 U/L    ALT 19 7 - 52 U/L    Alkaline Phosphatase 47 34 - 104 U/L    Total Protein 6.6 6.4 - 8.4 g/dL    Albumin 4.1 3.5 - 5.0 g/dL    Total Bilirubin 0.52 0.20 - 1.00 mg/dL    eGFR 72 ml/min/1.73sq m   Albumin / creatinine urine ratio   Result Value Ref Range    Creatinine, Ur 47.0 Reference range not established. mg/dL    Albumin,U,Random <7.0 <20.0 mg/L    Albumin Creat Ratio     Lipid Panel with Direct LDL reflex   Result Value Ref Range    Cholesterol 110 See Comment mg/dL    Triglycerides 111 See Comment mg/dL    HDL, Direct 45 >=40 mg/dL    LDL Calculated 43 0 - 100 mg/dL   Vitamin B12   Result Value Ref Range    Vitamin B-12 180 180 - 914 pg/mL   CBC and differential   Result Value Ref Range    WBC 6.36 4.31 - 10.16 Thousand/uL    RBC 4.57 3.88 - 5.62 Million/uL    Hemoglobin 14.1 12.0 - 17.0 g/dL    Hematocrit 42.1 36.5 - 49.3 %    MCV 92 82 - 98 fL    MCH 30.9 26.8 - 34.3 pg    MCHC 33.5 31.4 - 37.4 g/dL    RDW 11.9 11.6 - 15.1 %    MPV 11.7 8.9 - 12.7 fL    Platelets 142 (L) 149 - 390 Thousands/uL    nRBC 0 /100 WBCs    Segmented % 49 43 - 75 %     Immature Grans % 0 0 - 2 %    Lymphocytes % 34 14 - 44 %    Monocytes % 13 (H) 4 - 12 %    Eosinophils Relative 3 0 - 6 %    Basophils Relative 1 0 - 1 %    Absolute Neutrophils 3.10 1.85 - 7.62 Thousands/µL    Absolute Immature Grans 0.01 0.00 - 0.20 Thousand/uL    Absolute Lymphocytes 2.17 0.60 - 4.47 Thousands/µL    Absolute Monocytes 0.84 0.17 - 1.22 Thousand/µL    Eosinophils Absolute 0.18 0.00 - 0.61 Thousand/µL    Basophils Absolute 0.06 0.00 - 0.10 Thousands/µL   TSH, 3rd generation with Free T4 reflex   Result Value Ref Range    TSH 3RD GENERATON 3.383 0.450 - 4.500 uIU/mL       Orders Placed This Encounter   Procedures    Hemoglobin A1C    Comprehensive metabolic panel    CBC and differential       ALLERGIES:  No Known Allergies    Current Medications     Current Outpatient Medications   Medication Sig Dispense Refill    acetaminophen (TYLENOL) 500 mg tablet Take 500 mg by mouth daily at bedtime      ascorbic acid (VITAMIN C) 250 mg tablet Take 500 mg by mouth 2 (two) times a day      Aspirin 81 MG CAPS Take 1 capsule every day by oral route.      ciclopirox (LOPROX) 0.77 % cream       Cyanocobalamin (VITAMIN B-12 PO) Take 1 tablet by mouth 3 (three) times a day      finasteride (PROSCAR) 5 mg tablet Take 1 tablet (5 mg total) by mouth daily 30 tablet 5    furosemide (LASIX) 20 mg tablet Take 1 tablet (20 mg total) by mouth daily as needed (edema) 90 tablet 1    Ginger, Zingiber officinalis, (Ginger Root) 500 MG CAPS       Jardiance 25 MG TABS Take 1 tablet (25 mg total) by mouth daily 90 tablet 1    metFORMIN (GLUCOPHAGE) 500 mg tablet TAKE TWO TABLETS BY MOUTH TWICE A  tablet 1    metoprolol succinate (TOPROL-XL) 25 mg 24 hr tablet Take 1 tablet (25 mg total) by mouth daily 90 tablet 3    niacin 500 mg tablet Take 500 mg by mouth 4 (four) times a day      NON FORMULARY 25 mcg Vitamin D      Omega-3 Fatty Acids (FISH OIL BURP-LESS PO) Take by mouth      PROBIOTIC PRODUCT PO Take 111 mg by mouth  daily      repaglinide (PRANDIN) 0.5 mg tablet TAKE ONE TABLET BY MOUTH EVERY DAY BEFORE DINNER 90 tablet 1    rosuvastatin (CRESTOR) 10 MG tablet TAKE ONE TABLET BY MOUTH ONCE DAILY 90 tablet 3    sitaGLIPtin (JANUVIA) 100 mg tablet Take 1 tablet (100 mg total) by mouth daily      tamsulosin (FLOMAX) 0.4 mg TAKE ONE CAPSULE BY MOUTH EVERY DAY WITH DINNER 30 capsule 6    Tart Cherry (Tart Cherry Ultra) 1200 MG CAPS       Turmeric 500 MG CAPS       Wheat Dextrin (BENEFIBER DRINK MIX PO) Take by mouth       No current facility-administered medications for this visit.         Health Maintenance     Health Maintenance   Topic Date Due    RSV Vaccine Age 60+ Years (1 - 1-dose 60+ series) Never done    Diabetic Foot Exam  04/27/2024    Influenza Vaccine (1) 09/01/2024    Kidney Health Evaluation: Albumin/Creatinine Ratio  09/27/2024    Hepatitis C Screening  08/15/2024 (Originally 1950)    HEMOGLOBIN A1C  01/18/2025    Fall Risk  03/18/2025    Depression Screening  03/18/2025    Medicare Annual Wellness Visit (AWV)  03/18/2025    DM Eye Exam  04/05/2025    Kidney Health Evaluation: GFR  07/18/2025    Colorectal Cancer Screening  03/02/2027    Zoster Vaccine  Completed    Pneumococcal Vaccine: 65+ Years  Completed    COVID-19 Vaccine  Completed    RSV Vaccine age 0-20 Months  Aged Out    HIB Vaccine  Aged Out    IPV Vaccine  Aged Out    Hepatitis A Vaccine  Aged Out    Meningococcal ACWY Vaccine  Aged Out    HPV Vaccine  Aged Out     Immunization History   Administered Date(s) Administered    COVID-19 PFIZER VACCINE 0.3 ML IM 03/07/2021, 03/26/2021, 10/07/2021    COVID-19 Pfizer Vac BIVALENT Rubio-sucrose 12 Yr+ IM 10/31/2022    COVID-19 Pfizer mRNA vacc PF rubio-sucrose 12 yr and older (Comirnaty) 11/14/2023    INFLUENZA 10/31/2022    Influenza, high dose seasonal 0.7 mL 10/27/2021, 10/14/2023    Pneumococcal Conjugate Vaccine 20-valent (Pcv20), Polysace 11/17/2023    Pneumococcal Polysaccharide PPV23 10/27/2021     Zoster Vaccine Recombinant 03/21/2023, 05/22/2023        Patient's shoes and socks removed.    Right Foot/Ankle   Right Foot Inspection  Skin Exam: skin normal and skin intact. No dry skin, no warmth, no callus, no erythema, no maceration, no abnormal color, no pre-ulcer, no ulcer and no callus.     Toe Exam: ROM and strength within normal limits.     Sensory   Monofilament testing: diminished    Vascular  Capillary refills: < 3 seconds  The right DP pulse is 2+.     Left Foot/Ankle  Left Foot Inspection  Skin Exam: skin normal and skin intact. No dry skin, no warmth, no erythema, no maceration, normal color, no pre-ulcer, no ulcer and no callus.     Toe Exam: ROM and strength within normal limits.     Sensory   Monofilament testing: diminished    Vascular  Capillary refills: < 3 seconds  The left DP pulse is 2+.     Assign Risk Category  No deformity present  Loss of protective sensation  No weak pulses  Risk: 1     HESHAM Matias

## 2024-07-28 PROBLEM — R51.9 NONINTRACTABLE HEADACHE: Status: RESOLVED | Noted: 2023-08-14 | Resolved: 2024-07-28

## 2024-07-28 NOTE — ASSESSMENT & PLAN NOTE
F/u with endocrinology  Cont meds    Lab Results   Component Value Date    HGBA1C 7.8 (H) 07/18/2024

## 2024-07-28 NOTE — ASSESSMENT & PLAN NOTE
Stable  Cont to try to keep diabetes under control    Lab Results   Component Value Date    HGBA1C 7.8 (H) 07/18/2024

## 2024-07-30 DIAGNOSIS — N40.1 BENIGN PROSTATIC HYPERPLASIA WITH URINARY FREQUENCY: ICD-10-CM

## 2024-07-30 DIAGNOSIS — R35.0 BENIGN PROSTATIC HYPERPLASIA WITH URINARY FREQUENCY: ICD-10-CM

## 2024-07-30 RX ORDER — TAMSULOSIN HYDROCHLORIDE 0.4 MG/1
CAPSULE ORAL
Qty: 30 CAPSULE | Refills: 5 | Status: SHIPPED | OUTPATIENT
Start: 2024-07-30

## 2024-08-26 ENCOUNTER — OFFICE VISIT (OUTPATIENT)
Dept: GASTROENTEROLOGY | Facility: AMBULARY SURGERY CENTER | Age: 74
End: 2024-08-26
Payer: MEDICARE

## 2024-08-26 VITALS
BODY MASS INDEX: 28.39 KG/M2 | DIASTOLIC BLOOD PRESSURE: 64 MMHG | OXYGEN SATURATION: 96 % | WEIGHT: 214.2 LBS | HEART RATE: 64 BPM | SYSTOLIC BLOOD PRESSURE: 118 MMHG | HEIGHT: 73 IN

## 2024-08-26 DIAGNOSIS — K58.9 IRRITABLE BOWEL SYNDROME, UNSPECIFIED TYPE: Primary | ICD-10-CM

## 2024-08-26 PROCEDURE — 99213 OFFICE O/P EST LOW 20 MIN: CPT | Performed by: INTERNAL MEDICINE

## 2024-08-26 NOTE — PROGRESS NOTES
Ambulatory Visit  Name: Jus Chavez      : 1950      MRN: [id number]  Encounter Provider: Nleda Castillo MD  Encounter Date: 2024   Encounter department: [facility] [place]    Assessment & Plan   1. Irritable bowel syndrome, unspecified type  Assessment & Plan:  Chronic symptoms on going > 12 months with recent exacerbation, presents for medical management  - recommend repeat colonoscopy in  per previous subcentimeter tubular adenoma of the colon  Internal hemorrhoids noted on previous colonoscopy  - recommend optimization of bowel habits with avoidance of constipation to avoid anal irritation/leakage  Recommend continued psyllium powder fiber- continue daily benefiber, consider 2nd dose daily or every other day vs adding 2 green kiwifruits to improve symptoms  Consider repeat colonoscopy if symptoms not improved      History of Present Illness     Jus Chavez is a 74 y.o. male who presents in follow-up for IBS, hemorrhoids.  He has had significant improvement in the regularity of his bowel habits.  He has been using Benefiber at least once daily, and sometimes 2 times daily when he experiences rectal pressure or leakage.  He has also been using Gas-X after his meals to help with belching and discomfort.  He did have 1 loose bowel movement yesterday for which he used Imodium.    Colonoscopy - subcentimeter tubular adenoma of the sigmoid colon; diverticulosis     Abdominal Pain  This is a recurrent problem. The current episode started today. The onset quality is gradual. The problem occurs intermittently. The most recent episode lasted 5 hours. The problem has been unchanged. The pain is located in the epigastric region. The pain is at a severity of 2/10. The quality of the pain is dull. The abdominal pain does not radiate. Associated symptoms include belching and diarrhea. Pertinent negatives include no anorexia, arthralgias, constipation, dysuria, fever,  "flatus, frequency, headaches, hematochezia, hematuria, melena, myalgias, nausea, vomiting or weight loss. Nothing aggravates the pain. The pain is relieved by Nothing.         Review of Systems   Constitutional:  Negative for chills, fever and weight loss.   HENT:  Negative for ear pain and sore throat.    Eyes:  Negative for pain and visual disturbance.   Respiratory:  Negative for cough and shortness of breath.    Cardiovascular:  Negative for chest pain and palpitations.   Gastrointestinal:  Positive for abdominal pain and diarrhea. Negative for anorexia, constipation, flatus, hematochezia, melena, nausea and vomiting.   Genitourinary:  Negative for dysuria, frequency and hematuria.   Musculoskeletal:  Negative for arthralgias, back pain and myalgias.   Skin:  Negative for color change and rash.   Neurological:  Negative for seizures, syncope and headaches.   All other systems reviewed and are negative.      Objective     /64 (BP Location: Left arm, Patient Position: Sitting, Cuff Size: Standard)   Pulse 64   Ht 6' 1\" (1.854 m)   Wt 97.2 kg (214 lb 3.2 oz)   SpO2 96%   BMI 28.26 kg/m²     Physical Exam  Vitals and nursing note reviewed.   Constitutional:       General: He is not in acute distress.     Appearance: He is well-developed.   HENT:      Head: Normocephalic and atraumatic.   Eyes:      Conjunctiva/sclera: Conjunctivae normal.   Cardiovascular:      Rate and Rhythm: Normal rate and regular rhythm.      Heart sounds: No murmur heard.  Pulmonary:      Effort: Pulmonary effort is normal. No respiratory distress.      Breath sounds: Normal breath sounds.   Abdominal:      Palpations: Abdomen is soft.      Tenderness: There is no abdominal tenderness.   Musculoskeletal:         General: No swelling.      Cervical back: Neck supple.   Skin:     General: Skin is warm and dry.      Capillary Refill: Capillary refill takes less than 2 seconds.   Neurological:      Mental Status: He is alert. "   Psychiatric:         Mood and Affect: Mood normal.       Administrative Statements

## 2024-08-26 NOTE — PATIENT INSTRUCTIONS
For your bowel habits, as we discussed during today's visit,  - I would recommend starting psyllium, fiber supplementation.  This can be done with use of Konsyl, Citrucil, Metamucil or Benefiber fiber, which can be purchased over-the-counter.  I would recommend starting slow with 1 tsp mixed into a large glass of water, once a day, and increasing to goal of 1 tbsp mixed into a large glass of water per day.  - this helps with both diarrhea and constipation, helping to bulk the stool, and should not cause constipation or diarrhea, but treat both  - the only side effect of this can be bloating, if this occurs, cut down on the dose, and increase your water intake or alternatively, consider switching to an alternative as listed above    - continue benefiber daily- consider increasing to twice daily  Alternatively, add 2 green kiwifruits per day to improve symptoms  - continue cardiovascular exercise, all related to GI tract activity!

## 2024-09-02 DIAGNOSIS — E11.65 TYPE 2 DIABETES MELLITUS WITH HYPERGLYCEMIA, WITHOUT LONG-TERM CURRENT USE OF INSULIN (HCC): ICD-10-CM

## 2024-09-03 RX ORDER — REPAGLINIDE 0.5 MG/1
TABLET ORAL
Qty: 360 TABLET | Refills: 1 | Status: SHIPPED | OUTPATIENT
Start: 2024-09-03

## 2024-10-05 DIAGNOSIS — I25.118 CORONARY ARTERY DISEASE OF NATIVE ARTERY OF NATIVE HEART WITH STABLE ANGINA PECTORIS (HCC): ICD-10-CM

## 2024-10-07 RX ORDER — METOPROLOL SUCCINATE 25 MG/1
25 TABLET, EXTENDED RELEASE ORAL DAILY
Qty: 90 TABLET | Refills: 1 | Status: SHIPPED | OUTPATIENT
Start: 2024-10-07

## 2024-10-22 DIAGNOSIS — R35.0 BENIGN PROSTATIC HYPERPLASIA WITH URINARY FREQUENCY: ICD-10-CM

## 2024-10-22 DIAGNOSIS — N40.1 BENIGN PROSTATIC HYPERPLASIA WITH URINARY FREQUENCY: ICD-10-CM

## 2024-10-22 RX ORDER — FINASTERIDE 5 MG/1
5 TABLET, FILM COATED ORAL DAILY
Qty: 90 TABLET | Refills: 1 | Status: SHIPPED | OUTPATIENT
Start: 2024-10-22

## 2024-10-22 NOTE — TELEPHONE ENCOUNTER
Reason for call:   [x] Refill   [] Prior Auth  [] Other:     Office:   [] PCP/Provider -   [x] Specialty/Provider - Cardio/Kendall Miller    Medication: finasteride (PROSCAR) 5 mg tablet     Dose/Frequency: 1 tablet daily    Quantity: 30    Pharmacy: [facility] - [id number]    Does the patient have enough for 3 days?   [] Yes   [x] No - Send as HP to POD

## 2024-10-24 DIAGNOSIS — E11.65 TYPE 2 DIABETES MELLITUS WITH HYPERGLYCEMIA, WITHOUT LONG-TERM CURRENT USE OF INSULIN (HCC): ICD-10-CM

## 2024-11-22 ENCOUNTER — OFFICE VISIT (OUTPATIENT)
Dept: FAMILY MEDICINE CLINIC | Facility: CLINIC | Age: 74
End: 2024-11-22
Payer: MEDICARE

## 2024-11-22 VITALS
OXYGEN SATURATION: 95 % | BODY MASS INDEX: 28.23 KG/M2 | WEIGHT: 213 LBS | TEMPERATURE: 95.8 F | HEIGHT: 73 IN | RESPIRATION RATE: 18 BRPM | HEART RATE: 66 BPM | SYSTOLIC BLOOD PRESSURE: 120 MMHG | DIASTOLIC BLOOD PRESSURE: 70 MMHG

## 2024-11-22 DIAGNOSIS — E78.2 MIXED HYPERLIPIDEMIA: ICD-10-CM

## 2024-11-22 DIAGNOSIS — J01.00 ACUTE NON-RECURRENT MAXILLARY SINUSITIS: ICD-10-CM

## 2024-11-22 DIAGNOSIS — I25.118 CORONARY ARTERY DISEASE OF NATIVE ARTERY OF NATIVE HEART WITH STABLE ANGINA PECTORIS (HCC): ICD-10-CM

## 2024-11-22 DIAGNOSIS — D69.6 THROMBOCYTOPENIA (HCC): ICD-10-CM

## 2024-11-22 DIAGNOSIS — E11.65 TYPE 2 DIABETES MELLITUS WITH HYPERGLYCEMIA, WITHOUT LONG-TERM CURRENT USE OF INSULIN (HCC): Primary | ICD-10-CM

## 2024-11-22 DIAGNOSIS — I10 ESSENTIAL HYPERTENSION: ICD-10-CM

## 2024-11-22 DIAGNOSIS — I48.91 POSTOPERATIVE ATRIAL FIBRILLATION (HCC): ICD-10-CM

## 2024-11-22 DIAGNOSIS — I97.89 POSTOPERATIVE ATRIAL FIBRILLATION (HCC): ICD-10-CM

## 2024-11-22 DIAGNOSIS — E78.1 HYPERTRIGLYCERIDEMIA: ICD-10-CM

## 2024-11-22 PROCEDURE — G2211 COMPLEX E/M VISIT ADD ON: HCPCS | Performed by: NURSE PRACTITIONER

## 2024-11-22 PROCEDURE — 99214 OFFICE O/P EST MOD 30 MIN: CPT | Performed by: NURSE PRACTITIONER

## 2024-11-22 RX ORDER — AMOXICILLIN 875 MG/1
875 TABLET, COATED ORAL 2 TIMES DAILY
Qty: 20 TABLET | Refills: 0 | Status: SHIPPED | OUTPATIENT
Start: 2024-11-22 | End: 2024-12-02

## 2024-11-22 RX ORDER — SIMETHICONE 125 MG
125 TABLET,CHEWABLE ORAL EVERY 6 HOURS PRN
COMMUNITY

## 2024-11-22 NOTE — PROGRESS NOTES
FAMILY PRACTICE OFFICE VISIT       NAME: Jus Chavez  AGE: 74 y.o. SEX: male       : 1950        MRN: [id number]    DATE: 2024  TIME: 8:29 PM    Assessment and Plan   1. Type 2 diabetes mellitus with hyperglycemia, without long-term current use of insulin (HCC)  Assessment & Plan:  Cont meds and f/u with endo    Lab Results   Component Value Date    HGBA1C 7.8 (H) 2024     Orders:  -     Albumin / creatinine urine ratio; Future; Expected date: 2025  -     Comprehensive metabolic panel; Future; Expected date: 2025  -     Hemoglobin A1C; Future; Expected date: 2025  -     Lipid Panel with Direct LDL reflex; Future; Expected date: 2025  2. Coronary artery disease of native artery of native heart with stable angina pectoris (HCC)  Assessment & Plan:  Stable  Cont meds    Orders:  -     Albumin / creatinine urine ratio; Future; Expected date: 2025  -     Comprehensive metabolic panel; Future; Expected date: 2025  -     Hemoglobin A1C; Future; Expected date: 2025  -     Lipid Panel with Direct LDL reflex; Future; Expected date: 2025  3. Essential hypertension  Assessment & Plan:  Stable  Cont meds    Orders:  -     Albumin / creatinine urine ratio; Future; Expected date: 2025  -     Comprehensive metabolic panel; Future; Expected date: 2025  -     Hemoglobin A1C; Future; Expected date: 2025  -     Lipid Panel with Direct LDL reflex; Future; Expected date: 2025  4. Mixed hyperlipidemia  Assessment & Plan:  Stable  Cont meds    Orders:  -     Albumin / creatinine urine ratio; Future; Expected date: 2025  -     Comprehensive metabolic panel; Future; Expected date: 2025  -     Hemoglobin A1C; Future; Expected date: 2025  -     Lipid Panel with Direct LDL reflex; Future; Expected date: 2025  5. Postoperative atrial fibrillation (HCC)  Assessment & Plan:  Rate controlled  Cont meds    Orders:  -     Albumin  / creatinine urine ratio; Future; Expected date: 03/24/2025  -     Comprehensive metabolic panel; Future; Expected date: 03/24/2025  -     Hemoglobin A1C; Future; Expected date: 03/24/2025  -     Lipid Panel with Direct LDL reflex; Future; Expected date: 03/24/2025  6. Hypertriglyceridemia  -     Albumin / creatinine urine ratio; Future; Expected date: 03/24/2025  -     Comprehensive metabolic panel; Future; Expected date: 03/24/2025  -     Hemoglobin A1C; Future; Expected date: 03/24/2025  -     Lipid Panel with Direct LDL reflex; Future; Expected date: 03/24/2025  7. Acute non-recurrent maxillary sinusitis  -     amoxicillin (AMOXIL) 875 mg tablet; Take 1 tablet (875 mg total) by mouth 2 (two) times a day for 10 days  -     Albumin / creatinine urine ratio; Future; Expected date: 03/24/2025  -     Comprehensive metabolic panel; Future; Expected date: 03/24/2025  -     Hemoglobin A1C; Future; Expected date: 03/24/2025  -     Lipid Panel with Direct LDL reflex; Future; Expected date: 03/24/2025  8. Thrombocytopenia (HCC)  Assessment & Plan:  Monitor    Orders:  -     Albumin / creatinine urine ratio; Future; Expected date: 03/24/2025  -     Comprehensive metabolic panel; Future; Expected date: 03/24/2025  -     Hemoglobin A1C; Future; Expected date: 03/24/2025  -     Lipid Panel with Direct LDL reflex; Future; Expected date: 03/24/2025       Patient Instructions   Patient Education     Type 2 diabetes   The Basics   Written by the doctors and editors at [facility]   What is type 2 diabetes? -- This is a disorder that disrupts the way the body uses sugar. It is sometimes called type 2 diabetes mellitus.  All of the cells in the body need sugar to work normally. Sugar gets into the cells with the help of a hormone called insulin. Insulin is made by the pancreas, an organ in the belly. If there is not enough insulin, or if cells in the body don't respond normally to insulin, sugar builds up in the blood. That is what  "happens to people with diabetes.  There are 2 different types of diabetes:   In type 1 diabetes, the pancreas makes little or no insulin.   In type 2 diabetes, the pancreas still makes some insulin, but the cells in the body stop responding normally. Eventually, the pancreas cannot make enough insulin to keep up.  Having excess body weight or obesity increases a person's risk of developing type 2 diabetes. But people without excess body weight can get diabetes, too.  What are the symptoms of type 2 diabetes? -- Type 2 diabetes usually causes no symptoms. When symptoms do happen, they include:   Needing to urinate often   Intense thirst   Blurry vision  Can diabetes lead to other health problems? -- Yes. Type 2 diabetes might not make you feel sick. But if it is not managed, it can lead to serious problems over time, such as:   Heart attacks   Strokes   Kidney disease   Vision problems (or even blindness)   Pain or loss of feeling in the hands and feet   Needing to have fingers, toes, or other body parts removed (amputated)  How do I know if I have type 2 diabetes? -- Your doctor or nurse can do a blood test. There are 2 tests that can be used for this. Both involve measuring the amount of sugar in your blood, called your \"blood sugar\" or \"blood glucose\":   One of the tests measures your blood sugar at the time the blood sample is taken. This test is done in the morning. You can't eat or drink anything except water for at least 8 hours before the test.   The other test shows what your average blood sugar has been for the past 2 to 3 months. This blood test is called \"hemoglobin A1C\" or just \"A1C.\" It can be checked at any time of the day, even if you have recently eaten.  How is type 2 diabetes treated? -- The goals of treatment are to manage your blood sugar and lower the risk of future problems that can happen in people with diabetes.  Treatment might include:   Lifestyle changes - This is an important part of " "managing diabetes. It includes eating healthy foods and getting plenty of physical activity.   Medicines - There are a few medicines that help lower blood sugar. Some people need to take pills that help the body make more insulin or that help insulin do its job. Others need insulin shots.  Depending on what medicines you take, you might need to check your blood sugar regularly at home. But not everyone with type 2 diabetes needs to do this. Your doctor or nurse will tell you if you should be checking your blood sugar, and when and how to do this.  Sometimes, people with type 2 diabetes also need medicines to help prevent problems caused by the disease. For instance, medicines used to lower blood pressure can reduce the chances of a heart attack or stroke.   General medical care - It's also important to take care of other areas of your health. This includes watching your blood pressure and cholesterol levels. You should also get certain vaccines, such as vaccines to protect against the flu and coronavirus disease 2019 (\"COVID-19\"). Some people also need a vaccine to prevent pneumonia.  Can type 2 diabetes be prevented? -- Yes. To lower your chances of getting type 2 diabetes, the most important thing you can do is eat a healthy diet and get plenty of physical activity. This can help you lose weight if you are overweight. But eating well and being active are also good for your overall health. Even gentle activity, like walking, has benefits.  If you smoke, quitting can also lower your risk of type 2 diabetes. Quitting smoking can be difficult, but your doctor or nurse can help.  All topics are updated as new evidence becomes available and our peer review process is complete.  This topic retrieved from [employer] on: Apr 24, 2024.  Topic 59181 Version 23.0  Release: 32.3.2 - C32.113  © 2024 UpToDate, Inc. and/or its affiliates. All rights reserved.  Consumer Information Use and Disclaimer   Disclaimer: This generalized " "information is a limited summary of diagnosis, treatment, and/or medication information. It is not meant to be comprehensive and should be used as a tool to help the user understand and/or assess potential diagnostic and treatment options. It does NOT include all information about conditions, treatments, medications, side effects, or risks that may apply to a specific patient. It is not intended to be medical advice or a substitute for the medical advice, diagnosis, or treatment of a health care provider based on the health care provider's examination and assessment of a patient's specific and unique circumstances. Patients must speak with a health care provider for complete information about their health, medical questions, and treatment options, including any risks or benefits regarding use of medications. This information does not endorse any treatments or medications as safe, effective, or approved for treating a specific patient. UpToDate, Inc. and its affiliates disclaim any warranty or liability relating to this information or the use thereof.The use of this information is governed by the Terms of Use, available at https://www.Cortex[employer].com/en/know/clinical-effectiveness-terms. 2024© UpToDate, Inc. and its affiliates and/or licensors. All rights reserved.  Copyright   © 2024 UpToDate, Inc. and/or its affiliates. All rights reserved.    Patient Education     Foot care for people with diabetes   The Basics   Written by the doctors and editors at [employer]   Why is foot care important if I have diabetes? -- Diabetes can cause nerve damage if your blood sugar is high for a long time. The medical term for this is \"diabetic neuropathy.\"  If you have problems with the nerves in your feet, you might not be able to feel pain in your foot. Normally, people feel pain when they get a cut or a blister on their foot. The pain tells them that they need to treat their cut so it can heal. But people with nerve damage might " not feel any pain when their feet get hurt. They might not even know that they have a cut, so they might not treat it. Problems that aren't treated right away can get much worse. For example, an untreated cut can get infected and turn into an open sore.  High blood sugar can also damage blood vessels and decrease blood flow to your feet. This can weaken your skin and make wounds take longer to heal. You are also more likely to get an infection if you have high blood sugar.  How do I take care of my feet? -- Taking good care of your feet can help prevent foot problems. You should:   Wash your feet every day with soap and warm water. Pat your feet dry, and be sure to dry the skin between your toes.   Keep your feet moisturized. Put lotion on the tops and bottoms of your feet, but not between your toes.   Check your feet every day (figure 1). Look for cuts, blisters, redness, or swelling. Use a mirror, or ask someone to help you check the bottoms of your feet. Check all parts of the foot, especially between the toes. Look for broken skin, ulcers, blisters, or redness.   Trim your toenails straight across when needed (figure 2). Do not cut the corners of your toenails. File rough edges. Do not cut your cuticles. Ask for help if you cannot see well or have problems reaching your feet.   Ask your doctor or nurse to check your feet at each visit. Take your shoes and socks off for these checks.   See a foot care provider (such as a podiatrist) if you have an ingrown toenail, corn, or callus. Do not try to remove corns and calluses yourself.  How do I protect my feet from injury? -- There are several ways to protect your feet. You can:   Wear shoes and socks at all times, even at home. Do not walk barefoot. Wear swim shoes if you go to the beach or a swimming pool.   Choose shoes that fit well. They should not be not too tight or too loose. Your shoes should have plenty of room for your toes (figure 3). Your doctor might  give you a prescription for special shoes. Check to see if they are covered by your insurance.   Check your shoes each time before you put them on to make sure that the lining is smooth. Also check to make sure that there is nothing inside the shoes before putting them on.   Do not wear shoes that expose any part of the foot, like sandals, thongs, or clogs.   Wear cotton socks that fit loosely. Do not wear shoes without socks.   Protect your feet from heat and cold. Test bath water before putting your feet in it to make sure that it is not too hot. Do not walk barefoot on hot ground. Take extra care when going outside in the cold and wear warm socks.  What else should I know? -- You can lower your risk for foot problems by keeping your blood sugar levels as close to your goal as possible. Other things you can do include:   Move your ankles and toes often to help with blood flow. You can wear a support stocking to help with swelling.   Walk often. Regular walking helps blood flow.   If you smoke, try to quit. Your doctor or nurse can help. Smoking causes poor blood flow to your feet and can damage your nerves.  When should I call the doctor? -- Call your doctor or nurse for advice if you have:   A fever of 100.4°F (38°C) or higher, chills, or a wound that will not heal   Swelling, redness, warmth around a wound, a foul smell coming from a wound, or yellowish, greenish, or bloody discharge   Sores or blisters on your feet that hurt more or less than you would expect   Numbness or tingling in your foot or leg   Corns, calluses, blisters, or new sores on your foot   Very dry, scaly, or cracked skin on your feet   Changes in the way your foot joints or arch look  All topics are updated as new evidence becomes available and our peer review process is complete.  This topic retrieved from [employer] on: Mar 13, 2024.  Topic 456741 Version 2.0  Release: 32.2.4 - C32.71  © 2024 UpToDate, Inc. and/or its affiliates. All rights  reserved.  figure 1: Foot check for people with diabetes     People with diabetes should check both of their feet every day. It is important to check your feet all over, including in between your toes. If you can't see the bottom of your foot, use a mirror or ask another person to check for you. Let your doctor or nurse know if you find any:  Redness   Cuts or cracks in the skin   Blisters   Swelling   Graphic 24906 Version 3.0  figure 2: Trim your toenails     Trim your toenails straight across and smooth them with a nail file.  Graphic 45669 Version 2.0  figure 3: Correct shoe shape     Choose shoes that fit the right way and are not too tight or too loose. Your shoes should have plenty of room for your toes.  Graphic 54239 Version 2.0  Consumer Information Use and Disclaimer   Disclaimer: This generalized information is a limited summary of diagnosis, treatment, and/or medication information. It is not meant to be comprehensive and should be used as a tool to help the user understand and/or assess potential diagnostic and treatment options. It does NOT include all information about conditions, treatments, medications, side effects, or risks that may apply to a specific patient. It is not intended to be medical advice or a substitute for the medical advice, diagnosis, or treatment of a health care provider based on the health care provider's examination and assessment of a patient's specific and unique circumstances. Patients must speak with a health care provider for complete information about their health, medical questions, and treatment options, including any risks or benefits regarding use of medications. This information does not endorse any treatments or medications as safe, effective, or approved for treating a specific patient. UpToDate, Inc. and its affiliates disclaim any warranty or liability relating to this information or the use thereof.The use of this information is governed by the Terms of Use,  available at https://www.woltersAXADOuwer.com/en/know/clinical-effectiveness-terms. 2024© UpToDate, Inc. and its affiliates and/or licensors. All rights reserved.  Copyright   © 2024 UpToDate, Inc. and/or its affiliates. All rights reserved.           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   HENT:  Negative for congestion, nosebleeds and postnasal drip.    Eyes:  Negative for photophobia and visual disturbance.   Respiratory:  Negative for cough, shortness of breath and wheezing.    Cardiovascular:  Negative for chest pain and palpitations.   Gastrointestinal:  Negative for constipation, diarrhea, nausea and vomiting.   Genitourinary:  Negative for dysuria and frequency.   Musculoskeletal:  Negative for arthralgias and myalgias.   Skin:  Negative for rash.   Neurological:  Negative for dizziness, light-headedness and headaches.   Hematological:  Negative for adenopathy.   Psychiatric/Behavioral:  Negative for dysphoric mood and sleep disturbance. The patient is not nervous/anxious.        Active Problem List     Patient Active Problem List   Diagnosis    Aortic stenosis    Coronary artery disease of native artery of native heart with stable angina pectoris (HCC)    DM (diabetes mellitus), type 2 (HCC)    Essential hypertension    Hypertriglyceridemia    S/P CABG (coronary artery bypass graft)    S/P AVR    PVC (premature ventricular contraction)    Bilateral carotid artery stenosis    Mixed hyperlipidemia     Postoperative atrial fibrillation (HCC)    Benign prostatic hyperplasia with urinary frequency    Stenosis of right subclavian artery (HCC)    IBS (irritable bowel syndrome)    H/O diabetic foot ulcer    Diabetic peripheral neuropathy associated with type 2 diabetes mellitus (HCC)    Dizziness    Instability of foot joint    Peripheral venous insufficiency    Thrombocytopenia (HCC)    Right hydrocele         Past Medical History:  Past Medical History:   Diagnosis Date    Acute systolic heart failure (HCC) 02/23/2021    Athlete's foot     Bilateral impacted cerumen 03/09/2021    Coronary artery disease 07/30/2019    bypass and valve replacement    Diabetes mellitus (HCC)     Heart murmur     Other hydrocele 06/11/2020    TMJ (dislocation of temporomandibular joint)     Yeast infection        Past Surgical History:  Past Surgical History:   Procedure Laterality Date    CARDIAC SURGERY      CARDIAC VALVE REPLACEMENT      CATARACT EXTRACTION      COLONOSCOPY      CORONARY ARTERY BYPASS GRAFT      EYE SURGERY  03/2020    Cataract right eye    EYE SURGERY Left 12/03/2020    left eye cataract     HYDROCELE EXCISION / REPAIR      WI EXCISION HYDROCELE UNILATERAL Right 04/08/2022    Procedure: HYDROCELECTOMY;  Surgeon: Montrell Mancuso MD;  Location: [facility];  Service: Urology    WI EXCISION HYDROCELE UNILATERAL Right 11/16/2022    Procedure: HYDROCELECTOMY;  Surgeon: Montrell Mancuso MD;  Location: [facility];  Service: Urology    TONSILLECTOMY         Family History:  Family History   Problem Relation Age of Onset    Heart disease Mother     Cancer Mother         skin melonoma/mastectomy    Heart disease Father     Hypertension Father     Mitral valve prolapse Sister     Diabetes type II Maternal Grandmother     Colon cancer Neg Hx        Social History:  Social History     Socioeconomic History    Marital status: /Civil Union     Spouse name: Not on file    Number of children: Not on file     Years of education: Not on file    Highest education level: Not on file   Occupational History    Not on file   Tobacco Use    Smoking status: Never    Smokeless tobacco: Never   Vaping Use    Vaping status: Never Used   Substance and Sexual Activity    Alcohol use: Yes     Alcohol/week: 4.0 standard drinks of alcohol     Types: 2 Glasses of wine, 1 Cans of beer, 1 Shots of liquor per week     Comment: 2-3 glass of wine or  beer a week    Drug use: Never    Sexual activity: Not Currently     Partners: Female     Birth control/protection: None   Other Topics Concern    Not on file   Social History Narrative    Not on file     Social Drivers of Health     Financial Resource Strain: Low Risk  (3/11/2024)    Overall Financial Resource Strain (CARDIA)     Difficulty of Paying Living Expenses: Not very hard   Food Insecurity: No Food Insecurity (3/18/2024)    Nursing - Inadequate Food Risk Classification     Worried About Running Out of Food in the Last Year: Never true     Ran Out of Food in the Last Year: Never true     Ran Out of Food in the Last Year: Not on file   Transportation Needs: No Transportation Needs (3/18/2024)    PRAPARE - Transportation     Lack of Transportation (Medical): No     Lack of Transportation (Non-Medical): No   Physical Activity: Inactive (7/26/2019)    [facility]    Exercise Vital Sign     Days of Exercise per Week: 0 days     Minutes of Exercise per Session: 0 min   Stress: Not on file   Social Connections: Not on file   Intimate Partner Violence: Not on file   Housing Stability: Unknown (3/18/2024)    Housing Stability Vital Sign     Unable to Pay for Housing in the Last Year: No     Number of Times Moved in the Last Year: Not on file     Homeless in the Last Year: No       Objective     Vitals:    11/22/24 1009   BP: 120/70   Pulse: 66   Resp: 18   Temp: (!) 95.8 °F (35.4 °C)   SpO2: 95%     Wt Readings  from Last 3 Encounters:   11/22/24 96.6 kg (213 lb)   08/26/24 97.2 kg (214 lb 3.2 oz)   07/22/24 97.5 kg (215 lb)       Physical Exam  Vitals and nursing note reviewed.   Constitutional:       Appearance: Normal appearance.   HENT:      Head: Normocephalic and atraumatic.      Right Ear: Tympanic membrane, ear canal and external ear normal.      Left Ear: Tympanic membrane, ear canal and external ear normal.      Nose: Nose normal.      Mouth/Throat:      Mouth: Mucous membranes are moist.   Eyes:      Conjunctiva/sclera: Conjunctivae normal.   Cardiovascular:      Rate and Rhythm: Normal rate and regular rhythm.      Pulses: Normal pulses.      Heart sounds: Normal heart sounds.   Pulmonary:      Effort: Pulmonary effort is normal.      Breath sounds: Normal breath sounds.   Abdominal:      General: Bowel sounds are normal.      Palpations: Abdomen is soft.   Musculoskeletal:         General: Normal range of motion.      Cervical back: Normal range of motion and neck supple.   Skin:     General: Skin is warm and dry.      Capillary Refill: Capillary refill takes less than 2 seconds.   Neurological:      General: No focal deficit present.      Mental Status: He is alert and oriented to person, place, and time.   Psychiatric:         Mood and Affect: Mood normal.         Behavior: Behavior normal.         Thought Content: Thought content normal.         Judgment: Judgment normal.         Pertinent Laboratory/Diagnostic Studies:  Lab Results   Component Value Date    BUN 27 (H) 07/18/2024    CREATININE 1.00 07/18/2024    CALCIUM 9.5 07/18/2024    K 3.9 07/18/2024    CO2 24 07/18/2024     07/18/2024     Lab Results   Component Value Date    ALT 19 07/18/2024    AST 16 07/18/2024    ALKPHOS 47 07/18/2024       Lab Results   Component Value Date    WBC 6.36 07/18/2024    HGB 14.1 07/18/2024    HCT 42.1 07/18/2024    MCV 92 07/18/2024     (L) 07/18/2024       Lab Results   Component Value Date    TSH 3.00  "07/20/2020       No results found for: \"CHOL\"  Lab Results   Component Value Date    TRIG 111 07/18/2024     Lab Results   Component Value Date    HDL 45 07/18/2024     Lab Results   Component Value Date    LDLCALC 43 07/18/2024     Lab Results   Component Value Date    HGBA1C 7.8 (H) 07/18/2024       Results for orders placed or performed in visit on 07/18/24   Hemoglobin A1C    Collection Time: 07/18/24  7:42 AM   Result Value Ref Range    Hemoglobin A1C 7.8 (H) Normal 4.0-5.6%; PreDiabetic 5.7-6.4%; Diabetic >=6.5%; Glycemic control for adults with diabetes <7.0% %     mg/dl   Comprehensive metabolic panel    Collection Time: 07/18/24  7:42 AM   Result Value Ref Range    Sodium 140 135 - 147 mmol/L    Potassium 3.9 3.5 - 5.3 mmol/L    Chloride 106 96 - 108 mmol/L    CO2 24 21 - 32 mmol/L    ANION GAP 10 4 - 13 mmol/L    BUN 27 (H) 5 - 25 mg/dL    Creatinine 1.00 0.60 - 1.30 mg/dL    Glucose, Fasting 160 (H) 65 - 99 mg/dL    Calcium 9.5 8.4 - 10.2 mg/dL    AST 16 13 - 39 U/L    ALT 19 7 - 52 U/L    Alkaline Phosphatase 47 34 - 104 U/L    Total Protein 6.6 6.4 - 8.4 g/dL    Albumin 4.1 3.5 - 5.0 g/dL    Total Bilirubin 0.52 0.20 - 1.00 mg/dL    eGFR 72 ml/min/1.73sq m   Albumin / creatinine urine ratio    Collection Time: 07/18/24  7:42 AM   Result Value Ref Range    Creatinine, Ur 47.0 Reference range not established. mg/dL    Albumin,U,Random <7.0 <20.0 mg/L    Albumin Creat Ratio     Lipid Panel with Direct LDL reflex    Collection Time: 07/18/24  7:42 AM   Result Value Ref Range    Cholesterol 110 See Comment mg/dL    Triglycerides 111 See Comment mg/dL    HDL, Direct 45 >=40 mg/dL    LDL Calculated 43 0 - 100 mg/dL   Vitamin B12    Collection Time: 07/18/24  7:42 AM   Result Value Ref Range    Vitamin B-12 180 180 - 914 pg/mL   CBC and differential    Collection Time: 07/18/24  7:42 AM   Result Value Ref Range    WBC 6.36 4.31 - 10.16 Thousand/uL    RBC 4.57 3.88 - 5.62 Million/uL    Hemoglobin 14.1 " 12.0 - 17.0 g/dL    Hematocrit 42.1 36.5 - 49.3 %    MCV 92 82 - 98 fL    MCH 30.9 26.8 - 34.3 pg    MCHC 33.5 31.4 - 37.4 g/dL    RDW 11.9 11.6 - 15.1 %    MPV 11.7 8.9 - 12.7 fL    Platelets 142 (L) 149 - 390 Thousands/uL    nRBC 0 /100 WBCs    Segmented % 49 43 - 75 %    Immature Grans % 0 0 - 2 %    Lymphocytes % 34 14 - 44 %    Monocytes % 13 (H) 4 - 12 %    Eosinophils Relative 3 0 - 6 %    Basophils Relative 1 0 - 1 %    Absolute Neutrophils 3.10 1.85 - 7.62 Thousands/µL    Absolute Immature Grans 0.01 0.00 - 0.20 Thousand/uL    Absolute Lymphocytes 2.17 0.60 - 4.47 Thousands/µL    Absolute Monocytes 0.84 0.17 - 1.22 Thousand/µL    Eosinophils Absolute 0.18 0.00 - 0.61 Thousand/µL    Basophils Absolute 0.06 0.00 - 0.10 Thousands/µL   TSH, 3rd generation with Free T4 reflex    Collection Time: 07/18/24  7:42 AM   Result Value Ref Range    TSH 3RD GENERATON 3.383 0.450 - 4.500 uIU/mL       Orders Placed This Encounter   Procedures    Albumin / creatinine urine ratio    Comprehensive metabolic panel    Hemoglobin A1C    Lipid Panel with Direct LDL reflex       ALLERGIES:  No Known Allergies    Current Medications     Current Outpatient Medications   Medication Sig Dispense Refill    acetaminophen (TYLENOL) 500 mg tablet Take 500 mg by mouth daily at bedtime      amoxicillin (AMOXIL) 875 mg tablet Take 1 tablet (875 mg total) by mouth 2 (two) times a day for 10 days 20 tablet 0    ascorbic acid (VITAMIN C) 250 mg tablet Take 500 mg by mouth 2 (two) times a day      Aspirin 81 MG CAPS Take 1 capsule every day by oral route.      Cyanocobalamin (VITAMIN B-12 PO) Take 1 tablet by mouth 3 (three) times a day      finasteride (PROSCAR) 5 mg tablet Take 1 tablet (5 mg total) by mouth daily 90 tablet 1    furosemide (LASIX) 20 mg tablet Take 1 tablet (20 mg total) by mouth daily as needed (edema) 90 tablet 1    Ginger, Zingiber officinalis, (Ginger Root) 500 MG CAPS       Jardiance 25 MG TABS Take 1 tablet (25 mg total)  by mouth daily 90 tablet 1    metFORMIN (GLUCOPHAGE) 500 mg tablet TAKE TWO TABLETS BY MOUTH TWICE A  tablet 1    metoprolol succinate (TOPROL-XL) 25 mg 24 hr tablet TAKE ONE TABLET BY MOUTH EVERY DAY 90 tablet 1    niacin 500 mg tablet Take 500 mg by mouth 4 (four) times a day      NON FORMULARY 25 mcg Vitamin D      Omega-3 Fatty Acids (FISH OIL BURP-LESS PO) Take by mouth      PROBIOTIC PRODUCT PO Take 111 mg by mouth daily      repaglinide (PRANDIN) 0.5 mg tablet TAKE 1 TABLET BY MOUTH DAILY WITH BREAKFAST, 1 WITH LUNCH, AND 2 TABLETS WITH DINNER 360 tablet 1    rosuvastatin (CRESTOR) 10 MG tablet TAKE ONE TABLET BY MOUTH ONCE DAILY 90 tablet 3    simethicone (MYLICON) 125 MG chewable tablet Chew 125 mg every 6 (six) hours as needed for flatulence      sitaGLIPtin (JANUVIA) 100 mg tablet Take 1 tablet (100 mg total) by mouth daily 30 tablet 11    tamsulosin (FLOMAX) 0.4 mg TAKE ONE CAPSULE BY MOUTH EVERY DAY WITH DINNER 30 capsule 5    Tart Cherry (Tart Cherry Ultra) 1200 MG CAPS       Turmeric 500 MG CAPS       Wheat Dextrin (BENEFIBER DRINK MIX PO) Take by mouth      ciclopirox (LOPROX) 0.77 % cream  (Patient not taking: Reported on 11/22/2024)       No current facility-administered medications for this visit.         Health Maintenance     Health Maintenance   Topic Date Due    Hepatitis C Screening  Never done    RSV Vaccine Age 60+ Years (1 - 1-dose 75+ series) Never done    Influenza Vaccine (1) 09/01/2024    COVID-19 Vaccine (6 - 2024-25 season) 09/01/2024    HEMOGLOBIN A1C  01/18/2025    Fall Risk  03/18/2025    Medicare Annual Wellness Visit (AWV)  03/18/2025    DM Eye Exam  04/05/2025    Kidney Health Evaluation: GFR  07/18/2025    Kidney Health Evaluation: Albumin/Creatinine Ratio  07/18/2025    Diabetic Foot Exam  07/22/2025    Depression Screening  11/22/2025    Colorectal Cancer Screening  03/02/2027    Zoster Vaccine  Completed    Pneumococcal Vaccine: 65+ Years  Completed    RSV Vaccine  age 0-20 Months  Aged Out    HIB Vaccine  Aged Out    IPV Vaccine  Aged Out    Hepatitis A Vaccine  Aged Out    Meningococcal ACWY Vaccine  Aged Out    HPV Vaccine  Aged Out     Immunization History   Administered Date(s) Administered    COVID-19 PFIZER VACCINE 0.3 ML IM 03/07/2021, 03/26/2021, 10/07/2021    COVID-19 Pfizer Vac BIVALENT Rubio-sucrose 12 Yr+ IM 10/31/2022    COVID-19 Pfizer mRNA vacc PF rubio-sucrose 12 yr and older (Comirnaty) 11/14/2023    INFLUENZA 10/31/2022    Influenza, high dose seasonal 0.7 mL 10/27/2021, 10/14/2023    Pneumococcal Conjugate Vaccine 20-valent (Pcv20), Polysace 11/17/2023    Pneumococcal Polysaccharide PPV23 10/27/2021    Zoster Vaccine Recombinant 03/21/2023, 05/22/2023       Depression Screening and Follow-up Plan: Patient was screened for depression during today's encounter. They screened negative with a PHQ-2 score of 0.        HESHAM Matias

## 2024-11-24 PROBLEM — N43.3 RIGHT HYDROCELE: Status: ACTIVE | Noted: 2024-11-24

## 2024-11-25 NOTE — PATIENT INSTRUCTIONS
"Patient Education     Type 2 diabetes   The Basics   Written by the doctors and editors at [facility]   What is type 2 diabetes? -- This is a disorder that disrupts the way the body uses sugar. It is sometimes called type 2 diabetes mellitus.  All of the cells in the body need sugar to work normally. Sugar gets into the cells with the help of a hormone called insulin. Insulin is made by the pancreas, an organ in the belly. If there is not enough insulin, or if cells in the body don't respond normally to insulin, sugar builds up in the blood. That is what happens to people with diabetes.  There are 2 different types of diabetes:   In type 1 diabetes, the pancreas makes little or no insulin.   In type 2 diabetes, the pancreas still makes some insulin, but the cells in the body stop responding normally. Eventually, the pancreas cannot make enough insulin to keep up.  Having excess body weight or obesity increases a person's risk of developing type 2 diabetes. But people without excess body weight can get diabetes, too.  What are the symptoms of type 2 diabetes? -- Type 2 diabetes usually causes no symptoms. When symptoms do happen, they include:   Needing to urinate often   Intense thirst   Blurry vision  Can diabetes lead to other health problems? -- Yes. Type 2 diabetes might not make you feel sick. But if it is not managed, it can lead to serious problems over time, such as:   Heart attacks   Strokes   Kidney disease   Vision problems (or even blindness)   Pain or loss of feeling in the hands and feet   Needing to have fingers, toes, or other body parts removed (amputated)  How do I know if I have type 2 diabetes? -- Your doctor or nurse can do a blood test. There are 2 tests that can be used for this. Both involve measuring the amount of sugar in your blood, called your \"blood sugar\" or \"blood glucose\":   One of the tests measures your blood sugar at the time the blood sample is taken. This test is done in the " "morning. You can't eat or drink anything except water for at least 8 hours before the test.   The other test shows what your average blood sugar has been for the past 2 to 3 months. This blood test is called \"hemoglobin A1C\" or just \"A1C.\" It can be checked at any time of the day, even if you have recently eaten.  How is type 2 diabetes treated? -- The goals of treatment are to manage your blood sugar and lower the risk of future problems that can happen in people with diabetes.  Treatment might include:   Lifestyle changes - This is an important part of managing diabetes. It includes eating healthy foods and getting plenty of physical activity.   Medicines - There are a few medicines that help lower blood sugar. Some people need to take pills that help the body make more insulin or that help insulin do its job. Others need insulin shots.  Depending on what medicines you take, you might need to check your blood sugar regularly at home. But not everyone with type 2 diabetes needs to do this. Your doctor or nurse will tell you if you should be checking your blood sugar, and when and how to do this.  Sometimes, people with type 2 diabetes also need medicines to help prevent problems caused by the disease. For instance, medicines used to lower blood pressure can reduce the chances of a heart attack or stroke.   General medical care - It's also important to take care of other areas of your health. This includes watching your blood pressure and cholesterol levels. You should also get certain vaccines, such as vaccines to protect against the flu and coronavirus disease 2019 (\"COVID-19\"). Some people also need a vaccine to prevent pneumonia.  Can type 2 diabetes be prevented? -- Yes. To lower your chances of getting type 2 diabetes, the most important thing you can do is eat a healthy diet and get plenty of physical activity. This can help you lose weight if you are overweight. But eating well and being active are also good " for your overall health. Even gentle activity, like walking, has benefits.  If you smoke, quitting can also lower your risk of type 2 diabetes. Quitting smoking can be difficult, but your doctor or nurse can help.  All topics are updated as new evidence becomes available and our peer review process is complete.  This topic retrieved from [employer] on: Apr 24, 2024.  Topic 39997 Version 23.0  Release: 32.3.2 - C32.113  © 2024 UpToDate, Inc. and/or its affiliates. All rights reserved.  Consumer Information Use and Disclaimer   Disclaimer: This generalized information is a limited summary of diagnosis, treatment, and/or medication information. It is not meant to be comprehensive and should be used as a tool to help the user understand and/or assess potential diagnostic and treatment options. It does NOT include all information about conditions, treatments, medications, side effects, or risks that may apply to a specific patient. It is not intended to be medical advice or a substitute for the medical advice, diagnosis, or treatment of a health care provider based on the health care provider's examination and assessment of a patient's specific and unique circumstances. Patients must speak with a health care provider for complete information about their health, medical questions, and treatment options, including any risks or benefits regarding use of medications. This information does not endorse any treatments or medications as safe, effective, or approved for treating a specific patient. UpToDate, Inc. and its affiliates disclaim any warranty or liability relating to this information or the use thereof.The use of this information is governed by the Terms of Use, available at https://www.wolterskluwer.com/en/know/clinical-effectiveness-terms. 2024© UpToDate, Inc. and its affiliates and/or licensors. All rights reserved.  Copyright   © 2024 UpToDate, Inc. and/or its affiliates. All rights reserved.    Patient Education    "  Foot care for people with diabetes   The Basics   Written by the doctors and editors at [facility]   Why is foot care important if I have diabetes? -- Diabetes can cause nerve damage if your blood sugar is high for a long time. The medical term for this is \"diabetic neuropathy.\"  If you have problems with the nerves in your feet, you might not be able to feel pain in your foot. Normally, people feel pain when they get a cut or a blister on their foot. The pain tells them that they need to treat their cut so it can heal. But people with nerve damage might not feel any pain when their feet get hurt. They might not even know that they have a cut, so they might not treat it. Problems that aren't treated right away can get much worse. For example, an untreated cut can get infected and turn into an open sore.  High blood sugar can also damage blood vessels and decrease blood flow to your feet. This can weaken your skin and make wounds take longer to heal. You are also more likely to get an infection if you have high blood sugar.  How do I take care of my feet? -- Taking good care of your feet can help prevent foot problems. You should:   Wash your feet every day with soap and warm water. Pat your feet dry, and be sure to dry the skin between your toes.   Keep your feet moisturized. Put lotion on the tops and bottoms of your feet, but not between your toes.   Check your feet every day (figure 1). Look for cuts, blisters, redness, or swelling. Use a mirror, or ask someone to help you check the bottoms of your feet. Check all parts of the foot, especially between the toes. Look for broken skin, ulcers, blisters, or redness.   Trim your toenails straight across when needed (figure 2). Do not cut the corners of your toenails. File rough edges. Do not cut your cuticles. Ask for help if you cannot see well or have problems reaching your feet.   Ask your doctor or nurse to check your feet at each visit. Take your shoes and socks " off for these checks.   See a foot care provider (such as a podiatrist) if you have an ingrown toenail, corn, or callus. Do not try to remove corns and calluses yourself.  How do I protect my feet from injury? -- There are several ways to protect your feet. You can:   Wear shoes and socks at all times, even at home. Do not walk barefoot. Wear swim shoes if you go to the beach or a swimming pool.   Choose shoes that fit well. They should not be not too tight or too loose. Your shoes should have plenty of room for your toes (figure 3). Your doctor might give you a prescription for special shoes. Check to see if they are covered by your insurance.   Check your shoes each time before you put them on to make sure that the lining is smooth. Also check to make sure that there is nothing inside the shoes before putting them on.   Do not wear shoes that expose any part of the foot, like sandals, thongs, or clogs.   Wear cotton socks that fit loosely. Do not wear shoes without socks.   Protect your feet from heat and cold. Test bath water before putting your feet in it to make sure that it is not too hot. Do not walk barefoot on hot ground. Take extra care when going outside in the cold and wear warm socks.  What else should I know? -- You can lower your risk for foot problems by keeping your blood sugar levels as close to your goal as possible. Other things you can do include:   Move your ankles and toes often to help with blood flow. You can wear a support stocking to help with swelling.   Walk often. Regular walking helps blood flow.   If you smoke, try to quit. Your doctor or nurse can help. Smoking causes poor blood flow to your feet and can damage your nerves.  When should I call the doctor? -- Call your doctor or nurse for advice if you have:   A fever of 100.4°F (38°C) or higher, chills, or a wound that will not heal   Swelling, redness, warmth around a wound, a foul smell coming from a wound, or yellowish, greenish,  or bloody discharge   Sores or blisters on your feet that hurt more or less than you would expect   Numbness or tingling in your foot or leg   Corns, calluses, blisters, or new sores on your foot   Very dry, scaly, or cracked skin on your feet   Changes in the way your foot joints or arch look  All topics are updated as new evidence becomes available and our peer review process is complete.  This topic retrieved from [employer] on: Mar 13, 2024.  Topic 671393 Version 2.0  Release: 32.2.4 - C32.71  © 2024 UpToDate, Inc. and/or its affiliates. All rights reserved.  figure 1: Foot check for people with diabetes     People with diabetes should check both of their feet every day. It is important to check your feet all over, including in between your toes. If you can't see the bottom of your foot, use a mirror or ask another person to check for you. Let your doctor or nurse know if you find any:  Redness   Cuts or cracks in the skin   Blisters   Swelling   Graphic 07817 Version 3.0  figure 2: Trim your toenails     Trim your toenails straight across and smooth them with a nail file.  Graphic 43296 Version 2.0  figure 3: Correct shoe shape     Choose shoes that fit the right way and are not too tight or too loose. Your shoes should have plenty of room for your toes.  Graphic 00710 Version 2.0  Consumer Information Use and Disclaimer   Disclaimer: This generalized information is a limited summary of diagnosis, treatment, and/or medication information. It is not meant to be comprehensive and should be used as a tool to help the user understand and/or assess potential diagnostic and treatment options. It does NOT include all information about conditions, treatments, medications, side effects, or risks that may apply to a specific patient. It is not intended to be medical advice or a substitute for the medical advice, diagnosis, or treatment of a health care provider based on the health care provider's examination and assessment  of a patient's specific and unique circumstances. Patients must speak with a health care provider for complete information about their health, medical questions, and treatment options, including any risks or benefits regarding use of medications. This information does not endorse any treatments or medications as safe, effective, or approved for treating a specific patient. UpToDate, Inc. and its affiliates disclaim any warranty or liability relating to this information or the use thereof.The use of this information is governed by the Terms of Use, available at https://www.woltersThomas Engine Companyuwer.com/en/know/clinical-effectiveness-terms. 2024© UpToDate, Inc. and its affiliates and/or licensors. All rights reserved.  Copyright   © 2024 UpToDate, Inc. and/or its affiliates. All rights reserved.

## 2024-11-26 ENCOUNTER — TELEPHONE (OUTPATIENT)
Dept: ENDOCRINOLOGY | Facility: CLINIC | Age: 74
End: 2024-11-26

## 2024-11-26 NOTE — TELEPHONE ENCOUNTER
Called and left a voicemail to reschedule due to precptor schedule change. Offered Judy on 12-3-2024. Based on availability schedule, otherwise reschedule accordingly.

## 2024-11-29 NOTE — TELEPHONE ENCOUNTER
Pt calling to reschedule his appt due to provider change in date. Pt refused to schedule in April 2025 which Is next available. Pt wants an appt sooner he stated he has been rescheduled a couple of times and is over due for an appt. Pt is on wait list.

## 2024-11-29 NOTE — TELEPHONE ENCOUNTER
Kevyn Gibson  Can you please add pt to dec 5 2024 3 pm dr virgen   I called pt and that date is good for him   Thank you

## 2024-11-29 NOTE — TELEPHONE ENCOUNTER
Kevyn Gibson  Pt is a reschedule per Anca's note and access center said pt rescheduled several times and doesn't want a April 2025 appt  Can you please see if you can find an appt   Thank you

## 2024-12-02 DIAGNOSIS — R60.9 EDEMA, UNSPECIFIED TYPE: ICD-10-CM

## 2024-12-02 RX ORDER — FUROSEMIDE 20 MG/1
20 TABLET ORAL DAILY PRN
Qty: 90 TABLET | Refills: 1 | Status: SHIPPED | OUTPATIENT
Start: 2024-12-02 | End: 2025-05-31

## 2024-12-02 NOTE — TELEPHONE ENCOUNTER
Reason for call:   [x] Refill   [] Prior Auth  [] Other:     Office:   [] PCP/Provider -   [x] Specialty/Provider - Cardio Omkar/ Paul Argueta MD    Medication: furosemide (LASIX) 20 mg tablet     Dose/Frequency: Take 1 tablet (20 mg total) by mouth daily as needed (edema)     Quantity: 90    [facility] [place] - [address]     Does the patient have enough for 3 days?   [] Yes   [x] No - Send as HP to POD

## 2024-12-03 ENCOUNTER — RESULTS FOLLOW-UP (OUTPATIENT)
Dept: ENDOCRINOLOGY | Facility: CLINIC | Age: 74
End: 2024-12-03

## 2024-12-03 ENCOUNTER — APPOINTMENT (OUTPATIENT)
Dept: LAB | Facility: AMBULARY SURGERY CENTER | Age: 74
End: 2024-12-03
Payer: MEDICARE

## 2024-12-03 DIAGNOSIS — E11.42 DIABETIC PERIPHERAL NEUROPATHY ASSOCIATED WITH TYPE 2 DIABETES MELLITUS (HCC): ICD-10-CM

## 2024-12-03 DIAGNOSIS — E11.65 TYPE 2 DIABETES MELLITUS WITH HYPERGLYCEMIA, WITHOUT LONG-TERM CURRENT USE OF INSULIN (HCC): ICD-10-CM

## 2024-12-03 DIAGNOSIS — I10 ESSENTIAL HYPERTENSION: ICD-10-CM

## 2024-12-03 DIAGNOSIS — I25.118 CORONARY ARTERY DISEASE OF NATIVE ARTERY OF NATIVE HEART WITH STABLE ANGINA PECTORIS (HCC): ICD-10-CM

## 2024-12-03 DIAGNOSIS — E78.2 MIXED HYPERLIPIDEMIA: ICD-10-CM

## 2024-12-03 LAB
ALBUMIN SERPL BCG-MCNC: 4.2 G/DL (ref 3.5–5)
ALP SERPL-CCNC: 46 U/L (ref 34–104)
ALT SERPL W P-5'-P-CCNC: 14 U/L (ref 7–52)
ANION GAP SERPL CALCULATED.3IONS-SCNC: 8 MMOL/L (ref 4–13)
AST SERPL W P-5'-P-CCNC: 15 U/L (ref 13–39)
BASOPHILS # BLD AUTO: 0.08 THOUSANDS/ΜL (ref 0–0.1)
BASOPHILS NFR BLD AUTO: 1 % (ref 0–1)
BILIRUB SERPL-MCNC: 0.54 MG/DL (ref 0.2–1)
BUN SERPL-MCNC: 27 MG/DL (ref 5–25)
CALCIUM SERPL-MCNC: 9.4 MG/DL (ref 8.4–10.2)
CHLORIDE SERPL-SCNC: 104 MMOL/L (ref 96–108)
CO2 SERPL-SCNC: 27 MMOL/L (ref 21–32)
CREAT SERPL-MCNC: 1.12 MG/DL (ref 0.6–1.3)
EOSINOPHIL # BLD AUTO: 0.2 THOUSAND/ΜL (ref 0–0.61)
EOSINOPHIL NFR BLD AUTO: 3 % (ref 0–6)
ERYTHROCYTE [DISTWIDTH] IN BLOOD BY AUTOMATED COUNT: 11.9 % (ref 11.6–15.1)
EST. AVERAGE GLUCOSE BLD GHB EST-MCNC: 189 MG/DL
GFR SERPL CREATININE-BSD FRML MDRD: 63 ML/MIN/1.73SQ M
GLUCOSE P FAST SERPL-MCNC: 166 MG/DL (ref 65–99)
HBA1C MFR BLD: 8.2 %
HCT VFR BLD AUTO: 41.8 % (ref 36.5–49.3)
HGB BLD-MCNC: 14.1 G/DL (ref 12–17)
IMM GRANULOCYTES # BLD AUTO: 0.02 THOUSAND/UL (ref 0–0.2)
IMM GRANULOCYTES NFR BLD AUTO: 0 % (ref 0–2)
LYMPHOCYTES # BLD AUTO: 2.06 THOUSANDS/ΜL (ref 0.6–4.47)
LYMPHOCYTES NFR BLD AUTO: 32 % (ref 14–44)
MCH RBC QN AUTO: 30.7 PG (ref 26.8–34.3)
MCHC RBC AUTO-ENTMCNC: 33.7 G/DL (ref 31.4–37.4)
MCV RBC AUTO: 91 FL (ref 82–98)
MONOCYTES # BLD AUTO: 0.73 THOUSAND/ΜL (ref 0.17–1.22)
MONOCYTES NFR BLD AUTO: 11 % (ref 4–12)
NEUTROPHILS # BLD AUTO: 3.42 THOUSANDS/ΜL (ref 1.85–7.62)
NEUTS SEG NFR BLD AUTO: 53 % (ref 43–75)
NRBC BLD AUTO-RTO: 0 /100 WBCS
PLATELET # BLD AUTO: 161 THOUSANDS/UL (ref 149–390)
PMV BLD AUTO: 11.4 FL (ref 8.9–12.7)
POTASSIUM SERPL-SCNC: 4 MMOL/L (ref 3.5–5.3)
PROT SERPL-MCNC: 6.6 G/DL (ref 6.4–8.4)
RBC # BLD AUTO: 4.6 MILLION/UL (ref 3.88–5.62)
SODIUM SERPL-SCNC: 139 MMOL/L (ref 135–147)
WBC # BLD AUTO: 6.51 THOUSAND/UL (ref 4.31–10.16)

## 2024-12-03 PROCEDURE — 80053 COMPREHEN METABOLIC PANEL: CPT

## 2024-12-03 PROCEDURE — 85025 COMPLETE CBC W/AUTO DIFF WBC: CPT

## 2024-12-03 PROCEDURE — 36415 COLL VENOUS BLD VENIPUNCTURE: CPT

## 2024-12-03 PROCEDURE — 83036 HEMOGLOBIN GLYCOSYLATED A1C: CPT

## 2024-12-05 ENCOUNTER — OFFICE VISIT (OUTPATIENT)
Dept: ENDOCRINOLOGY | Facility: CLINIC | Age: 74
End: 2024-12-05
Payer: MEDICARE

## 2024-12-05 VITALS
SYSTOLIC BLOOD PRESSURE: 112 MMHG | HEART RATE: 70 BPM | WEIGHT: 213 LBS | OXYGEN SATURATION: 98 % | DIASTOLIC BLOOD PRESSURE: 62 MMHG | HEIGHT: 73 IN | BODY MASS INDEX: 28.23 KG/M2

## 2024-12-05 DIAGNOSIS — E53.8 VITAMIN B12 DEFICIENCY: ICD-10-CM

## 2024-12-05 DIAGNOSIS — I10 ESSENTIAL HYPERTENSION: ICD-10-CM

## 2024-12-05 DIAGNOSIS — E11.65 TYPE 2 DIABETES MELLITUS WITH HYPERGLYCEMIA, WITHOUT LONG-TERM CURRENT USE OF INSULIN (HCC): Primary | ICD-10-CM

## 2024-12-05 DIAGNOSIS — E78.2 MIXED HYPERLIPIDEMIA: ICD-10-CM

## 2024-12-05 PROCEDURE — 99214 OFFICE O/P EST MOD 30 MIN: CPT | Performed by: INTERNAL MEDICINE

## 2024-12-05 PROCEDURE — G2211 COMPLEX E/M VISIT ADD ON: HCPCS | Performed by: INTERNAL MEDICINE

## 2024-12-05 NOTE — PATIENT INSTRUCTIONS
Check blood sugar before breakfast and before dinner, goal for blood sugar is in the morning before breakfast is 80 to 120 mg per DL  During the day blood sugar ranges 80 to 180 mg per DL    Hypoglycemia instructions   Jus Chavez  12/5/2024  [id number]    Low Blood Sugar    Steps to treat low blood sugar.    1. Test blood sugar if you have symptoms of low blood sugar:   Low Blood Sugar Symptoms:  o Sweaty  o Dizzy  o Rapid heartbeat  o Shaky    o Bad mood  o Hungry      2. Treat blood sugar less than 70 with 15 grams of fast-acting carbohydrate:   Examples of 15 grams Fast-Acting Carbohydrate:  o 4 oz juice  o 4 oz regular soda  o 3-4 glucose tablets (chew)  o 3-4 hard candies (chew)              3.   Wait 15 minutes and test your blood sugar again           4. If blood sugar is less than 100, repeat steps 2-3.      5. When your blood sugar is 100 or more, eat a snack if it will be longer than one hour until your next meal. The snack should be 15 grams of carbohydrate and a protein:   Examples of snacks:  o ½ sandwich  o 6 crackers with cheese  o Piece of fruit with cheese or peanut butter  o 6 crackers with peanut butter

## 2024-12-05 NOTE — PROGRESS NOTES
Jus Chavez 74 y.o. male MRN: [id number]    Encounter: [id number]      Assessment & Plan     Assessment:  This is a 74 y.o.-year-old male with diabetes with hyperglycemia.    Plan:  Diagnoses and all orders for this visit:    Type 2 diabetes mellitus with hyperglycemia, without long-term current use of insulin (Formerly [facility])    Lab Results   Component Value Date    HGBA1C 8.2 (H) 12/03/2024   A1c is Un-controlled , Hba1c goal is 7-7.5%   Discussed to keep carbohydrate consistent with meals, avoid snack at night as fasting blood sugars elevated , during the day blood sugars are ok   Continue Jardiance 25 mg daily  Continue metformin 1000 mg twice a day  Continue Prandin 0.5 mg with dinner   Educated about hypoglycemia symptoms and treatment.  Discussed importance of follow-up with ophthalmology and podiatry.  -     Vitamin B12; Future  -     Basic metabolic panel; Future  -     Hemoglobin A1C; Future    Vitamin B12 deficiency  Continue vitamin B12 1000 mcg daily  -     Vitamin B12; Future    Essential hypertension  Blood pressure well-controlled, continue current management, as per PCP  Mixed hyperlipidemia  LDL at goal.   Continue statins, lifestyle modifications    Aaron Bailon         CC: Diabetes    History of Present Illness     HPI:    Jus Chavez is a 74-year-old male with medical history of type 2 diabetes hypertension, hyperlipidemia is here for follow-up.  Diabetes course is stable.    Current medication include metformin 1000 mg twice a day, Jardiance 25 mg daily, Prandin 0.5 mg with dinner.  He checks blood sugars once or twice daily and they are usually 152 to 20 mg per DL range.  He denies, shortness of breath, chest pain.  He takes vitamin B12 supplementation 1000 mcg daily    Complications of diabetes are coronary artery disease, status post CABG, diabetic polyneuropathy ,history of diabetes.  Lab Results   Component Value Date    HGBA1C 8.2 (H) 12/03/2024           Lab Results   Component  Value Date    CREATININE 1.12 12/03/2024     Component      Latest Ref Rng 7/18/2024 12/3/2024   Sodium      135 - 147 mmol/L  139    Potassium      3.5 - 5.3 mmol/L  4.0    Chloride      96 - 108 mmol/L  104    Carbon Dioxide      21 - 32 mmol/L  27    ANION GAP      4 - 13 mmol/L  8    BUN      5 - 25 mg/dL  27 (H)    Creatinine      0.60 - 1.30 mg/dL  1.12    GLUCOSE, FASTING      65 - 99 mg/dL  166 (H)    Calcium      8.4 - 10.2 mg/dL  9.4    AST      13 - 39 U/L  15    ALT      7 - 52 U/L  14    ALK PHOS      34 - 104 U/L  46    Total Protein      6.4 - 8.4 g/dL  6.6    Albumin      3.5 - 5.0 g/dL  4.2    Total Bilirubin      0.20 - 1.00 mg/dL  0.54    GFR, Calculated      ml/min/1.73sq m  63    Cholesterol      See Comment mg/dL 110     Triglycerides      See Comment mg/dL 111     HDL      >=40 mg/dL 45     LDL Calculated      0 - 100 mg/dL 43     EXT Creatinine Urine      Reference range not established. mg/dL 47.0     Albumin,U,Random      <20.0 mg/L <7.0     Albumin Creat Ratio --     Hemoglobin A1C      Normal 4.0-5.6%; PreDiabetic 5.7-6.4%; Diabetic >=6.5%; Glycemic control for adults with diabetes <7.0% %  8.2 (H)    eAG, EST AVG Glucose      mg/dl  189    Vitamin B-12      180 - 914 pg/mL 180     TSH 3RD GENERATON      0.450 - 4.500 uIU/mL 3.383            Lab Results   Component Value Date    HGBA1C 8.2 (H) 12/03/2024     Lab Results   Component Value Date    CREATININE 1.12 12/03/2024          Review of Systems   Constitutional:  Negative for activity change, diaphoresis, fatigue, fever and unexpected weight change.   HENT: Negative.     Eyes:  Negative for visual disturbance.   Respiratory:  Negative for cough, chest tightness and shortness of breath.    Cardiovascular:  Negative for chest pain, palpitations and leg swelling.   Gastrointestinal:  Negative for abdominal pain, blood in stool, constipation, diarrhea, nausea and vomiting.   Endocrine: Negative for cold intolerance, heat intolerance,  polydipsia, polyphagia and polyuria.   Genitourinary:  Negative for dysuria, enuresis, frequency and urgency.   Musculoskeletal:  Negative for arthralgias and myalgias.   Skin:  Negative for pallor, rash and wound.   Allergic/Immunologic: Negative.    Neurological:  Negative for dizziness, tremors, weakness and numbness.   Hematological: Negative.    Psychiatric/Behavioral: Negative.         Historical Information   Past Medical History:   Diagnosis Date    Acute systolic heart failure (HCC) 02/23/2021    Athlete's foot     Bilateral impacted cerumen 03/09/2021    Coronary artery disease 07/30/2019    bypass and valve replacement    Diabetes mellitus (HCC)     Heart murmur     Other hydrocele 06/11/2020    TMJ (dislocation of temporomandibular joint)     Yeast infection      Past Surgical History:   Procedure Laterality Date    CARDIAC SURGERY      CARDIAC VALVE REPLACEMENT      CATARACT EXTRACTION      COLONOSCOPY      CORONARY ARTERY BYPASS GRAFT      EYE SURGERY  03/2020    Cataract right eye    EYE SURGERY Left 12/03/2020    left eye cataract     HYDROCELE EXCISION / REPAIR      CA EXCISION HYDROCELE UNILATERAL Right 04/08/2022    Procedure: HYDROCELECTOMY;  Surgeon: Montrell Mancuso MD;  Location: [facility] MA[facility] OR;  Service: Urology    CA EXCISION HYDROCELE UNILATERAL Right 11/16/2022    Procedure: HYDROCELECTOMY;  Surgeon: Montrell Mancuso MD;  Location: [facility] MAIN OR;  Service: Urology    TONSILLECTOMY       Social History   Social History     Substance and Sexual Activity   Alcohol Use Yes    Alcohol/week: 4.0 standard drinks of alcohol    Types: 2 Glasses of wine, 1 Cans of beer, 1 Shots of liquor per week    Comment: 2-3 glass of wine or  beer a week     Social History     Substance and Sexual Activity   Drug Use Never     Social History     Tobacco Use   Smoking Status Never   Smokeless Tobacco Never     Family History:   Family History   Problem Relation Age of Onset    Heart disease Mother      Cancer Mother         skin melonoma/mastectomy    Heart disease Father     Hypertension Father     Mitral valve prolapse Sister     Diabetes type II Maternal Grandmother     Colon cancer Neg Hx        Meds/Allergies   Current Outpatient Medications   Medication Sig Dispense Refill    acetaminophen (TYLENOL) 500 mg tablet Take 500 mg by mouth daily at bedtime      ascorbic acid (VITAMIN C) 250 mg tablet Take 500 mg by mouth 2 (two) times a day      Aspirin 81 MG CAPS Take 1 capsule every day by oral route.      Cyanocobalamin (VITAMIN B-12 PO) Take 1 tablet by mouth 3 (three) times a day      finasteride (PROSCAR) 5 mg tablet Take 1 tablet (5 mg total) by mouth daily 90 tablet 1    furosemide (LASIX) 20 mg tablet Take 1 tablet (20 mg total) by mouth daily as needed (edema) 90 tablet 1    Ginger, Zingiber officinalis, (Ginger Root) 500 MG CAPS       Jardiance 25 MG TABS Take 1 tablet (25 mg total) by mouth daily 90 tablet 1    metFORMIN (GLUCOPHAGE) 500 mg tablet TAKE TWO TABLETS BY MOUTH TWICE A  tablet 1    metoprolol succinate (TOPROL-XL) 25 mg 24 hr tablet TAKE ONE TABLET BY MOUTH EVERY DAY 90 tablet 1    niacin 500 mg tablet Take 500 mg by mouth 4 (four) times a day      NON FORMULARY 25 mcg Vitamin D      Omega-3 Fatty Acids (FISH OIL BURP-LESS PO) Take by mouth      PROBIOTIC PRODUCT PO Take 111 mg by mouth daily      repaglinide (PRANDIN) 0.5 mg tablet TAKE 1 TABLET BY MOUTH DAILY WITH BREAKFAST, 1 WITH LUNCH, AND 2 TABLETS WITH DINNER 360 tablet 1    rosuvastatin (CRESTOR) 10 MG tablet TAKE ONE TABLET BY MOUTH ONCE DAILY 90 tablet 3    simethicone (MYLICON) 125 MG chewable tablet Chew 125 mg every 6 (six) hours as needed for flatulence      sitaGLIPtin (JANUVIA) 100 mg tablet Take 1 tablet (100 mg total) by mouth daily 30 tablet 11    tamsulosin (FLOMAX) 0.4 mg TAKE ONE CAPSULE BY MOUTH EVERY DAY WITH DINNER 30 capsule 5    Tart Cherry (Tart Cherry Ultra) 1200 MG CAPS       Turmeric 500 MG CAPS        "Wheat Dextrin (BENEFIBER DRINK MIX PO) Take by mouth      ciclopirox (LOPROX) 0.77 % cream  (Patient not taking: Reported on 12/5/2024)       No current facility-administered medications for this visit.     No Known Allergies    Objective   Vitals: Blood pressure 112/62, pulse 70, height 6' 1\" (1.854 m), weight 96.6 kg (213 lb), SpO2 98%.    Physical Exam  Vitals reviewed.   Constitutional:       General: He is not in acute distress.     Appearance: He is well-developed.   HENT:      Head: Normocephalic and atraumatic.   Eyes:      Pupils: Pupils are equal, round, and reactive to light.   Neck:      Thyroid: No thyromegaly.   Cardiovascular:      Rate and Rhythm: Normal rate and regular rhythm.      Heart sounds: Normal heart sounds.   Pulmonary:      Effort: Pulmonary effort is normal. No respiratory distress.      Breath sounds: Normal breath sounds.   Musculoskeletal:         General: No deformity. Normal range of motion.      Cervical back: Normal range of motion and neck supple.   Skin:     General: Skin is warm and dry.      Findings: No erythema.   Neurological:      Mental Status: He is alert and oriented to person, place, and time.       The history was obtained from the review of the chart, patient.    Lab Results:   Lab Results   Component Value Date/Time    Hemoglobin A1C 8.2 (H) 12/03/2024 08:24 AM    Hemoglobin A1C 7.8 (H) 07/18/2024 07:42 AM    Hemoglobin A1C 8.5 (H) 02/07/2024 07:48 AM    WBC 6.51 12/03/2024 08:24 AM    WBC 6.36 07/18/2024 07:42 AM    Hemoglobin 14.1 12/03/2024 08:24 AM    Hemoglobin 14.1 07/18/2024 07:42 AM    Hematocrit 41.8 12/03/2024 08:24 AM    Hematocrit 42.1 07/18/2024 07:42 AM    MCV 91 12/03/2024 08:24 AM    MCV 92 07/18/2024 07:42 AM    Platelets 161 12/03/2024 08:24 AM    Platelets 142 (L) 07/18/2024 07:42 AM    BUN 27 (H) 12/03/2024 08:24 AM    BUN 27 (H) 07/18/2024 07:42 AM    BUN 28 (H) 02/07/2024 07:48 AM    Potassium 4.0 12/03/2024 08:24 AM    Potassium 3.9 07/18/2024 " "07:42 AM    Potassium 3.9 02/07/2024 07:48 AM    Chloride 104 12/03/2024 08:24 AM    Chloride 106 07/18/2024 07:42 AM    Chloride 103 02/07/2024 07:48 AM    CO2 27 12/03/2024 08:24 AM    CO2 24 07/18/2024 07:42 AM    CO2 26 02/07/2024 07:48 AM    Creatinine 1.12 12/03/2024 08:24 AM    Creatinine 1.00 07/18/2024 07:42 AM    Creatinine 1.04 02/07/2024 07:48 AM    AST 15 12/03/2024 08:24 AM    AST 16 07/18/2024 07:42 AM    ALT 14 12/03/2024 08:24 AM    ALT 19 07/18/2024 07:42 AM    Total Protein 6.6 12/03/2024 08:24 AM    Total Protein 6.6 07/18/2024 07:42 AM    Albumin 4.2 12/03/2024 08:24 AM    Albumin 4.1 07/18/2024 07:42 AM    HDL, Direct 45 07/18/2024 07:42 AM    Triglycerides 111 07/18/2024 07:42 AM           Imaging Studies: Results Review Statement: No pertinent imaging studies reviewed.    Portions of the record may have been created with voice recognition software. Occasional wrong word or \"sound a like\" substitutions may have occurred due to the inherent limitations of voice recognition software. Read the chart carefully and recognize, using context, where substitutions have occurred.    "

## 2024-12-16 ENCOUNTER — PATIENT MESSAGE (OUTPATIENT)
Dept: FAMILY MEDICINE CLINIC | Facility: CLINIC | Age: 74
End: 2024-12-16

## 2024-12-20 NOTE — PATIENT COMMUNICATION
Pt called as well. Providers message given. He is feeling better but does still have head congestion which is worse at night. He is having a hard time sleeping. He would like another abx. Does he need a different one? Please advise as soon as possible.

## 2025-01-02 DIAGNOSIS — E11.65 TYPE 2 DIABETES MELLITUS WITH HYPERGLYCEMIA, WITHOUT LONG-TERM CURRENT USE OF INSULIN (HCC): ICD-10-CM

## 2025-01-28 DIAGNOSIS — R35.0 BENIGN PROSTATIC HYPERPLASIA WITH URINARY FREQUENCY: ICD-10-CM

## 2025-01-28 DIAGNOSIS — N40.1 BENIGN PROSTATIC HYPERPLASIA WITH URINARY FREQUENCY: ICD-10-CM

## 2025-01-28 RX ORDER — TAMSULOSIN HYDROCHLORIDE 0.4 MG/1
0.4 CAPSULE ORAL
Qty: 30 CAPSULE | Refills: 1 | Status: SHIPPED | OUTPATIENT
Start: 2025-01-28

## 2025-01-31 LAB
LEFT EYE DIABETIC RETINOPATHY: NORMAL
RIGHT EYE DIABETIC RETINOPATHY: NORMAL

## 2025-02-11 DIAGNOSIS — E11.9 TYPE 2 DIABETES MELLITUS WITHOUT COMPLICATION, WITHOUT LONG-TERM CURRENT USE OF INSULIN (HCC): ICD-10-CM

## 2025-02-11 RX ORDER — EMPAGLIFLOZIN 25 MG/1
25 TABLET, FILM COATED ORAL DAILY
Qty: 30 TABLET | Refills: 5 | Status: SHIPPED | OUTPATIENT
Start: 2025-02-11

## 2025-02-11 NOTE — TELEPHONE ENCOUNTER
Reason for call:   [x] Refill   [] Prior Auth  [] Other:     Office:   [] PCP/Provider -   [] Specialty/Provider - Endo    Medication: Jardiance 25 mg, take 1 tablet by mouth daily       Pharmacy: [facility]     Does the patient have enough for 3 days?   [x] Yes   [] No - Send as HP to POD

## 2025-02-27 ENCOUNTER — NURSE TRIAGE (OUTPATIENT)
Age: 75
End: 2025-02-27

## 2025-02-27 NOTE — TELEPHONE ENCOUNTER
"Reason for Conversation: Pt called to report he has had L arm pain on and off for the past 5-7 days. At times he notices it, other times he does not. It is located from above elbow to below shoulder. Pt notes sometimes when he moves his arm it hurts, other times it does not.     He denies any chest pain/tightness or sob, no numbness, weakness or swelling in arm. Pain does not radiate into neck or jaw.  ? If any overuse of arm or injury, pt denies, he is unsure if he \"slept on it\" and that could be cause, he is unsure if it is muscle pain.     Advised I would make Dr. Miller aware. Should he develop worsening pain, chest pian or tightness, pain radiates or he becomes sob to call back immediately or go to ED for eval. Pt states he will do so.     VS/Weight: (Note: Please include date/time vitals/weight were measured)  pt does not check BP/vitals routinely    Pain: No    Risk Factors: Hypertension, Arrhythmia, and Other hx AVR and CABG    Recent relevant testing and date of testing: Echo 06/05/24    Medication: furosemide 20mg daily, metoprolol succinate 25mg daily, rosuvastatin 10mg daily    Upcoming Office Visit: Yes05/09/25    Last Office Visit: 05/03/24       Answer Assessment - Initial Assessment Questions  1. ONSET: \"When did the pain start?\"      Approx 5-7 days  2. LOCATION: \"Where is the pain located?\"      L elbow to below shoulder  3. PAIN: \"How bad is the pain?\" (Scale 0-10; or none, mild, moderate, severe)      4  4. WORK OR EXERCISE: \"Has there been any recent work or exercise that involved this part of the body?\"      No, but does note he may have slept on his arm  5. CAUSE: \"What do you think is causing the arm pain?\"      unsure  6. OTHER SYMPTOMS: \"Do you have any other symptoms?\" (e.g., neck pain, swelling, rash, fever, numbness, weakness)      Denies weakness, numbness or swelling in arm    Protocols used: Arm Pain-Adult-[place]    "

## 2025-02-27 NOTE — TELEPHONE ENCOUNTER
Description is more musculoskeletal than angina. Can be offered sooner visit with me or AP.  No other changes at this time.

## 2025-02-28 ENCOUNTER — TELEPHONE (OUTPATIENT)
Age: 75
End: 2025-02-28

## 2025-02-28 NOTE — TELEPHONE ENCOUNTER
The patient call again to verify is Isabel place the order for the Xray. The patient also schedule an appointment for Monday in case isabel would like to see him before place the order.     If the order is place , please contact the patient to have it done tomorrow

## 2025-03-03 ENCOUNTER — RESULTS FOLLOW-UP (OUTPATIENT)
Dept: FAMILY MEDICINE CLINIC | Facility: CLINIC | Age: 75
End: 2025-03-03

## 2025-03-03 ENCOUNTER — OFFICE VISIT (OUTPATIENT)
Dept: CARDIOLOGY CLINIC | Facility: CLINIC | Age: 75
End: 2025-03-03
Payer: MEDICARE

## 2025-03-03 ENCOUNTER — HOSPITAL ENCOUNTER (OUTPATIENT)
Dept: NON INVASIVE DIAGNOSTICS | Facility: HOSPITAL | Age: 75
Discharge: HOME | End: 2025-03-03
Payer: MEDICARE

## 2025-03-03 ENCOUNTER — OFFICE VISIT (OUTPATIENT)
Dept: FAMILY MEDICINE CLINIC | Facility: CLINIC | Age: 75
End: 2025-03-03
Payer: MEDICARE

## 2025-03-03 ENCOUNTER — APPOINTMENT (OUTPATIENT)
Dept: LAB | Facility: AMBULARY SURGERY CENTER | Age: 75
End: 2025-03-03
Payer: MEDICARE

## 2025-03-03 ENCOUNTER — HOSPITAL ENCOUNTER (OUTPATIENT)
Dept: RADIOLOGY | Facility: HOSPITAL | Age: 75
Discharge: HOME | End: 2025-03-03
Payer: MEDICARE

## 2025-03-03 ENCOUNTER — RESULTS FOLLOW-UP (OUTPATIENT)
Dept: CARDIOLOGY CLINIC | Facility: CLINIC | Age: 75
End: 2025-03-03

## 2025-03-03 VITALS
SYSTOLIC BLOOD PRESSURE: 108 MMHG | HEIGHT: 73 IN | WEIGHT: 211 LBS | RESPIRATION RATE: 18 BRPM | DIASTOLIC BLOOD PRESSURE: 64 MMHG | HEART RATE: 75 BPM | OXYGEN SATURATION: 95 % | BODY MASS INDEX: 27.96 KG/M2 | TEMPERATURE: 97.1 F

## 2025-03-03 VITALS
HEIGHT: 73 IN | WEIGHT: 210.8 LBS | TEMPERATURE: 95.8 F | OXYGEN SATURATION: 96 % | DIASTOLIC BLOOD PRESSURE: 78 MMHG | HEART RATE: 65 BPM | BODY MASS INDEX: 27.94 KG/M2 | SYSTOLIC BLOOD PRESSURE: 128 MMHG

## 2025-03-03 DIAGNOSIS — I25.118 CORONARY ARTERY DISEASE OF NATIVE ARTERY OF NATIVE HEART WITH STABLE ANGINA PECTORIS (HCC): ICD-10-CM

## 2025-03-03 DIAGNOSIS — I65.23 BILATERAL CAROTID ARTERY STENOSIS: ICD-10-CM

## 2025-03-03 DIAGNOSIS — I35.0 NONRHEUMATIC AORTIC VALVE STENOSIS: ICD-10-CM

## 2025-03-03 DIAGNOSIS — Z95.2 S/P AVR: ICD-10-CM

## 2025-03-03 DIAGNOSIS — I48.91 POSTOPERATIVE ATRIAL FIBRILLATION (HCC): ICD-10-CM

## 2025-03-03 DIAGNOSIS — I77.1 STENOSIS OF RIGHT SUBCLAVIAN ARTERY (HCC): ICD-10-CM

## 2025-03-03 DIAGNOSIS — R60.9 EDEMA, UNSPECIFIED TYPE: ICD-10-CM

## 2025-03-03 DIAGNOSIS — I10 ESSENTIAL HYPERTENSION: ICD-10-CM

## 2025-03-03 DIAGNOSIS — E11.42 DIABETIC PERIPHERAL NEUROPATHY ASSOCIATED WITH TYPE 2 DIABETES MELLITUS (HCC): ICD-10-CM

## 2025-03-03 DIAGNOSIS — M79.602 LEFT ARM PAIN: ICD-10-CM

## 2025-03-03 DIAGNOSIS — R60.0 EDEMA OF LEFT UPPER ARM: ICD-10-CM

## 2025-03-03 DIAGNOSIS — I97.89 POSTOPERATIVE ATRIAL FIBRILLATION (HCC): ICD-10-CM

## 2025-03-03 DIAGNOSIS — E78.2 MIXED HYPERLIPIDEMIA: ICD-10-CM

## 2025-03-03 DIAGNOSIS — R60.0 EDEMA OF LEFT UPPER ARM: Primary | ICD-10-CM

## 2025-03-03 DIAGNOSIS — M79.89 LEFT ARM SWELLING: ICD-10-CM

## 2025-03-03 LAB
BASOPHILS # BLD AUTO: 0.05 THOUSANDS/ÂΜL (ref 0–0.1)
BASOPHILS NFR BLD AUTO: 1 % (ref 0–1)
EOSINOPHIL # BLD AUTO: 0.17 THOUSAND/ÂΜL (ref 0–0.61)
EOSINOPHIL NFR BLD AUTO: 2 % (ref 0–6)
ERYTHROCYTE [DISTWIDTH] IN BLOOD BY AUTOMATED COUNT: 11.9 % (ref 11.6–15.1)
HCT VFR BLD AUTO: 39.9 % (ref 36.5–49.3)
HGB BLD-MCNC: 13.4 G/DL (ref 12–17)
IMM GRANULOCYTES # BLD AUTO: 0.02 THOUSAND/UL (ref 0–0.2)
IMM GRANULOCYTES NFR BLD AUTO: 0 % (ref 0–2)
LYMPHOCYTES # BLD AUTO: 2.56 THOUSANDS/ÂΜL (ref 0.6–4.47)
LYMPHOCYTES NFR BLD AUTO: 36 % (ref 14–44)
MCH RBC QN AUTO: 30.5 PG (ref 26.8–34.3)
MCHC RBC AUTO-ENTMCNC: 33.6 G/DL (ref 31.4–37.4)
MCV RBC AUTO: 91 FL (ref 82–98)
MONOCYTES # BLD AUTO: 0.89 THOUSAND/ÂΜL (ref 0.17–1.22)
MONOCYTES NFR BLD AUTO: 12 % (ref 4–12)
NEUTROPHILS # BLD AUTO: 3.51 THOUSANDS/ÂΜL (ref 1.85–7.62)
NEUTS SEG NFR BLD AUTO: 49 % (ref 43–75)
NRBC BLD AUTO-RTO: 0 /100 WBCS
PLATELET # BLD AUTO: 167 THOUSANDS/UL (ref 149–390)
PMV BLD AUTO: 11.2 FL (ref 8.9–12.7)
RBC # BLD AUTO: 4.4 MILLION/UL (ref 3.88–5.62)
WBC # BLD AUTO: 7.2 THOUSAND/UL (ref 4.31–10.16)

## 2025-03-03 PROCEDURE — 73060 X-RAY EXAM OF HUMERUS: CPT

## 2025-03-03 PROCEDURE — 73090 X-RAY EXAM OF FOREARM: CPT

## 2025-03-03 PROCEDURE — 93000 ELECTROCARDIOGRAM COMPLETE: CPT

## 2025-03-03 PROCEDURE — 99214 OFFICE O/P EST MOD 30 MIN: CPT

## 2025-03-03 PROCEDURE — 99214 OFFICE O/P EST MOD 30 MIN: CPT | Performed by: NURSE PRACTITIONER

## 2025-03-03 PROCEDURE — 84550 ASSAY OF BLOOD/URIC ACID: CPT

## 2025-03-03 PROCEDURE — 93971 EXTREMITY STUDY: CPT | Performed by: SURGERY

## 2025-03-03 PROCEDURE — 73080 X-RAY EXAM OF ELBOW: CPT

## 2025-03-03 PROCEDURE — 93971 EXTREMITY STUDY: CPT

## 2025-03-03 PROCEDURE — 36415 COLL VENOUS BLD VENIPUNCTURE: CPT

## 2025-03-03 PROCEDURE — 80053 COMPREHEN METABOLIC PANEL: CPT

## 2025-03-03 PROCEDURE — G2211 COMPLEX E/M VISIT ADD ON: HCPCS | Performed by: NURSE PRACTITIONER

## 2025-03-03 PROCEDURE — 85025 COMPLETE CBC W/AUTO DIFF WBC: CPT

## 2025-03-03 RX ORDER — FUROSEMIDE 20 MG/1
20 TABLET ORAL DAILY
Start: 2025-03-03 | End: 2025-08-30

## 2025-03-03 RX ORDER — COLCHICINE 0.6 MG/1
TABLET ORAL
Qty: 3 TABLET | Refills: 0 | Status: SHIPPED | OUTPATIENT
Start: 2025-03-03

## 2025-03-03 RX ORDER — KETOCONAZOLE 20 MG/G
CREAM TOPICAL DAILY
COMMUNITY
Start: 2025-02-06

## 2025-03-03 NOTE — PROGRESS NOTES
Jus Gastonlizet  1950  [id number]  [facility] [address] [phone]    1. Coronary artery disease of native artery of native heart with stable angina pectoris (HCC)  POCT ECG      2. Postoperative atrial fibrillation (HCC)        3. Edema of left upper arm  VAS upper limb venous duplex scan, unilateral/limited      4. Edema, unspecified type  furosemide (LASIX) 20 mg tablet      5. Stenosis of right subclavian artery (HCC)        6. Essential hypertension        7. Bilateral carotid artery stenosis        8. Nonrheumatic aortic valve stenosis        9. S/P AVR        10. Mixed hyperlipidemia            Summary/Discussion:  Left arm swelling:  - he was instructed by his chiropractor to follow-up with cardiology due to acute left arm swelling associated with pain   - noticeable left arm swelling on exam today  - recommend STAT VAS duplex of left upper extremity to rule out DVT while not being on anticoagulation at baseline   - etiology unclear at this time  - he is scheduled to follow up with his PCP this afternoon as well     Coronary artery disease:  - with prior CABG in 7/2019  - he has not needed an ischemic evaluation since his bypass 3  - without symptoms of angina   - EKG today demonstrating sinus rhythm, LVH, septal infarct, ns T wave abnormality   - continue aspirin, statin and beta blocker    Severe aortic stenosis s/p AVR in 7/2019:  - echo (6/2024): normal valve function  - bio AVR sound noted on exam    - antibiotics before dental procedures  - volume status appears stable on exam today with no evidence of acute weight gain based on chart review. He does have chronic lower extremity edema which he states is baseline for him. Continue lasix 20 mg daily   - continue aspirin    Postoperative atrial fibrillation:  - lasted < 24 hours s/p surgery with no documented recurrence since 7/2019. Initially on amiodarone and warfarin for a  brief period of time, but this was stopped  - 48-hour Holter (10/2020): No evidence of atrial fibrillation.  PVCs were noted.  No other significant arrhythmias  - EKG as noted above   - continue to monitor with routine EKGs    Hypertension:  - 128/78  - continue present medication regimen  - lifestyle modification   - close blood pressure monitoring     Dyslipidemia:  - Lipid Profile:    Latest Reference Range & Units 07/18/24 07:42   Cholesterol See Comment mg/dL 110   Triglycerides See Comment mg/dL 111   HDL >=40 mg/dL 45   LDL Calculated 0 - 100 mg/dL 43   - continue rosuvastatin 10 mg daily   - encouraged low cholesterol diet and annual lipid follow up    Carotid artery stenosis  Moderate right subclavian disease  - carotid dopplers (4/2022): < 50% bilaterally   - continue aspirin and statin     Interval History: Jus Chavez is a 74 y.o. year old male with history mentioned in problem list who presents to the office today for a follow up.     He was instructed by his chiropractor to follow-up with cardiology due to acute left arm swelling associated with pain. Overall from a cardiac standpoint he has been feeling well. He denies any chest pain/pressure/discomfort or shortness of breath. He denies worsening lower extremity edema, orthopnea, and PND. He denies lightheadedness, dizziness, and syncope. He denies palpitations.        He will RTO on 5/9/2025 with Dr. Miller or sooner if necessary. He will call with any concerns.       Medical Problems       Problem List       Aortic stenosis    Coronary artery disease of native artery of native heart with stable angina pectoris (HCC)    DM (diabetes mellitus), type 2 (HCC)      Lab Results   Component Value Date    HGBA1C 8.2 (H) 12/03/2024         Essential hypertension    Hypertriglyceridemia    S/P CABG (coronary artery bypass graft)    S/P AVR    PVC (premature ventricular contraction)    Bilateral carotid artery stenosis    Mixed hyperlipidemia     Postoperative atrial fibrillation (HCC)    Benign prostatic hyperplasia with urinary frequency    Stenosis of right subclavian artery (HCC)    IBS (irritable bowel syndrome)    H/O diabetic foot ulcer    Diabetic peripheral neuropathy associated with type 2 diabetes mellitus (HCC)      Lab Results   Component Value Date    HGBA1C 8.2 (H) 12/03/2024         Dizziness    Instability of foot joint    Peripheral venous insufficiency    Thrombocytopenia (HCC)    Right hydrocele        Past Medical History:   Diagnosis Date    Acute systolic heart failure (HCC) 02/23/2021    Athlete's foot     Bilateral impacted cerumen 03/09/2021    Coronary artery disease 07/30/2019    bypass and valve replacement    Diabetes mellitus (HCC)     Heart murmur     Other hydrocele 06/11/2020    TMJ (dislocation of temporomandibular joint)     Yeast infection      Social History     Socioeconomic History    Marital status: /Civil Union     Spouse name: Not on file    Number of children: Not on file    Years of education: Not on file    Highest education level: Not on file   Occupational History    Not on file   Tobacco Use    Smoking status: Never    Smokeless tobacco: Never   Vaping Use    Vaping status: Never Used   Substance and Sexual Activity    Alcohol use: Yes     Alcohol/week: 4.0 standard drinks of alcohol     Types: 2 Glasses of wine, 1 Cans of beer, 1 Shots of liquor per week     Comment: 2-3 glass of wine or  beer a week    Drug use: Never    Sexual activity: Not Currently     Partners: Female     Birth control/protection: None   Other Topics Concern    Not on file   Social History Narrative    Not on file     Social Drivers of Health     Financial Resource Strain: Low Risk  (3/11/2024)    Overall Financial Resource Strain (CARDIA)     Difficulty of Paying Living Expenses: Not very hard   Food Insecurity: No Food Insecurity (3/18/2024)    Nursing - Inadequate Food Risk Classification     Worried About Running Out of Food  in the Last Year: Never true     Ran Out of Food in the Last Year: Never true     Ran Out of Food in the Last Year: Not on file   Transportation Needs: No Transportation Needs (3/18/2024)    PRAPARE - Transportation     Lack of Transportation (Medical): No     Lack of Transportation (Non-Medical): No   Physical Activity: Inactive (7/26/2019)    [facility]    Exercise Vital Sign     Days of Exercise per Week: 0 days     Minutes of Exercise per Session: 0 min   Stress: Not on file   Social Connections: Not on file   Intimate Partner Violence: Not on file   Housing Stability: Unknown (3/18/2024)    Housing Stability Vital Sign     Unable to Pay for Housing in the Last Year: No     Number of Times Moved in the Last Year: Not on file     Homeless in the Last Year: No      Family History   Problem Relation Age of Onset    Heart disease Mother     Cancer Mother         skin melonoma/mastectomy    Heart disease Father     Hypertension Father     Mitral valve prolapse Sister     Diabetes type II Maternal Grandmother     Colon cancer Neg Hx      Past Surgical History:   Procedure Laterality Date    CARDIAC SURGERY      CARDIAC VALVE REPLACEMENT      CATARACT EXTRACTION      COLONOSCOPY      CORONARY ARTERY BYPASS GRAFT      EYE SURGERY  03/2020    Cataract right eye    EYE SURGERY Left 12/03/2020    left eye cataract     HYDROCELE EXCISION / REPAIR      CA EXCISION HYDROCELE UNILATERAL Right 04/08/2022    Procedure: HYDROCELECTOMY;  Surgeon: Montrell Mancuso MD;  Location: [facility];  Service: Urology    CA EXCISION HYDROCELE UNILATERAL Right 11/16/2022    Procedure: HYDROCELECTOMY;  Surgeon: Montrell Mancuso MD;  Location: [facility];  Service: Urology    TONSILLECTOMY         Current Outpatient Medications:     acetaminophen (TYLENOL) 500 mg tablet, Take 500 mg by mouth daily at bedtime, Disp: , Rfl:     Aspirin 81 MG  CAPS, Take 1 capsule every day by oral route., Disp: , Rfl:     ciclopirox (LOPROX) 0.77 % cream, PRN, Disp: , Rfl:     Cyanocobalamin (VITAMIN B-12 PO), Take 1 tablet by mouth 3 (three) times a day, Disp: , Rfl:     finasteride (PROSCAR) 5 mg tablet, Take 1 tablet (5 mg total) by mouth daily, Disp: 90 tablet, Rfl: 1    furosemide (LASIX) 20 mg tablet, Take 1 tablet (20 mg total) by mouth daily, Disp: , Rfl:     Jardiance 25 MG TABS, Take 1 tablet (25 mg total) by mouth daily, Disp: 30 tablet, Rfl: 5    ketoconazole (NIZORAL) 2 % cream, Apply topically daily, Disp: , Rfl:     metFORMIN (GLUCOPHAGE) 500 mg tablet, TAKE TWO TABLETS BY MOUTH TWICE A DAY, Disp: 360 tablet, Rfl: 1    metoprolol succinate (TOPROL-XL) 25 mg 24 hr tablet, TAKE ONE TABLET BY MOUTH EVERY DAY, Disp: 90 tablet, Rfl: 1    niacin 500 mg tablet, Take 500 mg by mouth 4 (four) times a day, Disp: , Rfl:     NON FORMULARY, 25 mcg Vitamin D, Disp: , Rfl:     Omega-3 Fatty Acids (FISH OIL BURP-LESS PO), Take by mouth, Disp: , Rfl:     PROBIOTIC PRODUCT PO, Take 111 mg by mouth daily, Disp: , Rfl:     repaglinide (PRANDIN) 0.5 mg tablet, TAKE 1 TABLET BY MOUTH DAILY WITH BREAKFAST, 1 WITH LUNCH, AND 2 TABLETS WITH DINNER, Disp: 360 tablet, Rfl: 1    rosuvastatin (CRESTOR) 10 MG tablet, TAKE ONE TABLET BY MOUTH ONCE DAILY, Disp: 90 tablet, Rfl: 3    simethicone (MYLICON) 125 MG chewable tablet, Chew 125 mg every 6 (six) hours as needed for flatulence, Disp: , Rfl:     sitaGLIPtin (JANUVIA) 100 mg tablet, Take 1 tablet (100 mg total) by mouth daily, Disp: 30 tablet, Rfl: 11    tamsulosin (FLOMAX) 0.4 mg, TAKE ONE CAPSULE BY MOUTH EVERY DAY WITH DINNER, Disp: 30 capsule, Rfl: 1    Tart Cherry (Tart Cherry Ultra) 1200 MG CAPS, , Disp: , Rfl:     Turmeric 500 MG CAPS, , Disp: , Rfl:     Wheat Dextrin (BENEFIBER DRINK MIX PO), Take by mouth, Disp: , Rfl:     ascorbic acid (VITAMIN C) 250 mg tablet, Take 500 mg by mouth 2 (two) times a day (Patient not taking:  "Reported on 3/3/2025), Disp: , Rfl:     Norma, Zingiber officinalis, (Norma Root) 500 MG CAPS, , Disp: , Rfl:   No Known Allergies    Labs:     Chemistry        Component Value Date/Time    K 4.0 12/03/2024 0824    K 4.2 07/20/2020 0804     12/03/2024 0824     07/20/2020 0804    CO2 27 12/03/2024 0824    CO2 29 07/20/2020 0804    BUN 27 (H) 12/03/2024 0824    BUN 25 07/20/2020 0804    CREATININE 1.12 12/03/2024 0824    CREATININE 1.00 07/20/2020 0804        Component Value Date/Time    CALCIUM 9.4 12/03/2024 0824    CALCIUM 10.1 07/20/2020 0804    ALKPHOS 46 12/03/2024 0824    ALKPHOS 54 08/02/2019 0600    AST 15 12/03/2024 0824    AST 29 08/02/2019 0600    ALT 14 12/03/2024 0824    ALT 25 08/02/2019 0600            No results found for: \"CHOL\"  Lab Results   Component Value Date    HDL 45 07/18/2024    HDL 46 01/16/2023    HDL 46 09/12/2022     Lab Results   Component Value Date    LDLCALC 43 07/18/2024    LDLCALC 45 01/16/2023    LDLCALC 48 09/12/2022     Lab Results   Component Value Date    TRIG 111 07/18/2024    TRIG 135 01/16/2023    TRIG 114 09/12/2022     No results found for: \"CHOLHDL\"    Imaging: No results found.    ECG:  sinus rhythm, LVH, septal infarct, ns T wave abnormality     ROS    Vitals:    03/03/25 1106   BP: 128/78   Pulse: 65   Temp: (!) 95.8 °F (35.4 °C)   SpO2: 96%     Vitals:    03/03/25 1106   Weight: 95.6 kg (210 lb 12.8 oz)     Height: 6' 1\" (185.4 cm)   Body mass index is 27.81 kg/m².    Physical Exam   "

## 2025-03-03 NOTE — ASSESSMENT & PLAN NOTE
Lab Results   Component Value Date    HGBA1C 8.2 (H) 12/03/2024       Orders:    colchicine (COLCRYS) 0.6 mg tablet; Take two tablets now and one one hour later

## 2025-03-03 NOTE — PROGRESS NOTES
Name: Jus Chavez      : 1950      MRN: [id number]  Encounter Provider: HESHAM Matias  Encounter Date: 3/3/2025   Encounter department: [facility]    Assessment & Plan  Edema of left upper arm    Orders:    Uric acid; Future    CBC and differential; Future    Comprehensive metabolic panel; Future    XR elbow 3+ vw left; Future    XR humerus left; Future    XR forearm 2 vw left; Future    Ambulatory Referral to Orthopedic Surgery; Future    colchicine (COLCRYS) 0.6 mg tablet; Take two tablets now and one one hour later    Left arm pain    Orders:    Uric acid; Future    CBC and differential; Future    Comprehensive metabolic panel; Future    XR elbow 3+ vw left; Future    XR humerus left; Future    XR forearm 2 vw left; Future    Ambulatory Referral to Orthopedic Surgery; Future    colchicine (COLCRYS) 0.6 mg tablet; Take two tablets now and one one hour later    Diabetic peripheral neuropathy associated with type 2 diabetes mellitus (HCC)    Lab Results   Component Value Date    HGBA1C 8.2 (H) 2024       Orders:    colchicine (COLCRYS) 0.6 mg tablet; Take two tablets now and one one hour later                           History of Present Illness     Started pain Thursday, swelling started over the weekend  No fevers  Left arm edema  Doppler neg for dvt  Minimal redness  Tender to palpation          Review of Systems   Constitutional:  Negative for fatigue and fever.   Respiratory:  Negative for cough, shortness of breath and wheezing.    Cardiovascular:  Negative for chest pain and palpitations.   Musculoskeletal:  Positive for arthralgias, joint swelling and myalgias.   Skin:  Negative for rash.   Neurological:  Negative for dizziness, light-headedness and headaches.   Hematological:  Negative for adenopathy.   Psychiatric/Behavioral:  Negative for dysphoric mood and sleep disturbance. The patient is not nervous/anxious.      Past Medical History:   Diagnosis Date     Acute systolic heart failure (HCC) 02/23/2021    Athlete's foot     Bilateral impacted cerumen 03/09/2021    Coronary artery disease 07/30/2019    bypass and valve replacement    Diabetes mellitus (HCC)     Heart murmur     Other hydrocele 06/11/2020    TMJ (dislocation of temporomandibular joint)     Yeast infection      Past Surgical History:   Procedure Laterality Date    CARDIAC SURGERY      CARDIAC VALVE REPLACEMENT      CATARACT EXTRACTION      COLONOSCOPY      CORONARY ARTERY BYPASS GRAFT      EYE SURGERY  03/2020    Cataract right eye    EYE SURGERY Left 12/03/2020    left eye cataract     HYDROCELE EXCISION / REPAIR      AR EXCISION HYDROCELE UNILATERAL Right 04/08/2022    Procedure: HYDROCELECTOMY;  Surgeon: Montrell Mancuso MD;  Location: [facility];  Service: Urology    AR EXCISION HYDROCELE UNILATERAL Right 11/16/2022    Procedure: HYDROCELECTOMY;  Surgeon: Montrell Mancuso MD;  Location: [facility];  Service: Urology    TONSILLECTOMY       Family History   Problem Relation Age of Onset    Heart disease Mother     Cancer Mother         skin melonoma/mastectomy    Heart disease Father     Hypertension Father     Mitral valve prolapse Sister     Diabetes type II Maternal Grandmother     Colon cancer Neg Hx      Social History     Tobacco Use    Smoking status: Never    Smokeless tobacco: Never   Vaping Use    Vaping status: Never Used   Substance and Sexual Activity    Alcohol use: Yes     Alcohol/week: 4.0 standard drinks of alcohol     Types: 2 Glasses of wine, 1 Cans of beer, 1 Shots of liquor per week     Comment: 2-3 glass of wine or  beer a week    Drug use: Never    Sexual activity: Not Currently     Partners: Female     Birth control/protection: None     Current Outpatient Medications on File Prior to Visit   Medication Sig    acetaminophen (TYLENOL) 500 mg tablet Take 500 mg by mouth daily at bedtime    Aspirin 81 MG CAPS Take 1 capsule every day by oral route.    ciclopirox  (LOPROX) 0.77 % cream PRN    Cyanocobalamin (VITAMIN B-12 PO) Take 1 tablet by mouth 3 (three) times a day    finasteride (PROSCAR) 5 mg tablet Take 1 tablet (5 mg total) by mouth daily    furosemide (LASIX) 20 mg tablet Take 1 tablet (20 mg total) by mouth daily    Jardiance 25 MG TABS Take 1 tablet (25 mg total) by mouth daily    ketoconazole (NIZORAL) 2 % cream Apply topically daily    metFORMIN (GLUCOPHAGE) 500 mg tablet TAKE TWO TABLETS BY MOUTH TWICE A DAY    metoprolol succinate (TOPROL-XL) 25 mg 24 hr tablet TAKE ONE TABLET BY MOUTH EVERY DAY    niacin 500 mg tablet Take 500 mg by mouth 4 (four) times a day    NON FORMULARY 25 mcg Vitamin D    Omega-3 Fatty Acids (FISH OIL BURP-LESS PO) Take by mouth    PROBIOTIC PRODUCT PO Take 111 mg by mouth daily    repaglinide (PRANDIN) 0.5 mg tablet TAKE 1 TABLET BY MOUTH DAILY WITH BREAKFAST, 1 WITH LUNCH, AND 2 TABLETS WITH DINNER    rosuvastatin (CRESTOR) 10 MG tablet TAKE ONE TABLET BY MOUTH ONCE DAILY    simethicone (MYLICON) 125 MG chewable tablet Chew 125 mg every 6 (six) hours as needed for flatulence    ascorbic acid (VITAMIN C) 250 mg tablet Take 500 mg by mouth 2 (two) times a day (Patient not taking: Reported on 3/3/2025)    Ginger, Zingiber officinalis, (Ginger Root) 500 MG CAPS  (Patient not taking: Reported on 3/3/2025)    sitaGLIPtin (JANUVIA) 100 mg tablet Take 1 tablet (100 mg total) by mouth daily    tamsulosin (FLOMAX) 0.4 mg TAKE ONE CAPSULE BY MOUTH EVERY DAY WITH DINNER    Tart Pan (Tart Cherry Ultra) 1200 MG CAPS     Turmeric 500 MG CAPS     Wheat Dextrin (BENEFIBER DRINK MIX PO) Take by mouth    [DISCONTINUED] furosemide (LASIX) 20 mg tablet Take 1 tablet (20 mg total) by mouth daily as needed (edema)     No Known Allergies  Immunization History   Administered Date(s) Administered    COVID-19 PFIZER VACCINE 0.3 ML IM 03/07/2021, 03/26/2021, 10/07/2021    COVID-19 Pfizer Vac BIVALENT Rubio-sucrose 12 Yr+ IM 10/31/2022    COVID-19 Pfizer mRNA  "vacc PF taylor-sucrose 12 yr and older (Comirnaty) 11/14/2023    INFLUENZA 10/31/2022    Influenza, high dose seasonal 0.7 mL 10/27/2021, 10/14/2023    Pneumococcal Conjugate Vaccine 20-valent (Pcv20), Polysace 11/17/2023    Pneumococcal Polysaccharide PPV23 10/27/2021    Zoster Vaccine Recombinant 03/21/2023, 05/22/2023     Objective   /64 (BP Location: Left arm, Patient Position: Sitting, Cuff Size: Standard)   Pulse 75   Temp (!) 97.1 °F (36.2 °C) (Tympanic)   Resp 18   Ht 6' 1\" (1.854 m)   Wt 95.7 kg (211 lb)   SpO2 95%   BMI 27.84 kg/m²     Physical Exam  Vitals and nursing note reviewed.   Constitutional:       Appearance: Normal appearance.   HENT:      Head: Normocephalic and atraumatic.   Eyes:      Conjunctiva/sclera: Conjunctivae normal.   Cardiovascular:      Rate and Rhythm: Normal rate and regular rhythm.      Heart sounds: Normal heart sounds.   Pulmonary:      Effort: Pulmonary effort is normal.      Breath sounds: Normal breath sounds.   Musculoskeletal:         General: Swelling and tenderness present.      Left upper arm: Swelling, edema, tenderness and bony tenderness present.        Arms:       Cervical back: Normal range of motion and neck supple.   Skin:     General: Skin is warm and dry.      Capillary Refill: Capillary refill takes less than 2 seconds.   Neurological:      General: No focal deficit present.      Mental Status: He is alert and oriented to person, place, and time.   Psychiatric:         Mood and Affect: Mood normal.         Behavior: Behavior normal.         Thought Content: Thought content normal.         Judgment: Judgment normal.         "

## 2025-03-04 ENCOUNTER — OFFICE VISIT (OUTPATIENT)
Dept: OBGYN CLINIC | Facility: CLINIC | Age: 75
End: 2025-03-04
Payer: MEDICARE

## 2025-03-04 VITALS — HEIGHT: 73 IN | BODY MASS INDEX: 27.96 KG/M2 | WEIGHT: 211 LBS

## 2025-03-04 DIAGNOSIS — R60.0 EDEMA OF LEFT UPPER ARM: Primary | ICD-10-CM

## 2025-03-04 DIAGNOSIS — M79.602 LEFT ARM PAIN: ICD-10-CM

## 2025-03-04 LAB
ALBUMIN SERPL BCG-MCNC: 4 G/DL (ref 3.5–5)
ALP SERPL-CCNC: 65 U/L (ref 34–104)
ALT SERPL W P-5'-P-CCNC: 39 U/L (ref 7–52)
ANION GAP SERPL CALCULATED.3IONS-SCNC: 10 MMOL/L (ref 4–13)
AST SERPL W P-5'-P-CCNC: 29 U/L (ref 13–39)
BILIRUB SERPL-MCNC: 0.31 MG/DL (ref 0.2–1)
BUN SERPL-MCNC: 30 MG/DL (ref 5–25)
CALCIUM SERPL-MCNC: 9.4 MG/DL (ref 8.4–10.2)
CHLORIDE SERPL-SCNC: 104 MMOL/L (ref 96–108)
CO2 SERPL-SCNC: 25 MMOL/L (ref 21–32)
CREAT SERPL-MCNC: 1.46 MG/DL (ref 0.6–1.3)
GFR SERPL CREATININE-BSD FRML MDRD: 46 ML/MIN/1.73SQ M
GLUCOSE SERPL-MCNC: 207 MG/DL (ref 65–140)
POTASSIUM SERPL-SCNC: 4.3 MMOL/L (ref 3.5–5.3)
PROT SERPL-MCNC: 6.5 G/DL (ref 6.4–8.4)
SODIUM SERPL-SCNC: 139 MMOL/L (ref 135–147)
URATE SERPL-MCNC: 6.5 MG/DL (ref 3.5–8.5)

## 2025-03-04 PROCEDURE — 99204 OFFICE O/P NEW MOD 45 MIN: CPT | Performed by: PHYSICAL MEDICINE & REHABILITATION

## 2025-03-04 NOTE — PROGRESS NOTES
1. Edema of left upper arm    2. Left arm pain      Orders Placed This Encounter   Procedures    CT VENOGRAM UPPER EXTREMITIES LEFT w/ contrast     No orders of the defined types were placed in this encounter.      Impression:  Left arm swelling pain likely secondary to peripheral venous insufficiency and unlikely due to proximal or distal bicep tendon tear.  Unlikely SVC syndrome.  Pain started on March 23 without injury.  He has bilateral lower extremity venous insufficiency.  He had a venous Doppler which showed no DVT.  On physical examination, he has diffuse swelling through the mid humerus and into the mid forearm.  He has a normal hook test.  No obvious Brooks sign.  We will have him try elevation and compression which would be both therapeutic and diagnostic in this case.  We will also have him go for a CT venogram.  I will see him back afterwards.    Imaging Studies (I personally reviewed images in PACS and report):  Left humerus, forearm and elbow x-rays most recent to this encounter reviewed.  These images show medial epicondyle calcification.  No elbow joint effusion.  No acute osseous abnormalities.    Venous doppler of LUE showed no DVT.    No follow-ups on file.    Patient is in agreement with the above plan.    HPI:  Jus Chavez is a 74 y.o. male  who presents for evaluation of   Chief Complaint   Patient presents with    Left Arm - Pain, Swelling       Onset/Mechanism: 2/23/2025- his arm was swollen.  The swelling was initially in the upper arm.  Pain has been in the antecubital fossa.  The swelling has travelled down into the elbow.  He denies any injury preceding this.  Location: As above.  Radiation: As above.  Provocative: Any movement of the arm.  Severity: Painful.  Associated Symptoms: Swelling.  Treatment so far: Ice and Tylenol.    Following history reviewed and updated:  Past Medical History:   Diagnosis Date    Acute systolic heart failure (HCC) 02/23/2021    Athlete's foot      "Bilateral impacted cerumen 03/09/2021    Coronary artery disease 07/30/2019    bypass and valve replacement    Diabetes mellitus (HCC)     Heart murmur     Other hydrocele 06/11/2020    TMJ (dislocation of temporomandibular joint)     Yeast infection      Past Surgical History:   Procedure Laterality Date    CARDIAC SURGERY      CARDIAC VALVE REPLACEMENT      CATARACT EXTRACTION      COLONOSCOPY      CORONARY ARTERY BYPASS GRAFT      EYE SURGERY  03/2020    Cataract right eye    EYE SURGERY Left 12/03/2020    left eye cataract     HYDROCELE EXCISION / REPAIR      OH EXCISION HYDROCELE UNILATERAL Right 04/08/2022    Procedure: HYDROCELECTOMY;  Surgeon: Montrell Mancuso MD;  Location: [facility];  Service: Urology    OH EXCISION HYDROCELE UNILATERAL Right 11/16/2022    Procedure: HYDROCELECTOMY;  Surgeon: Montrell Mancuso MD;  Location: [facility];  Service: Urology    TONSILLECTOMY       Social History   Social History     Substance and Sexual Activity   Alcohol Use Yes    Alcohol/week: 4.0 standard drinks of alcohol    Types: 2 Glasses of wine, 1 Cans of beer, 1 Shots of liquor per week    Comment: 2-3 glass of wine or  beer a week     Social History     Substance and Sexual Activity   Drug Use Never     Social History     Tobacco Use   Smoking Status Never   Smokeless Tobacco Never     Family History   Problem Relation Age of Onset    Heart disease Mother     Cancer Mother         skin melonoma/mastectomy    Heart disease Father     Hypertension Father     Mitral valve prolapse Sister     Diabetes type II Maternal Grandmother     Colon cancer Neg Hx      No Known Allergies     Constitutional:  Ht 6' 1\" (1.854 m)   Wt 95.7 kg (211 lb)   BMI 27.84 kg/m²    General: NAD.  Eyes: Anicteric sclerae.  Neck: Supple.  Lungs: Unlabored breathing.  Cardiovascular: No lower extremity edema.  Skin: Intact without erythema.  Neurologic: Sensation intact to light touch.  Psychiatric: Mood and affect are " appropriate.    Left Elbow Exam     Tenderness   Left elbow tenderness location: Tender throughout the volar elbow.     Range of Motion   Extension:  abnormal   Flexion:  normal   Pronation:  normal   Supination:  normal     Other   Erythema: absent  Scars: absent  Sensation: normal  Pulse: present    Comments:  Compartments are soft and compressible.  WWP.  SLTI.  Normal Hook test.  No bruising.    No swelling in shoulder girdle or wrist/hand.             Procedures

## 2025-03-06 DIAGNOSIS — R60.0 EDEMA OF LEFT UPPER ARM: Primary | ICD-10-CM

## 2025-03-08 DIAGNOSIS — I25.118 CORONARY ARTERY DISEASE OF NATIVE ARTERY OF NATIVE HEART WITH STABLE ANGINA PECTORIS (HCC): ICD-10-CM

## 2025-03-09 DIAGNOSIS — E11.65 TYPE 2 DIABETES MELLITUS WITH HYPERGLYCEMIA, WITHOUT LONG-TERM CURRENT USE OF INSULIN (HCC): ICD-10-CM

## 2025-03-10 ENCOUNTER — HOSPITAL ENCOUNTER (OUTPATIENT)
Dept: CT IMAGING | Facility: HOSPITAL | Age: 75
Discharge: HOME | End: 2025-03-10
Attending: PHYSICAL MEDICINE & REHABILITATION
Payer: MEDICARE

## 2025-03-10 DIAGNOSIS — M79.602 LEFT ARM PAIN: ICD-10-CM

## 2025-03-10 DIAGNOSIS — R60.0 EDEMA OF LEFT UPPER ARM: ICD-10-CM

## 2025-03-10 PROCEDURE — 73206 CT ANGIO UPR EXTRM W/O&W/DYE: CPT

## 2025-03-10 RX ORDER — ROSUVASTATIN CALCIUM 10 MG/1
10 TABLET, COATED ORAL DAILY
Qty: 90 TABLET | Refills: 1 | Status: SHIPPED | OUTPATIENT
Start: 2025-03-10

## 2025-03-10 RX ADMIN — IOHEXOL 100 ML: 350 INJECTION, SOLUTION INTRAVENOUS at 15:04

## 2025-03-11 RX ORDER — REPAGLINIDE 0.5 MG/1
TABLET ORAL
Qty: 360 TABLET | Refills: 1 | Status: SHIPPED | OUTPATIENT
Start: 2025-03-11

## 2025-03-12 DIAGNOSIS — M79.602 LEFT ARM PAIN: Primary | ICD-10-CM

## 2025-03-12 DIAGNOSIS — R60.0 EDEMA OF LEFT UPPER ARM: Primary | ICD-10-CM

## 2025-03-12 DIAGNOSIS — R60.0 EDEMA OF LEFT UPPER ARM: ICD-10-CM

## 2025-03-12 DIAGNOSIS — M79.602 LEFT ARM PAIN: ICD-10-CM

## 2025-03-13 ENCOUNTER — OFFICE VISIT (OUTPATIENT)
Dept: VASCULAR SURGERY | Facility: CLINIC | Age: 75
End: 2025-03-13
Payer: MEDICARE

## 2025-03-13 VITALS
BODY MASS INDEX: 28.23 KG/M2 | HEART RATE: 59 BPM | WEIGHT: 213 LBS | DIASTOLIC BLOOD PRESSURE: 60 MMHG | HEIGHT: 73 IN | OXYGEN SATURATION: 95 % | SYSTOLIC BLOOD PRESSURE: 138 MMHG

## 2025-03-13 DIAGNOSIS — R60.0 EDEMA OF LEFT UPPER ARM: ICD-10-CM

## 2025-03-13 PROCEDURE — 99204 OFFICE O/P NEW MOD 45 MIN: CPT | Performed by: SURGERY

## 2025-03-13 NOTE — PROGRESS NOTES
Name: Jus Chavez      : 1950      MRN: [id number]  Encounter Provider: Yo Grande MD  Encounter Date: 3/13/2025   Encounter department: [facility]  :  Assessment & Plan  Edema of left upper arm    73 yo M referred to the office for left arm swelling. Patient reports edema started around .  He states at first he had pain in the in the left bicep and the next day they noted swelling in the antecubital fossa.  The next day they noted more swelling in the left forearm and then into the hand the day after that.  The patient was taking ibuprofen for anti-inflammatory effects and was referred to cardiology by his chiropractor for the left arm swelling.  Patient underwent a left extremity venous duplex which ruled out DVT.  He was then seen by orthopedics who ordered a CT venogram of the left upper extremity.    I reviewed the results of the CT and there is a fluid collection within the antecubital fossa that the patient is currently scheduled to have aspirated tomorrow.  Also noted was left subclavian vein compression between the first rib and clavicle.    On exam he has left forearm edema, palpable radial and ulnar pulse. He is unable to extend his left elbow. Both passive and active ROM is restricted due to pain. Pain in bicep elicited with supination    I discussed with the patient the findings of the left subclavian vein we discussed thoracic outlet syndrome.  Patient's symptoms were sudden in onset and the distribution of symptoms and pain is not consistent with a thoracic outlet syndrome.  It is unlikely at the age of 74 to suddenly have symptoms of TOS. For sudden onset left upper extremity swelling in the setting of thoracic outlet syndrome I would expect a left subclavian DVT which was not present.  Also TOS would not be restricting his ability to fully extend his left elbow or cause pain with supination.  I believe patient's symptoms are likely from this  "fluid collection and will see symptom relief after aspiration.  Will defer to his orthopedic doctor for further management of this.  I do recommend continuing with left arm sleeve compression and elevation for edema.  Will plan to have the patient see me about 2 to 4 weeks    Orders:  •  Ambulatory Referral to Vascular Surgery        History of Present Illness   HPI  Jus Chavez is a 74 y.o. male who presents     Pt is new to our practice and here for consult of left arm pain and swelling for approx 2 weeks with minimal recent improvement. Pt c/o shooting pain in upper arm when he tries to ties his shoes and neck and shoulder discomfort. Pt stated he will be undergoing an aspiration process for fluid in same location.  History obtained from: patient    Review of Systems  Medical History Reviewed by provider this encounter:     .     Objective   /60 (BP Location: Right arm, Patient Position: Sitting)   Pulse 59   Ht 6' 1\" (1.854 m)   Wt 96.6 kg (213 lb)   SpO2 95%   BMI 28.10 kg/m²      Physical Exam  Vitals and nursing note reviewed.   Constitutional:       Appearance: He is well-developed.   HENT:      Head: Normocephalic and atraumatic.   Eyes:      Conjunctiva/sclera: Conjunctivae normal.   Cardiovascular:      Rate and Rhythm: Normal rate and regular rhythm.   Pulmonary:      Effort: Pulmonary effort is normal.      Breath sounds: Normal breath sounds.   Abdominal:      Palpations: Abdomen is soft.      Tenderness: There is no abdominal tenderness.   Musculoskeletal:      Cervical back: Neck supple.      Comments: Left elbow ROM restricted by pain, unable to fully extend. Pain with supination  FROM in left shoulder  Left forearm edema   Skin:     General: Skin is warm and dry.      Capillary Refill: Capillary refill takes less than 2 seconds.   Neurological:      General: No focal deficit present.      Mental Status: He is alert and oriented to person, place, and time.   Psychiatric:         Mood " and Affect: Mood normal.         Administrative Statements   I have spent a total time of 45 minutes in caring for this patient on the day of the visit/encounter including Diagnostic results, Prognosis, Risks and benefits of tx options, Instructions for management, Patient and family education, Importance of tx compliance, Risk factor reductions, Impressions, Counseling / Coordination of care, Documenting in the medical record, Reviewing/placing orders in the medical record (including tests, medications, and/or procedures), and Obtaining or reviewing history  .

## 2025-03-13 NOTE — ASSESSMENT & PLAN NOTE
73 yo M referred to the office for left arm swelling. Patient reports edema started around March 23.  He states at first he had pain in the in the left bicep and the next day they noted swelling in the antecubital fossa.  The next day they noted more swelling in the left forearm and then into the hand the day after that.  The patient was taking ibuprofen for anti-inflammatory effects and was referred to cardiology by his chiropractor for the left arm swelling.  Patient underwent a left extremity venous duplex which ruled out DVT.  He was then seen by orthopedics who ordered a CT venogram of the left upper extremity.    I reviewed the results of the CT and there is a fluid collection within the antecubital fossa that the patient is currently scheduled to have aspirated tomorrow.  Also noted was left subclavian vein compression between the first rib and clavicle.    On exam he has left forearm edema, palpable radial and ulnar pulse. He is unable to extend his left elbow. Both passive and active ROM is restricted due to pain. Pain in bicep elicited with supination    I discussed with the patient the findings of the left subclavian vein we discussed thoracic outlet syndrome.  Patient's symptoms were sudden in onset and the distribution of symptoms and pain is not consistent with a thoracic outlet syndrome.  It is unlikely at the age of 74 to suddenly have symptoms of TOS. For sudden onset left upper extremity swelling in the setting of thoracic outlet syndrome I would expect a left subclavian DVT which was not present.  Also TOS would not be restricting his ability to fully extend his left elbow or cause pain with supination.  I believe patient's symptoms are likely from this fluid collection and will see symptom relief after aspiration.  Will defer to his orthopedic doctor for further management of this.  I do recommend continuing with left arm sleeve compression and elevation for edema.  Will plan to have the  patient see me about 2 to 4 weeks    Orders:  •  Ambulatory Referral to Vascular Surgery

## 2025-03-13 NOTE — LETTER
March 13, 2025     HESHAM Matias  [address] [address]    Patient: Jus Chavez   YOB: 1950   Date of Visit: 3/13/2025       Dear Dr. Valentine:    Thank you for referring Jus Chavez to me for evaluation. Below are the relevant portions of my assessment and plan of care.         If you have questions, please do not hesitate to call me. I look forward to following Jus along with you.         Sincerely,        Yo Grande MD        Assessment & Plan  Edema of left upper arm    Patient reports edema started around March 23.  He states at first he had pain in the in the left bicep and the next day they noted swelling in the antecubital fossa.  The next day they noted more swelling in the left forearm and then into the hand the day after that.  The patient was taking ibuprofen for anti-inflammatory effects and was referred to cardiology by his chiropractor for the left arm swelling.  Patient underwent a left extremity venous duplex which ruled out DVT.  He was then seen by orthopedics who ordered a CT venogram of the left upper extremity.    I reviewed the results of the CT and there is a fluid collection within the antecubital fossa that the patient is currently scheduled to have aspirated tomorrow.  Also noted was left subclavian vein compression between the first rib and clavicle.    On exam he has left forearm edema, palpable radial and ulnar pulse. He is unable to extend his left elbow. Both passive and active ROM is restricted due to pain. Pain in bicep elicited with supination    I discussed with the patient the findings of the left subclavian vein we discussed thoracic outlet syndrome.  Patient's symptoms were sudden in onset and the distribution of symptoms and pain is not consistent with a thoracic outlet syndrome.  It is unlikely at the age of 74 to suddenly have symptoms of TOS. For sudden onset left upper extremity swelling in the  setting of thoracic outlet syndrome I would expect a left subclavian DVT which was not present.  Also TOS would not be restricting his ability to fully extend his left elbow or cause pain with supination.  I believe patient's symptoms are likely from this fluid collection and will see symptom relief after aspiration.  Will defer to his orthopedic doctor for further management of this.  I do recommend continuing with left arm sleeve compression and elevation for edema.  Will plan to have the patient see me about 2 to 4 weeks

## 2025-03-14 ENCOUNTER — HOSPITAL ENCOUNTER (OUTPATIENT)
Dept: RADIOLOGY | Facility: HOSPITAL | Age: 75
Discharge: HOME | End: 2025-03-14
Attending: RADIOLOGY
Payer: MEDICARE

## 2025-03-14 DIAGNOSIS — M79.602 LEFT ARM PAIN: ICD-10-CM

## 2025-03-14 DIAGNOSIS — R60.0 EDEMA OF LEFT UPPER ARM: ICD-10-CM

## 2025-03-14 PROCEDURE — 10005 FNA BX W/US GDN 1ST LES: CPT

## 2025-03-14 PROCEDURE — 87176 TISSUE HOMOGENIZATION CULTR: CPT | Performed by: PHYSICAL MEDICINE & REHABILITATION

## 2025-03-14 PROCEDURE — 88112 CYTOPATH CELL ENHANCE TECH: CPT | Performed by: PATHOLOGY

## 2025-03-14 PROCEDURE — 87070 CULTURE OTHR SPECIMN AEROBIC: CPT | Performed by: PHYSICAL MEDICINE & REHABILITATION

## 2025-03-14 PROCEDURE — 87205 SMEAR GRAM STAIN: CPT | Performed by: PHYSICAL MEDICINE & REHABILITATION

## 2025-03-14 PROCEDURE — 20206 BIOPSY MUSCLE PERQ NEEDLE: CPT

## 2025-03-14 RX ORDER — LIDOCAINE WITH 8.4% SOD BICARB 0.9%(10ML)
SYRINGE (ML) INJECTION AS NEEDED
Status: COMPLETED | OUTPATIENT
Start: 2025-03-14 | End: 2025-03-14

## 2025-03-14 RX ADMIN — Medication 10 ML: at 10:46

## 2025-03-14 NOTE — BRIEF OP NOTE (RAD/CATH)
INTERVENTIONAL RADIOLOGY PROCEDURE NOTE    Date: 3/14/2025    Procedure:   Procedure Summary       Date: 03/14/25 Room / Location: [facility] Interventional Radiology    Anesthesia Start:  Anesthesia Stop:     Procedure: IR BIOPSY SOFT TISSUE/SUPERFICIAL Diagnosis:       Left arm pain      Edema of left upper arm      (Left antecubital fossa intramuscular fluid collection)    Scheduled Providers:  Responsible Provider:     Anesthesia Type: Not recorded ASA Status: Not recorded            Preoperative diagnosis:   1. Left arm pain    2. Edema of left upper arm         Postoperative diagnosis: Same.    Surgeon: Grant Michael MD     Assistant: None. No qualified resident was available.    Blood loss: Minimal    Specimens: Five 18-gauge x 1.3 cm cores     Findings:   Complex collection/lesion in the left antecubital fossa as seen on prior CT Venogram.    US-guided aspiration yielded scant fluid; the lesion felt more solid than liquid.    We transitioned to US-guided biopsy, taking cores as above.  Two were sent for culture and three sent in formalin for pathology.    Complications: None immediate.    Anesthesia: local

## 2025-03-14 NOTE — H&P
Interventional Radiology  History and Physical 3/14/2025     Jus Chavez   1950   [id number]    Assessment/Plan:  Very pleasant 74-year-old gentleman with left elbow tender swelling and pain radiating down to the hand, in the left few weeks, no history of trauma, no evident local infectious signs of redness or warmth, no systemic signs of illness, found to have a fluid collection at the site on CT venogram.  He presents today for image-guided aspiration for further evaluation.    Problem List Items Addressed This Visit       Left arm pain    Relevant Orders    IR aspiration only    Edema of left upper arm    Relevant Orders    IR aspiration only          Subjective:     Patient ID: Jus Chavez is a 74 y.o. male.    History of Present Illness  Very pleasant 74-year-old gentleman with left elbow tender swelling and pain radiating down to the hand, in the left few weeks, no history of trauma, no evident local infectious signs of redness or warmth, no systemic signs of illness, found to have a fluid collection at the site on CT venogram.  He presents today for image-guided aspiration for further evaluation.          Past Medical History:   Diagnosis Date    Acute systolic heart failure (HCC) 02/23/2021    Athlete's foot     Bilateral impacted cerumen 03/09/2021    Coronary artery disease 07/30/2019    bypass and valve replacement    Diabetes mellitus (HCC)     Heart murmur     Other hydrocele 06/11/2020    TMJ (dislocation of temporomandibular joint)     Yeast infection         Past Surgical History:   Procedure Laterality Date    CARDIAC SURGERY      CARDIAC VALVE REPLACEMENT      CATARACT EXTRACTION      COLONOSCOPY      CORONARY ARTERY BYPASS GRAFT      EYE SURGERY  03/2020    Cataract right eye    EYE SURGERY Left 12/03/2020    left eye cataract     HYDROCELE EXCISION / REPAIR      SD EXCISION HYDROCELE UNILATERAL Right 04/08/2022    Procedure: HYDROCELECTOMY;  Surgeon: Montrell Mancuso MD;   Location: [facility];  Service: Urology    MD EXCISION HYDROCELE UNILATERAL Right 11/16/2022    Procedure: HYDROCELECTOMY;  Surgeon: Montrell Mancuso MD;  Location: [facility];  Service: Urology    TONSILLECTOMY          Social History     Tobacco Use   Smoking Status Never   Smokeless Tobacco Never        Social History     Substance and Sexual Activity   Alcohol Use Yes    Alcohol/week: 4.0 standard drinks of alcohol    Types: 2 Glasses of wine, 1 Cans of beer, 1 Shots of liquor per week    Comment: 2-3 glass of wine or  beer a week        Social History     Substance and Sexual Activity   Drug Use Never        No Known Allergies    Current Outpatient Medications   Medication Sig Dispense Refill    acetaminophen (TYLENOL) 500 mg tablet Take 500 mg by mouth daily at bedtime      ascorbic acid (VITAMIN C) 250 mg tablet Take 500 mg by mouth 2 (two) times a day (Patient not taking: Reported on 3/3/2025)      Aspirin 81 MG CAPS Take 1 capsule every day by oral route.      ciclopirox (LOPROX) 0.77 % cream PRN      colchicine (COLCRYS) 0.6 mg tablet Take two tablets now and one one hour later 3 tablet 0    Cyanocobalamin (VITAMIN B-12 PO) Take 1 tablet by mouth 3 (three) times a day      finasteride (PROSCAR) 5 mg tablet Take 1 tablet (5 mg total) by mouth daily 90 tablet 1    furosemide (LASIX) 20 mg tablet Take 1 tablet (20 mg total) by mouth daily      Norma, Zingiber officinalis, (Norma Root) 500 MG CAPS  (Patient not taking: Reported on 3/3/2025)      Jardiance 25 MG TABS Take 1 tablet (25 mg total) by mouth daily 30 tablet 5    ketoconazole (NIZORAL) 2 % cream Apply topically daily      metFORMIN (GLUCOPHAGE) 500 mg tablet TAKE TWO TABLETS BY MOUTH TWICE A  tablet 1    metoprolol succinate (TOPROL-XL) 25 mg 24 hr tablet TAKE ONE TABLET BY MOUTH EVERY DAY 90 tablet 1    niacin 500 mg tablet Take 500 mg by mouth 4 (four) times a day      NON FORMULARY 25 mcg Vitamin D      Omega-3 Fatty Acids  "(FISH OIL BURP-LESS PO) Take by mouth      PROBIOTIC PRODUCT PO Take 111 mg by mouth daily      repaglinide (PRANDIN) 0.5 mg tablet TAKE 1 TABLET BY MOUTH DAILY WITH BREAKFAST, 1 WITH LUNCH, AND 2 TABLETS WITH DINNER 360 tablet 1    rosuvastatin (CRESTOR) 10 MG tablet TAKE ONE TABLET BY MOUTH ONCE DAILY 90 tablet 1    simethicone (MYLICON) 125 MG chewable tablet Chew 125 mg every 6 (six) hours as needed for flatulence      sitaGLIPtin (JANUVIA) 100 mg tablet Take 1 tablet (100 mg total) by mouth daily 30 tablet 11    tamsulosin (FLOMAX) 0.4 mg TAKE ONE CAPSULE BY MOUTH EVERY DAY WITH DINNER 30 capsule 1    Tart Cherry (Tart Cherry Ultra) 1200 MG CAPS       Turmeric 500 MG CAPS       Wheat Dextrin (BENEFIBER DRINK MIX PO) Take by mouth       No current facility-administered medications for this encounter.          Objective:    There were no vitals filed for this visit.    Physical Exam:  General: Well appearing, no acute distress.  HEENT: NC/AT.  EOMI.  CV: RRR  Chest: Normal respiratory effort.  Speaking in full sentences.  Abdomen: Soft, ND/NT.  Skin: Warm and dry  Neuro: Alert and oriented x 3.  No focal deficits.    LUE: Local tenderness and mild swelling at the ulnar aspect of the volar elbow.  No erythema.  On US, complex intramuscular collection, avascular.         No results found for: \"BNP\"   Lab Results   Component Value Date    WBC 7.20 03/03/2025    HGB 13.4 03/03/2025    HCT 39.9 03/03/2025    MCV 91 03/03/2025     03/03/2025     Lab Results   Component Value Date    INR 2.3 (H) 08/06/2019    INR 2.1 (H) 08/05/2019    INR 1.7 (H) 08/04/2019     No results found for: \"PTT\"      I have personally reviewed pertinent imaging and laboratory results.     Code Status: No Order  Advance Directive and Living Will: Yes    Power of :    POLST:      This text is generated with voice recognition software. There may be translation, syntax,  or grammatical errors. If you have any questions, please " contact the dictating provider.

## 2025-03-14 NOTE — SEDATION DOCUMENTATION
Left antecubital fossa tissue biopsy completed by Dr. Michael. Not amenable to aspiration. Specimen sent to lab. Band-aid to site. Patient tolerated well.

## 2025-03-14 NOTE — DISCHARGE INSTRUCTIONS
POST BIOPSY    Care after your procedure:    1. Limit your activities for 24 hours after your biopsy.    2. No driving day of biopsy.    3. Return to your normal diet.Small sips of flat soda will help with mild nausea.    4. Remove band-aid or dressing 24 hours after procedure.     Contact Interventional Radiology at [phone] (BIANCH[facility] PATIENTS: Contact Interventional Radiology at [phone]) (JO[facility] PATIENTS: Contact Interventional Radiology at [phone]) if:    1. Difficulty breathing, nausea or vomiting.    2. Chills or fever above 101 degrees F.     3. Pain at biopsy site not relieved by medication.     4. Develop any redness, swelling, heat, unusual drainage, heavy bruising or bleeding from biopsy site.

## 2025-03-16 LAB
BACTERIA TISS AEROBE CULT: NO GROWTH
GRAM STN SPEC: NORMAL

## 2025-03-17 ENCOUNTER — TELEPHONE (OUTPATIENT)
Dept: OBGYN CLINIC | Facility: CLINIC | Age: 75
End: 2025-03-17

## 2025-03-17 ENCOUNTER — OFFICE VISIT (OUTPATIENT)
Dept: OBGYN CLINIC | Facility: CLINIC | Age: 75
End: 2025-03-17
Payer: MEDICARE

## 2025-03-17 VITALS — HEIGHT: 73 IN | BODY MASS INDEX: 28.23 KG/M2 | WEIGHT: 213 LBS

## 2025-03-17 DIAGNOSIS — R22.32 ELBOW MASS, LEFT: Primary | ICD-10-CM

## 2025-03-17 DIAGNOSIS — R60.0 EDEMA OF LEFT UPPER ARM: ICD-10-CM

## 2025-03-17 DIAGNOSIS — M79.602 LEFT ARM PAIN: ICD-10-CM

## 2025-03-17 LAB
BACTERIA TISS AEROBE CULT: NO GROWTH
GRAM STN SPEC: NORMAL

## 2025-03-17 PROCEDURE — 99213 OFFICE O/P EST LOW 20 MIN: CPT | Performed by: PHYSICAL MEDICINE & REHABILITATION

## 2025-03-17 NOTE — PROGRESS NOTES
1. Elbow mass, left  MRI elbow left wo contrast      2. Edema of left upper arm  MRI elbow left wo contrast      3. Left arm pain  MRI elbow left wo contrast        Orders Placed This Encounter   Procedures    MRI elbow left wo contrast        Impression:  Patient is here in follow up of left arm swelling pain likely secondary to antecubital mass vs less likely distal bicep tear.  Pain started on March 23 without injury.  He has bilateral lower extremity venous insufficiency.  CT venogram reviewed.  On physical examination, he has diffuse swelling through the mid humerus and into the mid forearm.  Appreciate vascular surgery input.  We will obtain an MRI of his left elbow.  He recently had IR biopsy of the soft tissue mass.  I will see him back for MRI review.    Imaging Studies (I personally reviewed images in PACS and report):  Left humerus, forearm and elbow x-rays most recent to this encounter reviewed.  These images show medial epicondyle calcification.  No elbow joint effusion.  No acute osseous abnormalities.     Venous doppler of LUE showed no DVT.    CT venogram reviewed.    No follow-ups on file.    Patient is in agreement with the above plan.    HPI:  Jus Chavez is a 74 y.o. male  who presents in follow up.  Here for   Chief Complaint   Patient presents with    Left Arm - Pain, Follow-up       Since last visit: See above.  Feeling improved after elevation and having the biopsy.  Symptoms still there.  Worse when tying his shoe laces.    Following history reviewed and updated:  Past Medical History:   Diagnosis Date    Acute systolic heart failure (HCC) 02/23/2021    Athlete's foot     Bilateral impacted cerumen 03/09/2021    Coronary artery disease 07/30/2019    bypass and valve replacement    Diabetes mellitus (HCC)     Heart murmur     Other hydrocele 06/11/2020    TMJ (dislocation of temporomandibular joint)     Yeast infection      Past Surgical History:   Procedure Laterality Date    CARDIAC  "SURGERY      CARDIAC VALVE REPLACEMENT      CATARACT EXTRACTION      COLONOSCOPY      CORONARY ARTERY BYPASS GRAFT      EYE SURGERY  03/2020    Cataract right eye    EYE SURGERY Left 12/03/2020    left eye cataract     HYDROCELE EXCISION / REPAIR      NH EXCISION HYDROCELE UNILATERAL Right 04/08/2022    Procedure: HYDROCELECTOMY;  Surgeon: Montrell Mancuso MD;  Location: [facility];  Service: Urology    NH EXCISION HYDROCELE UNILATERAL Right 11/16/2022    Procedure: HYDROCELECTOMY;  Surgeon: Montrell Mancuso MD;  Location[facility];  Service: Urology    TONSILLECTOMY       Social History   Social History     Substance and Sexual Activity   Alcohol Use Yes    Alcohol/week: 4.0 standard drinks of alcohol    Types: 2 Glasses of wine, 1 Cans of beer, 1 Shots of liquor per week    Comment: 2-3 glass of wine or  beer a week     Social History     Substance and Sexual Activity   Drug Use Never     Social History     Tobacco Use   Smoking Status Never   Smokeless Tobacco Never     Family History   Problem Relation Age of Onset    Heart disease Mother     Cancer Mother         skin melonoma/mastectomy    Heart disease Father     Hypertension Father     Mitral valve prolapse Sister     Diabetes type II Maternal Grandmother     Colon cancer Neg Hx      No Known Allergies     Constitutional:  Ht 6' 1\" (1.854 m)   Wt 96.6 kg (213 lb)   BMI 28.10 kg/m²    General: NAD.  Eyes: Clear sclerae.  ENT: No inflammation, lesion, or mass of lips.  No tracheal deviation.  Musculoskeletal: As mentioned below.  Integumentary: No visible rashes or skin lesions.  Pulmonary/Chest: Effort normal. No respiratory distress.   Neuro: CN's grossly intact, CHIU.  Psych: Normal affect and judgement.  Vascular: WWP.    Left Elbow Exam     Tenderness   The patient is experiencing tenderness in the medial epicondyle (Medial arm and forearm).     Range of Motion   Extension:  abnormal   Flexion:  normal   Pronation:  normal   Supination: "  normal     Other   Erythema: absent  Scars: absent  Sensation: normal  Pulse: present             Procedures

## 2025-03-18 ENCOUNTER — HOSPITAL ENCOUNTER (OUTPATIENT)
Dept: MRI IMAGING | Facility: HOSPITAL | Age: 75
Discharge: HOME | End: 2025-03-18
Attending: PHYSICAL MEDICINE & REHABILITATION
Payer: MEDICARE

## 2025-03-18 DIAGNOSIS — R22.32 ELBOW MASS, LEFT: ICD-10-CM

## 2025-03-18 PROCEDURE — 73223 MRI JOINT UPR EXTR W/O&W/DYE: CPT

## 2025-03-18 PROCEDURE — A9585 GADOBUTROL INJECTION: HCPCS | Performed by: PHYSICAL MEDICINE & REHABILITATION

## 2025-03-18 RX ORDER — GADOBUTROL 604.72 MG/ML
9 INJECTION INTRAVENOUS
Status: COMPLETED | OUTPATIENT
Start: 2025-03-18 | End: 2025-03-18

## 2025-03-18 RX ADMIN — GADOBUTROL 9 ML: 604.72 INJECTION INTRAVENOUS at 10:04

## 2025-03-20 ENCOUNTER — OFFICE VISIT (OUTPATIENT)
Dept: OBGYN CLINIC | Facility: MEDICAL CENTER | Age: 75
End: 2025-03-20
Payer: MEDICARE

## 2025-03-20 VITALS — HEIGHT: 73 IN | WEIGHT: 213 LBS | BODY MASS INDEX: 28.23 KG/M2

## 2025-03-20 DIAGNOSIS — R22.32 ELBOW MASS, LEFT: ICD-10-CM

## 2025-03-20 PROCEDURE — 99204 OFFICE O/P NEW MOD 45 MIN: CPT | Performed by: STUDENT IN AN ORGANIZED HEALTH CARE EDUCATION/TRAINING PROGRAM

## 2025-03-20 NOTE — PROGRESS NOTES
Orthopedic Oncology Surgery Office Note  Jus Chavez (74 y.o. male)  : 1950 Encounter Date: 3/20/2025  Encounter Department: [facility] EMILIA Howard DO, Orthopedic Surgeon  Orthopedic Oncology & Sarcoma Surgery   Phone:[phone] Fax:[phone]    Assessment, Plan, & Discussion:   :  Assessment & Plan  Elbow mass, left  MRI and prior aspiration results and core biopsy results reviewed with patient today  Awaiting final formal pathology  Return to care once resulted, instructed on return plan  Avoid strenuous activity to the left arm until further notice  Discussed in depth at this time I am unsure of what this mass specifically is.  Is an infiltrative mass of his distal brachialis.  Preliminary report demonstrates fibro-osseous tissue.  There is a wide ranging differential including benign entities as well as malignant ones.  I cannot make a plan until there is a final pathology and order what to do next.  Once the final pathology is returned we will have an in-depth discussion about the next steps.  Orders:    Ambulatory referral to Orthopedic Surgery      Comorbidity, including: s/p CABG, s/p AVR; T2DM, afib, peripheral venous insufficiency, IBS, thrombocytopenia,   - continue current management     Surgical Planning:   No surgery planned at this time  Future surgical planning based on imaging results    Follow Up & Tasks:     No follow-ups on file.     Tasks:  Await pathology   ___________________________________________________________________________    History of Present Illness:     Jus Chavez is a 74 y.o. male with history of mass/swelling of left elbow who presents for consultation at the request of Stephen Enamorado DO  regarding s/p biopsy of left elbow mass.     He has been to PCP for suspected gout, vascular surgery for phlebitis vs other concern for increased swelling over time.  He describes that he was told that it could be cyst  versus mass and it was aspirated without any results on cytology, then biopsy was taken showing spindle cells but inconclusive.  MRI was then taken showing a mass that favored benign origin but with the biopsy diagnosed spindle cell further evaluation would be warranted.  The biopsy pathology was sent out for further study and preliminary read only shows fibro-osseous changes is pending for further review    He describes majority of his pain coming when he is tying his shoes.  He is in a flexed position with his head down and attempt to straighten the elbow.  He has most of his pain with extension and has noticed himself carrying his elbow in flexion for 3 weeks now, leading to stiffness and pain.  He denies any Tinel's.  He denies getting bigger or smaller.  He reports that the swelling that he had has decreased in size but he has a lot of residual fluid throughout the hand.  He reports that a few weeks ago he was unable to wear a watch or ring but that has since resolved    At baseline patient gaits without  assistance.  No noted constitutional symptoms such as fever, chills, night sweats, fatigue, weight gains/losses. No noted  chest pain/shortness of breath.  Patient has  personal history of cancer. - skin cancer removed from posterior knee, no systemic treatment or radiation required    Review of Systems:   Allergies, medications, past medical/surgical/family/social history have been reviewed.  Complete 12 system review performed and found to be negative except: except as per mentioned in HPI.    Oncology and Treatment History:      Review of referring provider's records:  Referring provider: Stephen Enamorado DO    Patient Care team:   Patient Care Team:  HESHAM Matias as PCP - General (Family Medicine)  Montrell Mancuso MD as Consulting Physician (Urology)  Aaron Bailon MD as Consulting Physician (Endocrinology)  Kendall Miller MD as Consulting Physician (Cardiology)  Magalys Calix MD  "as Consulting Physician (Ophthalmology)  Demian Owen DPM as Consulting Physician (Podiatry)  Jonelle Bunch DC as Consulting Physician (Chiropractic Medicine)     Oncology History    No history exists.       Physical Examination:     Height: 6' 1\" (185.4 cm)  Weight - Scale: 96.6 kg (213 lb)  BMI (Calculated): 28.1  BSA (Calculated - m2): 2.21 sq meters     There were no vitals filed for this visit.  Body mass index is 28.1 kg/m².    General: alert and oriented; well nourished/well developed; no apparent distress.   Present with spouse  Psychiatric: normal mood and affect  HEENT: NCAT. Head/neck - full range of motion.   Cardiovascular: no chest pain, no palpitations, no discernable arrhythmia   Lungs: breathing comfortably; equal symmetric chest expansion.   Abdomen: soft, non-tender, non-distended.   Skin: warm; dry; no lesions, rashes, petechiae or purpura; no clubbing, no cyanosis, no edema, + palpable masses.    Extremity: left elbow   Inspection: no edema, skin abnormalities throughout   Palpation: + palpable masses or lesions   Range of motion of joints: WFL range of motion all extremities.   Motor strength: WNL all extremities. : intact.   Sensation: grossly intact to all extremities.    Pulses: intact   Lymphatics: (no obvious) lymphadenopathy  Gait: normal gait.    No pain with resisted wrist extension  Intact flexion  Limited AROM/PROM to extension at about 20 degrees   Negative cervical spurling  - tinels      Imaging Results:   All images personally review today by Dr. Howard    Study: MRI left elbow w wo  Date: 3/18/25  Report: I have read and agree with the radiologist report. and I have personally reviewed the imaging in PACS and my impression is as follows:  5.3 cm heterogeneously enhancing masslike thickening of the distal brachialis muscle. Appearance on this examination would be consistent with brachialis myotendinous junction strain and intramuscular hematoma, however biopsy has " already been performed   demonstrating spindle cell neoplasm. Overall, appearance may be due to in part to brachialis myotendinous junction strain with underlying spindle cell neoplasm or infiltrative neoplasm with small volume postbiopsy blood products. No osseous involvement.    Study: CT venogram LUE  Date: 3/10/25  Report: I have read and agree with the radiologist report. and I have personally reviewed the imaging in PACS and my impression is as follows:  Discrete fluid collection in the antecubital fossa, apparently along or within the muscle/tendon, possibly the brachialis. This may represent abscess or muscle tear, but is not a joint effusion.     We can consider needle aspiration if clinically appropriate. I personally discussed this with Dr. Enamorado of orthopedics at the time of this interpretation     In this arm up position there is extrinsic compression of the left subclavian vein between the clavicle and rib. Additionally there is narrowing of the brachiocephalic vein. No deep venous thrombosis, in agreement with prior duplex.    Study: 3/3/25  Date: 3/3/25  Report: I have read and agree with the radiologist report. and I have personally reviewed the imaging in PACS and my impression is as follows:  No acute fracture or dislocation.  Mild first CMC joint degenerative change.  No lytic or blastic osseous lesion.  Unremarkable soft tissues.    Pathology & Pertinent Laboratory Findings:      Pertinent laboratory findings:  N/A    Pathology: Pending further analysis  SOFT TISSUE, LEFT ARM MASS, BIOPSY:    - Fibroosseous proliferation; pending outside consultation and MRI results; see note.    H&E stained sections show skeletal muscle and adjacent soft tissues involved by an infiltrative spindle cell proliferation, with areas of epithelioid osteoblastic cells. The findings are of a fibroosseous proliferation with a preliminary differential diagnosis to include osteosarcoma, dedifferentiated chondrosarcoma,  and their benign counterparts, as well as aneurysmal bone cyst, nodular fasciitis, fibrous dysplasia, etc.   The case will be sent out for expert consultation as we await the MRI results.     ASPIRATION:  A. Left upper arm, mass, aspiration:  Negative for malignancy.  Scant degenerated neutrophils, red blood cells, histiocytes and fragment of skeletal muscle.  See concurrent surgical pathology report, [id number].   Satisfactory for evaluation.    Microbiology:  Cultures:       Medical, Surgical, Family, and Social History      Past Medical History:   Diagnosis Date    Acute systolic heart failure (HCC) 02/23/2021    Athlete's foot     Bilateral impacted cerumen 03/09/2021    Coronary artery disease 07/30/2019    bypass and valve replacement    Diabetes mellitus (HCC)     Heart murmur     Other hydrocele 06/11/2020    TMJ (dislocation of temporomandibular joint)     Yeast infection      Past Surgical History:   Procedure Laterality Date    CARDIAC SURGERY      CARDIAC VALVE REPLACEMENT      CATARACT EXTRACTION      COLONOSCOPY      CORONARY ARTERY BYPASS GRAFT      EYE SURGERY  03/2020    Cataract right eye    EYE SURGERY Left 12/03/2020    left eye cataract     HYDROCELE EXCISION / REPAIR      IR BIOPSY SOFT TISSUE/SUPERFICIAL  3/14/2025    RI EXCISION HYDROCELE UNILATERAL Right 04/08/2022    Procedure: HYDROCELECTOMY;  Surgeon: Montrell Mancuso MD;  Location: [facility];  Service: Urology    RI EXCISION HYDROCELE UNILATERAL Right 11/16/2022    Procedure: HYDROCELECTOMY;  Surgeon: Montrell Mancuso MD;  Location[facility];  Service: Urology    TONSILLECTOMY         Current Outpatient Medications:     acetaminophen (TYLENOL) 500 mg tablet, Take 500 mg by mouth daily at bedtime, Disp: , Rfl:     Aspirin 81 MG CAPS, Take 1 capsule every day by oral route., Disp: , Rfl:     Cyanocobalamin (VITAMIN B-12 PO), Take 1 tablet by mouth 3 (three) times a day, Disp: , Rfl:     finasteride (PROSCAR) 5 mg tablet,  Take 1 tablet (5 mg total) by mouth daily, Disp: 90 tablet, Rfl: 1    furosemide (LASIX) 20 mg tablet, Take 1 tablet (20 mg total) by mouth daily, Disp: , Rfl:     Jardiance 25 MG TABS, Take 1 tablet (25 mg total) by mouth daily, Disp: 30 tablet, Rfl: 5    ketoconazole (NIZORAL) 2 % cream, Apply topically daily, Disp: , Rfl:     metFORMIN (GLUCOPHAGE) 500 mg tablet, TAKE TWO TABLETS BY MOUTH TWICE A DAY, Disp: 360 tablet, Rfl: 1    metoprolol succinate (TOPROL-XL) 25 mg 24 hr tablet, TAKE ONE TABLET BY MOUTH EVERY DAY, Disp: 90 tablet, Rfl: 1    niacin 500 mg tablet, Take 500 mg by mouth 4 (four) times a day, Disp: , Rfl:     NON FORMULARY, 25 mcg Vitamin D, Disp: , Rfl:     Omega-3 Fatty Acids (FISH OIL BURP-LESS PO), Take by mouth, Disp: , Rfl:     PROBIOTIC PRODUCT PO, Take 111 mg by mouth daily, Disp: , Rfl:     repaglinide (PRANDIN) 0.5 mg tablet, TAKE 1 TABLET BY MOUTH DAILY WITH BREAKFAST, 1 WITH LUNCH, AND 2 TABLETS WITH DINNER, Disp: 360 tablet, Rfl: 1    rosuvastatin (CRESTOR) 10 MG tablet, TAKE ONE TABLET BY MOUTH ONCE DAILY, Disp: 90 tablet, Rfl: 1    simethicone (MYLICON) 125 MG chewable tablet, Chew 125 mg every 6 (six) hours as needed for flatulence, Disp: , Rfl:     sitaGLIPtin (JANUVIA) 100 mg tablet, Take 1 tablet (100 mg total) by mouth daily, Disp: 30 tablet, Rfl: 11    tamsulosin (FLOMAX) 0.4 mg, TAKE ONE CAPSULE BY MOUTH EVERY DAY WITH DINNER, Disp: 30 capsule, Rfl: 1    Tart Cherry (Tart Cherry Ultra) 1200 MG CAPS, , Disp: , Rfl:     Turmeric 500 MG CAPS, , Disp: , Rfl:     Wheat Dextrin (BENEFIBER DRINK MIX PO), Take by mouth, Disp: , Rfl:     ascorbic acid (VITAMIN C) 250 mg tablet, Take 500 mg by mouth 2 (two) times a day (Patient not taking: Reported on 3/3/2025), Disp: , Rfl:     ciclopirox (LOPROX) 0.77 % cream, PRN, Disp: , Rfl:     colchicine (COLCRYS) 0.6 mg tablet, Take two tablets now and one one hour later, Disp: 3 tablet, Rfl: 0    Ginger, Zingiber officinalis, (Ginger Root) 500  MG CAPS, , Disp: , Rfl:   No Known Allergies  Family History   Problem Relation Age of Onset    Heart disease Mother     Cancer Mother         skin melonoma/mastectomy    Heart disease Father     Hypertension Father     Mitral valve prolapse Sister     Diabetes type II Maternal Grandmother     Colon cancer Neg Hx      Social History     Socioeconomic History    Marital status: /Civil Union     Spouse name: Not on file    Number of children: Not on file    Years of education: Not on file    Highest education level: Not on file   Occupational History    Not on file   Tobacco Use    Smoking status: Never    Smokeless tobacco: Never   Vaping Use    Vaping status: Never Used   Substance and Sexual Activity    Alcohol use: Yes     Alcohol/week: 4.0 standard drinks of alcohol     Types: 2 Glasses of wine, 1 Cans of beer, 1 Shots of liquor per week     Comment: 2-3 glass of wine or  beer a week    Drug use: Never    Sexual activity: Not Currently     Partners: Female     Birth control/protection: None   Other Topics Concern    Not on file   Social History Narrative    Not on file     Social Drivers of Health     Financial Resource Strain: Low Risk  (3/11/2024)    Overall Financial Resource Strain (CARDIA)     Difficulty of Paying Living Expenses: Not very hard   Food Insecurity: No Food Insecurity (3/16/2025)    Hunger Vital Sign     Worried About Running Out of Food in the Last Year: Never true     Ran Out of Food in the Last Year: Never true   Transportation Needs: No Transportation Needs (3/16/2025)    PRAPARE - Transportation     Lack of Transportation (Medical): No     Lack of Transportation (Non-Medical): No   Physical Activity: Inactive (7/26/2019)    [facility]    Exercise Vital Sign     Days of Exercise per Week: 0 days     Minutes of Exercise per Session: 0 min   Stress: Not on file   Social Connections: Not on file    Intimate Partner Violence: Not on file   Housing Stability: Low Risk  (3/16/2025)    Housing Stability Vital Sign     Unable to Pay for Housing in the Last Year: No     Number of Times Moved in the Last Year: 0     Homeless in the Last Year: No       This Visit:     I, Max Gonzalez PA-C, scribed this note in conjunction with and in the presence of Dr. Iam Howard DO, who performed parts of the services as described in this documentation    Max Gonzalez PA-C   3/20/2025 10:18 AM       Associated Orders:     Problem List Items Addressed This Visit    None  Visit Diagnoses         Elbow mass, left

## 2025-03-26 DIAGNOSIS — R35.0 BENIGN PROSTATIC HYPERPLASIA WITH URINARY FREQUENCY: ICD-10-CM

## 2025-03-26 DIAGNOSIS — N40.1 BENIGN PROSTATIC HYPERPLASIA WITH URINARY FREQUENCY: ICD-10-CM

## 2025-03-26 RX ORDER — TAMSULOSIN HYDROCHLORIDE 0.4 MG/1
0.4 CAPSULE ORAL
Qty: 30 CAPSULE | Refills: 0 | Status: SHIPPED | OUTPATIENT
Start: 2025-03-26

## 2025-03-28 ENCOUNTER — OFFICE VISIT (OUTPATIENT)
Dept: FAMILY MEDICINE CLINIC | Facility: CLINIC | Age: 75
End: 2025-03-28
Payer: MEDICARE

## 2025-03-28 VITALS
SYSTOLIC BLOOD PRESSURE: 118 MMHG | WEIGHT: 208 LBS | BODY MASS INDEX: 27.57 KG/M2 | TEMPERATURE: 97.9 F | HEART RATE: 65 BPM | HEIGHT: 73 IN | RESPIRATION RATE: 17 BRPM | OXYGEN SATURATION: 94 % | DIASTOLIC BLOOD PRESSURE: 60 MMHG

## 2025-03-28 DIAGNOSIS — R21 RASH: ICD-10-CM

## 2025-03-28 DIAGNOSIS — Z00.00 MEDICARE ANNUAL WELLNESS VISIT, SUBSEQUENT: Primary | ICD-10-CM

## 2025-03-28 PROCEDURE — G0439 PPPS, SUBSEQ VISIT: HCPCS | Performed by: NURSE PRACTITIONER

## 2025-03-28 RX ORDER — MOMETASONE FUROATE 1 MG/G
CREAM TOPICAL DAILY
Qty: 45 G | Refills: 1 | Status: SHIPPED | OUTPATIENT
Start: 2025-03-28

## 2025-03-28 NOTE — PROGRESS NOTES
Name: Jus Chavez      : 1950      MRN: [id number]  Encounter Provider: HESHAM Matias  Encounter Date: 3/28/2025   [facility]    Assessment & Plan  Medicare annual wellness visit, subsequent         Rash    Orders:    mometasone (ELOCON) 0.1 % cream; Apply topically daily       Preventive health issues were discussed with patient, and age appropriate screening tests were ordered as noted in patient's After Visit Summary. Personalized health advice and appropriate referrals for health education or preventive services given if needed, as noted in patient's After Visit Summary.    History of Present Illness     Here for medicare wellness  Has rash on feet  Using ketoconazole cream with minimal relief  Follows with podiatry  Gets small hive appearing lesion on lower abdomen when he eats sugar on and off  Comes and goes  Itches  Continues to follow with ortho for left arm discomfort and edema  Pain is lessening and improving  Swelling is less as well  Awaiting pathology final report            Patient Care Team:  HESHAM Matias as PCP - General (Family Medicine)  Montrell Mancuso MD as Consulting Physician (Urology)  Aaron Bailon MD as Consulting Physician (Endocrinology)  Kendall Miller MD as Consulting Physician (Cardiology)  Magalys Calix MD as Consulting Physician (Ophthalmology)  Demian Owen DPM as Consulting Physician (Podiatry)  Jonelle Bunch DC as Consulting Physician (Chiropractic Medicine)    Review of Systems   Constitutional:  Negative for fatigue and fever.   HENT:  Negative for congestion, postnasal drip and rhinorrhea.    Eyes:  Negative for photophobia and visual disturbance.   Respiratory:  Negative for cough, shortness of breath and wheezing.    Cardiovascular:  Negative for chest pain and palpitations.   Gastrointestinal:  Negative for constipation, diarrhea, nausea and vomiting.   Genitourinary:  Negative  for dysuria and frequency.   Musculoskeletal:  Negative for arthralgias and myalgias.   Skin:  Negative for rash.   Neurological:  Negative for dizziness, light-headedness and headaches.   Hematological:  Negative for adenopathy.   Psychiatric/Behavioral:  Negative for dysphoric mood and sleep disturbance. The patient is not nervous/anxious.      Medical History Reviewed by provider this encounter:  Tobacco  Allergies  Meds  Problems  Med Hx  Surg Hx  Fam Hx       Annual Wellness Visit Questionnaire   Jus is here for his Subsequent Wellness visit. Last Medicare Wellness visit information reviewed, patient interviewed and updates made to the record today.      Health Risk Assessment:   Patient rates overall health as good. Patient feels that their physical health rating is slightly worse. Patient is satisfied with their life. Eyesight was rated as same. Hearing was rated as same. Patient feels that their emotional and mental health rating is same. Patients states they are never, rarely angry. Patient states they are often unusually tired/fatigued. Pain experienced in the last 7 days has been some. Patient's pain rating has been 7/10. Patient states that he has experienced no weight loss or gain in last 6 months.     Fall Risk Screening:   In the past year, patient has experienced: no history of falling in past year      Home Safety:  Patient does not have trouble with stairs inside or outside of their home. Patient has working smoke alarms and has working carbon monoxide detector. Home safety hazards include: none.     Nutrition:   Current diet is Diabetic, Low Cholesterol, Low Saturated Fat, Low Carb, No Added Salt and Limited junk food.     Medications:   Patient is currently taking over-the-counter supplements. OTC medications include: see medication list. Patient is able to manage medications.     Activities of Daily Living (ADLs)/Instrumental Activities of Daily Living (IADLs):   Walk and transfer  into and out of bed and chair?: Yes  Dress and groom yourself?: Yes    Bathe or shower yourself?: Yes    Feed yourself? Yes  Do your laundry/housekeeping?: Yes  Manage your money, pay your bills and track your expenses?: Yes  Make your own meals?: Yes    Do your own shopping?: Yes    Previous Hospitalizations:   Any hospitalizations or ED visits within the last 12 months?: No      Advance Care Planning:   Living will: Yes    Durable POA for healthcare: Yes    Advanced directive: Yes    End of Life Decisions reviewed with patient: Yes    Provider agrees with end of life decisions: Yes      Cognitive Screening:   Provider or family/friend/caregiver concerned regarding cognition?: No    PREVENTIVE SCREENINGS      Cardiovascular Screening:    General: Screening Not Indicated and History Lipid Disorder      Diabetes Screening:     General: Screening Not Indicated and History Diabetes      Colorectal Cancer Screening:     General: Screening Current      Prostate Cancer Screening:    General: Screening Not Indicated      Osteoporosis Screening:    General: Screening Not Indicated      Abdominal Aortic Aneurysm (AAA) Screening:        General: Screening Not Indicated      Lung Cancer Screening:     General: Screening Not Indicated      Hepatitis C Screening:    General: Screening Not Indicated    Screening, Brief Intervention, and Referral to Treatment (SBIRT)     Screening  Typical number of drinks in a day: 1  Typical number of drinks in a week: 4  Interpretation: Low risk drinking behavior.    AUDIT-C Screenin) How often did you have a drink containing alcohol in the past year? 4 or more times a week  2) How many drinks did you have on a typical day when you were drinking in the past year? 1 to 2  3) How often did you have 6 or more drinks on one occasion in the past year? never    AUDIT-C Score: 4  Interpretation: Score 4-12 (male): POSITIVE screen for alcohol misuse    AUDIT Screenin) How often during the  last year have you found that you were not able to stop drinking once you had started? 0 - never  5) How often during the last year have you failed to do what was normally expected from you because of drinking? 0 - never  6) How often during the last year have you needed a first drink in the morning to get yourself going after a heavy drinking session? 0 - never  7) How often during the last year have you had a feeling of guilt or remorse after drinking? 0 - never  8) How often during the last year have you been unable to remember what happened the night before because you had been drinking? 0 - never  9) Have you or someone else been injured as a result of your drinking? 0 - no  10) Has a relative or friend or a doctor or another health worker been concerned about your drinking or suggested you cut down? 0 - no    AUDIT Score: 4  Interpretation: Low risk alcohol consumption    Single Item Drug Screening:  How often have you used an illegal drug (including marijuana) or a prescription medication for non-medical reasons in the past year? never    Single Item Drug Screen Score: 0  Interpretation: Negative screen for possible drug use disorder    Brief Intervention  Alcohol & drug use screenings were reviewed. No concerns regarding substance use disorder identified.     Other Counseling Topics:   Car/seat belt/driving safety, skin self-exam, sunscreen and calcium and vitamin D intake and regular weightbearing exercise.     Social Drivers of Health     Financial Resource Strain: Low Risk  (3/11/2024)    Overall Financial Resource Strain (CARDIA)     Difficulty of Paying Living Expenses: Not very hard   Food Insecurity: No Food Insecurity (3/16/2025)    Hunger Vital Sign     Worried About Running Out of Food in the Last Year: Never true     Ran Out of Food in the Last Year: Never true   Transportation Needs: No Transportation Needs (3/16/2025)    PRAPARE - Transportation     Lack of Transportation (Medical): No     Lack  "of Transportation (Non-Medical): No   Housing Stability: Low Risk  (3/16/2025)    Housing Stability Vital Sign     Unable to Pay for Housing in the Last Year: No     Number of Times Moved in the Last Year: 0     Homeless in the Last Year: No   Utilities: Not At Risk (3/16/2025)    [facility] Utilities     Threatened with loss of utilities: No     No results found.    Objective   /60 (BP Location: Left arm, Patient Position: Sitting, Cuff Size: Standard)   Pulse 65   Temp 97.9 °F (36.6 °C) (Tympanic)   Resp 17   Ht 6' 1\" (1.854 m)   Wt 94.3 kg (208 lb)   SpO2 94%   BMI 27.44 kg/m²     Physical Exam  Vitals and nursing note reviewed.   Constitutional:       Appearance: Normal appearance.   HENT:      Head: Normocephalic and atraumatic.      Right Ear: Tympanic membrane, ear canal and external ear normal.      Left Ear: Tympanic membrane, ear canal and external ear normal.      Nose: Nose normal.      Mouth/Throat:      Mouth: Mucous membranes are moist.   Eyes:      Conjunctiva/sclera: Conjunctivae normal.   Cardiovascular:      Rate and Rhythm: Normal rate and regular rhythm.      Heart sounds: Normal heart sounds.   Pulmonary:      Effort: Pulmonary effort is normal.      Breath sounds: Normal breath sounds.   Abdominal:      General: Bowel sounds are normal.   Musculoskeletal:         General: Normal range of motion.      Cervical back: Normal range of motion and neck supple.   Skin:     General: Skin is warm and dry.      Capillary Refill: Capillary refill takes less than 2 seconds.   Neurological:      General: No focal deficit present.      Mental Status: He is alert and oriented to person, place, and time.   Psychiatric:         Mood and Affect: Mood normal.         Behavior: Behavior normal.         Thought Content: Thought content normal.         Judgment: Judgment normal.     BMI Counseling: Body mass index is 27.44 kg/m². The BMI is above normal. Nutrition recommendations include reducing portion " sizes, decreasing overall calorie intake, 3-5 servings of fruits/vegetables daily, reducing fast food intake, consuming healthier snacks, decreasing soda and/or juice intake, moderation in carbohydrate intake, increasing intake of lean protein, reducing intake of saturated fat and trans fat, and reducing intake of cholesterol. Exercise recommendations include moderate aerobic physical activity for 150 minutes/week, exercising 3-5 times per week, and strength training exercises.

## 2025-03-30 DIAGNOSIS — I25.118 CORONARY ARTERY DISEASE OF NATIVE ARTERY OF NATIVE HEART WITH STABLE ANGINA PECTORIS (HCC): ICD-10-CM

## 2025-03-31 RX ORDER — METOPROLOL SUCCINATE 25 MG/1
25 TABLET, EXTENDED RELEASE ORAL DAILY
Qty: 90 TABLET | Refills: 1 | Status: SHIPPED | OUTPATIENT
Start: 2025-03-31

## 2025-04-02 ENCOUNTER — OFFICE VISIT (OUTPATIENT)
Dept: UROLOGY | Facility: CLINIC | Age: 75
End: 2025-04-02
Payer: MEDICARE

## 2025-04-02 VITALS
SYSTOLIC BLOOD PRESSURE: 122 MMHG | HEIGHT: 73 IN | WEIGHT: 208 LBS | DIASTOLIC BLOOD PRESSURE: 64 MMHG | HEART RATE: 65 BPM | BODY MASS INDEX: 27.57 KG/M2 | OXYGEN SATURATION: 96 %

## 2025-04-02 DIAGNOSIS — R35.0 BENIGN PROSTATIC HYPERPLASIA WITH URINARY FREQUENCY: Primary | ICD-10-CM

## 2025-04-02 DIAGNOSIS — N40.1 BENIGN PROSTATIC HYPERPLASIA WITH URINARY FREQUENCY: Primary | ICD-10-CM

## 2025-04-02 PROCEDURE — 99213 OFFICE O/P EST LOW 20 MIN: CPT | Performed by: PHYSICIAN ASSISTANT

## 2025-04-03 ENCOUNTER — OFFICE VISIT (OUTPATIENT)
Dept: OBGYN CLINIC | Facility: MEDICAL CENTER | Age: 75
End: 2025-04-03
Payer: MEDICARE

## 2025-04-03 VITALS — HEIGHT: 73 IN | BODY MASS INDEX: 27.7 KG/M2 | WEIGHT: 209 LBS

## 2025-04-03 DIAGNOSIS — R22.32 ELBOW MASS, LEFT: Primary | ICD-10-CM

## 2025-04-03 PROCEDURE — 99214 OFFICE O/P EST MOD 30 MIN: CPT | Performed by: STUDENT IN AN ORGANIZED HEALTH CARE EDUCATION/TRAINING PROGRAM

## 2025-04-03 NOTE — PROGRESS NOTES
Orthopedic Oncology Surgery Office Note  Jus Chavez (74 y.o. male)  : 1950 Encounter Date: 4/3/2025  Encounter Department: [facility] EMILIA Howard DO, Orthopedic Surgeon  Orthopedic Oncology & Sarcoma Surgery   Phone:[phone] Fax:[phone]    Assessment, Plan, & Discussion:   :  Assessment & Plan  Elbow mass, left  Discussed with the patient that:  - no final pathology has been received back  - explained that result from radiologist  states that the results favor benign fibroosseous proliferation  - explained that he may never be able to full extend his left elbow  - explained in depth the anatomy and physiology of why he may never be able to straighten  - explained pathophysiology of myositis ossificans  - explained that there is no data representing correlation with covid shot suggesting that the covid shot may have caused this to happen  - taking anti-inflammatories may help to decrease his pain  - encouraged swimming therapy  - discussed that even without full motion, he will likely be able to return to normal activities of daily living and functionality  - we will contact him with final pathologies and follow-up plan based on pathology results  - The patient expresses understanding and is in agreement with today's treatment plan.                Comorbidity, including: s/p CABG, s/p AVR; T2DM, afib, peripheral venous insufficiency, IBS, thrombocytopenia,   - continue current management     Surgical Planning:   No surgery planned at this time  Future surgical planning based on imaging results    Follow Up & Tasks:     Return in about 3 months (around 7/3/2025) for Recheck.     Tasks:  Await pathology   ___________________________________________________________________________    History of Present Illness:     Jus Chavez is a 74 y.o. male with history of mass/swelling of left elbow who presents for consultation at the request of No ref.  provider found  regarding s/p biopsy of left elbow mass.     He has been to PCP for suspected gout, vascular surgery for phlebitis vs other concern for increased swelling over time.  He describes that he was told that it could be cyst versus mass and it was aspirated without any results on cytology, then biopsy was taken showing spindle cells but inconclusive.  MRI was then taken showing a mass that favored benign origin but with the biopsy diagnosed spindle cell further evaluation would be warranted.  The biopsy pathology was sent out for further study and preliminary read only shows fibro-osseous changes is pending for further review    He describes majority of his pain coming when he is tying his shoes.  He is in a flexed position with his head down and attempt to straighten the elbow.  He has most of his pain with extension and has noticed himself carrying his elbow in flexion for 3 weeks now, leading to stiffness and pain.  He denies any Tinel's.  He denies getting bigger or smaller.  He reports that the swelling that he had has decreased in size but he has a lot of residual fluid throughout the hand.  He reports that a few weeks ago he was unable to wear a watch or ring but that has since resolved    At baseline patient gaits without  assistance.  No noted constitutional symptoms such as fever, chills, night sweats, fatigue, weight gains/losses. No noted  chest pain/shortness of breath.  Patient has  personal history of cancer. - skin cancer removed from posterior knee, no systemic treatment or radiation required    Review of Systems:   Allergies, medications, past medical/surgical/family/social history have been reviewed.  Complete 12 system review performed and found to be negative except: except as per mentioned in HPI.    Oncology and Treatment History:      Review of referring provider's records:  Referring provider: No ref. provider found    Patient Care team:   Patient Care Team:  HESHAM Matias  "as PCP - General (Family Medicine)  Montrell Mancuso MD as Consulting Physician (Urology)  Aaron Bailon MD as Consulting Physician (Endocrinology)  Kendall Miller MD as Consulting Physician (Cardiology)  Magalys Calix MD as Consulting Physician (Ophthalmology)  Demian Owen DPM as Consulting Physician (Podiatry)  Jonelle Bunch DC as Consulting Physician (Chiropractic Medicine)  HESHAM Villagomez as Nurse Practitioner (Nurse Practitioner)     Oncology History    No history exists.       Physical Examination:     Height: 6' 1\" (185.4 cm)  Weight - Scale: 94.8 kg (209 lb)  BMI (Calculated): 27.6  BSA (Calculated - m2): 2.19 sq meters     There were no vitals filed for this visit.  Body mass index is 27.57 kg/m².    General: alert and oriented; well nourished/well developed; no apparent distress.   Present with spouse  Psychiatric: normal mood and affect  HEENT: NCAT. Head/neck - full range of motion.   Cardiovascular: no chest pain, no palpitations, no discernable arrhythmia   Lungs: breathing comfortably; equal symmetric chest expansion.   Abdomen: soft, non-tender, non-distended.   Skin: warm; dry; no lesions, rashes, petechiae or purpura; no clubbing, no cyanosis, no edema, + palpable masses.    Extremity: left elbow   Inspection: no edema, skin abnormalities throughout   Palpation: + palpable masses or lesions   Range of motion of joints: WFL range of motion all extremities.   Motor strength: WNL all extremities. : intact.   Sensation: grossly intact to all extremities.    Pulses: intact   Lymphatics: (no obvious) lymphadenopathy  Gait: normal gait.    No pain with resisted wrist extension  Intact flexion  Limited AROM/PROM to extension at about 20 degrees   Negative cervical spurling  - tinels      Imaging Results:   All images personally review today by Dr. Howard    Study: MRI left elbow w wo  Date: 3/18/25  Report: I have read and agree with the radiologist report. " and I have personally reviewed the imaging in PACS and my impression is as follows:  5.3 cm heterogeneously enhancing masslike thickening of the distal brachialis muscle. Appearance on this examination would be consistent with brachialis myotendinous junction strain and intramuscular hematoma, however biopsy has already been performed   demonstrating spindle cell neoplasm. Overall, appearance may be due to in part to brachialis myotendinous junction strain with underlying spindle cell neoplasm or infiltrative neoplasm with small volume postbiopsy blood products. No osseous involvement.    Study: CT venogram LUE  Date: 3/10/25  Report: I have read and agree with the radiologist report. and I have personally reviewed the imaging in PACS and my impression is as follows:  Discrete fluid collection in the antecubital fossa, apparently along or within the muscle/tendon, possibly the brachialis. This may represent abscess or muscle tear, but is not a joint effusion.     We can consider needle aspiration if clinically appropriate. I personally discussed this with Dr. Enamorado of orthopedics at the time of this interpretation     In this arm up position there is extrinsic compression of the left subclavian vein between the clavicle and rib. Additionally there is narrowing of the brachiocephalic vein. No deep venous thrombosis, in agreement with prior duplex.    Study: 3/3/25  Date: 3/3/25  Report: I have read and agree with the radiologist report. and I have personally reviewed the imaging in PACS and my impression is as follows:  No acute fracture or dislocation.  Mild first CMC joint degenerative change.  No lytic or blastic osseous lesion.  Unremarkable soft tissues.    Pathology & Pertinent Laboratory Findings:      Pertinent laboratory findings:  N/A    Pathology: Pending further analysis  SOFT TISSUE, LEFT ARM MASS, BIOPSY:    - Fibroosseous proliferation; pending outside consultation and MRI results; see note.    H&E  stained sections show skeletal muscle and adjacent soft tissues involved by an infiltrative spindle cell proliferation, with areas of epithelioid osteoblastic cells. The findings are of a fibroosseous proliferation with a preliminary differential diagnosis to include osteosarcoma, dedifferentiated chondrosarcoma, and their benign counterparts, as well as aneurysmal bone cyst, nodular fasciitis, fibrous dysplasia, etc.   The case will be sent out for expert consultation as we await the MRI results.       Epic Secure Chat message from Dr. Esteban, Pathologist on 4/3/25: I just spoke with [facility]. They favor a  (i.e. myositis ossificans, etc.) but they have some molecular testing pending for further characterization that should result next week      ASPIRATION:  A. Left upper arm, mass, aspiration:  Negative for malignancy.  Scant degenerated neutrophils, red blood cells, histiocytes and fragment of skeletal muscle.  See concurrent surgical pathology report, [id number].   Satisfactory for evaluation.    Microbiology:  Cultures:       Medical, Surgical, Family, and Social History      Past Medical History:   Diagnosis Date    Acute systolic heart failure (HCC) 02/23/2021    Athlete's foot     Bilateral impacted cerumen 03/09/2021    Coronary artery disease 07/30/2019    bypass and valve replacement    Diabetes mellitus (HCC)     Heart murmur     Other hydrocele 06/11/2020    TMJ (dislocation of temporomandibular joint)     Yeast infection      Past Surgical History:   Procedure Laterality Date    CARDIAC SURGERY      CARDIAC VALVE REPLACEMENT      CATARACT EXTRACTION      COLONOSCOPY      CORONARY ARTERY BYPASS GRAFT      EYE SURGERY  03/2020    Cataract right eye    EYE SURGERY Left 12/03/2020    left eye cataract     HYDROCELE EXCISION / REPAIR      IR BIOPSY SOFT TISSUE/SUPERFICIAL  3/14/2025    IL EXCISION HYDROCELE UNILATERAL Right 04/08/2022    Procedure: HYDROCELECTOMY;  Surgeon: Montrell Mancuso MD;   Location: [facility];  Service: Urology    PA EXCISION HYDROCELE UNILATERAL Right 11/16/2022    Procedure: HYDROCELECTOMY;  Surgeon: Montrell Mancuso MD;  Location: [facility];  Service: Urology    TONSILLECTOMY         Current Outpatient Medications:     acetaminophen (TYLENOL) 500 mg tablet, Take 500 mg by mouth daily at bedtime, Disp: , Rfl:     Aspirin 81 MG CAPS, Take 1 capsule every day by oral route., Disp: , Rfl:     Cyanocobalamin (VITAMIN B-12 PO), Take 1 tablet by mouth 3 (three) times a day, Disp: , Rfl:     finasteride (PROSCAR) 5 mg tablet, Take 1 tablet (5 mg total) by mouth daily, Disp: 90 tablet, Rfl: 1    furosemide (LASIX) 20 mg tablet, Take 1 tablet (20 mg total) by mouth daily, Disp: , Rfl:     Jardiance 25 MG TABS, Take 1 tablet (25 mg total) by mouth daily, Disp: 30 tablet, Rfl: 5    ketoconazole (NIZORAL) 2 % cream, Apply topically daily, Disp: , Rfl:     metFORMIN (GLUCOPHAGE) 500 mg tablet, TAKE TWO TABLETS BY MOUTH TWICE A DAY, Disp: 360 tablet, Rfl: 1    metoprolol succinate (TOPROL-XL) 25 mg 24 hr tablet, TAKE ONE TABLET BY MOUTH EVERY DAY, Disp: 90 tablet, Rfl: 1    mometasone (ELOCON) 0.1 % cream, Apply topically daily, Disp: 45 g, Rfl: 1    niacin 500 mg tablet, Take 500 mg by mouth 4 (four) times a day, Disp: , Rfl:     NON FORMULARY, 25 mcg Vitamin D, Disp: , Rfl:     Omega-3 Fatty Acids (FISH OIL BURP-LESS PO), Take by mouth, Disp: , Rfl:     PROBIOTIC PRODUCT PO, Take 111 mg by mouth daily, Disp: , Rfl:     repaglinide (PRANDIN) 0.5 mg tablet, TAKE 1 TABLET BY MOUTH DAILY WITH BREAKFAST, 1 WITH LUNCH, AND 2 TABLETS WITH DINNER, Disp: 360 tablet, Rfl: 1    rosuvastatin (CRESTOR) 10 MG tablet, TAKE ONE TABLET BY MOUTH ONCE DAILY, Disp: 90 tablet, Rfl: 1    simethicone (MYLICON) 125 MG chewable tablet, Chew 125 mg every 6 (six) hours as needed for flatulence, Disp: , Rfl:     sitaGLIPtin (JANUVIA) 100 mg tablet, Take 1 tablet (100 mg total) by mouth daily, Disp: 30  tablet, Rfl: 11    tamsulosin (FLOMAX) 0.4 mg, TAKE ONE CAPSULE BY MOUTH EVERY DAY WITH DINNER, Disp: 30 capsule, Rfl: 0    Tart Cherry (Tart Cherry Ultra) 1200 MG CAPS, , Disp: , Rfl:     Turmeric 500 MG CAPS, , Disp: , Rfl:     Wheat Dextrin (BENEFIBER DRINK MIX PO), Take by mouth, Disp: , Rfl:   No Known Allergies  Family History   Problem Relation Age of Onset    Heart disease Mother     Cancer Mother         skin melonoma/mastectomy    Heart disease Father     Hypertension Father     Mitral valve prolapse Sister     Diabetes type II Maternal Grandmother     Colon cancer Neg Hx      Social History     Socioeconomic History    Marital status: /Civil Union     Spouse name: Not on file    Number of children: Not on file    Years of education: Not on file    Highest education level: Not on file   Occupational History    Not on file   Tobacco Use    Smoking status: Never    Smokeless tobacco: Never   Vaping Use    Vaping status: Never Used   Substance and Sexual Activity    Alcohol use: Yes     Alcohol/week: 4.0 standard drinks of alcohol     Types: 2 Glasses of wine, 1 Cans of beer, 1 Shots of liquor per week     Comment: 2-3 glass of wine or  beer a week    Drug use: Never    Sexual activity: Not Currently     Partners: Female     Birth control/protection: None   Other Topics Concern    Not on file   Social History Narrative    Not on file     Social Drivers of Health     Financial Resource Strain: Low Risk  (3/11/2024)    Overall Financial Resource Strain (CARDIA)     Difficulty of Paying Living Expenses: Not very hard   Food Insecurity: No Food Insecurity (3/16/2025)    Hunger Vital Sign     Worried About Running Out of Food in the Last Year: Never true     Ran Out of Food in the Last Year: Never true   Transportation Needs: No Transportation Needs (3/16/2025)    PRAPARE - Transportation     Lack of Transportation (Medical): No     Lack of Transportation (Non-Medical): No   Physical Activity: Inactive  (7/26/2019)    [facility]    Exercise Vital Sign     Days of Exercise per Week: 0 days     Minutes of Exercise per Session: 0 min   Stress: Not on file   Social Connections: Not on file   Intimate Partner Violence: Not on file   Housing Stability: Low Risk  (3/16/2025)    Housing Stability Vital Sign     Unable to Pay for Housing in the Last Year: No     Number of Times Moved in the Last Year: 0     Homeless in the Last Year: No       This Visit:     Scribe Attestation      I,:  Sanaz Cline am acting as a scribe while in the presence of the attending physician.:       I,:  Iam Howard, DO personally performed the services described in this documentation    as scribed in my presence.:                 Associated Orders:     Problem List Items Addressed This Visit    None  Visit Diagnoses         Elbow mass, left    -  Primary

## 2025-04-03 NOTE — PROGRESS NOTES
UROLOGY PROGRESS NOTE   Patient Identifiers: Jus Chavez (MRN [id number])  Date of Service: 4/3/2025    Subjective:   74-year-old man history of BPH and right sided hydrocele surgery.  He has been on finasteride and tamsulosin.  No lower tract complaints.  His last hemoglobin A1c was 8.2.    Reason for visit: BPH and hydrocelectomy follow-up    Objective:     VITALS:    Vitals:    04/02/25 0939   BP: 122/64   Pulse: 65   SpO2: 96%     AUA SYMPTOM SCORE      Flowsheet Row Most Recent Value   AUA SYMPTOM SCORE    How often have you had a sensation of not emptying your bladder completely after you finished urinating? 0 (P)     How often have you had to urinate again less than two hours after you finished urinating? 1 (P)     How often have you found you stopped and started again several times when you urinate? 0 (P)     How often have you found it difficult to postpone urination? 4 (P)     How often have you had a weak urinary stream? 1 (P)     How often have you had to push or strain to begin urination? 0 (P)     How many times did you most typically get up to urinate from the time you went to bed at night until the time you got up in the morning? 2 (P)     Quality of Life: If you were to spend the rest of your life with your urinary condition just the way it is now, how would you feel about that? 2 (P)     AUA SYMPTOM SCORE 8 (P)                LABS:  Lab Results   Component Value Date    HGB 13.4 03/03/2025    HCT 39.9 03/03/2025    WBC 7.20 03/03/2025     03/03/2025   ]    Lab Results   Component Value Date    K 4.3 03/03/2025     03/03/2025    CO2 25 03/03/2025    BUN 30 (H) 03/03/2025    CREATININE 1.46 (H) 03/03/2025    CALCIUM 9.4 03/03/2025   ]        INPATIENT MEDS:    Current Outpatient Medications:     acetaminophen (TYLENOL) 500 mg tablet, Take 500 mg by mouth daily at bedtime, Disp: , Rfl:     Aspirin 81 MG CAPS, Take 1 capsule every day by oral route., Disp: , Rfl:     Cyanocobalamin  "(VITAMIN B-12 PO), Take 1 tablet by mouth 3 (three) times a day, Disp: , Rfl:     finasteride (PROSCAR) 5 mg tablet, Take 1 tablet (5 mg total) by mouth daily, Disp: 90 tablet, Rfl: 1    furosemide (LASIX) 20 mg tablet, Take 1 tablet (20 mg total) by mouth daily, Disp: , Rfl:     Jardiance 25 MG TABS, Take 1 tablet (25 mg total) by mouth daily, Disp: 30 tablet, Rfl: 5    ketoconazole (NIZORAL) 2 % cream, Apply topically daily, Disp: , Rfl:     metFORMIN (GLUCOPHAGE) 500 mg tablet, TAKE TWO TABLETS BY MOUTH TWICE A DAY, Disp: 360 tablet, Rfl: 1    metoprolol succinate (TOPROL-XL) 25 mg 24 hr tablet, TAKE ONE TABLET BY MOUTH EVERY DAY, Disp: 90 tablet, Rfl: 1    mometasone (ELOCON) 0.1 % cream, Apply topically daily, Disp: 45 g, Rfl: 1    niacin 500 mg tablet, Take 500 mg by mouth 4 (four) times a day, Disp: , Rfl:     NON FORMULARY, 25 mcg Vitamin D, Disp: , Rfl:     Omega-3 Fatty Acids (FISH OIL BURP-LESS PO), Take by mouth, Disp: , Rfl:     PROBIOTIC PRODUCT PO, Take 111 mg by mouth daily, Disp: , Rfl:     repaglinide (PRANDIN) 0.5 mg tablet, TAKE 1 TABLET BY MOUTH DAILY WITH BREAKFAST, 1 WITH LUNCH, AND 2 TABLETS WITH DINNER, Disp: 360 tablet, Rfl: 1    rosuvastatin (CRESTOR) 10 MG tablet, TAKE ONE TABLET BY MOUTH ONCE DAILY, Disp: 90 tablet, Rfl: 1    simethicone (MYLICON) 125 MG chewable tablet, Chew 125 mg every 6 (six) hours as needed for flatulence, Disp: , Rfl:     sitaGLIPtin (JANUVIA) 100 mg tablet, Take 1 tablet (100 mg total) by mouth daily, Disp: 30 tablet, Rfl: 11    tamsulosin (FLOMAX) 0.4 mg, TAKE ONE CAPSULE BY MOUTH EVERY DAY WITH DINNER, Disp: 30 capsule, Rfl: 0    Tart Cherry (Tart Cherry Ultra) 1200 MG CAPS, , Disp: , Rfl:     Turmeric 500 MG CAPS, , Disp: , Rfl:     Wheat Dextrin (BENEFIBER DRINK MIX PO), Take by mouth, Disp: , Rfl:       Physical Exam:   /64 (BP Location: Right arm, Patient Position: Sitting, Cuff Size: Large)   Pulse 65   Ht 6' 1\" (1.854 m)   Wt 94.3 kg (208 lb)   " SpO2 96%   BMI 27.44 kg/m²   GEN: no acute distress    RESP: breathing comfortably with no accessory muscle use    ABD: soft, non-tender, non-distended   INCISION:    EXT: no significant peripheral edema   (Male): Penis circumcised, phallus normal, meatus patent.  Testicles descended into scrotum bilaterally firm nontender right testicle and cord.  No inguinal hernias bilaterally.  MELYSSA: Prostate is enlarged at 40 grams. The prostate is not boggy. The prostate is not tender.  No nodules noted      RADIOLOGY:   None    Assessment:   #1.  BPH with obstruction  2.  Right hydrocelectomy    Plan:   -Continue finasteride and tamsulosin  -Scrotal exam is stable-he has no symptoms or residual pain  -Follow-up in 1 year for annual exam  -

## 2025-04-04 ENCOUNTER — TELEPHONE (OUTPATIENT)
Dept: VASCULAR SURGERY | Facility: CLINIC | Age: 75
End: 2025-04-04

## 2025-04-04 NOTE — TELEPHONE ENCOUNTER
I called and left a message with the PT to see if he would want to reschedule his appointment that he canceled for 4/10/25 with Dr Grande. In his cancellation notification he said that he was feeling better. Let him know if he needed us to call back if he needs us or wants to reschedule.

## 2025-04-09 ENCOUNTER — APPOINTMENT (OUTPATIENT)
Dept: LAB | Facility: AMBULARY SURGERY CENTER | Age: 75
End: 2025-04-09
Payer: MEDICARE

## 2025-04-09 ENCOUNTER — RESULTS FOLLOW-UP (OUTPATIENT)
Dept: FAMILY MEDICINE CLINIC | Facility: CLINIC | Age: 75
End: 2025-04-09

## 2025-04-09 DIAGNOSIS — E11.65 TYPE 2 DIABETES MELLITUS WITH HYPERGLYCEMIA, WITHOUT LONG-TERM CURRENT USE OF INSULIN (HCC): ICD-10-CM

## 2025-04-09 DIAGNOSIS — I25.118 CORONARY ARTERY DISEASE OF NATIVE ARTERY OF NATIVE HEART WITH STABLE ANGINA PECTORIS (HCC): ICD-10-CM

## 2025-04-09 DIAGNOSIS — E53.8 VITAMIN B12 DEFICIENCY: ICD-10-CM

## 2025-04-09 DIAGNOSIS — I48.91 POSTOPERATIVE ATRIAL FIBRILLATION (HCC): ICD-10-CM

## 2025-04-09 DIAGNOSIS — J01.00 ACUTE NON-RECURRENT MAXILLARY SINUSITIS: ICD-10-CM

## 2025-04-09 DIAGNOSIS — I10 ESSENTIAL HYPERTENSION: ICD-10-CM

## 2025-04-09 DIAGNOSIS — I97.89 POSTOPERATIVE ATRIAL FIBRILLATION (HCC): ICD-10-CM

## 2025-04-09 DIAGNOSIS — D69.6 THROMBOCYTOPENIA (HCC): ICD-10-CM

## 2025-04-09 DIAGNOSIS — E78.2 MIXED HYPERLIPIDEMIA: ICD-10-CM

## 2025-04-09 DIAGNOSIS — E78.1 HYPERTRIGLYCERIDEMIA: ICD-10-CM

## 2025-04-09 LAB
ALBUMIN SERPL BCG-MCNC: 4.3 G/DL (ref 3.5–5)
ALP SERPL-CCNC: 57 U/L (ref 34–104)
ALT SERPL W P-5'-P-CCNC: 16 U/L (ref 7–52)
ANION GAP SERPL CALCULATED.3IONS-SCNC: 11 MMOL/L (ref 4–13)
AST SERPL W P-5'-P-CCNC: 18 U/L (ref 13–39)
BILIRUB SERPL-MCNC: 0.51 MG/DL (ref 0.2–1)
BUN SERPL-MCNC: 27 MG/DL (ref 5–25)
CALCIUM SERPL-MCNC: 9.5 MG/DL (ref 8.4–10.2)
CHLORIDE SERPL-SCNC: 105 MMOL/L (ref 96–108)
CHOLEST SERPL-MCNC: 127 MG/DL (ref ?–200)
CO2 SERPL-SCNC: 24 MMOL/L (ref 21–32)
CREAT SERPL-MCNC: 1.06 MG/DL (ref 0.6–1.3)
CREAT UR-MCNC: 45.4 MG/DL
EST. AVERAGE GLUCOSE BLD GHB EST-MCNC: 192 MG/DL
GFR SERPL CREATININE-BSD FRML MDRD: 67 ML/MIN/1.73SQ M
GLUCOSE P FAST SERPL-MCNC: 185 MG/DL (ref 65–99)
HBA1C MFR BLD: 8.3 %
HDLC SERPL-MCNC: 42 MG/DL
LDLC SERPL CALC-MCNC: 56 MG/DL (ref 0–100)
MICROALBUMIN UR-MCNC: <7 MG/L
POTASSIUM SERPL-SCNC: 4.4 MMOL/L (ref 3.5–5.3)
PROT SERPL-MCNC: 6.5 G/DL (ref 6.4–8.4)
SODIUM SERPL-SCNC: 140 MMOL/L (ref 135–147)
TRIGL SERPL-MCNC: 146 MG/DL (ref ?–150)
VIT B12 SERPL-MCNC: 216 PG/ML (ref 180–914)

## 2025-04-09 PROCEDURE — 80053 COMPREHEN METABOLIC PANEL: CPT

## 2025-04-09 PROCEDURE — 82043 UR ALBUMIN QUANTITATIVE: CPT

## 2025-04-09 PROCEDURE — 83036 HEMOGLOBIN GLYCOSYLATED A1C: CPT

## 2025-04-09 PROCEDURE — 82570 ASSAY OF URINE CREATININE: CPT

## 2025-04-09 PROCEDURE — 80061 LIPID PANEL: CPT

## 2025-04-09 PROCEDURE — 36415 COLL VENOUS BLD VENIPUNCTURE: CPT

## 2025-04-09 PROCEDURE — 82607 VITAMIN B-12: CPT

## 2025-04-09 NOTE — PROGRESS NOTES
Name: Jus Chavez      : 1950      MRN: [id number]  Encounter Provider: HESHAM Villagomez  Encounter Date: 2025   Encounter department: [facility] [place]  :  Assessment & Plan  Type 2 diabetes mellitus with hyperglycemia, without long-term current use of insulin (HCC)  A1c above goal of 7-7.5%    Increase repaglinide to 1 mg before each meal.  Continue Jardiance 25 mg daily and Januvia 100 mg daily.    Reviewed risks as well as signs and symptoms of hypoglycemia.  Rule of 15's provided in AVS for appropriate treatment.  Call the office for blood glucose levels less than 70 mg/dL or persistent patterns over 250 mg/dL.  Advised to check blood glucose levels once daily, at different times of the day.    Continue to improve diet and lifestyle including attention to the amount and type of carbohydrates consumed as well as increased physical activity as tolerated.  Encouraged to walk for 15 minutes following meals.  Stay well-hydrated.  Agreeable to referral to diabetes education to review his current diet and how he can make improvements.    Follow-up in 4 months.  Labs prior to next visit.  Lab Results   Component Value Date    HGBA1C 8.3 (H) 2025     Orders:  •  Ambulatory referral to Diabetic Education  •  repaglinide (PRANDIN) 1 mg tablet; Take 1 tablet (1 mg total) by mouth 3 (three) times a day before meals  •  Hemoglobin A1C; Future  •  Basic metabolic panel; Future    Essential hypertension  /74 in the office. Continue current regimen.       Mixed hyperlipidemia   LDL near goal of less than 55 mg/dL. Continue crestor 10 mg daily.  Continue to improve diet.       Diabetic peripheral neuropathy associated with type 2 diabetes mellitus (HCC)  Continue B12 supplementation and increase to 2 tablets daily.  Orders:  •  Vitamin B12; Future        History of Present Illness {?Quick Links Encounters * My Last Note * Last Note in  Specialty * Snapshot * Since Last Visit * History :75901}  HPI  Jus Chavez is a 74 y.o. male who presents to the office today for routine follow-up of type 2 diabetes, without long term insulin use. Diabetes course has been stable. Complications of diabetes include: HTN, HLD, CKD.  He was last seen in the office 2024 by Dr. Bailon at which time his A1c was 8.2% and there were no changes made to his medication regimen.  His most recent A1c is worsened to 8.3%. He reports struggling with dietary excursions.    Denies recent episodes of hypoglycemia.  Symptoms of hypoglycemia include: has not experienced    Current home glucose monitoring: SMB[employer]  Fastin-208    Current Medication Regimen:   Jardiance 25 mg daily  Januvia 100 mg daily  Repaglinide 0.5 mg before meals; taking 1 mg before dinner      Thyroid Disorder: No  Hypertension, taking: Not on ACEi/ARB  Hyperlipidemia, taking: Crestor 10 mg daily, Tolerating well with no myalgias  History of Pancreatitis: No  Diabetes Education: Referred      Component      Latest Ref Rng 2025   Sodium      135 - 147 mmol/L 140    Potassium      3.5 - 5.3 mmol/L 4.4    Chloride      96 - 108 mmol/L 105    Carbon Dioxide      21 - 32 mmol/L 24    ANION GAP      4 - 13 mmol/L 11    BUN      5 - 25 mg/dL 27 (H)    Creatinine      0.60 - 1.30 mg/dL 1.06    GLUCOSE, FASTING      65 - 99 mg/dL 185 (H)    Calcium      8.4 - 10.2 mg/dL 9.5    AST      13 - 39 U/L 18    ALT      7 - 52 U/L 16    ALK PHOS      34 - 104 U/L 57    Total Protein      6.4 - 8.4 g/dL 6.5    Albumin      3.5 - 5.0 g/dL 4.3    Total Bilirubin      0.20 - 1.00 mg/dL 0.51    GFR, Calculated      ml/min/1.73sq m 67    Cholesterol      See Comment mg/dL 127    Triglycerides      See Comment mg/dL 146    HDL      >=40 mg/dL 42    LDL Calculated      0 - 100 mg/dL 56    EXT Creatinine Urine      Reference range not established. mg/dL 45.4    Albumin,U,Random      <20.0 mg/L <7.0    Albumin Creat  Ratio --    Hemoglobin A1C      Normal 4.0-5.6%; PreDiabetic 5.7-6.4%; Diabetic >=6.5%; Glycemic control for adults with diabetes <7.0% % 8.3 (H)    eAG, EST AVG Glucose      mg/dl 192    Vitamin B-12      180 - 914 pg/mL 216       Legend:  (H) High    History obtained from: patient    Review of Systems   Constitutional:  Negative for appetite change, fatigue and unexpected weight change.   HENT:  Negative for congestion, hearing loss and sore throat.    Eyes:  Negative for visual disturbance.   Cardiovascular:  Negative for palpitations.   Gastrointestinal:  Negative for abdominal pain, nausea and vomiting.   Endocrine: Negative for polydipsia and polyuria.   Genitourinary:  Negative for dysuria and frequency.   Musculoskeletal:  Positive for arthralgias. Negative for gait problem.   Neurological:  Negative for dizziness and weakness.   Psychiatric/Behavioral:  Negative for sleep disturbance.      Current Outpatient Medications on File Prior to Visit   Medication Sig Dispense Refill   • acetaminophen (TYLENOL) 500 mg tablet Take 500 mg by mouth daily at bedtime     • Aspirin 81 MG CAPS Take 1 capsule every day by oral route.     • Cyanocobalamin (VITAMIN B-12 PO) Take 1,000 mcg by mouth 2 (two) times a day     • finasteride (PROSCAR) 5 mg tablet Take 1 tablet (5 mg total) by mouth daily 90 tablet 1   • furosemide (LASIX) 20 mg tablet Take 1 tablet (20 mg total) by mouth daily     • Jardiance 25 MG TABS Take 1 tablet (25 mg total) by mouth daily 30 tablet 5   • ketoconazole (NIZORAL) 2 % cream Apply topically daily     • metFORMIN (GLUCOPHAGE) 500 mg tablet TAKE TWO TABLETS BY MOUTH TWICE A  tablet 1   • metoprolol succinate (TOPROL-XL) 25 mg 24 hr tablet TAKE ONE TABLET BY MOUTH EVERY DAY 90 tablet 1   • mometasone (ELOCON) 0.1 % cream Apply topically daily 45 g 1   • niacin 500 mg tablet Take 500 mg by mouth 4 (four) times a day     • NON FORMULARY 25 mcg Vitamin D     • Omega-3 Fatty Acids (FISH OIL  BURP-LESS PO) Take by mouth     • PROBIOTIC PRODUCT PO Take 111 mg by mouth daily     • rosuvastatin (CRESTOR) 10 MG tablet TAKE ONE TABLET BY MOUTH ONCE DAILY 90 tablet 1   • simethicone (MYLICON) 125 MG chewable tablet Chew 125 mg every 6 (six) hours as needed for flatulence     • sitaGLIPtin (JANUVIA) 100 mg tablet Take 1 tablet (100 mg total) by mouth daily 30 tablet 11   • tamsulosin (FLOMAX) 0.4 mg TAKE ONE CAPSULE BY MOUTH EVERY DAY WITH DINNER 30 capsule 0   • Tart Cherry (Tart Cherry Ultra) 1200 MG CAPS      • Turmeric 500 MG CAPS      • Wheat Dextrin (BENEFIBER DRINK MIX PO) Take by mouth     • [DISCONTINUED] repaglinide (PRANDIN) 0.5 mg tablet TAKE 1 TABLET BY MOUTH DAILY WITH BREAKFAST, 1 WITH LUNCH, AND 2 TABLETS WITH DINNER 360 tablet 1     No current facility-administered medications on file prior to visit.      Social History     Tobacco Use   • Smoking status: Never   • Smokeless tobacco: Never   Vaping Use   • Vaping status: Never Used   Substance and Sexual Activity   • Alcohol use: Yes     Alcohol/week: 4.0 standard drinks of alcohol     Types: 2 Glasses of wine, 1 Cans of beer, 1 Shots of liquor per week     Comment: 2-3 glass of wine or  beer a week   • Drug use: Never   • Sexual activity: Not Currently     Partners: Female     Birth control/protection: None        Objective {?Quick Links Trend Vitals * Enter New Vitals * Results Review * Timeline (Adult) * Labs * Imaging * Cardiology * Procedures * Lung Cancer Screening * Surgical eConsent :71023}  /76 (BP Location: Right arm, Patient Position: Sitting, Cuff Size: Extra-Large)   Pulse 67   Wt 95.3 kg (210 lb 3.2 oz)   SpO2 96%   BMI 27.73 kg/m²      Physical Exam  Vitals reviewed.   Constitutional:       General: He is not in acute distress.     Appearance: Normal appearance. He is well-groomed and overweight.   HENT:      Head: Normocephalic and atraumatic.      Mouth/Throat:      Mouth: Mucous membranes are moist.   Eyes:       Conjunctiva/sclera: Conjunctivae normal.   Cardiovascular:      Rate and Rhythm: Normal rate.   Pulmonary:      Effort: Pulmonary effort is normal. No respiratory distress.   Abdominal:      General: There is no distension.      Palpations: Abdomen is soft.   Musculoskeletal:         General: No swelling.      Cervical back: Normal range of motion.   Skin:     General: Skin is warm and dry.   Neurological:      Mental Status: He is alert and oriented to person, place, and time.   Psychiatric:         Mood and Affect: Mood normal.         Behavior: Behavior normal. Behavior is cooperative.         Thought Content: Thought content normal.         Judgment: Judgment normal.         Administrative Statements {?Quick Links Full Problem List * Level of Service * [facility]:48536}  I have spent a total time of 33 minutes in caring for this patient on the day of the visit/encounter including Diagnostic results, Prognosis, Risks and benefits of tx options, Instructions for management, Patient and family education, Importance of tx compliance, Risk factor reductions, Impressions, Counseling / Coordination of care, Documenting in the medical record, Reviewing/placing orders in the medical record (including tests, medications, and/or procedures), and Obtaining or reviewing history  .

## 2025-04-10 ENCOUNTER — RESULTS FOLLOW-UP (OUTPATIENT)
Dept: ENDOCRINOLOGY | Facility: CLINIC | Age: 75
End: 2025-04-10

## 2025-04-10 NOTE — RESULT ENCOUNTER NOTE
This patient has upcoming appointment, no urgent lab results, will review with patient at that time. He will need to be started on Vitamin B 12 supplimentation. Vitamin b12 is low

## 2025-04-11 ENCOUNTER — TELEPHONE (OUTPATIENT)
Dept: OBGYN CLINIC | Facility: HOSPITAL | Age: 75
End: 2025-04-11

## 2025-04-11 ENCOUNTER — OFFICE VISIT (OUTPATIENT)
Dept: ENDOCRINOLOGY | Facility: CLINIC | Age: 75
End: 2025-04-11
Payer: MEDICARE

## 2025-04-11 ENCOUNTER — TELEPHONE (OUTPATIENT)
Dept: ENDOCRINOLOGY | Facility: CLINIC | Age: 75
End: 2025-04-11

## 2025-04-11 VITALS
OXYGEN SATURATION: 96 % | DIASTOLIC BLOOD PRESSURE: 76 MMHG | BODY MASS INDEX: 27.73 KG/M2 | WEIGHT: 210.2 LBS | SYSTOLIC BLOOD PRESSURE: 122 MMHG | HEART RATE: 67 BPM

## 2025-04-11 DIAGNOSIS — E11.65 TYPE 2 DIABETES MELLITUS WITH HYPERGLYCEMIA, WITHOUT LONG-TERM CURRENT USE OF INSULIN (HCC): Primary | ICD-10-CM

## 2025-04-11 DIAGNOSIS — I10 ESSENTIAL HYPERTENSION: ICD-10-CM

## 2025-04-11 DIAGNOSIS — E53.8 B12 DEFICIENCY: ICD-10-CM

## 2025-04-11 DIAGNOSIS — E11.42 DIABETIC PERIPHERAL NEUROPATHY ASSOCIATED WITH TYPE 2 DIABETES MELLITUS (HCC): ICD-10-CM

## 2025-04-11 DIAGNOSIS — E78.2 MIXED HYPERLIPIDEMIA: ICD-10-CM

## 2025-04-11 PROCEDURE — G2211 COMPLEX E/M VISIT ADD ON: HCPCS

## 2025-04-11 PROCEDURE — 99214 OFFICE O/P EST MOD 30 MIN: CPT

## 2025-04-11 RX ORDER — REPAGLINIDE 1 MG/1
1 TABLET ORAL
Qty: 270 TABLET | Refills: 1 | Status: SHIPPED | OUTPATIENT
Start: 2025-04-11

## 2025-04-11 NOTE — PATIENT INSTRUCTIONS
Increase repaglinide to 1 mg (1 tablet ) before meals  To finish your current prescription, take 2 tablets before each meal  Continue Januvia 100 mg daily  Continue Jardiance 25 mg daily  Stay well hydrated!  Increase B12 supplement to 2 tablets daily   Continue walking 15 minutes after meals and add in stairs  Continue to check blood glucose levels at least once daily, at different times of the day  Schedule to Diabetes Education      Low Blood Sugar  Steps to treat low blood sugar.    1. Test blood sugar if you have symptoms of low blood sugar:   Low Blood Sugar Symptoms:  o Sweaty  o Dizzy  o Rapid heartbeat  o Shaky  o Bad mood  o Hungry    2. Treat blood sugar less than 70 with 15 grams of fast-acting carbohydrate:   Examples of 15 grams Fast-Acting Carbohydrate:  o 4 oz juice/ 4 oz regular soda  o 1 tablespoon honey  o 1 pack of fun size skittles (about 15 individual skittles)  o 12 gummy bears  o 4 starburst   o 3-4 hard candies (chew)  o 3-4 glucose tablets (chew)            3.   Wait 15 minutes and test your blood sugar again         4.   If blood sugar is less than 100, repeat steps 2-3.    5.   When your blood sugar is 100 or more, eat a snack if it will be longer than one hour until your next meal. The snack should be 15 grams of carbohydrate and a protein:   Examples of snacks:  o ½ sandwich  o 6 crackers with cheese or with peanut butter  o Piece of fruit with cheese or peanut butter

## 2025-04-11 NOTE — ASSESSMENT & PLAN NOTE
A1c above goal of 7-7.5%    Increase repaglinide to 1 mg before each meal.  Continue Jardiance 25 mg daily and Januvia 100 mg daily.    Reviewed risks as well as signs and symptoms of hypoglycemia.  Rule of 15's provided in AVS for appropriate treatment.  Call the office for blood glucose levels less than 70 mg/dL or persistent patterns over 250 mg/dL.  Advised to check blood glucose levels once daily, at different times of the day.    Continue to improve diet and lifestyle including attention to the amount and type of carbohydrates consumed as well as increased physical activity as tolerated.  Encouraged to walk for 15 minutes following meals.  Stay well-hydrated.  Agreeable to referral to diabetes education to review his current diet and how he can make improvements.    Follow-up in 4 months.  Labs prior to next visit.  Lab Results   Component Value Date    HGBA1C 8.3 (H) 04/09/2025     Orders:  •  Ambulatory referral to Diabetic Education  •  repaglinide (PRANDIN) 1 mg tablet; Take 1 tablet (1 mg total) by mouth 3 (three) times a day before meals  •  Hemoglobin A1C; Future  •  Basic metabolic panel; Future

## 2025-04-11 NOTE — TELEPHONE ENCOUNTER
Caller: Jus    Doctor: Dr. Howard    Reason for call: Patient is looking to see if his other biopsy result came in that you were waiting for? Asking also where this biopsy was sent to be tested?    Please call.     Call back#: [phone]

## 2025-04-11 NOTE — TELEPHONE ENCOUNTER
LOPEZ with patient to inform him [facility] schedule opened for September if he wanted to reschedule his apt.  Patient requested [facility]

## 2025-04-14 NOTE — TELEPHONE ENCOUNTER
Caller: berna    Doctor: Dr. briceno    Reason for call: checking status of biopsy results. Relayed Dr damon Message.  Thank you    Call back#: [phone]

## 2025-04-21 DIAGNOSIS — N40.1 BENIGN PROSTATIC HYPERPLASIA WITH URINARY FREQUENCY: ICD-10-CM

## 2025-04-21 DIAGNOSIS — R35.0 BENIGN PROSTATIC HYPERPLASIA WITH URINARY FREQUENCY: ICD-10-CM

## 2025-04-21 NOTE — TELEPHONE ENCOUNTER
Patient called to request a refill for their Tamsulosin 0.4mg advised a refill was requested on 04/21/25 and is pending approval. Patient verbalized understanding and is in agreement.     Does the patient have enough for 3 days?   [x] Yes   [] No - Send as HP to POD

## 2025-04-22 RX ORDER — TAMSULOSIN HYDROCHLORIDE 0.4 MG/1
0.4 CAPSULE ORAL
Qty: 30 CAPSULE | Refills: 5 | Status: SHIPPED | OUTPATIENT
Start: 2025-04-22

## 2025-04-23 ENCOUNTER — TELEPHONE (OUTPATIENT)
Dept: OBGYN CLINIC | Facility: MEDICAL CENTER | Age: 75
End: 2025-04-23

## 2025-04-23 NOTE — TELEPHONE ENCOUNTER
Left message for patient to call to discuss biopsy results. Left my direct contact #.  Spoke with Dr. Chandler in Pathology, specimen was sent to [facility] for a second opinion. They requested the MRI disc on 4/21/2025 to review in conjunction with the specimen. The pathology department will be following up with the [facility] today to verify the receipt of the disc and completion of the specimen with final report.

## 2025-04-25 ENCOUNTER — TELEPHONE (OUTPATIENT)
Dept: OBGYN CLINIC | Facility: MEDICAL CENTER | Age: 75
End: 2025-04-25

## 2025-04-25 DIAGNOSIS — M79.602 LEFT ARM PAIN: ICD-10-CM

## 2025-04-25 DIAGNOSIS — R22.32 ELBOW MASS, LEFT: Primary | ICD-10-CM

## 2025-04-25 NOTE — TELEPHONE ENCOUNTER
Phone call to patient to go over pathology results.    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    Will have final answer in print soon    Physical therapy prescription written    All questions answered, patient is very thankful for the phone call

## 2025-04-27 PROBLEM — Z00.00 MEDICARE ANNUAL WELLNESS VISIT, SUBSEQUENT: Status: RESOLVED | Noted: 2021-11-07 | Resolved: 2025-04-27

## 2025-04-28 DIAGNOSIS — N40.1 BENIGN PROSTATIC HYPERPLASIA WITH URINARY FREQUENCY: ICD-10-CM

## 2025-04-28 DIAGNOSIS — R35.0 BENIGN PROSTATIC HYPERPLASIA WITH URINARY FREQUENCY: ICD-10-CM

## 2025-04-28 RX ORDER — FINASTERIDE 5 MG/1
5 TABLET, FILM COATED ORAL DAILY
Qty: 90 TABLET | Refills: 1 | Status: SHIPPED | OUTPATIENT
Start: 2025-04-28

## 2025-04-28 NOTE — TELEPHONE ENCOUNTER
Reason for call:   [x] Refill   [] Prior Auth  [] Other:     Office:   [] PCP/Provider -   [x] Specialty/Provider - Paul Argueta MD Cardio [facility]    Medication: Finasteride    Dose/Frequency: 5mg    One Daily    Quantity: 90    Pharmacy: [facility]   Does the patient have enough for 3 days?   [] Yes   [x] No - Send as HP to POD    Mail Away Pharmacy   Does the patient have enough for 10 days?   [] Yes   [] No - Send as HP to POD

## 2025-05-09 ENCOUNTER — OFFICE VISIT (OUTPATIENT)
Dept: CARDIOLOGY CLINIC | Facility: CLINIC | Age: 75
End: 2025-05-09
Payer: MEDICARE

## 2025-05-09 VITALS
BODY MASS INDEX: 27.57 KG/M2 | OXYGEN SATURATION: 93 % | HEIGHT: 73 IN | TEMPERATURE: 96.1 F | DIASTOLIC BLOOD PRESSURE: 56 MMHG | WEIGHT: 208 LBS | SYSTOLIC BLOOD PRESSURE: 114 MMHG | HEART RATE: 72 BPM

## 2025-05-09 DIAGNOSIS — I10 ESSENTIAL HYPERTENSION: ICD-10-CM

## 2025-05-09 DIAGNOSIS — Z95.1 S/P CABG (CORONARY ARTERY BYPASS GRAFT): ICD-10-CM

## 2025-05-09 DIAGNOSIS — I97.89 POSTOPERATIVE ATRIAL FIBRILLATION (HCC): ICD-10-CM

## 2025-05-09 DIAGNOSIS — I25.118 CORONARY ARTERY DISEASE OF NATIVE ARTERY OF NATIVE HEART WITH STABLE ANGINA PECTORIS (HCC): Primary | ICD-10-CM

## 2025-05-09 DIAGNOSIS — I48.91 POSTOPERATIVE ATRIAL FIBRILLATION (HCC): ICD-10-CM

## 2025-05-09 DIAGNOSIS — Z95.2 S/P AVR: ICD-10-CM

## 2025-05-09 DIAGNOSIS — I35.0 NONRHEUMATIC AORTIC VALVE STENOSIS: ICD-10-CM

## 2025-05-09 PROCEDURE — 99214 OFFICE O/P EST MOD 30 MIN: CPT | Performed by: INTERNAL MEDICINE

## 2025-05-09 NOTE — PROGRESS NOTES
Cardiology Follow Up    Jus Chavez  [id number]  1950  [facility] [place] [id number] [phone]      1. Coronary artery disease of native artery of native heart with stable angina pectoris (HCC)        2. Nonrheumatic aortic valve stenosis  Echo complete w/ contrast if indicated      3. Essential hypertension        4. Postoperative atrial fibrillation (HCC)        5. S/P CABG (coronary artery bypass graft)        6. S/P AVR  Echo complete w/ contrast if indicated          Discussion/Summary:    - CAD: History of bypass with LIMA to the LAD. Currently without any symptoms of angina. He is on aspirin. Sure is controlled with metoprolol. Lipid panel well-controlled with 10 mg Crestor. LDL is less than 55    - AS: Repeat echo next year. Bioprosthetic valve has been stable for few years. Antibiotic prior to dental work. Continue aspirin.    - HTN: Blood pressure controlled with metoprolol.    - PAF: No recurrence since surgery. It was brief and postoperative. No anticoagulation    - Edema -dependent lower extremity edema. Echo without significant heart failure. Continue Lasix which he is currently taking daily. He is concerned that his recent foot pain may be related to the edema. I am okay with him trying a few additional doses of Lasix as needed, but do not think he will need a higher dose chronically.    Previous History:  75-year-old man.  He has a history of coronary artery disease diagnosed in July of 2019 when he was having chest discomfort.  He had single-vessel bypass with LIMA to the LAD and also at that time had aortic stenosis and underwent a 25 mm [place] bioprosthetic aortic valve replacement ([place])    Preoperative testing showed that he had carotid artery stenosis bilaterally which has been monitored serially with ultrasound most recently with < 50% stenosis bilaterally but some R subclavian disease as  well.    Had atrial fibrillation which lasted < 24 hours after surgery. He was initially on amiodarone and warfarin for brief period of time after the surgery, but this was stopped.    He has had some episodes of palpitations.  He had a 1 week monitor with his prior cardiologist, no atrial fibrillation was found.  He then had a 48 hour Holter monitor in 10/2020 which showed only PVCs.  No other significant arrhythmias.    He has had myalgias with several statins in the past.  He now been on 10 mg of Crestor and is tolerating it.  His LDL is in the 40s.  He is also taking niacin.  He has been on this for 15 years, has not had any side effects with it. I discussed stopping it, but he prefers to continue.  Over-the-counter fish oil.  Lovaza was too expensive.    LE edema has become more prominent      Interval History:    Seen few months ago by our advanced practitioner because he was having swelling of the arm. He has undergone of quite extensive evaluation for this. No evidence of DVT. He has had MRI there was concern for a malignancy based on a biopsy but ultimately I think this was reviewed by the [facility] and there is no malignancy present. He is going to do physical therapy this is felt to be more of a orthopedic issue    His other concern is that diabetes is less well-controlled. He is working on some lifestyle changes.    Finally, he has some pain particularly in the right foot. He saw the podiatrist and they believe it is related to his heel no imaging was ordered and he is concerned about this he wants to know if there is anything going on with the bone. Advised him to follow-up with orthopedics. He was questioning whether lower extremity edema had anything to do with it. He has a little more edema of the ankle on the right than the left, but does not seem to be related to heart failure. I reviewed his echo from last year. Clinically, there is been no other change there so we are planning to repeat an  echo next year.    Patient Active Problem List   Diagnosis    Aortic stenosis    Coronary artery disease of native artery of native heart with stable angina pectoris (HCC)    DM (diabetes mellitus), type 2 (HCC)    Essential hypertension    Hypertriglyceridemia    S/P CABG (coronary artery bypass graft)    S/P AVR    PVC (premature ventricular contraction)    Bilateral carotid artery stenosis    Mixed hyperlipidemia    Postoperative atrial fibrillation (HCC)    Benign prostatic hyperplasia with urinary frequency    Stenosis of right subclavian artery (HCC)    IBS (irritable bowel syndrome)    H/O diabetic foot ulcer    Diabetic peripheral neuropathy associated with type 2 diabetes mellitus (HCC)    Dizziness    Instability of foot joint    Peripheral venous insufficiency    Thrombocytopenia (HCC)    Right hydrocele    Left arm pain    Edema of left upper arm    Rash     Past Medical History:   Diagnosis Date    Acute systolic heart failure (HCC) 02/23/2021    Athlete's foot     Bilateral impacted cerumen 03/09/2021    Coronary artery disease 07/30/2019    bypass and valve replacement    Diabetes mellitus ([facility])     Heart murmur     Other hydrocele 06/11/2020    TMJ (dislocation of temporomandibular joint)     Yeast infection      Social History     Tobacco Use    Smoking status: Never    Smokeless tobacco: Never   Vaping Use    Vaping status: Never Used   Substance Use Topics    Alcohol use: Yes     Alcohol/week: 4.0 standard drinks of alcohol     Types: 2 Glasses of wine, 1 Cans of beer, 1 Shots of liquor per week     Comment: 2-3 glass of wine or  beer a week    Drug use: Never      Family History   Problem Relation Age of Onset    Heart disease Mother     Cancer Mother         skin melonoma/mastectomy    Heart disease Father     Hypertension Father     Mitral valve prolapse Sister     Diabetes type II Maternal Grandmother     Colon cancer Neg Hx      Past Surgical History:   Procedure Laterality Date    CARDIAC  SURGERY      CARDIAC VALVE REPLACEMENT      CATARACT EXTRACTION      COLONOSCOPY      CORONARY ARTERY BYPASS GRAFT      EYE SURGERY  03/2020    Cataract right eye    EYE SURGERY Left 12/03/2020    left eye cataract     HYDROCELE EXCISION / REPAIR      IR BIOPSY SOFT TISSUE/SUPERFICIAL  3/14/2025    AZ EXCISION HYDROCELE UNILATERAL Right 04/08/2022    Procedure: HYDROCELECTOMY;  Surgeon: Montrell Mancuso MD;  [facility];  Service: Urology    AZ EXCISION HYDROCELE UNILATERAL Right 11/16/2022    Procedure: HYDROCELECTOMY;  Surgeon: Montrell Mancuso MD;  [facility];  Service: Urology    TONSILLECTOMY         Current Outpatient Medications:     acetaminophen (TYLENOL) 500 mg tablet, Take 500 mg by mouth daily at bedtime, Disp: , Rfl:     Aspirin 81 MG CAPS, Take 1 capsule every day by oral route., Disp: , Rfl:     Cyanocobalamin (VITAMIN B-12 PO), Take 1,000 mcg by mouth 2 (two) times a day, Disp: , Rfl:     finasteride (PROSCAR) 5 mg tablet, Take 1 tablet (5 mg total) by mouth daily, Disp: 90 tablet, Rfl: 1    furosemide (LASIX) 20 mg tablet, Take 1 tablet (20 mg total) by mouth daily, Disp: , Rfl:     Jardiance 25 MG TABS, Take 1 tablet (25 mg total) by mouth daily, Disp: 30 tablet, Rfl: 5    ketoconazole (NIZORAL) 2 % cream, Apply topically daily, Disp: , Rfl:     metFORMIN (GLUCOPHAGE) 500 mg tablet, TAKE TWO TABLETS BY MOUTH TWICE A DAY, Disp: 360 tablet, Rfl: 1    metoprolol succinate (TOPROL-XL) 25 mg 24 hr tablet, TAKE ONE TABLET BY MOUTH EVERY DAY, Disp: 90 tablet, Rfl: 1    niacin 500 mg tablet, Take 500 mg by mouth 4 (four) times a day, Disp: , Rfl:     NON FORMULARY, 25 mcg Vitamin D, Disp: , Rfl:     Omega-3 Fatty Acids (FISH OIL BURP-LESS PO), Take by mouth, Disp: , Rfl:     PROBIOTIC PRODUCT PO, Take 111 mg by mouth daily, Disp: , Rfl:     repaglinide (PRANDIN) 1 mg tablet, Take 1 tablet (1 mg total) by mouth 3 (three) times a day before meals, Disp: 270 tablet, Rfl: 1    " rosuvastatin (CRESTOR) 10 MG tablet, TAKE ONE TABLET BY MOUTH ONCE DAILY, Disp: 90 tablet, Rfl: 1    simethicone (MYLICON) 125 MG chewable tablet, Chew 125 mg every 6 (six) hours as needed for flatulence, Disp: , Rfl:     sitaGLIPtin (JANUVIA) 100 mg tablet, Take 1 tablet (100 mg total) by mouth daily, Disp: 30 tablet, Rfl: 11    tamsulosin (FLOMAX) 0.4 mg, TAKE ONE CAPSULE BY MOUTH EVERY DAY WITH DINNER, Disp: 30 capsule, Rfl: 5    Tart Cherry (Tart Cherry Ultra) 1200 MG CAPS, , Disp: , Rfl:     Turmeric 500 MG CAPS, , Disp: , Rfl:     Wheat Dextrin (BENEFIBER DRINK MIX PO), Take by mouth, Disp: , Rfl:     mometasone (ELOCON) 0.1 % cream, Apply topically daily (Patient not taking: Reported on 5/9/2025), Disp: 45 g, Rfl: 1  No Known Allergies    Vitals:    05/09/25 1441   BP: 114/56   BP Location: Right arm   Patient Position: Sitting   Cuff Size: Adult   Pulse: 72   Temp: (!) 96.1 °F (35.6 °C)   TempSrc: Tympanic   SpO2: 93%   Weight: 94.3 kg (208 lb)   Height: 6' 1\" (1.854 m)     Vitals:    05/09/25 1441   Weight: 94.3 kg (208 lb)      Height: 6' 1\" (185.4 cm)   Body mass index is 27.44 kg/m².    Physical Exam:  GEN: Jus Chavez appears well, alert and oriented x 3, pleasant and cooperative   HEENT: pupils equal, round, and reactive to light; extraocular muscles intact  NECK: supple, no carotid bruits   HEART: regular rhythm, bio AVR  LUNGS: clear to auscultation bilaterally; no wheezes, rales, or rhonchi   ABDOMEN: normal bowel sounds, soft, no tenderness, no distention  EXTREMITIES: peripheral pulses normal; no clubbing, cyanosis, or edema  NEURO: no focal findings   SKIN: normal without suspicious lesions on exposed skin    ROS:  Positive for fatigue, sleepiness. No snoring, gets refreshed sleep. No chest pain, shortness of breath.  Except as noted in HPI, is otherwise reviewed in detail and a 12 point ROS is negative in detail  ROS reviewed and is unchanged    Labs:  Lab Results   Component Value Date    " "K 4.4 04/09/2025     04/09/2025    CREATININE 1.06 04/09/2025    BUN 27 (H) 04/09/2025    CO2 24 04/09/2025    ALT 16 04/09/2025    AST 18 04/09/2025    TSH 3.00 07/20/2020    INR 2.3 (H) 08/06/2019    GLUF 185 (H) 04/09/2025    HGBA1C 8.3 (H) 04/09/2025    WBC 7.20 03/03/2025    HGB 13.4 03/03/2025    HCT 39.9 03/03/2025     03/03/2025     No results found for: \"CHOL\"  Lab Results   Component Value Date    HDL 42 04/09/2025    HDL 45 07/18/2024    HDL 46 01/16/2023     Lab Results   Component Value Date    LDLCALC 56 04/09/2025    LDLCALC 43 07/18/2024    LDLCALC 45 01/16/2023     Lab Results   Component Value Date    TRIG 146 04/09/2025    TRIG 111 07/18/2024    TRIG 135 01/16/2023     Testing:  Echo 6/5/24:  Left Ventricle: Left ventricular cavity size is normal. Wall thickness is mildly increased. The left ventricular ejection fraction is 65%. Systolic function is normal. Wall motion is normal. Diastolic function is mildly abnormal, consistent with grade I (abnormal) relaxation.    Right Ventricle: Systolic function is mildly reduced. Abnormal tricuspid annular plane systolic excursion (TAPSE) < 1.7 cm.    Aortic Valve: There is a bioprosthetic valve. The prosthetic valve appears well-seated and appears to be functioning normally. There is no evidence of transvalvular regurgitation. The gradient recorded across the prosthetic aortic valve is within the expected range. The aortic valve mean gradient is 12 mmHg.    Mitral Valve: There is mild annular calcification. There is mild regurgitation.    Tricuspid Valve: There is mild regurgitation.    Prior TTE study available for comparison. Prior study date: 4/12/2023. No significant changes noted compared to the prior study.    Echo 4/12/23:  Left Ventricle: Left ventricular cavity size is normal. Wall thickness is mildly increased. There is mild concentric hypertrophy. The left ventricular ejection fraction is 60%. Systolic function is normal. Wall " motion is normal. Diastolic function is moderately abnormal, consistent with grade II (pseudonormal) relaxation.    Right Ventricle: Right ventricular cavity size is mildly dilated. Systolic function is mildly reduced. Abnormal tricuspid annular plane systolic excursion (TAPSE) < 1.7 cm.    Left Atrium: The atrium is mildly dilated.    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    Aortic Valve: There is a bioprosthetic valve. The prosthetic valve appears to be functioning normally. There is no evidence of paravalvular regurgitation. There is no evidence of transvalvular regurgitation. The gradient recorded across the prosthetic aortic valve is within the expected range. The aortic valve mean gradient is 13 mmHg. The dimensionless velocity index is 0.50. The aortic valve area is 1.80 cm2.    Mitral Valve: There is moderate annular calcification. There is mild regurgitation.    Tricuspid Valve: There is mild regurgitation.    Pulmonic Valve: There is mild regurgitation.    Aorta: The aortic root is normal in size. The ascending aorta is mildly dilated at 4.1 cm.      Echo 8/2021:  LEFT VENTRICLE:  Systolic function was normal. Ejection fraction was estimated to be 60 %.  Wall thickness was mildly increased.  There was mild concentric hypertrophy.  Doppler parameters were consistent with abnormal left ventricular relaxation (grade 1 diastolic dysfunction).  Doppler parameters were consistent with elevated mean left atrial filling pressure.     RIGHT VENTRICLE:  The ventricle was mildly to moderately dilated.  Systolic function was normal.     LEFT ATRIUM:  The atrium was mildly dilated.     ATRIAL SEPTUM:  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. No definite shunt noted, but poor subcostal views.     MITRAL VALVE:  There was mild annular calcification.  There was trace regurgitation.     AORTIC VALVE:  A bioprosthesis was present. It exhibited normal function.  Valve peak gradient was 30 mmHg.  Valve mean gradient was  16 mmHg.     TRICUSPID VALVE:  There was mild regurgitation.     PULMONIC VALVE:  There was mild regurgitation.     AORTA:  There was mild dilatation of the ascending aorta at 4cm.    Holter:  The patient was in sinus rhythm throughout the recording.     Minimum HR: 59  Average HR: 74  Maximum HR: 102     Premature ventricular contractions: 2954 (2%)  Ventricular runs: 0     Supraventricular contractions: 34  Supraventricular runs: 1 lasting 4 beats     Longest RR: 1.5 sec     Arrhythmias: none     Diary submitted: No        Impression     Abnormal Holter monitor  Frequent PVCs without ventricular runs  Rare PACs

## 2025-05-16 ENCOUNTER — EVALUATION (OUTPATIENT)
Dept: PHYSICAL THERAPY | Facility: CLINIC | Age: 75
End: 2025-05-16
Attending: STUDENT IN AN ORGANIZED HEALTH CARE EDUCATION/TRAINING PROGRAM
Payer: MEDICARE

## 2025-05-16 DIAGNOSIS — M25.522 ELBOW PAIN, LEFT: Primary | ICD-10-CM

## 2025-05-16 PROCEDURE — 97110 THERAPEUTIC EXERCISES: CPT | Performed by: PHYSICAL THERAPIST

## 2025-05-16 PROCEDURE — 97161 PT EVAL LOW COMPLEX 20 MIN: CPT | Performed by: PHYSICAL THERAPIST

## 2025-05-16 NOTE — PROGRESS NOTES
PT Evaluation     Today's date: 2025  Patient name: Jus Chavez  : 1950  MRN: [id number]  Referring provider: Iam Howard DO  Dx:   Encounter Diagnosis     ICD-10-CM    1. Elbow pain, left  M25.522                      Assessment  Impairments: abnormal or restricted ROM  Functional limitations: None    Assessment details: Patient is a 75 y.o. male referred to PT by Dr. Howard with a dx of L elbow pain.  Patient reports onset of pain complaints occurred L anterior elbow pain that has improved over time.   The pain complaints are aggravated by elbow extension.  Feel at this time his current lace of extension range may improve slightly, however, the risk of making patients symptoms worse outweigh the amount of motion loss.  Feel he is capable of maintaining what motion he has and should perform all activities without restriction.  No other referral appears necessary at this time.     Understanding of Dx/Px/POC: good     Prognosis: good    Goals  1.  Patient will be independent and compliant with a HEP.    Plan    Plan details: Patient will continue with HEP.      Subjective Evaluation    History of Present Illness  Mechanism of injury: No SHAKA          Not a recurrent problem   Quality of life: good    Patient Goals  Patient goals for therapy: decreased pain, increased motion and return to sport/leisure activities    Pain  Current pain ratin  At best pain ratin  At worst pain ratin  Location: L anterior elbow  Relieving factors: rest  Exacerbated by: Elbow extension.  Progression: improved      Diagnostic Tests  MRI studies: abnormal  Treatments  Current treatment: physical therapy      Objective     Active Range of Motion     Left Elbow   Flexion: Left elbow active flexion: End range pain. WFL  Extension: -14 degrees with pain  Forearm supination: WFL  Forearm pronation: WFL    Passive Range of Motion     Left Elbow   Flexion: Left elbow passive flexion: End range pain.  WFL  Extension: -12 degrees with pain    Strength/Myotome Testing     Left Elbow   Flexion: 4+  Extension: 4+             Precautions: CABG/Valve replacement      Manuals                                                                 Neuro Re-Ed                                                                                                        Ther Ex                                                                                                                     Ther Activity                                       Gait Training                                       Modalities

## 2025-05-19 ENCOUNTER — TELEPHONE (OUTPATIENT)
Age: 75
End: 2025-05-19

## 2025-05-19 NOTE — TELEPHONE ENCOUNTER
Patient called in to see if his upcoming appointment for Diabetes Education would be covered. I offered to give patient CPT code and advised he can call the insurance company to make sure.    Patient did not want to, he said we should be doing it since we are the ones that bill them. I advised we do not know until then and again offered the CPT code which he declined taking.

## 2025-05-31 ENCOUNTER — OFFICE VISIT (OUTPATIENT)
Dept: URGENT CARE | Age: 75
End: 2025-05-31
Payer: MEDICARE

## 2025-05-31 VITALS
RESPIRATION RATE: 18 BRPM | DIASTOLIC BLOOD PRESSURE: 74 MMHG | WEIGHT: 210 LBS | TEMPERATURE: 97.5 F | HEART RATE: 62 BPM | OXYGEN SATURATION: 97 % | SYSTOLIC BLOOD PRESSURE: 154 MMHG | BODY MASS INDEX: 27.71 KG/M2

## 2025-05-31 DIAGNOSIS — J06.9 VIRAL URI WITH COUGH: Primary | ICD-10-CM

## 2025-05-31 LAB
SARS-COV-2 AG UPPER RESP QL IA: NEGATIVE
VALID CONTROL: NORMAL

## 2025-05-31 PROCEDURE — G0463 HOSPITAL OUTPT CLINIC VISIT: HCPCS | Performed by: STUDENT IN AN ORGANIZED HEALTH CARE EDUCATION/TRAINING PROGRAM

## 2025-05-31 PROCEDURE — 87811 SARS-COV-2 COVID19 W/OPTIC: CPT | Performed by: STUDENT IN AN ORGANIZED HEALTH CARE EDUCATION/TRAINING PROGRAM

## 2025-05-31 PROCEDURE — 99203 OFFICE O/P NEW LOW 30 MIN: CPT | Performed by: STUDENT IN AN ORGANIZED HEALTH CARE EDUCATION/TRAINING PROGRAM

## 2025-05-31 RX ORDER — FLUTICASONE PROPIONATE 50 MCG
2 SPRAY, SUSPENSION (ML) NASAL DAILY
Qty: 11.1 ML | Refills: 0 | Status: SHIPPED | OUTPATIENT
Start: 2025-05-31

## 2025-05-31 RX ORDER — BENZONATATE 200 MG/1
200 CAPSULE ORAL 3 TIMES DAILY PRN
Qty: 30 CAPSULE | Refills: 0 | Status: SHIPPED | OUTPATIENT
Start: 2025-05-31

## 2025-05-31 NOTE — PROGRESS NOTES
[facility]        NAME: Jus Chavez is a 75 y.o. male  : 1950    MRN: [id number]  DATE: May 31, 2025  TIME: 1:33 PM    Assessment and Plan   Viral URI with cough [J06.9]  1. Viral URI with cough  benzonatate (TESSALON) 200 MG capsule    fluticasone (FLONASE) 50 mcg/act nasal spray      Early viral URI symptoms, wife is here with similar, provided with Flonase and Tessalon Perles.  Rapid COVID test negative.  Further instructions as below.      Patient Instructions     To help clear out the mucus and reduce post-nasal drip which can cause sore throat and cough - use saline nasal spray or saline nasal rinse (with distilled or boiled water).    I recommend using nasal saline spray at least twice daily.  Especially helpful to use room to have a steamy shower to help clear out the mucus so your having less postnasal drip.  After clearing of the mucus, use the Flonase (fluticasone) nasal spray nightly to help reduce sinus inflammation and postnasal drip.    To soothe sore throat, you may try warm tea with honey, warm salt water gargles.  You may take Tylenol or Motrin to help with throat pain or muscle aches.  Stay well hydrated and get plenty of rest.     If your symptoms are worsening or fail to improve in the coming days, please return to see us or schedule with your PCP.  If you develop any severe/worsening symptoms please go to the ER.    Chief Complaint     Chief Complaint   Patient presents with    Cough     Sneeze began this am     Cold Like Symptoms     Eyes ache and nasal congestion          History of Present Illness       Patient presents with symptoms starting today.  He started with throat irritation, bilateral eyes irritated, postnasal drip.  His symptoms are overall mild but he is here with his wife who is having increased viral symptoms and want to get checked as well as they are planning a family trip in the next few days.  No fevers, chills, wheezing, shortness of  breath.    Cough        Review of Systems   Review of Systems   Respiratory:  Positive for cough.    All other systems reviewed and are negative.        Current Medications     Current Medications[1]    Current Allergies     Allergies as of 05/31/2025    (No Known Allergies)            The following portions of the patient's history were reviewed and updated as appropriate: allergies, current medications, past family history, past medical history, past social history, past surgical history and problem list.     Past Medical History[2]    Past Surgical History[3]    Family History[4]      Medications have been verified.        Objective   /74   Pulse 62   Temp 97.5 °F (36.4 °C)   Resp 18   Wt 95.3 kg (210 lb)   SpO2 97%   BMI 27.71 kg/m²   No LMP for male patient.       Physical Exam     Physical Exam  Vitals and nursing note reviewed.   Constitutional:       General: He is not in acute distress.     Appearance: Normal appearance. He is not ill-appearing or toxic-appearing.   HENT:      Head: Normocephalic and atraumatic.      Right Ear: Tympanic membrane, ear canal and external ear normal.      Left Ear: Tympanic membrane, ear canal and external ear normal.      Nose: Congestion present.      Mouth/Throat:      Mouth: Mucous membranes are moist.      Comments: PND    Eyes:      Extraocular Movements: Extraocular movements intact.       Cardiovascular:      Rate and Rhythm: Normal rate and regular rhythm.      Heart sounds: Normal heart sounds.   Pulmonary:      Effort: Pulmonary effort is normal. No respiratory distress.      Breath sounds: Normal breath sounds. No stridor. No wheezing, rhonchi or rales.     Skin:     General: Skin is warm and dry.      Capillary Refill: Capillary refill takes less than 2 seconds.      Findings: No rash.     Neurological:      Mental Status: He is alert.      Gait: Gait normal.     Psychiatric:         Behavior: Behavior normal.                          [1]   Current  Outpatient Medications:     Aspirin 81 MG CAPS, , Disp: , Rfl:     benzonatate (TESSALON) 200 MG capsule, Take 1 capsule (200 mg total) by mouth 3 (three) times a day as needed for cough, Disp: 30 capsule, Rfl: 0    Cyanocobalamin (VITAMIN B-12 PO), Take 1,000 mcg by mouth in the morning and 1,000 mcg in the evening., Disp: , Rfl:     finasteride (PROSCAR) 5 mg tablet, Take 1 tablet (5 mg total) by mouth daily, Disp: 90 tablet, Rfl: 1    fluticasone (FLONASE) 50 mcg/act nasal spray, 2 sprays into each nostril daily, Disp: 11.1 mL, Rfl: 0    furosemide (LASIX) 20 mg tablet, Take 1 tablet (20 mg total) by mouth daily, Disp: , Rfl:     Jardiance 25 MG TABS, Take 1 tablet (25 mg total) by mouth daily, Disp: 30 tablet, Rfl: 5    ketoconazole (NIZORAL) 2 % cream, Apply topically in the morning., Disp: , Rfl:     metFORMIN (GLUCOPHAGE) 500 mg tablet, TAKE TWO TABLETS BY MOUTH TWICE A DAY, Disp: 360 tablet, Rfl: 1    metoprolol succinate (TOPROL-XL) 25 mg 24 hr tablet, TAKE ONE TABLET BY MOUTH EVERY DAY, Disp: 90 tablet, Rfl: 1    mometasone (ELOCON) 0.1 % cream, Apply topically daily, Disp: 45 g, Rfl: 1    niacin 500 mg tablet, Take 500 mg by mouth in the morning and 500 mg at noon and 500 mg in the evening and 500 mg before bedtime., Disp: , Rfl:     NON FORMULARY, 25 mcg Vitamin D, Disp: , Rfl:     Omega-3 Fatty Acids (FISH OIL BURP-LESS PO), Take by mouth, Disp: , Rfl:     PROBIOTIC PRODUCT PO, Take 111 mg by mouth in the morning., Disp: , Rfl:     repaglinide (PRANDIN) 1 mg tablet, Take 1 tablet (1 mg total) by mouth 3 (three) times a day before meals, Disp: 270 tablet, Rfl: 1    rosuvastatin (CRESTOR) 10 MG tablet, TAKE ONE TABLET BY MOUTH ONCE DAILY, Disp: 90 tablet, Rfl: 1    sitaGLIPtin (JANUVIA) 100 mg tablet, Take 1 tablet (100 mg total) by mouth daily, Disp: 30 tablet, Rfl: 11    tamsulosin (FLOMAX) 0.4 mg, TAKE ONE CAPSULE BY MOUTH EVERY DAY WITH DINNER, Disp: 30 capsule, Rfl: 5    Tart Cherry (Tart Cherry  Ultra) 1200 MG CAPS, , Disp: , Rfl:     Turmeric 500 MG CAPS, , Disp: , Rfl:     Wheat Dextrin (BENEFIBER DRINK MIX PO), Take by mouth, Disp: , Rfl:     acetaminophen (TYLENOL) 500 mg tablet, Take 500 mg by mouth daily at bedtime (Patient not taking: Reported on 5/31/2025), Disp: , Rfl:     simethicone (MYLICON) 125 MG chewable tablet, Chew 125 mg every 6 (six) hours as needed for flatulence, Disp: , Rfl:   [2]   Past Medical History:  Diagnosis Date    Acute systolic heart failure (HCC) 02/23/2021    Athlete's foot     Bilateral impacted cerumen 03/09/2021    Coronary artery disease 07/30/2019    bypass and valve replacement    Diabetes mellitus (HCC)     Heart murmur     Other hydrocele 06/11/2020    TMJ (dislocation of temporomandibular joint)     Yeast infection    [3]   Past Surgical History:  Procedure Laterality Date    CARDIAC SURGERY      CARDIAC VALVE REPLACEMENT      CATARACT EXTRACTION      COLONOSCOPY      CORONARY ARTERY BYPASS GRAFT      EYE SURGERY  03/2020    Cataract right eye    EYE SURGERY Left 12/03/2020    left eye cataract     HYDROCELE EXCISION / REPAIR      IR BIOPSY SOFT TISSUE/SUPERFICIAL  3/14/2025    WA EXCISION HYDROCELE UNILATERAL Right 04/08/2022    Procedure: HYDROCELECTOMY;  Surgeon: Montrell Mancuso MD;  Location: [facility];  Service: Urology    WA EXCISION HYDROCELE UNILATERAL Right 11/16/2022    Procedure: HYDROCELECTOMY;  Surgeon: Montrell Mancuso MD;  Location: [facility];  Service: Urology    TONSILLECTOMY     [4]   Family History  Problem Relation Name Age of Onset    Heart disease Mother Laceykj Chavez     Cancer Mother Lacey Chavez         skin melonoma/mastectomy    Heart disease Father Torsten Chavez     Hypertension Father Torsten Chavez     Mitral valve prolapse Sister      Diabetes type II Maternal Grandmother do not know     Colon cancer Neg Hx

## 2025-05-31 NOTE — PATIENT INSTRUCTIONS
To help clear out the mucus and reduce post-nasal drip which can cause sore throat and cough - use saline nasal spray or saline nasal rinse (with distilled or boiled water).    I recommend using nasal saline spray at least twice daily.  Especially helpful to use room to have a steamy shower to help clear out the mucus so your having less postnasal drip.  After clearing of the mucus, use the Flonase (fluticasone) nasal spray nightly to help reduce sinus inflammation and postnasal drip.    To soothe sore throat, you may try warm tea with honey, warm salt water gargles.  You may take Tylenol or Motrin to help with throat pain or muscle aches.  Stay well hydrated and get plenty of rest.     If your symptoms are worsening or fail to improve in the coming days, please return to see us or schedule with your PCP.  If you develop any severe/worsening symptoms please go to the ER.      Nasal Saline Rinse:

## 2025-06-03 DIAGNOSIS — R60.9 EDEMA, UNSPECIFIED TYPE: ICD-10-CM

## 2025-06-03 RX ORDER — FUROSEMIDE 20 MG/1
20 TABLET ORAL DAILY
Qty: 90 TABLET | Refills: 1 | Status: SHIPPED | OUTPATIENT
Start: 2025-06-03 | End: 2025-06-09 | Stop reason: SDUPTHER

## 2025-06-09 RX ORDER — FUROSEMIDE 20 MG/1
20 TABLET ORAL DAILY
Qty: 90 TABLET | Refills: 1 | Status: SHIPPED | OUTPATIENT
Start: 2025-06-09 | End: 2025-12-06

## 2025-06-09 NOTE — TELEPHONE ENCOUNTER
"Patient called checking on status of refill for furosemide (LASIX). Patient made aware medication was refilled on 6/3 for 90 tablets [facility] [place]. It appeared the doctor had \"phone-in\" selected so it never actually made it to the pharmacy. I resent it. Patient instructed to contact the pharmacy and speak with someone directly to obtain refills of medication. Patient advised to call back for refill if their pharmacy is unable to assist them. Patient verbalized understanding.  "

## 2025-06-16 DIAGNOSIS — I25.118 CORONARY ARTERY DISEASE OF NATIVE ARTERY OF NATIVE HEART WITH STABLE ANGINA PECTORIS (HCC): ICD-10-CM

## 2025-06-16 RX ORDER — ROSUVASTATIN CALCIUM 10 MG/1
10 TABLET, COATED ORAL DAILY
Qty: 90 TABLET | Refills: 1 | Status: SHIPPED | OUTPATIENT
Start: 2025-06-16

## 2025-06-19 ENCOUNTER — OFFICE VISIT (OUTPATIENT)
Dept: DIABETES SERVICES | Facility: CLINIC | Age: 75
End: 2025-06-19
Payer: MEDICARE

## 2025-06-19 VITALS — BODY MASS INDEX: 27.52 KG/M2 | WEIGHT: 208.6 LBS

## 2025-06-19 DIAGNOSIS — E11.65 TYPE 2 DIABETES MELLITUS WITH HYPERGLYCEMIA, WITHOUT LONG-TERM CURRENT USE OF INSULIN (HCC): Primary | ICD-10-CM

## 2025-06-19 PROCEDURE — 97802 MEDICAL NUTRITION INDIV IN: CPT

## 2025-06-19 NOTE — PROGRESS NOTES
"Medical Nutrition Therapy    Assessment    Visit Type: Initial visit  Chief complaint/Medical Diagnosis/reason for visit: E11.65 (ICD-10-CM) - Type 2 diabetes mellitus with hyperglycemia, without long-term current use of insulin ([facility])     HPI Jus Chavez was seen today unaccompanied regarding type 2 diabetes. Patient is currently taking 25 mg of jardiance daily, 500 mg of metformin twice daily, 1 mg of prandin with meals and 100 mg of januvia daily. Last HbA1c was 8.3% in April 2025. Conducted dietary recall. See below for details. Patient brought in blood sugar log and food log which was scanned into media the media tab. Explained basic pathophysiology of diabetes and impact of diet on blood glucose levels. Together we discussed what foods contain carbohydrates, reading a food label, timing of meals and snacks, serving sizes, the role of fiber, the importance of consistent carbohydrate intake in the appropriate amounts. Used the portion booklet to teach Jus more about food groups and basic carbohydrate counting. Encouraged 3 regular meals ~4-5 hours apart with 1-2 snacks per day as needed. Discussed keeping carbohydrate intake consistent. Goal is 45-60 grams of carbohydrate per meal and 0-15 grams of carbohydrate per snack. Wrote out sample meal plan for patient. Jus demonstrated good understanding and will call with any questions or if he would like to schedule a follow-up visit.    Ht Readings from Last 1 Encounters:   05/09/25 6' 1\" (1.854 m)     Wt Readings from Last 3 Encounters:   06/19/25 94.6 kg (208 lb 9.6 oz)   05/31/25 95.3 kg (210 lb)   05/09/25 94.3 kg (208 lb)     Weight Change: No    Barriers to Learning: no barriers    Do you follow any special diet presently?: No  Who shops: patient and spouse  Who cooks: spouse - mainly, but patient helps    Food Log: Completed via the method of usual daily food recall. See media tab for food log brought in by patient.    Breakfast: 8 AM 2 cups " oatmeal made with water 1/4 c dried cranberries, 1 cup coffee with 1/3 chalf and half and splenda, yogurt, 1/3 cup granola, osiris - 9 grams 4.5  Morning Snack: none  Lunch: 1:30-2:00 PM - 1 cup potato salad, 1 rice cake, 3/4 cup of radha slaw, robbin, 15-20 chips  Afternoon Snack: small bag of trail mix - 1/4 - 12 grams or energy bar, tostitos - with salsa or hummus - 12-15 chips  Dinner: 7 PM - pasta with sauce big portion, bread and cheese, fish, sometimes with tuna mixed into pasta, hamburger (1-2 patties), 1 cup brown rice, 1 cup of couscous, half a large 1 or 1 small one  Evening Snack: 2-3 nights a week after dinner, apple slices, popcorn, granola, small bag smart pop popcorn, vanilla 1/3 cup  Beverages: water 3-4, decaf coffee, 0-2 glass a wine a week, 1 few beers with dinner  Eating out/Take out: 3 weeks - pizza; 3 and chinese - pork fried rice and pork or chicken and vegetables - noodles  Exercise: goes for walks 3-4 times a week, ankle bothering him and issue elbow    Estimated calorie needs: 1,820 kcals/day   Carbohydrate: 45-60 g/meal, 0-15 g/snack       Fat: 5 servings/day      Protein: 8 ounces/day    Nutrition Diagnosis:  Inconsistent carbohydrate intake related to food and nutrition related knowledge deficit concerning appropriate amount and timing of carbohydrate intake as evidenced by estimated carbohydrate intake that is different from recommended types or ingested on an irregular basis.    Intervention: increased fiber intake, label reading, behavior modification strategies, carbohydrate counting, meal timing, meal planning, individualized meal plan, monitoring portion control, and exercise guidelines     Treatment Goals: Patient understands education and recommendations, Patient will consume 3 meals a day, Patient will monitor portion control, Patient will consume snacks, Patient will count carbohydrates, and Patient will exercise    Monitoring and evaluation:    Term code indicator  FH 4.4  Mealtime Behavior Criteria: Eat 3 regular meals ~4-5 hours apart with 1-2 snacks per day as needed.  Term code indicator  FH 1.6.3 Carbohydrate Intake Criteria: Keep carbohydrate intake consistent. Aim for 45-60 grams of carbohydrate per meal and 0-15 grams of carbohydrate per snack.    Materials Provided: Portion booklet    Patient’s Response to Instruction:  Comprehension: good  Motivation: good  Expected Compliance: good    Start- Stop: 9:30-10:30  Total Minutes: 60 Minutes  Group or Individual Instruction: MNT-I  Other: HESHAM Villagomez    Thank you for coming to the [facility] for education today. Please feel free to call with any questions or concerns.    Nidia Ellington, MS, RD, LDN  [address]  [place] [phone]

## 2025-06-23 ENCOUNTER — TELEPHONE (OUTPATIENT)
Dept: DIABETES SERVICES | Facility: CLINIC | Age: 75
End: 2025-06-23

## 2025-07-03 ENCOUNTER — OFFICE VISIT (OUTPATIENT)
Dept: OBGYN CLINIC | Facility: MEDICAL CENTER | Age: 75
End: 2025-07-03
Payer: MEDICARE

## 2025-07-03 VITALS — WEIGHT: 208.6 LBS | BODY MASS INDEX: 27.65 KG/M2 | HEIGHT: 73 IN

## 2025-07-03 DIAGNOSIS — M60.9: Primary | ICD-10-CM

## 2025-07-03 PROCEDURE — 99214 OFFICE O/P EST MOD 30 MIN: CPT | Performed by: STUDENT IN AN ORGANIZED HEALTH CARE EDUCATION/TRAINING PROGRAM

## 2025-07-03 NOTE — PATIENT INSTRUCTIONS
proliferative myositis     SOFT TISSUE, LEFT ARM MASS, BIOPSY:    - Fibroblastic / myofibroblastic proliferation with metaplastic bone formation, most compatible with proliferative myositis.    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    - The case is sent out to [facility] for additional consultation; see note for their full report, including addendum.

## 2025-07-03 NOTE — PROGRESS NOTES
Orthopedic Oncology Surgery Office Note  Jus Chavez (75 y.o. male)  : 1950 Encounter Date: 7/3/2025  Encounter Department: [facility] EMILIA Howard DO, Orthopedic Surgeon  Orthopedic Oncology & Sarcoma Surgery   Phone:[phone] Fax:[phone]    Assessment, Plan, & Discussion:   :  Assessment & Plan  Myositis of left forearm, unspecified myositis type  Reviewed pathology since further testing with [facility]; able to rule out any malignant diagnosis vs myositis ossificans  Reviewed diagnosis in relation to history of diabetes  Answered questions regarding chance of spread- explained pathology in the sense of gout where there is a risk of it occurring in multiple locations, but not necessarily by spread  Continue improvement to range of motion and activities of daily living  Encouraged continued management of diabetes   No indication to follow further  If physical exam changes, reimaging can be considered  in the future            Comorbidity, including: s/p CABG, s/p AVR; T2DM, afib, peripheral venous insufficiency, IBS, thrombocytopenia,   - continue current management     Surgical Planning:   No surgery planned at this time  Future surgical planning based on imaging results    Follow Up & Tasks:     No follow-ups on file.   _____________________________________________________________    History of Present Illness:     Today, he is here for further follow up, interval review of progress on his elbow range of motion, and review of MRI/new path     Interval HPI:   4/3/25: Jus Chavez is a 75 y.o. male with history of mass/swelling of left elbow who presents for consultation at the request of No ref. provider found  regarding s/p biopsy of left elbow mass.     He has been to PCP for suspected gout, vascular surgery for phlebitis vs other concern for increased swelling over time.  He describes that he was told that it could be cyst versus mass  and it was aspirated without any results on cytology, then biopsy was taken showing spindle cells but inconclusive.  MRI was then taken showing a mass that favored benign origin but with the biopsy diagnosed spindle cell further evaluation would be warranted.  The biopsy pathology was sent out for further study and preliminary read only shows fibro-osseous changes is pending for further review    He describes majority of his pain coming when he is tying his shoes.  He is in a flexed position with his head down and attempt to straighten the elbow.  He has most of his pain with extension and has noticed himself carrying his elbow in flexion for 3 weeks now, leading to stiffness and pain.  He denies any Tinel's.  He denies getting bigger or smaller.  He reports that the swelling that he had has decreased in size but he has a lot of residual fluid throughout the hand.  He reports that a few weeks ago he was unable to wear a watch or ring but that has since resolved    At baseline patient gaits without  assistance.  No noted constitutional symptoms such as fever, chills, night sweats, fatigue, weight gains/losses. No noted  chest pain/shortness of breath.  Patient has  personal history of cancer. - skin cancer removed from posterior knee, no systemic treatment or radiation required    Review of Systems:   Allergies, medications, past medical/surgical/family/social history have been reviewed.  Complete 12 system review performed and found to be negative except: except as per mentioned in HPI.    Oncology and Treatment History:      Review of referring provider's records:  Referring provider: No ref. provider found    Patient Care team:   Patient Care Team:  HESHAM Matias as PCP - General (Family Medicine)  Montrell Mancuso MD as Consulting Physician (Urology)  Aaron Bailon MD as Consulting Physician (Endocrinology)  Kendall Miller MD as Consulting Physician (Cardiology)  Magalys Calix MD as  "Consulting Physician (Ophthalmology)  Demian Owen DPM as Consulting Physician (Podiatry)  Jonelle Bunch DC as Consulting Physician (Chiropractic Medicine)  HESHAM Villagomez as Nurse Practitioner (Endocrinology)     Oncology History    No history exists.       Physical Examination:     Height: 6' 1\" (185.4 cm)  Weight - Scale: 94.6 kg (208 lb 9.6 oz)  BMI (Calculated): 27.5  BSA (Calculated - m2): 2.19 sq meters  Pain Assessment  Pain Loc: Elbow     There were no vitals filed for this visit.  Body mass index is 27.52 kg/m².    General: alert and oriented; well nourished/well developed; no apparent distress.   Present with spouse  Psychiatric: normal mood and affect  HEENT: NCAT. Head/neck - full range of motion.   Cardiovascular: no chest pain, no palpitations, no discernable arrhythmia   Lungs: breathing comfortably; equal symmetric chest expansion.   Abdomen: soft, non-tender, non-distended.   Skin: warm; dry; no lesions, rashes, petechiae or purpura; no clubbing, no cyanosis, no edema, + palpable masses.    Extremity: left elbow   Inspection: no edema, skin abnormalities throughout   Palpation: + palpable masses or lesions   Range of motion of joints: WFL range of motion all extremities.   Motor strength: WNL all extremities. : intact.   Sensation: grossly intact to all extremities.    Pulses: intact   Lymphatics: (no obvious) lymphadenopathy  Gait: normal gait.    AROM full and pain free       Imaging Results:   All images personally review today by Dr. Howard    Study: MRI left elbow w wo  Date: 3/18/25  Report: I have read and agree with the radiologist report. and I have personally reviewed the imaging in PACS and my impression is as follows:  5.3 cm heterogeneously enhancing masslike thickening of the distal brachialis muscle. Appearance on this examination would be consistent with brachialis myotendinous junction strain and intramuscular hematoma, however biopsy has already " been performed   demonstrating spindle cell neoplasm. Overall, appearance may be due to in part to brachialis myotendinous junction strain with underlying spindle cell neoplasm or infiltrative neoplasm with small volume postbiopsy blood products. No osseous involvement.    Study: CT venogram LUE  Date: 3/10/25  Report: I have read and agree with the radiologist report. and I have personally reviewed the imaging in PACS and my impression is as follows:  Discrete fluid collection in the antecubital fossa, apparently along or within the muscle/tendon, possibly the brachialis. This may represent abscess or muscle tear, but is not a joint effusion.     We can consider needle aspiration if clinically appropriate. I personally discussed this with Dr. Enamorado of orthopedics at the time of this interpretation     In this arm up position there is extrinsic compression of the left subclavian vein between the clavicle and rib. Additionally there is narrowing of the brachiocephalic vein. No deep venous thrombosis, in agreement with prior duplex.    Study: 3/3/25  Date: 3/3/25  Report: I have read and agree with the radiologist report. and I have personally reviewed the imaging in PACS and my impression is as follows:  No acute fracture or dislocation.  Mild first CMC joint degenerative change.  No lytic or blastic osseous lesion.  Unremarkable soft tissues.    Pathology & Pertinent Laboratory Findings:      Pertinent laboratory findings:  N/A    Pathology: Pending further analysis    SOFT TISSUE, LEFT ARM MASS, BIOPSY:    - Fibroblastic / myofibroblastic proliferation with metaplastic bone formation, most compatible with proliferative myositis.    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    - The case is sent out to [facility] for additional consultation; see note for their full report, including addendum.     Reviewed with agreement in intradepartmental  "consensus. Preliminary results communicated to [place] and TIMI Enamorado by Dr. JESSIKA Esteban via Epic Secure Chat on 03/17/2025, at 1245, and final results on 04/18/2025 at 1311.    Electronically signed by London Esteban MD on 4/29/2025 at 1750 EDT  Preliminary result electronically signed by London Esteban MD on 3/17/2025 at 1614 EDT   Note    H&E stained sections show skeletal muscle and adjacent soft tissues involved by an infiltrative spindle cell proliferation, with areas of epithelioid osteoblastic cells; this raises a differential diagnosis of both benign and malignant entities, however, . The case is sent out to [facility]ology for more expert consultation.      ADDITIONAL LABS:       CYTOGENETIC FISH STUDIES:   Cytogenetic FISH studies are performed (USP6 Rearrangement) and are reportedly negative; ([facility]; see separate report):               [facility] Bone and Soft Tissue Consultation:  Soft tissue mass, left upper arm, core biopsy:  - Fibroblastic / myofibroblastic proliferation with metaplastic bone formation, most compatible with proliferative myositis.  See comment.     Diagnosis Comment  Thank you for sending this arm mass biopsy for review. Per report, the patient presented with left elbow swelling and tenderness for a few weeks, CT venogram detected a fluid collection.     A comprehensive consultation, including review of the provided imaging studies was performed for this case.     Histologic sections show an intramuscular spindle cell proliferation composed of bland fibroblasts and myofibroblasts arranged in loose fascicles infiltrating skeletal muscle fibers, impacting a \"checkerboard pattern\". Additionally, there are foci of metaplastic bone formation with prominent osteoblastic and osteoclastic rimming, as well as organizing hemorrhage. No significant cytologic atypia is identified.      FISH testing for UPS6 " rearrangement was performed at the [facility] as a send out test from the [facility], which is negative.      The provided plain film and ultrasound imaging studies was reviewed by our expert musculoskeletal radiologist Khari Curry, who did not see concrete evidence of mineralization, which may be related to the fact that imaging studies were performed at an earlier stage of the lesion.      This is a challenging case, and I agree with your morphologic description. Given the morphologic features, this lesion is best classified as fibroblastic/myofibroblastic proliferation with metaplastic bone formation, favor benign/reactive and most compatible with proliferative myositis, which can demonstrate metaplastic bone formation. Negative USP6 rearrangement argues against myositis ossificans. There is no evidence of malignancy. If more current imaging studies are available, we would be more than happy to review an provide an addendum.     Thank you again for the opportunity to provide an opinion in this case.  Please call the Bone and Soft Tissue Pathology Consultation Service at [phone] with questions or if additional follow-up information becomes available regarding this patient. The case was reviewed in conjunction with the bone and soft tissue pathology fellow, Joselito Gibbons MD.     Elsa Harris MD     Addendum  Additional imaging studies (MRI and CT) were received and reviewed with Dr. Khari Murdock, our expert musculoskeletal radiologist.  He noted diffuse swelling and edematous changes of a deep muscle group at the left upper arem.  There is also central areas with signal characteristics of necrosis.  There is no radiologic evidence of a neoplasm.  Overall, these findings are those of a reactive/inflammatory process.  The radiologic features of most reminiscent of diabetic myonecrosis, a condition that occurs in patients with chronic poor glycemic control.  Of note, the provided  clinical note did mention a history of diabetes mellitus.  Based on available literature,  conservative management is preferred in cases of myonecrosis because of the possibility of complication associated with surgery (PMID: 20464519).       Microbiology:  Cultures:       Medical, Surgical, Family, and Social History      Past Medical History:   Diagnosis Date    Acute systolic heart failure (HCC) 02/23/2021    Athlete's foot     Bilateral impacted cerumen 03/09/2021    Coronary artery disease 07/30/2019    bypass and valve replacement    Diabetes mellitus (HCC)     Heart murmur     Other hydrocele 06/11/2020    TMJ (dislocation of temporomandibular joint)     Yeast infection      Past Surgical History:   Procedure Laterality Date    CARDIAC SURGERY      CARDIAC VALVE REPLACEMENT      CATARACT EXTRACTION      COLONOSCOPY      CORONARY ARTERY BYPASS GRAFT      EYE SURGERY  03/2020    Cataract right eye    EYE SURGERY Left 12/03/2020    left eye cataract     HYDROCELE EXCISION / REPAIR      IR BIOPSY SOFT TISSUE/SUPERFICIAL  3/14/2025    IA EXCISION HYDROCELE UNILATERAL Right 04/08/2022    Procedure: HYDROCELECTOMY;  Surgeon: Montrell Mancuso MD;  Location: [facility];  Service: Urology    IA EXCISION HYDROCELE UNILATERAL Right 11/16/2022    Procedure: HYDROCELECTOMY;  Surgeon: Montrell Mancuso MD;  Location: [facility];  Service: Urology    TONSILLECTOMY         Current Outpatient Medications:     Aspirin 81 MG CAPS, , Disp: , Rfl:     benzonatate (TESSALON) 200 MG capsule, Take 1 capsule (200 mg total) by mouth 3 (three) times a day as needed for cough, Disp: 30 capsule, Rfl: 0    Cholecalciferol 125 MCG (5000 UT) TABS, Take by mouth, Disp: , Rfl:     Cyanocobalamin (VITAMIN B-12 PO), Take 1,000 mcg by mouth in the morning and 1,000 mcg in the evening., Disp: , Rfl:     finasteride (PROSCAR) 5 mg tablet, Take 1 tablet (5 mg total) by mouth daily, Disp: 90 tablet, Rfl: 1    fluticasone (FLONASE) 50  mcg/act nasal spray, 2 sprays into each nostril daily, Disp: 11.1 mL, Rfl: 0    furosemide (LASIX) 20 mg tablet, Take 1 tablet (20 mg total) by mouth daily, Disp: 90 tablet, Rfl: 1    Jardiance 25 MG TABS, Take 1 tablet (25 mg total) by mouth daily, Disp: 30 tablet, Rfl: 5    ketoconazole (NIZORAL) 2 % cream, Apply topically in the morning., Disp: , Rfl:     metFORMIN (GLUCOPHAGE) 500 mg tablet, TAKE TWO TABLETS BY MOUTH TWICE A DAY, Disp: 360 tablet, Rfl: 1    metoprolol succinate (TOPROL-XL) 25 mg 24 hr tablet, TAKE ONE TABLET BY MOUTH EVERY DAY, Disp: 90 tablet, Rfl: 1    mometasone (ELOCON) 0.1 % cream, Apply topically daily, Disp: 45 g, Rfl: 1    niacin 500 mg tablet, Take 500 mg by mouth in the morning and 500 mg at noon and 500 mg in the evening and 500 mg before bedtime., Disp: , Rfl:     NON FORMULARY, 25 mcg Vitamin D, Disp: , Rfl:     Omega-3 Fatty Acids (FISH OIL BURP-LESS PO), Take by mouth, Disp: , Rfl:     PROBIOTIC PRODUCT PO, Take 111 mg by mouth in the morning., Disp: , Rfl:     repaglinide (PRANDIN) 1 mg tablet, Take 1 tablet (1 mg total) by mouth 3 (three) times a day before meals, Disp: 270 tablet, Rfl: 1    rosuvastatin (CRESTOR) 10 MG tablet, TAKE ONE TABLET BY MOUTH ONCE DAILY, Disp: 90 tablet, Rfl: 1    simethicone (MYLICON) 125 MG chewable tablet, Chew 125 mg every 6 (six) hours as needed for flatulence, Disp: , Rfl:     sitaGLIPtin (JANUVIA) 100 mg tablet, Take 1 tablet (100 mg total) by mouth daily, Disp: 30 tablet, Rfl: 11    tamsulosin (FLOMAX) 0.4 mg, TAKE ONE CAPSULE BY MOUTH EVERY DAY WITH DINNER, Disp: 30 capsule, Rfl: 5    Tart Cherry (Tart Cherry Ultra) 1200 MG CAPS, , Disp: , Rfl:     Turmeric 500 MG CAPS, , Disp: , Rfl:     Wheat Dextrin (BENEFIBER DRINK MIX PO), Take by mouth, Disp: , Rfl:     acetaminophen (TYLENOL) 500 mg tablet, Take 500 mg by mouth daily at bedtime (Patient not taking: Reported on 5/31/2025), Disp: , Rfl:   Allergies   Allergen Reactions    Pollen Extract  Sneezing     Family History   Problem Relation Name Age of Onset    Heart disease Mother Lacey Chavez     Cancer Mother Lacey Chavez         skin melonoma/mastectomy    Heart disease Father Torsten Chavez     Hypertension Father Torsten Chavez     Mitral valve prolapse Sister      Diabetes type II Maternal Grandmother do not know     Colon cancer Neg Hx       Social History     Socioeconomic History    Marital status: /Civil Union     Spouse name: Not on file    Number of children: Not on file    Years of education: Not on file    Highest education level: Not on file   Occupational History    Not on file   Tobacco Use    Smoking status: Never    Smokeless tobacco: Never   Vaping Use    Vaping status: Never Used   Substance and Sexual Activity    Alcohol use: Yes     Alcohol/week: 4.0 standard drinks of alcohol     Types: 2 Glasses of wine, 1 Cans of beer, 1 Shots of liquor per week     Comment: 2-3 glass of wine or  beer a week    Drug use: Never    Sexual activity: Not Currently     Partners: Female     Birth control/protection: None   Other Topics Concern    Not on file   Social History Narrative    Not on file     Social Drivers of Health     Financial Resource Strain: Low Risk  (3/11/2024)    Overall Financial Resource Strain (CARDIA)     Difficulty of Paying Living Expenses: Not very hard   Food Insecurity: No Food Insecurity (3/16/2025)    Nursing - Inadequate Food Risk Classification     Worried About Running Out of Food in the Last Year: Never true     Ran Out of Food in the Last Year: Never true     Ran Out of Food in the Last Year: Not on file   Transportation Needs: No Transportation Needs (3/16/2025)    PRAPARE - Transportation     Lack of Transportation (Medical): No     Lack of Transportation (Non-Medical): No   Physical Activity: Inactive (7/26/2019)    Received from [facility] [facility]    Exercise Vital Sign     Days of Exercise per Week: 0 days     Minutes of Exercise  per Session: 0 min   Stress: Not on file   Social Connections: Not on file   Intimate Partner Violence: Not on file   Housing Stability: Low Risk  (3/16/2025)    Housing Stability Vital Sign     Unable to Pay for Housing in the Last Year: No     Number of Times Moved in the Last Year: 0     Homeless in the Last Year: No       This Visit:     Scribe Attestation      I,:  Ivan Spencer am acting as a scribe while in the presence of the attending physician.:       I,:  Iam Howard DO personally performed the services described in this documentation    as scribed in my presence.:           I, Max Gonzalez PA-C, scribed this note in conjunction with and in the presence of Dr. Iam Howard DO, who performed parts of the services as described in this documentation

## 2025-07-06 DIAGNOSIS — E11.65 TYPE 2 DIABETES MELLITUS WITH HYPERGLYCEMIA, WITHOUT LONG-TERM CURRENT USE OF INSULIN (HCC): ICD-10-CM

## 2025-07-08 ENCOUNTER — TELEPHONE (OUTPATIENT)
Dept: ENDOCRINOLOGY | Facility: CLINIC | Age: 75
End: 2025-07-08

## 2025-07-21 ENCOUNTER — OFFICE VISIT (OUTPATIENT)
Dept: FAMILY MEDICINE CLINIC | Facility: CLINIC | Age: 75
End: 2025-07-21
Payer: MEDICARE

## 2025-07-21 ENCOUNTER — TELEPHONE (OUTPATIENT)
Dept: ADMINISTRATIVE | Facility: OTHER | Age: 75
End: 2025-07-21

## 2025-07-21 VITALS
RESPIRATION RATE: 17 BRPM | OXYGEN SATURATION: 95 % | WEIGHT: 210 LBS | BODY MASS INDEX: 27.83 KG/M2 | DIASTOLIC BLOOD PRESSURE: 64 MMHG | HEIGHT: 73 IN | SYSTOLIC BLOOD PRESSURE: 126 MMHG | HEART RATE: 57 BPM | TEMPERATURE: 97 F

## 2025-07-21 DIAGNOSIS — E11.42 DIABETIC PERIPHERAL NEUROPATHY ASSOCIATED WITH TYPE 2 DIABETES MELLITUS (HCC): ICD-10-CM

## 2025-07-21 DIAGNOSIS — I48.91 POSTOPERATIVE ATRIAL FIBRILLATION (HCC): ICD-10-CM

## 2025-07-21 DIAGNOSIS — E78.1 HYPERTRIGLYCERIDEMIA: ICD-10-CM

## 2025-07-21 DIAGNOSIS — E78.2 MIXED HYPERLIPIDEMIA: ICD-10-CM

## 2025-07-21 DIAGNOSIS — I97.89 POSTOPERATIVE ATRIAL FIBRILLATION (HCC): ICD-10-CM

## 2025-07-21 DIAGNOSIS — I10 ESSENTIAL HYPERTENSION: Primary | ICD-10-CM

## 2025-07-21 DIAGNOSIS — E08.22 DIABETES MELLITUS DUE TO UNDERLYING CONDITION WITH CHRONIC KIDNEY DISEASE, WITHOUT LONG-TERM CURRENT USE OF INSULIN, UNSPECIFIED CKD STAGE (HCC): ICD-10-CM

## 2025-07-21 PROBLEM — R21 RASH: Status: RESOLVED | Noted: 2025-03-28 | Resolved: 2025-07-21

## 2025-07-21 PROBLEM — R60.0 EDEMA OF LEFT UPPER ARM: Status: RESOLVED | Noted: 2025-03-04 | Resolved: 2025-07-21

## 2025-07-21 PROBLEM — R42 DIZZINESS: Status: RESOLVED | Noted: 2023-08-14 | Resolved: 2025-07-21

## 2025-07-21 PROBLEM — M79.602 LEFT ARM PAIN: Status: RESOLVED | Noted: 2025-03-04 | Resolved: 2025-07-21

## 2025-07-21 PROCEDURE — G2211 COMPLEX E/M VISIT ADD ON: HCPCS | Performed by: NURSE PRACTITIONER

## 2025-07-21 PROCEDURE — 99214 OFFICE O/P EST MOD 30 MIN: CPT | Performed by: NURSE PRACTITIONER

## 2025-07-21 NOTE — ASSESSMENT & PLAN NOTE
Cont meds and f/u with endo  Lab Results   Component Value Date    HGBA1C 8.3 (H) 04/09/2025

## 2025-07-21 NOTE — ASSESSMENT & PLAN NOTE
Stable  Cont meds      Orders:    Comprehensive metabolic panel; Future    Hemoglobin A1C; Future    CBC and differential; Future    Lipid Panel with Direct LDL reflex; Future

## 2025-07-21 NOTE — ASSESSMENT & PLAN NOTE
Cont meds      Orders:    Comprehensive metabolic panel; Future    Hemoglobin A1C; Future    CBC and differential; Future    Lipid Panel with Direct LDL reflex; Future

## 2025-07-21 NOTE — PROGRESS NOTES
Diabetic Foot Exam    Patient's shoes and socks removed.    Right Foot/Ankle   Right Foot Inspection  Skin Exam: skin normal, skin intact, callus and callus. No dry skin, no warmth, no erythema, no maceration, no abnormal color, no pre-ulcer and no ulcer.     Toe Exam: ROM and strength within normal limits.     Sensory   Vibration: intact  Proprioception: intact  Monofilament testing: intact    Vascular  Capillary refills: < 3 seconds  The right DP pulse is 2+. The right PT pulse is 2+.     Left Foot/Ankle  Left Foot Inspection  Skin Exam: skin normal, skin intact and callus. No dry skin, no warmth, no erythema, no maceration, normal color, no pre-ulcer and no ulcer.     Toe Exam: ROM and strength within normal limits.     Sensory   Vibration: intact  Proprioception: intact  Monofilament testing: intact    Vascular  Capillary refills: < 3 seconds  The left DP pulse is 2+. The left PT pulse is 2+.     Assign Risk Category  No deformity present  No loss of protective sensation  No weak pulses  Risk: 0  Name: Jus Chavez      : 1950      MRN: [id number]  Encounter Provider: HESHAM Matias  Encounter Date: 2025   Encounter department: [facility]    Assessment & Plan  Essential hypertension  Stable  Cont meds      Orders:    Comprehensive metabolic panel; Future    Hemoglobin A1C; Future    CBC and differential; Future    Lipid Panel with Direct LDL reflex; Future    Hypertriglyceridemia  Cont meds      Orders:    Comprehensive metabolic panel; Future    Hemoglobin A1C; Future    CBC and differential; Future    Lipid Panel with Direct LDL reflex; Future    Mixed hyperlipidemia  Cont meds      Orders:    Comprehensive metabolic panel; Future    Hemoglobin A1C; Future    CBC and differential; Future    Lipid Panel with Direct LDL reflex; Future    Diabetes mellitus due to underlying condition with chronic kidney disease, without long-term current use of insulin, unspecified CKD  stage (HCC)    Lab Results   Component Value Date    HGBA1C 8.3 (H) 04/09/2025       Orders:    Hemoglobin A1C; Future    Postoperative atrial fibrillation (HCC)  Rate controlled  Cont meds           Diabetic peripheral neuropathy associated with type 2 diabetes mellitus (HCC)  Cont meds and f/u with endo  Lab Results   Component Value Date    HGBA1C 8.3 (H) 04/09/2025                 History of Present Illness     Here for f/u to chronic medical conditions  Left arm back to normal mostly, minimal swelling left    Tolera ting meds without side affects  Feeling well overall  Following with podiatry  Following with endocrinology  Following with cardiology        Review of Systems   Constitutional:  Negative for fatigue and fever.   HENT:  Negative for congestion, postnasal drip and rhinorrhea.    Eyes:  Negative for photophobia and visual disturbance.   Respiratory:  Negative for cough, shortness of breath and wheezing.    Cardiovascular:  Negative for chest pain and palpitations.   Gastrointestinal:  Negative for constipation, diarrhea, nausea and vomiting.   Genitourinary:  Negative for dysuria and frequency.   Musculoskeletal:  Negative for arthralgias and myalgias.   Skin:  Negative for rash.   Neurological:  Negative for dizziness, light-headedness and headaches.   Hematological:  Negative for adenopathy.   Psychiatric/Behavioral:  Negative for dysphoric mood and sleep disturbance. The patient is not nervous/anxious.      Past Medical History[1]  Past Surgical History[2]  Family History[3]  Social History[4]  Medications[5]  Allergies   Allergen Reactions    Pollen Extract Sneezing     Immunization History   Administered Date(s) Administered    COVID-19 PFIZER VACCINE 0.3 ML IM 03/07/2021, 03/26/2021, 10/07/2021    COVID-19 Pfizer Vac BIVALENT Rubio-sucrose 12 Yr+ IM 10/31/2022    COVID-19 Pfizer mRNA vacc PF rubio-sucrose 12 yr and older (Comirnaty) 11/14/2023, 01/07/2025    INFLUENZA 10/31/2022, 01/07/2025     "Influenza, high dose seasonal 0.7 mL 10/27/2021, 10/14/2023    Pneumococcal Conjugate Vaccine 20-valent (Pcv20), Polysace 11/17/2023    Pneumococcal Polysaccharide PPV23 10/27/2021    Zoster Vaccine Recombinant 03/21/2023, 05/22/2023     Objective   /64 (BP Location: Left arm, Patient Position: Sitting, Cuff Size: Standard)   Pulse 57   Temp (!) 97 °F (36.1 °C) (Tympanic)   Resp 17   Ht 6' 1\" (1.854 m)   Wt 95.3 kg (210 lb)   SpO2 95%   BMI 27.71 kg/m²     Physical Exam  Vitals and nursing note reviewed.   Constitutional:       Appearance: Normal appearance.   HENT:      Head: Normocephalic and atraumatic.      Right Ear: Tympanic membrane, ear canal and external ear normal.      Left Ear: Tympanic membrane, ear canal and external ear normal.      Nose: Nose normal.      Mouth/Throat:      Mouth: Mucous membranes are moist.     Eyes:      Conjunctiva/sclera: Conjunctivae normal.       Cardiovascular:      Rate and Rhythm: Normal rate and regular rhythm.      Pulses: Normal pulses. no weak pulses.           Dorsalis pedis pulses are 2+ on the right side and 2+ on the left side.        Posterior tibial pulses are 2+ on the right side and 2+ on the left side.      Heart sounds: Normal heart sounds.   Pulmonary:      Effort: Pulmonary effort is normal.      Breath sounds: Normal breath sounds.   Abdominal:      Palpations: Abdomen is soft.     Musculoskeletal:         General: Normal range of motion.      Cervical back: Normal range of motion and neck supple.   Feet:      Right foot:      Skin integrity: Callus present. No ulcer, skin breakdown, erythema, warmth or dry skin.      Left foot:      Skin integrity: Callus present. No ulcer, skin breakdown, erythema, warmth or dry skin.     Skin:     General: Skin is warm and dry.      Capillary Refill: Capillary refill takes less than 2 seconds.     Neurological:      General: No focal deficit present.      Mental Status: He is alert and oriented to person, " place, and time.     Psychiatric:         Mood and Affect: Mood normal.         Behavior: Behavior normal.         Thought Content: Thought content normal.         Judgment: Judgment normal.              [1]   Past Medical History:  Diagnosis Date    Acute systolic heart failure (HCC) 02/23/2021    Athlete's foot     Bilateral impacted cerumen 03/09/2021    Coronary artery disease 07/30/2019    bypass and valve replacement    Diabetes mellitus (HCC)     Heart murmur     Other hydrocele 06/11/2020    TMJ (dislocation of temporomandibular joint)     Yeast infection    [2]   Past Surgical History:  Procedure Laterality Date    CARDIAC SURGERY      CARDIAC VALVE REPLACEMENT      CATARACT EXTRACTION      COLONOSCOPY      CORONARY ARTERY BYPASS GRAFT      EYE SURGERY  03/2020    Cataract right eye    EYE SURGERY Left 12/03/2020    left eye cataract     HYDROCELE EXCISION / REPAIR      IR BIOPSY SOFT TISSUE/SUPERFICIAL  3/14/2025    SC EXCISION HYDROCELE UNILATERAL Right 04/08/2022    Procedure: HYDROCELECTOMY;  Surgeon: Montrell Mancuso MD;  Location: [facility];  Service: Urology    SC EXCISION HYDROCELE UNILATERAL Right 11/16/2022    Procedure: HYDROCELECTOMY;  Surgeon: Montrell Mancuso MD;  Location: [facility];  Service: Urology    TONSILLECTOMY     [3]   Family History  Problem Relation Name Age of Onset    Heart disease Mother Lacey Chavez     Cancer Mother Lacey Chavez         skin melonoma/mastectomy    Heart disease Father Torsten Chavez     Hypertension Father Torsten Chavez     Mitral valve prolapse Sister      Diabetes type II Maternal Grandmother do not know     Colon cancer Neg Hx     [4]   Social History  Tobacco Use    Smoking status: Never    Smokeless tobacco: Never   Vaping Use    Vaping status: Never Used   Substance and Sexual Activity    Alcohol use: Yes     Alcohol/week: 4.0 standard drinks of alcohol     Types: 2 Glasses of wine, 1 Cans of beer, 1 Shots of liquor per week      Comment: 2-3 glass of wine or  beer a week    Drug use: Never    Sexual activity: Not Currently     Partners: Female     Birth control/protection: None   [5]   Current Outpatient Medications on File Prior to Visit   Medication Sig    acetaminophen (TYLENOL) 500 mg tablet Take 500 mg by mouth daily at bedtime    Aspirin 81 MG CAPS     Cyanocobalamin (VITAMIN B-12 PO) Take 1,000 mcg by mouth in the morning and 1,000 mcg in the evening.    finasteride (PROSCAR) 5 mg tablet Take 1 tablet (5 mg total) by mouth daily    fluticasone (FLONASE) 50 mcg/act nasal spray 2 sprays into each nostril daily    furosemide (LASIX) 20 mg tablet Take 1 tablet (20 mg total) by mouth daily    Jardiance 25 MG TABS Take 1 tablet (25 mg total) by mouth daily    metFORMIN (GLUCOPHAGE) 500 mg tablet TAKE TWO TABLETS BY MOUTH TWICE A DAY    metoprolol succinate (TOPROL-XL) 25 mg 24 hr tablet TAKE ONE TABLET BY MOUTH EVERY DAY    niacin 500 mg tablet Take 500 mg by mouth in the morning and 500 mg at noon and 500 mg in the evening and 500 mg before bedtime.    NON FORMULARY 25 mcg Vitamin D    Omega-3 Fatty Acids (FISH OIL BURP-LESS PO) Take by mouth    PROBIOTIC PRODUCT PO Take 111 mg by mouth in the morning.    repaglinide (PRANDIN) 1 mg tablet Take 1 tablet (1 mg total) by mouth 3 (three) times a day before meals    rosuvastatin (CRESTOR) 10 MG tablet TAKE ONE TABLET BY MOUTH ONCE DAILY    simethicone (MYLICON) 125 MG chewable tablet Chew 125 mg every 6 (six) hours as needed for flatulence    sitaGLIPtin (JANUVIA) 100 mg tablet Take 1 tablet (100 mg total) by mouth daily    tamsulosin (FLOMAX) 0.4 mg TAKE ONE CAPSULE BY MOUTH EVERY DAY WITH DINNER    Tart Pan (Tart Cherry Ultra) 1200 MG CAPS     Turmeric 500 MG CAPS     Wheat Dextrin (BENEFIBER DRINK MIX PO) Take by mouth    [DISCONTINUED] benzonatate (TESSALON) 200 MG capsule Take 1 capsule (200 mg total) by mouth 3 (three) times a day as needed for cough (Patient not taking: Reported on  7/21/2025)    [DISCONTINUED] Cholecalciferol 125 MCG (5000 UT) TABS Take by mouth (Patient not taking: Reported on 7/21/2025)    [DISCONTINUED] ketoconazole (NIZORAL) 2 % cream Apply topically in the morning. (Patient not taking: Reported on 7/21/2025)    [DISCONTINUED] mometasone (ELOCON) 0.1 % cream Apply topically daily (Patient not taking: Reported on 7/21/2025)

## 2025-08-18 DIAGNOSIS — E11.65 TYPE 2 DIABETES MELLITUS WITH HYPERGLYCEMIA, WITHOUT LONG-TERM CURRENT USE OF INSULIN (HCC): ICD-10-CM

## 2025-08-19 RX ORDER — REPAGLINIDE 1 MG/1
1 TABLET ORAL
Qty: 270 TABLET | Refills: 1 | Status: SHIPPED | OUTPATIENT
Start: 2025-08-19

## (undated) DEVICE — TUBING SUCTION 5MM X 12 FT

## (undated) DEVICE — SUT VICRYL 3-0 SH 27 IN J416H

## (undated) DEVICE — SUT CHROMIC 4-0 PS-2 18 IN 1637G

## (undated) DEVICE — ADHESIVE SKIN HIGH VISCOSITY EXOFIN 1ML

## (undated) DEVICE — ROSEBUD DISSECTORS: Brand: DEROYAL

## (undated) DEVICE — RAZOR STERILE

## (undated) DEVICE — BETHLEHEM UNIVERSAL MINOR GEN: Brand: CARDINAL HEALTH

## (undated) DEVICE — NEEDLE 25G X 1 1/2

## (undated) DEVICE — SYRINGE 10ML LL

## (undated) DEVICE — MEDI-VAC YANK SUCT HNDL W/TPRD BULBOUS TIP: Brand: CARDINAL HEALTH

## (undated) DEVICE — GLOVE INDICATOR PI UNDERGLOVE SZ 7 BLUE

## (undated) DEVICE — SUT VICRYL 2-0 SH 27 IN UNDYED J417H

## (undated) DEVICE — CHLORAPREP HI-LITE 26ML ORANGE

## (undated) DEVICE — VIAL DECANTER

## (undated) DEVICE — SPECIMEN CONTAINER STERILE PEEL PACK

## (undated) DEVICE — SUT CHROMIC 3-0 SH 27 IN G122H

## (undated) DEVICE — GAUZE SPONGES,USP TYPE VII GAUZE, 12 PLY: Brand: CURITY

## (undated) DEVICE — GLOVE SRG BIOGEL 7

## (undated) DEVICE — INTENDED FOR TISSUE SEPARATION, AND OTHER PROCEDURES THAT REQUIRE A SHARP SURGICAL BLADE TO PUNCTURE OR CUT.: Brand: BARD-PARKER SAFETY BLADES SIZE 15, STERILE

## (undated) DEVICE — GAUZE SPONGES,16 PLY: Brand: CURITY

## (undated) DEVICE — PLUMEPEN PRO 10FT

## (undated) DEVICE — SPINAL NEEDLE TOUHY 14 G

## (undated) DEVICE — PAD GROUNDING ADULT